# Patient Record
Sex: MALE | Race: WHITE | Employment: OTHER | URBAN - METROPOLITAN AREA
[De-identification: names, ages, dates, MRNs, and addresses within clinical notes are randomized per-mention and may not be internally consistent; named-entity substitution may affect disease eponyms.]

---

## 2017-01-02 ENCOUNTER — ALLIED HEALTH/NURSE VISIT (OUTPATIENT)
Dept: PEDIATRICS | Facility: CLINIC | Age: 82
End: 2017-01-02
Payer: COMMERCIAL

## 2017-01-02 VITALS — DIASTOLIC BLOOD PRESSURE: 74 MMHG | SYSTOLIC BLOOD PRESSURE: 145 MMHG | HEART RATE: 81 BPM

## 2017-01-02 DIAGNOSIS — I10 HTN (HYPERTENSION): Primary | ICD-10-CM

## 2017-01-02 PROCEDURE — 99207 ZZC NO CHARGE NURSE ONLY: CPT

## 2017-01-02 NOTE — PROGRESS NOTES
In for blood pressure check.    Today's reading: /74 mmHg  Pulse 81     History   Smoking status     Never Smoker    Smokeless tobacco     Never Used       Current Outpatient Prescriptions   Medication Sig     ACCU-CHEK SMARTVIEW test strip TEST BLOOD SUGAR TWICE DAILY  OR AS DIRECTED     blood glucose monitoring (NO BRAND SPECIFIED) test strip 1 strip by In Vitro route 2 times daily     chlorthalidone (HYGROTON) 25 MG tablet Take 1 tablet (25 mg) by mouth daily     metFORMIN (GLUCOPHAGE-XR) 500 MG 24 hr tablet Take 1 tablet (500 mg) by mouth 2 times daily (with meals)     chlorthalidone (HYGROTON) 25 MG tablet Take 1 tablet (25 mg) by mouth daily     tamsulosin (FLOMAX) 0.4 MG capsule Take 1 capsule (0.4 mg) by mouth daily     levothyroxine (SYNTHROID/LEVOTHROID) 50 MCG tablet TAKE 1 TABLET EVERY DAY     order for DME Equipment being ordered: HAPAD Metatarsal pad, QTY:2     capsaicin (ZOSTRIX) 0.025 % CREA To feet 2-3 times daily as needed.     ciclopirox (LOPROX) 0.77 % cream Apply topically 2 times daily To toenails.     blood glucose monitoring (ACCU-CHEK FASTCLIX) lancets TEST BLOOD SUGAR TWICE DAILY  OR AS DIRECTED     losartan (COZAAR) 100 MG tablet Take 1 tablet (100 mg) by mouth daily     blood glucose calibration (ACCU-CHEK SMARTVIEW CONTROL) solution 1 Bottle as needed Use to calibrate blood glucose monitor as needed as directed.     blood glucose monitoring (ACCU-CHEK MILADYS SMARTVIEW) meter device kit Use to test blood sugar 2 times daily or as directed.     STATIN NOT PRESCRIBED, INTENTIONAL, 1 each continuous Statin not prescribed intentionally due to does not meet [place] criteria and Other: LDL at goal without the statin.     aspirin 81 MG tablet Take 1 tablet (81 mg) by mouth daily     Omega-3 Fatty Acids (FISH OIL PO) Take  by mouth daily.     No current facility-administered medications for this visit.        He is having his blood pressure monitored because it has been mildly elevated  and has not been on medication.    Milo has had the following symptoms: none    Patient was informed that his two readings from day will be sent to Dr. Ibarra for review and he will receive a call with Dr. Ibarra's instructions. Patient states that he is leaving for [place] on Wednesday.      Estefanía Ocampo CMA

## 2017-03-01 ENCOUNTER — TELEPHONE (OUTPATIENT)
Dept: PEDIATRICS | Facility: CLINIC | Age: 82
End: 2017-03-01

## 2017-03-01 DIAGNOSIS — I10 HYPERTENSION GOAL BP (BLOOD PRESSURE) < 140/90: ICD-10-CM

## 2017-03-01 DIAGNOSIS — G47.33 OSA (OBSTRUCTIVE SLEEP APNEA): ICD-10-CM

## 2017-03-01 DIAGNOSIS — E03.4 HYPOTHYROIDISM DUE TO ACQUIRED ATROPHY OF THYROID: ICD-10-CM

## 2017-03-01 DIAGNOSIS — N40.0 BPH (BENIGN PROSTATIC HYPERPLASIA): ICD-10-CM

## 2017-03-01 NOTE — TELEPHONE ENCOUNTER
[facility] Call Center    Phone Message    Name of Caller: Milo     Phone Number:     [phone]-     Best time to return call: Any    May a detailed message be left on voicemail: yes    Relation to patient: Self    Reason for Call: Medication Refill Request    Has the patient contacted the pharmacy for the refill? Yes   Name of medication being requested: hydrochlorothiazide (HYDRODIURIL) 25 MG tablet [46883] (Order [id number]); tamsulosin (FLOMAX) 0.4 MG capsule [690399] (Order [id number]) levothyroxine (SYNTHROID/LEVOTHROID) 50 MCG tablet [85072] (Order [id number]) losartan (COZAAR) 100 MG tablet [08156] (Order [id number])     Provider who prescribed the medication: Dr. Ibarra  Ph[facility]acy: [facility]   Date medication is needed: asap       Question or concern regarding medication   Prescription Clarification:   Name of Medication: hydrochlorothiazide (HYDRODIURIL) 25 MG tablet [99468] (Order [id number])  Prescribing Provider: Dr. Ibarra   Pharmacy: [facility]    What on the order needs clarification?     Is patient symptomatic? No. Describe question or concern: Patient has questions about hydrochlorothiazide (HYDRODIURIL) 25 MG tablet [36302] (Order [id number])- He is currently taking the Rx and wondering if he should continue, if so will need refills. Please advise.       Action Taken: Message routed to:  Primary Care p [phone]

## 2017-03-03 RX ORDER — LOSARTAN POTASSIUM 100 MG/1
100 TABLET ORAL DAILY
Qty: 90 TABLET | Refills: 0 | Status: SHIPPED | OUTPATIENT
Start: 2017-03-03 | End: 2017-05-02

## 2017-03-03 RX ORDER — LEVOTHYROXINE SODIUM 50 UG/1
TABLET ORAL
Qty: 90 TABLET | Refills: 1 | Status: SHIPPED | OUTPATIENT
Start: 2017-03-03 | End: 2017-07-10

## 2017-03-03 RX ORDER — TAMSULOSIN HYDROCHLORIDE 0.4 MG/1
0.4 CAPSULE ORAL DAILY
Qty: 90 CAPSULE | Refills: 1 | Status: SHIPPED | OUTPATIENT
Start: 2017-03-03 | End: 2017-07-12

## 2017-03-03 NOTE — TELEPHONE ENCOUNTER
Spoke to patient- He reports that he has been taking both HCTZ and Chlorthalidone for about 1 week. He will discontinue the HCTZ. He will check his BP and call us with a reading next week as he was due for a re check in 2 weeks from last visit. He is currently in [place] until May.     Message will be postponed to follow up next week for a home BP reading.     Viki Chowdhury RN, AE-C

## 2017-03-03 NOTE — TELEPHONE ENCOUNTER
Due for a BP check. He should not be taking HCTZ but using chlorthalidone (HYGROTON) 25 MG tablet instead.     Called patient and he was unavailable. I will try back shortly.     Potassium   Date Value Ref Range Status   07/05/2016 4.0 3.4 - 5.3 mmol/L Final     Creatinine   Date Value Ref Range Status   07/05/2016 1.20 0.66 - 1.25 mg/dL Final     BP Readings from Last 3 Encounters:   01/02/17 145/74   12/19/16 144/72   11/07/16 181/74

## 2017-03-06 PROBLEM — G47.33 OSA (OBSTRUCTIVE SLEEP APNEA): Status: ACTIVE | Noted: 2017-03-06

## 2017-03-06 NOTE — TELEPHONE ENCOUNTER
Milo called this am stating he is in [place] and his CPAP machine .  He is wondering if we can send over a copy of his last sleep study?  I could not find a copy of a sleep study so I will let him know when I call him back.   He also needs a written order from Dr. Ibarra for a new CPAP machine.      Phone # [phone]    Routed to Dr. Fred Mendes CMA

## 2017-03-06 NOTE — TELEPHONE ENCOUNTER
Patient returned call to clinic and advised of provider note. Patient states he can't recall where he was seen for sleep study. It was probably done sometime around 1999. No records in chart and patient states he hasn't been seen for any further evaluation since he was given machine. Patient will try to find info of where he was seen.  Ernst Calvillo, CMA

## 2017-03-06 NOTE — TELEPHONE ENCOUNTER
We do not have DEMOND as a diagnosis for him. Where and when he was diagnosed? Typically the sleep center providers writes the CPAP because they have the sleep study result and the CPAP setting and writes for the machine. Can he contact the sleep center where he was diagnosed and treated?

## 2017-03-08 NOTE — TELEPHONE ENCOUNTER
Tried calling patient to see if he was able to get help with ordering a new CPAP machine.  Phone line busy.  Will try call again later.    Viviana Mendes CMA

## 2017-03-08 NOTE — TELEPHONE ENCOUNTER
I called patient to see if he was able to get a CPAP machine.  He states he called [employer] today and they gave him the name of a provider to which he has an appointment with on 03/14/17.  He states he was told by [employer] he has to see a provider face to face.  Patient will call back if further concerns.    Viviana Mendes CMA

## 2017-05-02 DIAGNOSIS — I10 HYPERTENSION GOAL BP (BLOOD PRESSURE) < 140/90: ICD-10-CM

## 2017-05-03 RX ORDER — LOSARTAN POTASSIUM 100 MG/1
TABLET ORAL
Qty: 60 TABLET | Refills: 0 | Status: SHIPPED | OUTPATIENT
Start: 2017-05-03 | End: 2017-07-12

## 2017-05-03 RX ORDER — CHLORTHALIDONE 25 MG/1
TABLET ORAL
Qty: 60 TABLET | Refills: 0 | Status: SHIPPED | OUTPATIENT
Start: 2017-05-03 | End: 2017-12-22

## 2017-05-03 NOTE — TELEPHONE ENCOUNTER
losartan (COZAAR) 100 MG tablet  Last Written Prescription Date: 3/3/2017  Last Fill Quantity: 90, # refills: 0  Last Office Visit with [facility], [facility] or [facility] prescribing provider: 12/19/2016     Potassium   Date Value Ref Range Status   07/05/2016 4.0 3.4 - 5.3 mmol/L Final     Creatinine   Date Value Ref Range Status   07/05/2016 1.20 0.66 - 1.25 mg/dL Final     BP Readings from Last 3 Encounters:   01/02/17 145/74   12/19/16 144/72   11/07/16 181/74       chlorthalidone (HYGROTON) 25 MG tablet  Last Written Prescription Date: 12/19/2016  Last Fill Quantity: 90, # refills: 1  Last Office Visit with [facility], [facility] or [facility] prescribing provider: 12/19/2016     Potassium   Date Value Ref Range Status   07/05/2016 4.0 3.4 - 5.3 mmol/L Final     Creatinine   Date Value Ref Range Status   07/05/2016 1.20 0.66 - 1.25 mg/dL Final     BP Readings from Last 3 Encounters:   01/02/17 145/74   12/19/16 144/72   11/07/16 181/74     Refilled per [facility] protocol.    Azucena Castle RN

## 2017-06-26 DIAGNOSIS — E11.9 CONTROLLED TYPE 2 DIABETES MELLITUS WITHOUT COMPLICATION, WITHOUT LONG-TERM CURRENT USE OF INSULIN (H): ICD-10-CM

## 2017-06-26 NOTE — LETTER
June 27, 2017      Milo Matthewabiliomarlyn  [address] [address]              Dear Milo,    Thank you for your refill request. We recently received a call from your pharmacy requesting a refill of your medication. We have provided a 30 day refill of your medication, metFORMIN (GLUCOPHAGE-XR) 500 MG 24 hr tablet, as requested.      However, our records show it is time for laboratory monitoring and diabetes follow up visit with Dr. Ibarra to ensure your treatment is safe and effective for you.  Labs: A1C    Regular follow up, including visits with your health care provider and laboratory monitoring are necessary to make sure your medication is working properly.    You can reach us at [phone] or schedule online via [employer].    Thank you for taking an active role in your healthcare.    Sincerely,  Azucena Castle RN,   [facility]            Sincerely,  Laurence Ibarra MD

## 2017-06-27 RX ORDER — METFORMIN HCL 500 MG
TABLET, EXTENDED RELEASE 24 HR ORAL
Qty: 60 TABLET | Refills: 0 | Status: SHIPPED | OUTPATIENT
Start: 2017-06-27 | End: 2017-07-12

## 2017-06-27 NOTE — TELEPHONE ENCOUNTER
metFORMIN (GLUCOPHAGE-XR) 500 MG 24 hr tablet     Last Written Prescription Date: 12/19/2016  Last Fill Quantity: 180, # refills: 1  Last Office Visit with [facility], [facility] or [facility] prescribing provider:  12/19/2016        BP Readings from Last 3 Encounters:   01/02/17 145/74   12/19/16 144/72   11/07/16 181/74     Lab Results   Component Value Date    MICROL 32 07/05/2016     Lab Results   Component Value Date    UMALCR 20.79 07/05/2016     Creatinine   Date Value Ref Range Status   07/05/2016 1.20 0.66 - 1.25 mg/dL Final   ]  GFR Estimate   Date Value Ref Range Status   07/05/2016 58 (L) >60 mL/min/1.7m2 Final     Comment:     Non  GFR Calc   06/22/2015 68 >60 mL/min/1.7m2 Final     Comment:     Non  GFR Calc   06/23/2014 66 >60 mL/min/1.7m2 Final     GFR Estimate If Black   Date Value Ref Range Status   07/05/2016 71 >60 mL/min/1.7m2 Final     Comment:      GFR Calc   06/22/2015 83 >60 mL/min/1.7m2 Final     Comment:      GFR Calc   06/23/2014 79 >60 mL/min/1.7m2 Final     Lab Results   Component Value Date    CHOL 137 12/19/2016     Lab Results   Component Value Date    HDL 35 12/19/2016     Lab Results   Component Value Date    LDL 52 12/19/2016     Lab Results   Component Value Date    TRIG 251 12/19/2016     Lab Results   Component Value Date    CHOLHDLRATIO 3.8 06/22/2015     Lab Results   Component Value Date    AST 47 06/23/2014     Lab Results   Component Value Date    ALT 49 07/05/2016     Lab Results   Component Value Date    A1C 5.8 10/17/2016    A1C 6.7 07/05/2016    A1C 6.3 09/29/2015    A1C 5.8 06/22/2015    A1C 5.7 12/16/2014     Potassium   Date Value Ref Range Status   07/05/2016 4.0 3.4 - 5.3 mmol/L Final     Refilled 1 month per [facility] protocol. Letter sent to patient indicating this as well as the need to schedule office visit with Dr. Ibarra and laboratory monitoring for A1C    Azucena Castle RN

## 2017-07-03 DIAGNOSIS — E11.9 DIABETES TYPE 2, CONTROLLED (H): ICD-10-CM

## 2017-07-05 RX ORDER — LANCETS
EACH MISCELLANEOUS
Qty: 204 EACH | Refills: 0 | Status: SHIPPED | OUTPATIENT
Start: 2017-07-05 | End: 2017-12-27

## 2017-07-05 NOTE — TELEPHONE ENCOUNTER
blood glucose monitoring (ACCU-CHEK FASTCLIX) lancets      Last Written Prescription Date: 5/3/2016  Last Fill Quantity: 3 Box,  # refills: 3   Last Office Visit with [facility], [facility] or [facility] prescribing provider: 12/19/2016                                         Next 5 appointments (look out 90 days)     Jul 12, 2017  1:40 PM CDT   Return Visit with Laurence Ibarra MD   [facility]    [address] [place] [address] [phone]                Refilled per [facility] protocol.    Azucena Castle RN

## 2017-07-10 DIAGNOSIS — N40.0 BENIGN PROSTATIC HYPERPLASIA WITHOUT LOWER URINARY TRACT SYMPTOMS: ICD-10-CM

## 2017-07-10 DIAGNOSIS — E03.4 HYPOTHYROIDISM DUE TO ACQUIRED ATROPHY OF THYROID: ICD-10-CM

## 2017-07-12 ENCOUNTER — OFFICE VISIT (OUTPATIENT)
Dept: PEDIATRICS | Facility: CLINIC | Age: 82
End: 2017-07-12
Payer: COMMERCIAL

## 2017-07-12 VITALS
HEIGHT: 68 IN | WEIGHT: 182 LBS | HEART RATE: 81 BPM | TEMPERATURE: 97.6 F | DIASTOLIC BLOOD PRESSURE: 66 MMHG | SYSTOLIC BLOOD PRESSURE: 128 MMHG | BODY MASS INDEX: 27.58 KG/M2 | OXYGEN SATURATION: 96 %

## 2017-07-12 DIAGNOSIS — I10 HYPERTENSION GOAL BP (BLOOD PRESSURE) < 140/90: ICD-10-CM

## 2017-07-12 DIAGNOSIS — N40.0 BENIGN PROSTATIC HYPERPLASIA WITHOUT LOWER URINARY TRACT SYMPTOMS: ICD-10-CM

## 2017-07-12 DIAGNOSIS — D64.9 ANEMIA, UNSPECIFIED TYPE: ICD-10-CM

## 2017-07-12 DIAGNOSIS — E11.9 CONTROLLED TYPE 2 DIABETES MELLITUS WITHOUT COMPLICATION, WITHOUT LONG-TERM CURRENT USE OF INSULIN (H): ICD-10-CM

## 2017-07-12 DIAGNOSIS — N18.30 CONTROLLED TYPE 2 DIABETES MELLITUS WITH STAGE 3 CHRONIC KIDNEY DISEASE, WITHOUT LONG-TERM CURRENT USE OF INSULIN (H): Primary | ICD-10-CM

## 2017-07-12 DIAGNOSIS — C61 PROSTATE CANCER (H): ICD-10-CM

## 2017-07-12 DIAGNOSIS — E11.22 CONTROLLED TYPE 2 DIABETES MELLITUS WITH STAGE 3 CHRONIC KIDNEY DISEASE, WITHOUT LONG-TERM CURRENT USE OF INSULIN (H): Primary | ICD-10-CM

## 2017-07-12 DIAGNOSIS — E11.42 DIABETIC POLYNEUROPATHY ASSOCIATED WITH TYPE 2 DIABETES MELLITUS (H): ICD-10-CM

## 2017-07-12 DIAGNOSIS — E03.4 HYPOTHYROIDISM DUE TO ACQUIRED ATROPHY OF THYROID: ICD-10-CM

## 2017-07-12 LAB
ALBUMIN UR-MCNC: 10 MG/DL
ANION GAP SERPL CALCULATED.3IONS-SCNC: 8 MMOL/L (ref 3–14)
APPEARANCE UR: CLEAR
BILIRUB UR QL STRIP: NEGATIVE
BUN SERPL-MCNC: 29 MG/DL (ref 7–30)
CALCIUM SERPL-MCNC: 9.2 MG/DL (ref 8.5–10.1)
CHLORIDE SERPL-SCNC: 103 MMOL/L (ref 94–109)
CO2 SERPL-SCNC: 28 MMOL/L (ref 20–32)
COLOR UR AUTO: YELLOW
CREAT SERPL-MCNC: 1.32 MG/DL (ref 0.66–1.25)
CREAT UR-MCNC: 176 MG/DL
ERYTHROCYTE [DISTWIDTH] IN BLOOD BY AUTOMATED COUNT: 14.7 % (ref 10–15)
FERRITIN SERPL-MCNC: 412 NG/ML (ref 26–388)
GFR SERPL CREATININE-BSD FRML MDRD: 52 ML/MIN/1.7M2
GLUCOSE SERPL-MCNC: 177 MG/DL (ref 70–99)
GLUCOSE UR STRIP-MCNC: 70 MG/DL
HBA1C MFR BLD: 5.7 % (ref 4.3–6)
HCT VFR BLD AUTO: 34.3 % (ref 40–53)
HGB BLD-MCNC: 11.8 G/DL (ref 13.3–17.7)
HGB BLD-MCNC: 12.2 G/DL (ref 13.3–17.7)
HGB UR QL STRIP: NEGATIVE
IRON SATN MFR SERPL: 33 % (ref 15–46)
IRON SERPL-MCNC: 88 UG/DL (ref 35–180)
KETONES UR STRIP-MCNC: NEGATIVE MG/DL
LEUKOCYTE ESTERASE UR QL STRIP: NEGATIVE
MCH RBC QN AUTO: 33.1 PG (ref 26.5–33)
MCHC RBC AUTO-ENTMCNC: 35.6 G/DL (ref 31.5–36.5)
MCV RBC AUTO: 93 FL (ref 78–100)
MICROALBUMIN UR-MCNC: 25 MG/L
MICROALBUMIN/CREAT UR: 13.98 MG/G CR (ref 0–17)
NITRATE UR QL: NEGATIVE
NON-SQ EPI CELLS #/AREA URNS LPF: ABNORMAL /LPF
PH UR STRIP: 7 PH (ref 5–7)
PLATELET # BLD AUTO: 161 10E9/L (ref 150–450)
POTASSIUM SERPL-SCNC: 3.8 MMOL/L (ref 3.4–5.3)
PSA SERPL-MCNC: NORMAL UG/L (ref 0–4)
RBC # BLD AUTO: 3.69 10E12/L (ref 4.4–5.9)
RBC #/AREA URNS AUTO: ABNORMAL /HPF (ref 0–2)
RETICS # AUTO: 156.5 10E9/L (ref 25–95)
RETICS/RBC NFR AUTO: 4.2 % (ref 0.5–2)
SODIUM SERPL-SCNC: 139 MMOL/L (ref 133–144)
SP GR UR STRIP: 1.02 (ref 1–1.03)
TIBC SERPL-MCNC: 264 UG/DL (ref 240–430)
TSH SERPL DL<=0.005 MIU/L-ACNC: 2.44 MU/L (ref 0.4–4)
URN SPEC COLLECT METH UR: ABNORMAL
UROBILINOGEN UR STRIP-MCNC: NORMAL MG/DL (ref 0–2)
VIT B12 SERPL-MCNC: 494 PG/ML (ref 193–986)
WBC # BLD AUTO: 6 10E9/L (ref 4–11)
WBC #/AREA URNS AUTO: ABNORMAL /HPF (ref 0–2)

## 2017-07-12 PROCEDURE — 83036 HEMOGLOBIN GLYCOSYLATED A1C: CPT | Performed by: INTERNAL MEDICINE

## 2017-07-12 PROCEDURE — 81001 URINALYSIS AUTO W/SCOPE: CPT | Performed by: INTERNAL MEDICINE

## 2017-07-12 PROCEDURE — 99214 OFFICE O/P EST MOD 30 MIN: CPT | Performed by: INTERNAL MEDICINE

## 2017-07-12 PROCEDURE — 83550 IRON BINDING TEST: CPT | Performed by: INTERNAL MEDICINE

## 2017-07-12 PROCEDURE — 80048 BASIC METABOLIC PNL TOTAL CA: CPT | Performed by: INTERNAL MEDICINE

## 2017-07-12 PROCEDURE — 85018 HEMOGLOBIN: CPT | Performed by: INTERNAL MEDICINE

## 2017-07-12 PROCEDURE — 84153 ASSAY OF PSA TOTAL: CPT | Performed by: INTERNAL MEDICINE

## 2017-07-12 PROCEDURE — 36415 COLL VENOUS BLD VENIPUNCTURE: CPT | Performed by: INTERNAL MEDICINE

## 2017-07-12 PROCEDURE — 82043 UR ALBUMIN QUANTITATIVE: CPT | Performed by: INTERNAL MEDICINE

## 2017-07-12 PROCEDURE — 82728 ASSAY OF FERRITIN: CPT | Performed by: INTERNAL MEDICINE

## 2017-07-12 PROCEDURE — 84443 ASSAY THYROID STIM HORMONE: CPT | Performed by: INTERNAL MEDICINE

## 2017-07-12 PROCEDURE — 82607 VITAMIN B-12: CPT | Performed by: INTERNAL MEDICINE

## 2017-07-12 PROCEDURE — 83540 ASSAY OF IRON: CPT | Performed by: INTERNAL MEDICINE

## 2017-07-12 PROCEDURE — 85045 AUTOMATED RETICULOCYTE COUNT: CPT | Performed by: INTERNAL MEDICINE

## 2017-07-12 PROCEDURE — 85027 COMPLETE CBC AUTOMATED: CPT | Performed by: INTERNAL MEDICINE

## 2017-07-12 RX ORDER — METFORMIN HCL 500 MG
TABLET, EXTENDED RELEASE 24 HR ORAL
Qty: 180 TABLET | Refills: 1 | Status: SHIPPED | OUTPATIENT
Start: 2017-07-12 | End: 2017-11-25

## 2017-07-12 RX ORDER — CHLORTHALIDONE 25 MG/1
25 TABLET ORAL DAILY
Qty: 90 TABLET | Refills: 3 | Status: SHIPPED | OUTPATIENT
Start: 2017-07-12 | End: 2018-10-21

## 2017-07-12 RX ORDER — LOSARTAN POTASSIUM 100 MG/1
TABLET ORAL
Qty: 90 TABLET | Refills: 3 | Status: SHIPPED | OUTPATIENT
Start: 2017-07-12 | End: 2018-06-01

## 2017-07-12 RX ORDER — LEVOTHYROXINE SODIUM 50 UG/1
TABLET ORAL
Qty: 90 TABLET | Refills: 1 | Status: CANCELLED | OUTPATIENT
Start: 2017-07-12

## 2017-07-12 RX ORDER — TAMSULOSIN HYDROCHLORIDE 0.4 MG/1
0.4 CAPSULE ORAL DAILY
Qty: 90 CAPSULE | Refills: 3 | Status: SHIPPED | OUTPATIENT
Start: 2017-07-12 | End: 2018-09-26

## 2017-07-12 NOTE — MR AVS SNAPSHOT
After Visit Summary   7/12/2017    Milo Lozano    MRN: [id number]           Patient Information     Date Of Birth          1935        Visit Information        Provider Department      7/12/2017 1:40 PM Laurence Ibarra MD [facility]        Today's Diagnoses     Controlled type 2 diabetes mellitus without complication, without long-term current use of insulin (H)    -  1    Hypertension goal BP (blood pressure) < 140/90        Prostate cancer (H)        Hypothyroidism due to acquired atrophy of thyroid        Benign prostatic hyperplasia without lower urinary tract symptoms        Anemia, unspecified type          Care Instructions    Make appointment(s) for:   -- get labs done today  -- Stop by lab to get testing kit for blood loss   -- diabetes follow up in 6 months.         Medication(s) prescribed today:    Orders Placed This Encounter   Medications     metFORMIN (GLUCOPHAGE-XR) 500 MG 24 hr tablet     Sig: TAKE 1 TABLET TWICE DAILY WITH MEALS     Dispense:  180 tablet     Refill:  1     losartan (COZAAR) 100 MG tablet     Sig: TAKE 1 TABLET EVERY DAY     Dispense:  90 tablet     Refill:  3     tamsulosin (FLOMAX) 0.4 MG capsule     Sig: Take 1 capsule (0.4 mg) by mouth daily     Dispense:  90 capsule     Refill:  3     chlorthalidone (HYGROTON) 25 MG tablet     Sig: Take 1 tablet (25 mg) by mouth daily     Dispense:  90 tablet     Refill:  3                     Follow-ups after your visit        Future tests that were ordered for you today     Open Future Orders        Priority Expected Expires Ordered    Occult blood fecal HGB immuno Routine  8/11/2017 7/12/2017            Who to contact     If you have questions or need follow up information about today's clinic visit or your schedule please contact [facility] directly at [phone].  Normal or non-critical lab and imaging results will be communicated to you by MyChart, letter or phone within 4 business  "days after the clinic has received the results. If you do not hear from us within 7 days, please contact the clinic through [employer] or phone. If you have a critical or abnormal lab result, we will notify you by phone as soon as possible.  Submit refill requests through [employer] or call your pharmacy and they will forward the refill request to us. Please allow 3 business days for your refill to be completed.          Additional Information About Your Visit        [employer] Information     [employer] is an electronic gateway that provides easy, online access to your medical records. With [employer], you can request a clinic appointment, read your test results, renew a prescription or communicate with your care team.     To sign up for [employer] visit the website at [email or web address]   You will be asked to enter the access code listed below, as well as some personal information. Please follow the directions to create your username and password.     Your access code is: [email or web address]  Expires: 10/10/2017  1:53 PM     Your access code will  in 90 days. If you need help or a new code, please contact your [facility] or call [phone] for assistance.        Care EveryWhere ID     This is your Care EveryWhere ID. This could be used by other organizations to access your [facility] medical records  [id number]        Your Vitals Were     Pulse Temperature Height Pulse Oximetry BMI (Body Mass Index)       81 97.6  F (36.4  C) (Temporal) 5' 8\" (1.727 m) 96% 27.67 kg/m2        Blood Pressure from Last 3 Encounters:   17 128/66   17 145/74   16 144/72    Weight from Last 3 Encounters:   17 182 lb (82.6 kg)   16 187 lb (84.8 kg)   16 188 lb (85.3 kg)              We Performed the Following     Albumin Random Urine Quantitative     Basic metabolic panel     CBC with platelets     Hemoglobin A1c     Iron and iron binding capacity     PSA, tumor marker     " Reticulocyte count     TSH with free T4 reflex     UA with Microscopic reflex to Culture (Emely Trammell; [facility])     Vitamin B12          Today's Medication Changes          These changes are accurate as of: 7/12/17  1:53 PM.  If you have any questions, ask your nurse or doctor.               These medicines have changed or have updated prescriptions.        Dose/Directions    losartan 100 MG tablet   Commonly known as:  COZAAR   This may have changed:  See the new instructions.   Used for:  Hypertension goal BP (blood pressure) < 140/90   Changed by:  Laurence Ibarra MD        TAKE 1 TABLET EVERY DAY   Quantity:  90 tablet   Refills:  3       metFORMIN 500 MG 24 hr tablet   Commonly known as:  GLUCOPHAGE-XR   This may have changed:  See the new instructions.   Used for:  Controlled type 2 diabetes mellitus without complication, without long-term current use of insulin (H)   Changed by:  Laurence Ibarra MD        TAKE 1 TABLET TWICE DAILY WITH MEALS   Quantity:  180 tablet   Refills:  1            Where to get your medicines      These medications were sent to [facility] - [facility] [address]  [address], [facility] [address]     Phone:  [phone]     chlorthalidone 25 MG tablet    losartan 100 MG tablet    metFORMIN 500 MG 24 hr tablet    tamsulosin 0.4 MG capsule                Primary Care Provider Office Phone # Fax #    Laurence Ibarra [phone]       [facility] [address]  [facility] [address]        Equal Access to Services     TONNY HANSON AH: Hadii nikole wang hadasho Soomaali, waaxda luqadaha, qaybta kaalmada adekath, tip claudio. So [facility] [phone].    ATENCIÓN: Si habla español, tiene a raygoza disposición servicios gratuitos de asistencia lingüística. Calista al [phone].    We comply with applicable federal civil rights laws and [place] laws. We do not discriminate on the basis of race, color, national  origin, age, disability sex, sexual orientation or gender identity.            Thank you!     Thank you for choosing [facility] [[email or web address]]  for your care. Our goal is always to provide you with excellent care. Hearing back from our patients is one way we can continue to improve our services. Please take a few minutes to complete the written survey that you may receive in the mail after your visit with us. Thank you!             Your Updated Medication List - Protect others around you: Learn how to safely use, store and throw away your medicines at www.disposemymeds.org.          This list is accurate as of: 7/12/17  1:53 PM.  Always use your most recent med list.                   Brand Name Dispense Instructions for use Diagnosis    * ACCU-CHEK SMARTVIEW test strip   Generic drug:  blood glucose monitoring     200 strip    TEST BLOOD SUGAR TWICE DAILY  OR AS DIRECTED    Type 2 diabetes mellitus without complication, without long-term current use of insulin (H)       * blood glucose monitoring test strip    no brand specified    200 strip    1 strip by In Vitro route 2 times daily    Type 2 diabetes mellitus without complication, without long-term current use of insulin (H)       aspirin 81 MG tablet     90 tablet    Take 1 tablet (81 mg) by mouth daily    Type 2 diabetes, HbA1c goal < 7% (H), Hypertension goal BP (blood pressure) < 140/90, CARDIOVASCULAR SCREENING; LDL GOAL LESS THAN 100       blood glucose calibration solution     1 each    1 Bottle as needed Use to calibrate blood glucose monitor as needed as directed.    Type 2 diabetes, HbA1c goal < 7% (H)       blood glucose monitoring lancets     204 each    TEST BLOOD SUGAR TWICE DAILY  OR AS DIRECTED    Diabetes type 2, controlled (H)       blood glucose monitoring meter device kit     1 kit    Use to test blood sugar 2 times daily or as directed.    Type 2 diabetes, HbA1C goal < 8% (H)       capsaicin 0.025 % Crea cream    ZOSTRIX    60 g    To feet  2-3 times daily as needed.    Diabetic neuropathy with neurologic complication (H)       * chlorthalidone 25 MG tablet    HYGROTON    90 tablet    Take 1 tablet (25 mg) by mouth daily    Hypertension goal BP (blood pressure) < 140/90       * chlorthalidone 25 MG tablet    HYGROTON    60 tablet    TAKE 1 TABLET EVERY DAY    Hypertension goal BP (blood pressure) < 140/90       * chlorthalidone 25 MG tablet    HYGROTON    90 tablet    Take 1 tablet (25 mg) by mouth daily    Hypertension goal BP (blood pressure) < 140/90       ciclopirox 0.77 % cream    LOPROX    90 g    Apply topically 2 times daily To toenails.    Dermatophytosis of nail       FISH OIL PO      Take  by mouth daily.        levothyroxine 50 MCG tablet    SYNTHROID/LEVOTHROID    90 tablet    TAKE 1 TABLET EVERY DAY    Hypothyroidism due to acquired atrophy of thyroid       losartan 100 MG tablet    COZAAR    90 tablet    TAKE 1 TABLET EVERY DAY    Hypertension goal BP (blood pressure) < 140/90       metFORMIN 500 MG 24 hr tablet    GLUCOPHAGE-XR    180 tablet    TAKE 1 TABLET TWICE DAILY WITH MEALS    Controlled type 2 diabetes mellitus without complication, without long-term current use of insulin (H)       order for DME     2 Device    Equipment being ordered: HAPAD Metatarsal pad, QTY:2    Diabetic neuropathy with neurologic complication (H)       STATIN NOT PRESCRIBED (INTENTIONAL)     0 each    1 each continuous Statin not prescribed intentionally due to does not meet [place] criteria and Other: LDL at goal without the statin.    CARDIOVASCULAR SCREENING; LDL GOAL LESS THAN 100       tamsulosin 0.4 MG capsule    FLOMAX    90 capsule    Take 1 capsule (0.4 mg) by mouth daily    Benign prostatic hyperplasia without lower urinary tract symptoms       * Notice:  This list has 5 medication(s) that are the same as other medications prescribed for you. Read the directions carefully, and ask your doctor or other care provider to review them with you.

## 2017-07-12 NOTE — PATIENT INSTRUCTIONS
Make appointment(s) for:   -- get labs done today  -- Stop by lab to get testing kit for blood loss   -- diabetes follow up in 6 months.         Medication(s) prescribed today:    Orders Placed This Encounter   Medications     metFORMIN (GLUCOPHAGE-XR) 500 MG 24 hr tablet     Sig: TAKE 1 TABLET TWICE DAILY WITH MEALS     Dispense:  180 tablet     Refill:  1     losartan (COZAAR) 100 MG tablet     Sig: TAKE 1 TABLET EVERY DAY     Dispense:  90 tablet     Refill:  3     tamsulosin (FLOMAX) 0.4 MG capsule     Sig: Take 1 capsule (0.4 mg) by mouth daily     Dispense:  90 capsule     Refill:  3     chlorthalidone (HYGROTON) 25 MG tablet     Sig: Take 1 tablet (25 mg) by mouth daily     Dispense:  90 tablet     Refill:  3

## 2017-07-12 NOTE — PROGRESS NOTES
SUBJECTIVE:                                                    Milo Lozano is a 82 year old male who presents to clinic today for the following health issues:      Diabetes Follow-up      Patient is checking blood sugars: down loaded    Diabetic concerns: blood sugar frequently over 200     Symptoms of hypoglycemia (low blood sugar): none     Paresthesias (numbness or burning in feet) or sores: Yes      Date of last diabetic eye exam: DUE      Amount of exercise or physical activity: 6-7 days/week for an average of less than 15 minutes    Problems taking medications regularly: No    Medication side effects: none    Diet: regular (no restrictions)      Average glucose 173 (151-213).    Getting Relief laser treatment for neuropathy in both feet. Half way through treatment. Not sure if it helps yet.       Current Outpatient Prescriptions on File Prior to Visit:  aspirin 81 MG tablet Take 1 tablet (81 mg) by mouth daily   Omega-3 Fatty Acids (FISH OIL PO) Take  by mouth daily.   levothyroxine (SYNTHROID/LEVOTHROID) 50 MCG tablet TAKE 1 TABLET EVERY DAY   blood glucose monitoring (ACCU-CHEK FASTCLIX) lancets TEST BLOOD SUGAR TWICE DAILY  OR AS DIRECTED   chlorthalidone (HYGROTON) 25 MG tablet TAKE 1 TABLET EVERY DAY   ACCU-CHEK SMARTVIEW test strip TEST BLOOD SUGAR TWICE DAILY  OR AS DIRECTED   blood glucose monitoring (NO BRAND SPECIFIED) test strip 1 strip by In Vitro route 2 times daily   chlorthalidone (HYGROTON) 25 MG tablet Take 1 tablet (25 mg) by mouth daily   order for DME Equipment being ordered: HAPAD Metatarsal pad, QTY:2   capsaicin (ZOSTRIX) 0.025 % CREA To feet 2-3 times daily as needed.   ciclopirox (LOPROX) 0.77 % cream Apply topically 2 times daily To toenails.   blood glucose calibration (ACCU-CHEK SMARTVIEW CONTROL) solution 1 Bottle as needed Use to calibrate blood glucose monitor as needed as directed.   blood glucose monitoring (ACCU-CHEK MILADYS SMARTVIEW) meter device kit Use to test blood  "sugar 2 times daily or as directed.   STATIN NOT PRESCRIBED, INTENTIONAL, 1 each continuous Statin not prescribed intentionally due to does not meet [place] criteria and Other: LDL at goal without the statin.     No current facility-administered medications on file prior to visit.       Problem list, Medication list, Allergies, and Medical/Social/Surgical histories reviewed in [facility] and updated as appropriate.    OBJECTIVE:                                                    /66 (Cuff Size: Adult Regular)  Pulse 81  Temp 97.6  F (36.4  C) (Temporal)  Ht 5' 8\" (1.727 m)  Wt 182 lb (82.6 kg)  SpO2 96%  BMI 27.67 kg/m2    GEN: healthy, alert and no distress  HEENT: unremarkable.  Neck:     supple, no thyromegaly, no cervical lymphadenopathy.   JACKSON:  CTA B/L, no wheezing or crackles.  CV:  RRR no murmur.  Intact distal pulses, good cap refill.  Abd: soft, normal active bowel sounds, non tender, non distended. No mass or organomegaly.   Ext:  no cyanosis, clubbing or edema.         Diagnostic test results:  Component      Latest Ref Rng & Units 7/12/2017   Sodium      133 - 144 mmol/L 139   Potassium      3.4 - 5.3 mmol/L 3.8   Chloride      94 - 109 mmol/L 103   Carbon Dioxide      20 - 32 mmol/L 28   Anion Gap      3 - 14 mmol/L 8   Glucose      70 - 99 mg/dL 177 (H)   Urea Nitrogen      7 - 30 mg/dL 29   Creatinine      0.66 - 1.25 mg/dL 1.32 (H)   GFR Estimate      >60 mL/min/1.7m2 52 (L)   GFR Estimate If Black      >60 mL/min/1.7m2 63   Calcium      8.5 - 10.1 mg/dL 9.2   Creatinine Urine      mg/dL 176   Albumin Urine mg/L      mg/L 25   Albumin Urine mg/g Cr      0 - 17 mg/g Cr 13.98   Hemoglobin      13.3 - 17.7 g/dL 11.8 (L)   Hemoglobin A1C      4.3 - 6.0 % 5.7   PSA      0 - 4 ug/L <0.01 . . .          ASSESSMENT/PLAN:                                                      82 year old male with the following diagnoses and treatment plan:      ICD-10-CM    1. Controlled type 2 diabetes mellitus with " stage 3 chronic kidney disease, without long-term current use of insulin (H) E11.22 Hemoglobin A1c    N18.3 Basic metabolic panel     Albumin Random Urine Quantitative     metFORMIN (GLUCOPHAGE-XR) 500 MG 24 hr tablet   2. Hypertension goal BP (blood pressure) < 140/90 I10 Basic metabolic panel     Albumin Random Urine Quantitative     losartan (COZAAR) 100 MG tablet     chlorthalidone (HYGROTON) 25 MG tablet   3. Prostate cancer (H) C61 PSA, tumor marker   4. Hypothyroidism due to acquired atrophy of thyroid E03.4 TSH with free T4 reflex   5. Benign prostatic hyperplasia without lower urinary tract symptoms N40.0 tamsulosin (FLOMAX) 0.4 MG capsule   6. Anemia, unspecified type D64.9 CBC with platelets     Iron and iron binding capacity     Vitamin B12     Reticulocyte count     Occult blood fecal HGB immuno     UA with Microscopic reflex to Culture ([place]; [facility])     Ferritin       -- stable chronic health issues. Medications refilled.   -- diabetic neuropathy: pt declined prescription medication. Will contact me if the laser treatment is unsuccessful.   -- developing anemia: which is new. No symptoms of GI/ bleed. Will obtain additional labs to evaluate.   -- further recommendation pending on lab results.   -- diabetes follow up in 6 months.     Will call or return to clinic if worsening or symptoms not improving as discussed.  See Patient Instructions.        Laurence Ibarra MD-PhD  [facility]    (Note: Chart documentation was done in part with Dragon Voice Recognition software. Although reviewed after completion, some word and grammatical errors may remain.)

## 2017-07-12 NOTE — TELEPHONE ENCOUNTER
Routing refill request to provider for review/approval because:  Patient has appointment with Dr. Ibarra today at 1:40pm.      levothyroxine     Last Written Prescription Date: 3/3/17  Last Quantity: 90, # refills: 1  Last Office Visit with FM, UMP or  Health prescribing provider: 12/19/16   Next 5 appointments (look out 90 days)     Jul 12, 2017  1:40 PM CDT   [facility] Visit with Laurence [facility] MD   [facility]    [address] [place] [id number] [phone]                   TSH   Date Value Ref Range Status   10/17/2016 2.63 0.40 - 4.00 mU/L Final         Bella Braxton RN, [facility]     ______________________________________________________________________    tamsulosin         Last Written Prescription Date: 3/3/17  Last Fill Quantity: 90, # refills: 1    Last Office Visit with [facility], [facility] or  Health prescribing provider:  12/19/16   Future Office Visit:    Next 5 appointments (look out 90 days)     Jul 12, 2017  1:40 PM CDT   Return Visit with Laurence Ibarra MD   [facility])    [address] [place] [id number] [phone]                  BP Readings from Last 3 Encounters:   01/02/17 145/74   12/19/16 144/72   11/07/16 181/74         Bella Braxton RN, [facility]

## 2017-07-12 NOTE — NURSING NOTE
"Chief Complaint   Patient presents with     Diabetes       Initial /66 (Cuff Size: Adult Regular)  Pulse 81  Temp 97.6  F (36.4  C) (Temporal)  Ht 5' 8\" (1.727 m)  Wt 182 lb (82.6 kg)  SpO2 96%  BMI 27.67 kg/m2 Estimated body mass index is 27.67 kg/(m^2) as calculated from the following:    Height as of this encounter: 5' 8\" (1.727 m).    Weight as of this encounter: 182 lb (82.6 kg).  Medication Reconciliation: complete    "

## 2017-07-13 PROCEDURE — 82274 ASSAY TEST FOR BLOOD FECAL: CPT | Performed by: INTERNAL MEDICINE

## 2017-07-13 RX ORDER — TAMSULOSIN HYDROCHLORIDE 0.4 MG/1
CAPSULE ORAL
Qty: 90 CAPSULE | Refills: 1 | COMMUNITY
Start: 2017-07-13 | End: 2017-07-13

## 2017-07-13 RX ORDER — LEVOTHYROXINE SODIUM 50 UG/1
TABLET ORAL
Qty: 90 TABLET | Refills: 3 | Status: SHIPPED | OUTPATIENT
Start: 2017-07-13 | End: 2018-05-01

## 2017-07-14 DIAGNOSIS — D64.9 ANEMIA, UNSPECIFIED TYPE: ICD-10-CM

## 2017-07-14 LAB — HEMOCCULT STL QL IA: NEGATIVE

## 2017-07-15 PROBLEM — E11.42 DIABETIC POLYNEUROPATHY ASSOCIATED WITH TYPE 2 DIABETES MELLITUS (H): Status: ACTIVE | Noted: 2017-07-15

## 2017-07-16 NOTE — PROGRESS NOTES
Please send normal result letter.         Dear Milo,    The results of your recent tests were normal. Enclosed is a copy of your test results.      If you have additional question, please call or make a follow-up appointment.    Laurence Ibarra MD

## 2017-07-21 ENCOUNTER — TELEPHONE (OUTPATIENT)
Dept: PEDIATRICS | Facility: CLINIC | Age: 82
End: 2017-07-21

## 2017-07-21 NOTE — TELEPHONE ENCOUNTER
Called patient and gave message per Dr. Ibarra.  Patient verbalized understanding and agreement to plan.  Scheduled lab appt for 8/10/17.     Miesha Bazan RN, [facility]          Notes Recorded by Laurence Ibarra MD on 7/21/2017 at 8:11 AM  Let pt know that I didn't find a specific cause for why the hemoglobin is low. No blood in the stool, iron, B12, TSH were all normal. Other labs were also normal or at baseline. I recommend rechecking his blood count in 3 weeks or so to see which way the hemoglobin is going. I have already ordered this as a future order in the computer..

## 2017-08-15 DIAGNOSIS — D64.9 ANEMIA, UNSPECIFIED TYPE: ICD-10-CM

## 2017-08-15 DIAGNOSIS — R78.89: Primary | ICD-10-CM

## 2017-08-15 LAB
BASOPHILS # BLD AUTO: 0 10E9/L (ref 0–0.2)
BASOPHILS NFR BLD AUTO: 1 %
DIFFERENTIAL METHOD BLD: ABNORMAL
EOSINOPHIL # BLD AUTO: 0 10E9/L (ref 0–0.7)
EOSINOPHIL NFR BLD AUTO: 1 %
ERYTHROCYTE [DISTWIDTH] IN BLOOD BY AUTOMATED COUNT: 14.7 % (ref 10–15)
HCT VFR BLD AUTO: 34.7 % (ref 40–53)
HGB BLD-MCNC: 12.1 G/DL (ref 13.3–17.7)
LYMPHOCYTES # BLD AUTO: 1.3 10E9/L (ref 0.8–5.3)
LYMPHOCYTES NFR BLD AUTO: 34 %
MCH RBC QN AUTO: 32.4 PG (ref 26.5–33)
MCHC RBC AUTO-ENTMCNC: 34.9 G/DL (ref 31.5–36.5)
MCV RBC AUTO: 93 FL (ref 78–100)
MONOCYTES # BLD AUTO: 0.4 10E9/L (ref 0–1.3)
MONOCYTES NFR BLD AUTO: 11 %
NEUTROPHILS # BLD AUTO: 2 10E9/L (ref 1.6–8.3)
NEUTROPHILS NFR BLD AUTO: 53 %
PLATELET # BLD AUTO: 145 10E9/L (ref 150–450)
PLATELET # BLD EST: NORMAL 10*3/UL
RBC # BLD AUTO: 3.74 10E12/L (ref 4.4–5.9)
RBC MORPH BLD: NORMAL
RETICS # AUTO: 165.7 10E9/L (ref 25–95)
RETICS/RBC NFR AUTO: 4.4 % (ref 0.5–2)
WBC # BLD AUTO: 3.7 10E9/L (ref 4–11)

## 2017-08-15 PROCEDURE — 36415 COLL VENOUS BLD VENIPUNCTURE: CPT | Performed by: INTERNAL MEDICINE

## 2017-08-15 PROCEDURE — 85045 AUTOMATED RETICULOCYTE COUNT: CPT | Performed by: INTERNAL MEDICINE

## 2017-08-15 PROCEDURE — 40000611 ZZHCL STATISTIC MORPHOLOGY W/INTERP HEMEPATH TC 85060: Performed by: INTERNAL MEDICINE

## 2017-08-15 PROCEDURE — 85025 COMPLETE CBC W/AUTO DIFF WBC: CPT | Performed by: INTERNAL MEDICINE

## 2017-08-16 LAB — COPATH REPORT: NORMAL

## 2017-08-18 ENCOUNTER — TELEPHONE (OUTPATIENT)
Dept: PEDIATRICS | Facility: CLINIC | Age: 82
End: 2017-08-18

## 2017-08-18 NOTE — TELEPHONE ENCOUNTER
Patient given results below.  Verbalized understanding.  He will call to schedule an appointment.    Bella Braxton RN,   [facility]

## 2017-08-18 NOTE — TELEPHONE ENCOUNTER
Called patient, left message with female in home with phone number to call back.   Miesha Bazan RN, [facility]    Bl morphology pathology review   Status:  Final result   Visible to patient:  No (Inaccessible in MyChart) Dx:  Anemia, unspecified type Order: [id number] - Reflex for Order [id number]       Notes Recorded by Laurence Ibarra MD on 8/18/2017 at 11:43 AM  Please call patient: he continues to have low grade anemia. The blood under the microscope showed a remanent of red blood cells (Weldon jolly bodies) that can sometime indicate low function of the spleen. I'm not certain the significance of this. I recommend he sees a blood specialist (hematologist). Referral made to heme/onc. Call [phone] to schedule.

## 2017-08-24 ENCOUNTER — TELEPHONE (OUTPATIENT)
Dept: PEDIATRICS | Facility: CLINIC | Age: 82
End: 2017-08-24

## 2017-08-24 NOTE — LETTER
August 24, 2017      Milo Lozano  [address] [facility] [address]        Dear Milo,       Enclosed are your recent results.  He continues to have low grade anemia. The blood under the microscope showed a remanent of red blood cells (Weldon jolly bodies) that can sometime indicate low function of the spleen. I'm not certain the significance of this. I recommend he sees a blood specialist (hematologist). Referral made to heme/onc. Call [phone] to schedule.    Sincerely,        Laurence Ibarra MD, MD

## 2017-08-24 NOTE — TELEPHONE ENCOUNTER
Patient has 1 bite on right leg, very faint.  2 bites on left leg.  Patient scratching the lower thigh one.  It is pink, not red, from the scratching.  It itches. Wonders what he can do.  Informed patient it does not look infected.  Gave him signs and symptoms of infection that would need to be treated and evaluated by a provider.  Informed patient he can use ice pack for 10 minutes, hydrocortisone cream, benadryl cream for the itching. At nighttime can use benadryl to help with itching, informed patient it can make him sleepy.  If does not clear up in a week or looks infected he will need to make an appt with provider.    Bella Braxton RN,   [facility]

## 2017-09-05 ENCOUNTER — ONCOLOGY VISIT (OUTPATIENT)
Dept: ONCOLOGY | Facility: CLINIC | Age: 82
End: 2017-09-05
Payer: COMMERCIAL

## 2017-09-05 VITALS
BODY MASS INDEX: 27.66 KG/M2 | HEART RATE: 75 BPM | TEMPERATURE: 97.1 F | SYSTOLIC BLOOD PRESSURE: 144 MMHG | WEIGHT: 182.5 LBS | RESPIRATION RATE: 16 BRPM | DIASTOLIC BLOOD PRESSURE: 75 MMHG | HEIGHT: 68 IN

## 2017-09-05 DIAGNOSIS — D61.818 PANCYTOPENIA (H): ICD-10-CM

## 2017-09-05 DIAGNOSIS — D64.9 NORMOCYTIC ANEMIA: Primary | ICD-10-CM

## 2017-09-05 DIAGNOSIS — R79.9 ABNORMAL BLOOD SMEAR: ICD-10-CM

## 2017-09-05 LAB
ALBUMIN SERPL-MCNC: 4.4 G/DL (ref 3.4–5)
ALP SERPL-CCNC: 55 U/L (ref 40–150)
ALT SERPL W P-5'-P-CCNC: 41 U/L (ref 0–70)
AST SERPL W P-5'-P-CCNC: 23 U/L (ref 0–45)
BASOPHILS # BLD AUTO: 0 10E9/L (ref 0–0.2)
BASOPHILS NFR BLD AUTO: 1 %
BILIRUB DIRECT SERPL-MCNC: 0.2 MG/DL (ref 0–0.2)
BILIRUB SERPL-MCNC: 1 MG/DL (ref 0.2–1.3)
CREAT SERPL-MCNC: 1.37 MG/DL (ref 0.66–1.25)
DIFFERENTIAL METHOD BLD: ABNORMAL
EOSINOPHIL # BLD AUTO: 0 10E9/L (ref 0–0.7)
EOSINOPHIL NFR BLD AUTO: 1 %
ERYTHROCYTE [DISTWIDTH] IN BLOOD BY AUTOMATED COUNT: 14.9 % (ref 10–15)
FERRITIN SERPL-MCNC: 463 NG/ML (ref 26–388)
GFR SERPL CREATININE-BSD FRML MDRD: 50 ML/MIN/1.7M2
HCT VFR BLD AUTO: 34.7 % (ref 40–53)
HGB BLD-MCNC: 12.4 G/DL (ref 13.3–17.7)
IRON SATN MFR SERPL: 26 % (ref 15–46)
IRON SERPL-MCNC: 72 UG/DL (ref 35–180)
LDH SERPL L TO P-CCNC: 160 U/L (ref 85–227)
LYMPHOCYTES # BLD AUTO: 1.2 10E9/L (ref 0.8–5.3)
LYMPHOCYTES NFR BLD AUTO: 29 %
MCH RBC QN AUTO: 33.2 PG (ref 26.5–33)
MCHC RBC AUTO-ENTMCNC: 35.7 G/DL (ref 31.5–36.5)
MCV RBC AUTO: 93 FL (ref 78–100)
MONOCYTES # BLD AUTO: 0.7 10E9/L (ref 0–1.3)
MONOCYTES NFR BLD AUTO: 16 %
NEUTROPHILS # BLD AUTO: 2.3 10E9/L (ref 1.6–8.3)
NEUTROPHILS NFR BLD AUTO: 53 %
PLATELET # BLD AUTO: 157 10E9/L (ref 150–450)
PROT SERPL-MCNC: 8.5 G/DL (ref 6.8–8.8)
RBC # BLD AUTO: 3.74 10E12/L (ref 4.4–5.9)
RETICS # AUTO: 145.9 10E9/L (ref 25–95)
RETICS/RBC NFR AUTO: 3.9 % (ref 0.5–2)
TIBC SERPL-MCNC: 277 UG/DL (ref 240–430)
TSH SERPL DL<=0.005 MIU/L-ACNC: 2.68 MU/L (ref 0.4–4)
WBC # BLD AUTO: 4.2 10E9/L (ref 4–11)

## 2017-09-05 PROCEDURE — 86038 ANTINUCLEAR ANTIBODIES: CPT | Performed by: INTERNAL MEDICINE

## 2017-09-05 PROCEDURE — 85045 AUTOMATED RETICULOCYTE COUNT: CPT | Performed by: INTERNAL MEDICINE

## 2017-09-05 PROCEDURE — 82728 ASSAY OF FERRITIN: CPT | Performed by: INTERNAL MEDICINE

## 2017-09-05 PROCEDURE — 83550 IRON BINDING TEST: CPT | Performed by: INTERNAL MEDICINE

## 2017-09-05 PROCEDURE — 99204 OFFICE O/P NEW MOD 45 MIN: CPT | Performed by: INTERNAL MEDICINE

## 2017-09-05 PROCEDURE — 40000611 ZZHCL STATISTIC MORPHOLOGY W/INTERP HEMEPATH TC 85060: Performed by: INTERNAL MEDICINE

## 2017-09-05 PROCEDURE — 80076 HEPATIC FUNCTION PANEL: CPT | Performed by: INTERNAL MEDICINE

## 2017-09-05 PROCEDURE — 84443 ASSAY THYROID STIM HORMONE: CPT | Performed by: INTERNAL MEDICINE

## 2017-09-05 PROCEDURE — 83615 LACTATE (LD) (LDH) ENZYME: CPT | Performed by: INTERNAL MEDICINE

## 2017-09-05 PROCEDURE — 82784 ASSAY IGA/IGD/IGG/IGM EACH: CPT | Performed by: INTERNAL MEDICINE

## 2017-09-05 PROCEDURE — 86334 IMMUNOFIX E-PHORESIS SERUM: CPT | Performed by: INTERNAL MEDICINE

## 2017-09-05 PROCEDURE — 85025 COMPLETE CBC W/AUTO DIFF WBC: CPT | Performed by: INTERNAL MEDICINE

## 2017-09-05 PROCEDURE — 83010 ASSAY OF HAPTOGLOBIN QUANT: CPT | Performed by: INTERNAL MEDICINE

## 2017-09-05 PROCEDURE — 36415 COLL VENOUS BLD VENIPUNCTURE: CPT | Performed by: INTERNAL MEDICINE

## 2017-09-05 PROCEDURE — 99000 SPECIMEN HANDLING OFFICE-LAB: CPT | Performed by: INTERNAL MEDICINE

## 2017-09-05 PROCEDURE — 83540 ASSAY OF IRON: CPT | Performed by: INTERNAL MEDICINE

## 2017-09-05 PROCEDURE — 82747 ASSAY OF FOLIC ACID RBC: CPT | Mod: 90 | Performed by: INTERNAL MEDICINE

## 2017-09-05 PROCEDURE — 82565 ASSAY OF CREATININE: CPT | Performed by: INTERNAL MEDICINE

## 2017-09-05 PROCEDURE — 86880 COOMBS TEST DIRECT: CPT | Performed by: INTERNAL MEDICINE

## 2017-09-05 NOTE — MR AVS SNAPSHOT
After Visit Summary   9/5/2017    Milo Lozano    MRN: [id number]           Patient Information     Date Of Birth          1935        Visit Information        Provider Department      9/5/2017 1:00 PM Karen Hernandez MD [facility]        Today's Diagnoses     Normocytic anemia    -  1    Pancytopenia (H)        Abnormal blood smear        Creatinine elevation           Follow-ups after your visit        Your next 10 appointments already scheduled     Dec 27, 2017  9:30 AM CST   Return Visit with Laurence Ibarra MD PhD   [facility])    [id number] [place] [id number] [phone]            Dec 28, 2017  7:45 AM CST   LAB with LAB ONC [facility])    [id number] [place] [id number] [phone]           Patient must bring picture ID. Patient should be prepared to give a urine specimen  Please do not eat 10-12 hours before your appointment if you are coming in fasting for labs on lipids, cholesterol, or glucose (sugar). Pregnant women should follow their Care Team instructions. Water with medications is okay. Do not drink coffee or other fluids. If you have concerns about taking  your medications, please ask at office or if scheduling via [employer], send a message by clicking on Secure Messaging, Message Your Care Team.            Dec 28, 2017  8:15 AM CST   Return Visit with STEFFEN Smith CNP   [facility])    [address] [place] [id number] [phone]              Who to contact     If you have questions or need follow up information about today's clinic visit or your schedule please contact [facility] directly at [phone].  Normal or non-critical lab and imaging results will be communicated to you by MyChart, letter or phone within 4 business days  "after the clinic has received the results. If you do not hear from us within 7 days, please contact the clinic through [employer] or phone. If you have a critical or abnormal lab result, we will notify you by phone as soon as possible.  Submit refill requests through [employer] or call your pharmacy and they will forward the refill request to us. Please allow 3 business days for your refill to be completed.          Additional Information About Your Visit        [employer] Information     [employer] is an electronic gateway that provides easy, online access to your medical records. With [employer], you can request a clinic appointment, read your test results, renew a prescription or communicate with your care team.     To sign up for [employer] visit the website at [email or web address]   You will be asked to enter the access code listed below, as well as some personal information. Please follow the directions to create your username and password.     Your access code is: [email or web address]  Expires: 2018  4:38 PM     Your access code will  in 90 days. If you need help or a new code, please contact your [facility] or call [phone] for assistance.        Care EveryWhere ID     This is your Care EveryWhere ID. This could be used by other organizations to access your [place] medical records  [id number]        Your Vitals Were     Pulse Temperature Respirations Height BMI (Body Mass Index)       75 97.1  F (36.2  C) (Oral) 16 1.727 m (5' 8\") 27.75 kg/m2        Blood Pressure from Last 3 Encounters:   17 144/75   17 128/66   17 145/74    Weight from Last 3 Encounters:   17 82.8 kg (182 lb 8 oz)   17 82.6 kg (182 lb)   16 84.8 kg (187 lb)              We Performed the Following     Anti Nuclear Sydnie IgG by IFA with Reflex     Bld morphology pathology review     CBC with platelets differential     Creatinine     Direct antiglobulin test     ELECTROPHORESIS " PROTEIN SERUM     Ferritin     Folate RBC     Haptoglobin     Hepatic panel     Iron and iron binding capacity     Lactate Dehydrogenase     Protein Immunofixation Serum     Reticulocyte count     TSH with free T4 reflex        Primary Care Provider Office Phone # Fax #    Laurence Ibarra MD PhD [phone] [address]  [facility] [address]        Equal Access to Services     TONNY HANSON : Hadii aad ku hadasho Soomaali, waaxda luqadaha, qaybta kaalmada adeegyada, waxay idiin hayaan adeeg kharash la'aan . So [facility] [phone].    ATENCIÓN: Si habla español, tiene a raygoza disposición servicios gratuitos de asistencia lingüística. Llame al [phone].    We comply with applicable federal civil rights laws and [place] laws. We do not discriminate on the basis of race, color, national origin, age, disability, sex, sexual orientation, or gender identity.            Thank you!     Thank you for choosing [facility]  for your care. Our goal is always to provide you with excellent care. Hearing back from our patients is one way we can continue to improve our services. Please take a few minutes to complete the written survey that you may receive in the mail after your visit with us. Thank you!             Your Updated Medication List - Protect others around you: Learn how to safely use, store and throw away your medicines at www.disposemymeds.org.          This list is accurate as of: 9/5/17 11:59 PM.  Always use your most recent med list.                   Brand Name Dispense Instructions for use Diagnosis    * ACCU-CHEK SMARTVIEW test strip   Generic drug:  blood glucose monitoring     200 strip    TEST BLOOD SUGAR TWICE DAILY  OR AS DIRECTED    Type 2 diabetes mellitus without complication, without long-term current use of insulin (H)       * blood glucose monitoring test strip    no brand specified    200 strip    1 strip by In Vitro route 2 times daily    Type 2 diabetes mellitus  without complication, without long-term current use of insulin (H)       aspirin 81 MG tablet     90 tablet    Take 1 tablet (81 mg) by mouth daily    Type 2 diabetes, HbA1c goal < 7% (H), Hypertension goal BP (blood pressure) < 140/90, CARDIOVASCULAR SCREENING; LDL GOAL LESS THAN 100       blood glucose calibration solution     1 each    1 Bottle as needed Use to calibrate blood glucose monitor as needed as directed.    Type 2 diabetes, HbA1c goal < 7% (H)       blood glucose monitoring lancets     204 each    TEST BLOOD SUGAR TWICE DAILY  OR AS DIRECTED    Diabetes type 2, controlled (H)       blood glucose monitoring meter device kit     1 kit    Use to test blood sugar 2 times daily or as directed.    Type 2 diabetes, HbA1C goal < 8% (H)       capsaicin 0.025 % Crea cream    ZOSTRIX    60 g    To feet 2-3 times daily as needed.    Diabetic neuropathy with neurologic complication (H)       * chlorthalidone 25 MG tablet    HYGROTON    90 tablet    Take 1 tablet (25 mg) by mouth daily    Hypertension goal BP (blood pressure) < 140/90       * chlorthalidone 25 MG tablet    HYGROTON    60 tablet    TAKE 1 TABLET EVERY DAY    Hypertension goal BP (blood pressure) < 140/90       * chlorthalidone 25 MG tablet    HYGROTON    90 tablet    Take 1 tablet (25 mg) by mouth daily    Hypertension goal BP (blood pressure) < 140/90       ciclopirox 0.77 % cream    LOPROX    90 g    Apply topically 2 times daily To toenails.    Dermatophytosis of nail       FISH OIL PO      Take  by mouth daily.        levothyroxine 50 MCG tablet    SYNTHROID/LEVOTHROID    90 tablet    TAKE 1 TABLET EVERY DAY    Hypothyroidism due to acquired atrophy of thyroid       losartan 100 MG tablet    COZAAR    90 tablet    TAKE 1 TABLET EVERY DAY    Hypertension goal BP (blood pressure) < 140/90       metFORMIN 500 MG 24 hr tablet    GLUCOPHAGE-XR    180 tablet    TAKE 1 TABLET TWICE DAILY WITH MEALS    Controlled type 2 diabetes mellitus with stage 3  chronic kidney disease, without long-term current use of insulin (H)       order for DME     2 Device    Equipment being ordered: HAPAD Metatarsal pad, QTY:2    Diabetic neuropathy with neurologic complication (H)       STATIN NOT PRESCRIBED (INTENTIONAL)     0 each    1 each continuous Statin not prescribed intentionally due to does not meet [place] criteria and Other: LDL at goal without the statin.    CARDIOVASCULAR SCREENING; LDL GOAL LESS THAN 100       tamsulosin 0.4 MG capsule    FLOMAX    90 capsule    Take 1 capsule (0.4 mg) by mouth daily    Benign prostatic hyperplasia without lower urinary tract symptoms       * Notice:  This list has 5 medication(s) that are the same as other medications prescribed for you. Read the directions carefully, and ask your doctor or other care provider to review them with you.

## 2017-09-05 NOTE — PROGRESS NOTES
Hematology Consultation:  Date on this visit: 9/5/2017    Christiana Hsieh  is referred by Dr.Libin Ibarra for a hematology consultation. He requires evaluation for new diagnosis of pancytopenia.    Primary Physician: Laurence Ibarra     History Of Present Illness:  Mr. Hsieh is a 82 year old male who presents with new diagnosis of pancytopenia. He was noted to be newly anemic on his cbc on 7/12/2017. When his CBC was repeated on 8/15/2017, he was mildly pancytopenic as below.   Results for [facility]STIANA [facility] (MRN [id number]) as of 9/5/2017 13:09   Ref. Range 7/12/2017 14:05 8/15/2017 09:56   WBC Latest Ref Range: 4.0 - 11.0 10e9/L 6.0 3.7 (L)     Results for [facility]IANA [facility] (MRN [id number]) as of 9/5/2017 13:09   Ref. Range 10/8/2012 08:46 10/22/2013 11:37 7/12/2017 13:01 7/12/2017 14:05 8/15/2017 09:56   Hemoglobin Latest Ref Range: 13.3 - 17.7 g/dL 14.9 14.7 11.8 (L) 12.2 (L) 12.1 (L)   MCV is 93.   Results for [facility]IANA [facility] (MRN [id number]) as of 9/5/2017 13:09   Ref. Range 7/12/2017 14:05 8/15/2017 09:56   Platelet Count Latest Ref Range: 150 - 450 10e9/L 161 145 (L)     Absolute differential counts were normal on 8/15/2017. peripheral blood smear    showed Slight normochromic normocytic anemia; minimal poikilocytosis       Slight thrombocytopenia with overall normal platelet morphology       Slight leukopenia with normal differential. The red cells appear normochromic.  Poikilocytosis is minimal.  Polychromasia is present. Rouleaux formation is not increased. A single  Wel[place] body is seen. The morphology of the platelets is normal.  Absolute reticulocyte count was elevated at 165.7. B12 was normal in 07/2017 at 494. Ferritin was elevated at 412. Creatinine is mildly elevated at 1.32, which was a new elevation as below:  Results for [facility]IANA [facility] (MRN [id number]) as of 9/5/2017 13:09   Ref. Range 5/20/2014 08:53 6/23/2014 11:06 6/22/2015 09:00 7/5/2016 08:01 7/12/2017 14:04   Creatinine Latest  Ref Range: 0.66 - 1.25 mg/dL 1.03 1.09 1.05 1.20 1.32 (H)    PSA was undetectable in 07/2017. TSH was normal. Stool hemoccult was negative in 07/2017.  UA showed no hematuria but did glucosuria and proteinuria. He has type 2 DM. HbA1C was 5.7% on 7/12/2017. He has been on Metformin for years, per patient.  He has never had a colonoscopy. He has had peripheral neuropathy and has had laser treatments for it for 6 months. He is here with his wife [place]. Except for mild fatigue, he has been feeling well. He denies CP. He is SOB on exertion, unchanged. He is pain free.   In addition, a complete 12 point  review of systems is negative.      Past Medical/Surgical History:  Past Medical History:   Diagnosis Date     BPH (benign prostatic hypertrophy)      CARDIOVASCULAR SCREENING; LDL GOAL LESS THAN 100 12/29/2011     CARDIOVASCULAR SCREENING; LDL GOAL LESS THAN 130 12/29/2011     Hypertension goal BP (blood pressure) < 140/90 12/29/2011     Hypothyroidism due to acquired atrophy of thyroid 12/5/2016     Prostate cancer (H) 1/2007    Had Radiation     Type 2 diabetes, HbA1c goal < 7% (H)      Past Surgical History:   Procedure Laterality Date     COLONOSCOPY       Allergies:  Allergies as of 09/05/2017 - Nam as Reviewed 07/12/2017   Allergen Reaction Noted     Lisinopril Cough 06/25/2014     Current Medications:  Current Outpatient Prescriptions   Medication Sig Dispense Refill     levothyroxine (SYNTHROID/LEVOTHROID) 50 MCG tablet TAKE 1 TABLET EVERY DAY 90 tablet 3     metFORMIN (GLUCOPHAGE-XR) 500 MG 24 hr tablet TAKE 1 TABLET TWICE DAILY WITH MEALS 180 tablet 1     losartan (COZAAR) 100 MG tablet TAKE 1 TABLET EVERY DAY 90 tablet 3     tamsulosin (FLOMAX) 0.4 MG capsule Take 1 capsule (0.4 mg) by mouth daily 90 capsule 3     chlorthalidone (HYGROTON) 25 MG tablet Take 1 tablet (25 mg) by mouth daily 90 tablet 3     blood glucose monitoring (ACCU-CHEK FASTCLIX) lancets TEST BLOOD SUGAR TWICE DAILY  OR AS DIRECTED 204  "each 0     chlorthalidone (HYGROTON) 25 MG tablet TAKE 1 TABLET EVERY DAY 60 tablet 0     ACCU-CHEK SMARTVIEW test strip TEST BLOOD SUGAR TWICE DAILY  OR AS DIRECTED 200 strip 3     blood glucose monitoring (NO BRAND SPECIFIED) test strip 1 strip by In Vitro route 2 times daily 200 strip 3     chlorthalidone (HYGROTON) 25 MG tablet Take 1 tablet (25 mg) by mouth daily 90 tablet 1     order for DME Equipment being ordered: HAPAD Metatarsal pad, QTY:2 2 Device 2     capsaicin (ZOSTRIX) 0.025 % CREA To feet 2-3 times daily as needed. 60 g 6     ciclopirox (LOPROX) 0.77 % cream Apply topically 2 times daily To toenails. 90 g 5     blood glucose calibration (ACCU-CHEK SMARTVIEW CONTROL) solution 1 Bottle as needed Use to calibrate blood glucose monitor as needed as directed. 1 each 1     blood glucose monitoring (ACCU-CHEK MILADYS SMARTVIEW) meter device kit Use to test blood sugar 2 times daily or as directed. 1 kit 0     STATIN NOT PRESCRIBED, INTENTIONAL, 1 each continuous Statin not prescribed intentionally due to does not meet Scribner criteria and Other: LDL at goal without the statin. 0 each 0     aspirin 81 MG tablet Take 1 tablet (81 mg) by mouth daily 90 tablet 3     Omega-3 Fatty Acids (FISH OIL PO) Take  by mouth daily.        Family History:  Family History   Problem Relation Age of Onset     Hypertension Mother      Alzheimer Disease Mother      Prostate Cancer Paternal Grandfather      Social History:  Social History     Social History     Marital status:      Spouse name: [place] Scanlon     Number of children: 4     Years of education: N/A     Occupational History     Heating and Air Conditioning Retired     Social History Main Topics     Smoking status: Never Smoker     Smokeless tobacco: Never Used     Alcohol use No       Physical Exam:  /75 (BP Location: Left arm, Patient Position: Chair, Cuff Size: Adult Regular)  Pulse 75  Temp 97.1  F (36.2  C) (Oral)  Resp 16  Ht 1.727 m (5' 8\")  " Wt 82.8 kg (182 lb 8 oz)  BMI 27.75 kg/m2      GENERAL APPEARANCE: healthy, alert and no distress     HENT: Mouth without ulcers or lesions     NECK: no adenopathy, no asymmetry or masses     LYMPHATICS: No cervical, supraclavicular, axillary or inguinal lymphadenopathy     RESP: lungs clear to auscultation - no rales, rhonchi or wheezes     CARDIOVASCULAR: regular rates and rhythm, normal S1 S2, no S3 or S4 and no murmur.     ABDOMEN:  soft, nontender, no HSM or masses and bowel sounds normal     MUSCULOSKELETAL: extremities normal- no gross deformities noted, no evidence of inflammation in joints, FROM in all extremities. No edema b/l LE.     SKIN: no suspicious lesions or rashes     PSYCHIATRIC: mentation appears normal and affect normal    Laboratory/Imaging Studies  Labs reviewed and documented in the EMR.    ASSESSMENT/PLAN:    Mr. Lozano is a 82 year old man, with type 2 DM, controlled on Metformin, now with normocytic  Anemia and borderline leucopenia and thrombocytopenia as well as reticulocytosis.    1. Normocytic anemia, reticulocytosis- repeat retic count, peripheral blood smear (solis-Jolly body noted on peripheral blood smear in 08/2017 but patient has no h/o splenectomy), iron studies, serum protein electrophoresis/IEP, LAURA, LDH, haptoglobin, RBC folate, TSH, for further evaluation and inform the patient of the result. Anemia parallels worsening in renal function and could be related to anemia of kidney disease but we'll rule out other causes.    2. CKD- creat worsening in 4153-4880, and Hb is worse in 2017 (no value available for 2016). Metformin still ok to use since creat cl is about 50, but may need to be d/cd. Avoid nephrotoxic drugs. Repeat creat today.    3. Leucopenia, borderline thrombocytopenia- isolated, f/up on repeat CBCd peripheral blood smear, TSH, RBC folate, check FERMIN, LFTs.    It was my pleasure to meet Arnold and his wife.  We'll inform him of the results of his testing and  f/up based on the results.  At the end of our visit patient verbalized understanding and concurred with the plan.    Addendum:  Component      Latest Ref Rng & Units 9/5/2017   WBC      4.0 - 11.0 10e9/L 4.2   RBC Count      4.4 - 5.9 10e12/L 3.74 (L)   Hemoglobin      13.3 - 17.7 g/dL 12.4 (L)   Hematocrit      40.0 - 53.0 % 34.7 (L)   MCV      78 - 100 fl 93   MCH      26.5 - 33.0 pg 33.2 (H)   MCHC      31.5 - 36.5 g/dL 35.7   RDW      10.0 - 15.0 % 14.9   Platelet Count      150 - 450 10e9/L 157   % Neutrophils      % 53.0   % Lymphocytes      % 29.0   % Monocytes      % 16.0   % Eosinophils      % 1.0   % Basophils      % 1.0   Absolute Neutrophil      1.6 - 8.3 10e9/L 2.3   Absolute Lymphocytes      0.8 - 5.3 10e9/L 1.2   Absolute Monocytes      0.0 - 1.3 10e9/L 0.7   Absolute Eosinophils      0.0 - 0.7 10e9/L 0.0   Absolute Basophils      0.0 - 0.2 10e9/L 0.0   Diff Method       Automated Method   Bilirubin Direct      0.0 - 0.2 mg/dL 0.2   Bilirubin Total      0.2 - 1.3 mg/dL 1.0   Albumin      3.4 - 5.0 g/dL 4.4   Protein Total      6.8 - 8.8 g/dL 8.5   Alkaline Phosphatase      40 - 150 U/L 55   ALT      0 - 70 U/L 41   AST      0 - 45 U/L 23   Creatinine      0.66 - 1.25 mg/dL 1.37 (H)   GFR Estimate      >60 mL/min/1.7m2 50 (L)   GFR Estimate If Black      >60 mL/min/1.7m2 60 (L)   Iron      35 - 180 ug/dL 72   Iron Binding Cap      240 - 430 ug/dL 277   Iron Saturation Index      15 - 46 % 26   HCT Within Past 24h      % 34.7   Folate RBC      >=366 ng/mL SEE NOTE   % Retic      0.5 - 2.0 % 3.9 (H)   Absolute Retic      25 - 95 10e9/L 145.9 (H)   TSH      0.40 - 4.00 mU/L 2.68   LAURA  Broad Spectrum       Neg   Ferritin      26 - 388 ng/mL 463 (H)   Lactate Dehydrogenase      85 - 227 U/L 160   Haptoglobin      35 - 175 mg/dL 123   FERMIN interpretation      NEG:Negative Negative   peripheral blood smear 9/5/17:  Peripheral Blood Smear:   - Normocytic slight anemia with minimal, nonspecific red cell    morphologic changes   - Slight morphologic evidence of increase in red cell regeneration with   no morphologic evidence of hemolysis   - Reactive lymphocyte morphology   - Normal platelet numbers and morphology   - See Comment     COMMENT:   Reactive and atypical lymphocytes are very nonspecific and can be seen   with acute chronic viral syndromes, acute and chronic inflammation, and   with a variety of medications. A followup blood smear may be helpful in   order to reassess this finding.   The etiology of this patient's anemia is not apparent from this   peripheral blood morphology.  There is no definitive morphologic   evidence of microangiopathic hemolytic anemia.   Please correlate also with results of all other ancillary testing and   with clinical data.    NO M protein on serum immunofixation.    We'll have patient see our NP and have a repeat CBCd, creat  and peripheral blood smear to f/up on reactive and atypical lymphocytes, on wed, 12/27/2017, when he will be seeing Dr. Ibarra in f/up. We'll ask Dr. Ibarra if patient is ok to remain on Metformin.

## 2017-09-05 NOTE — NURSING NOTE
"Oncology Rooming Note    September 5, 2017 1:13 PM   Milo Lozano is a 82 year old male who presents for:    Chief Complaint   Patient presents with     Oncology Clinic Visit     Initial Vitals: /75 (BP Location: Left arm, Patient Position: Chair, Cuff Size: Adult Regular)  Pulse 75  Temp 97.1  F (36.2  C) (Oral)  Resp 16  Ht 1.727 m (5' 8\")  Wt 82.8 kg (182 lb 8 oz)  BMI 27.75 kg/m2 Estimated body mass index is 27.75 kg/(m^2) as calculated from the following:    Height as of this encounter: 1.727 m (5' 8\").    Weight as of this encounter: 82.8 kg (182 lb 8 oz). Body surface area is 1.99 meters squared.  Data Unavailable Comment: Data Unavailable   No LMP for male patient.  Allergies reviewed: Yes  Medications reviewed: Yes    Medications: Medication refills not needed today.  Pharmacy name entered into EPIC:    [facility] FA[facility] ONLY - NEW RX ONLY  [facility] - [place], [place] - [address]  [id number] [place], [place] - [address]    Clinical concerns: States he feels well; PCP concerned about recent drop in Hemoglobin    10 minutes for nursing intake (face to face time)     Cari Lennon RN            "

## 2017-09-06 LAB
COPATH REPORT: NORMAL
DAT POLY-SP REAG RBC QL: NORMAL
HAPTOGLOB SERPL-MCNC: 123 MG/DL (ref 35–175)
IGA SERPL-MCNC: 217 MG/DL (ref 70–380)
IGG SERPL-MCNC: 1350 MG/DL (ref 695–1620)
IGM SERPL-MCNC: 78 MG/DL (ref 60–265)
PROT PATTERN SERPL IFE-IMP: NORMAL

## 2017-09-07 LAB
ANA SER QL IF: NEGATIVE
FOLATE RBC-MCNC: NORMAL NG/ML
HCT VFR BLD CALC: 34.7 %

## 2017-09-14 ENCOUNTER — CARE COORDINATION (OUTPATIENT)
Dept: ONCOLOGY | Facility: CLINIC | Age: 82
End: 2017-09-14

## 2017-09-14 NOTE — PROGRESS NOTES
Spoke with patient and informed him of the following per Dr. Hernandez:  let the patient know that his Hb has improved slightly to 12.4 g/dl. His labwork show no other cause for anemia except his suboptimal kidney function which is stable but still not normal. I will ask Dr. Schmidt if he should continue on Metformin. On his blood smear there were some white cells that were not quite typical and may have been a reaction to some inflammation or smth else going on in his body. The doctor (pathologist) who looked at his blood under the microscope recommended that we repeat his blood smear. I would like him to see Nuvia on 12/27- the same day he is scheduled to f/up with Dr. SCHMIDT and have labs cbcd, creat, peripheral blood smear.  Patient is understanding of this plan and had no further questions.  Will send message to schedulers to make appointments.

## 2017-09-27 ENCOUNTER — TRANSFERRED RECORDS (OUTPATIENT)
Dept: HEALTH INFORMATION MANAGEMENT | Facility: CLINIC | Age: 82
End: 2017-09-27

## 2017-10-20 ENCOUNTER — TELEPHONE (OUTPATIENT)
Dept: PEDIATRICS | Facility: CLINIC | Age: 82
End: 2017-10-20

## 2017-10-20 DIAGNOSIS — E11.9 TYPE 2 DIABETES, HBA1C GOAL < 8% (H): ICD-10-CM

## 2017-10-20 NOTE — TELEPHONE ENCOUNTER
Pt came in today requesting another script for his Accu check Smartview machine, states his broke and needs replacement. I gave pt a machine from [facility] ed to replace.   Gabi SILVAA

## 2017-11-25 DIAGNOSIS — E11.22 CONTROLLED TYPE 2 DIABETES MELLITUS WITH STAGE 3 CHRONIC KIDNEY DISEASE, WITHOUT LONG-TERM CURRENT USE OF INSULIN (H): ICD-10-CM

## 2017-11-25 DIAGNOSIS — N18.30 CONTROLLED TYPE 2 DIABETES MELLITUS WITH STAGE 3 CHRONIC KIDNEY DISEASE, WITHOUT LONG-TERM CURRENT USE OF INSULIN (H): ICD-10-CM

## 2017-11-28 RX ORDER — METFORMIN HCL 500 MG
TABLET, EXTENDED RELEASE 24 HR ORAL
Qty: 180 TABLET | Refills: 0 | Status: SHIPPED | OUTPATIENT
Start: 2017-11-28 | End: 2018-05-01

## 2017-11-28 NOTE — TELEPHONE ENCOUNTER
metFORMIN (GLUCOPHAGE-XR) 500 MG 24 hr tablet        Last Written Prescription Date: 7/12/2017  Last Fill Quantity: 180, # refills: 1  Last Office Visit with [facility], MARIA TERESA or [facility] prescribing provider:  7/12/017   Next 5 appointments (look out 90 days)     Dec 27, 2017  9:30 AM CST   Return Visit with Laurence Ibarra MD PhD   [facility]    [address] [facility] [address]   [phone]            Dec 28, 2017  8:15 AM CST   Return Visit with STEFFEN Smith CNP   [facility] ([facility]    [address] [facility] [address] [phone]                   BP Readings from Last 3 Encounters:   09/05/17 144/75   07/12/17 128/66   01/02/17 145/74     Lab Results   Component Value Date    MICROL 25 07/12/2017     Lab Results   Component Value Date    UMALCR 13.98 07/12/2017     Creatinine   Date Value Ref Range Status   09/05/2017 1.37 (H) 0.66 - 1.25 mg/dL Final   ]  GFR Estimate   Date Value Ref Range Status   09/05/2017 50 (L) >60 mL/min/1.7m2 Final     Comment:     Non  GFR Calc   07/12/2017 52 (L) >60 mL/min/1.7m2 Final     Comment:     Non  GFR Calc   07/05/2016 58 (L) >60 mL/min/1.7m2 Final     Comment:     Non  GFR Calc     GFR Estimate If Black   Date Value Ref Range Status   09/05/2017 60 (L) >60 mL/min/1.7m2 Final     Comment:      GFR Calc   07/12/2017 63 >60 mL/min/1.7m2 Final     Comment:      GFR Calc   07/05/2016 71 >60 mL/min/1.7m2 Final     Comment:      GFR Calc     Lab Results   Component Value Date    CHOL 137 12/19/2016     Lab Results   Component Value Date    HDL 35 12/19/2016     Lab Results   Component Value Date    LDL 52 12/19/2016     Lab Results   Component Value Date    TRIG 251 12/19/2016     Lab Results   Component Value Date    CHOLHDLRATIO 3.8 06/22/2015     Lab Results    Component Value Date    AST 23 09/05/2017     Lab Results   Component Value Date    ALT 41 09/05/2017     Lab Results   Component Value Date    A1C 5.7 07/12/2017    A1C 5.8 10/17/2016    A1C 6.7 07/05/2016    A1C 6.3 09/29/2015    A1C 5.8 06/22/2015     Potassium   Date Value Ref Range Status   07/12/2017 3.8 3.4 - 5.3 mmol/L Final     Tia FLORENCIA Castle

## 2017-12-22 ENCOUNTER — ONCOLOGY VISIT (OUTPATIENT)
Dept: ONCOLOGY | Facility: CLINIC | Age: 82
End: 2017-12-22
Payer: COMMERCIAL

## 2017-12-22 VITALS
HEART RATE: 87 BPM | TEMPERATURE: 98.4 F | SYSTOLIC BLOOD PRESSURE: 147 MMHG | OXYGEN SATURATION: 98 % | RESPIRATION RATE: 18 BRPM | WEIGHT: 183.1 LBS | BODY MASS INDEX: 27.84 KG/M2 | DIASTOLIC BLOOD PRESSURE: 68 MMHG

## 2017-12-22 DIAGNOSIS — C61 PROSTATE CANCER (H): ICD-10-CM

## 2017-12-22 DIAGNOSIS — D64.9 NORMOCHROMIC ANEMIA: Primary | ICD-10-CM

## 2017-12-22 DIAGNOSIS — D64.9 NORMOCYTIC ANEMIA: ICD-10-CM

## 2017-12-22 DIAGNOSIS — R79.9 ABNORMAL BLOOD SMEAR: ICD-10-CM

## 2017-12-22 DIAGNOSIS — N18.30 CKD (CHRONIC KIDNEY DISEASE) STAGE 3, GFR 30-59 ML/MIN (H): ICD-10-CM

## 2017-12-22 DIAGNOSIS — D61.818 PANCYTOPENIA (H): ICD-10-CM

## 2017-12-22 LAB
BASOPHILS # BLD AUTO: 0 10E9/L (ref 0–0.2)
BASOPHILS NFR BLD AUTO: 0.4 %
CREAT SERPL-MCNC: 1.4 MG/DL (ref 0.66–1.25)
DIFFERENTIAL METHOD BLD: ABNORMAL
EOSINOPHIL # BLD AUTO: 0 10E9/L (ref 0–0.7)
EOSINOPHIL NFR BLD AUTO: 0.6 %
ERYTHROCYTE [DISTWIDTH] IN BLOOD BY AUTOMATED COUNT: 14.6 % (ref 10–15)
GFR SERPL CREATININE-BSD FRML MDRD: 49 ML/MIN/1.7M2
HCT VFR BLD AUTO: 33.8 % (ref 40–53)
HGB BLD-MCNC: 11.5 G/DL (ref 13.3–17.7)
IMM GRANULOCYTES # BLD: 0 10E9/L (ref 0–0.4)
IMM GRANULOCYTES NFR BLD: 0.8 %
LYMPHOCYTES # BLD AUTO: 1.1 10E9/L (ref 0.8–5.3)
LYMPHOCYTES NFR BLD AUTO: 21.6 %
MCH RBC QN AUTO: 32.1 PG (ref 26.5–33)
MCHC RBC AUTO-ENTMCNC: 34 G/DL (ref 31.5–36.5)
MCV RBC AUTO: 94 FL (ref 78–100)
MONOCYTES # BLD AUTO: 0.6 10E9/L (ref 0–1.3)
MONOCYTES NFR BLD AUTO: 12.5 %
NEUTROPHILS # BLD AUTO: 3.2 10E9/L (ref 1.6–8.3)
NEUTROPHILS NFR BLD AUTO: 64.1 %
PLATELET # BLD AUTO: 153 10E9/L (ref 150–450)
RBC # BLD AUTO: 3.58 10E12/L (ref 4.4–5.9)
RETICS # AUTO: 132.1 10E9/L (ref 25–95)
RETICS/RBC NFR AUTO: 3.7 % (ref 0.5–2)
WBC # BLD AUTO: 5 10E9/L (ref 4–11)

## 2017-12-22 PROCEDURE — 99214 OFFICE O/P EST MOD 30 MIN: CPT | Performed by: NURSE PRACTITIONER

## 2017-12-22 PROCEDURE — 36415 COLL VENOUS BLD VENIPUNCTURE: CPT | Performed by: INTERNAL MEDICINE

## 2017-12-22 PROCEDURE — 40000611 ZZHCL STATISTIC MORPHOLOGY W/INTERP HEMEPATH TC 85060: Performed by: INTERNAL MEDICINE

## 2017-12-22 PROCEDURE — 85045 AUTOMATED RETICULOCYTE COUNT: CPT | Performed by: INTERNAL MEDICINE

## 2017-12-22 PROCEDURE — 82565 ASSAY OF CREATININE: CPT | Performed by: INTERNAL MEDICINE

## 2017-12-22 PROCEDURE — 85025 COMPLETE CBC W/AUTO DIFF WBC: CPT | Performed by: INTERNAL MEDICINE

## 2017-12-22 ASSESSMENT — PAIN SCALES - GENERAL: PAINLEVEL: MILD PAIN (2)

## 2017-12-22 NOTE — LETTER
"    12/22/2017         RE: Milo Lozano  [address] [facility] [id number]        Dear Colleague,    Thank you for referring your patient, Milo Lozano, to the [facility], [place]. Please see a copy of my visit note below.    Oncology Follow Up Visit: December 22, 2017    Oncologist: Dr Karen Hernandez  PCP: Laurence Ibarra    Diagnosis: Pancytopenia  Milo Lozano is an 82 year old male with new diagnosis of pancytopenia noted to be present in 7/2017. Full workup in 9/2017 is seen in 9/5/2017 visit with Dr Hernandez.     Interval History: Mr. \"Alex\" Carlos comes to clinic for review of blood counts and concerns initially noted 6 months previous.Pt today states he is feeling well with continuation of diabetes and diabetic neuropathy to feet as his biggest complaint- has tried lazer treatment to feet as only trial of improvement. He also noted some SOB with exertion but admits he is not exercising even as they go to winter in [place]- they will be returning to [place] next week and back in May. Denies Night sweats, unintensional weight loss, increased fatigue, pain, fevers or illness. Diabetes under good control according to pt with metformin. Admits he never had colonoscopy.   Rest of comprehensive and complete ROS is reviewed and is negative.   Past Medical History:   Diagnosis Date     BPH (benign prostatic hypertrophy)      CARDIOVASCULAR SCREENING; LDL GOAL LESS THAN 100 12/29/2011     CARDIOVASCULAR SCREENING; LDL GOAL LESS THAN 130 12/29/2011     Hypertension goal BP (blood pressure) < 140/90 12/29/2011     Hypothyroidism due to acquired atrophy of thyroid 12/5/2016     Prostate cancer (H) 1/2007    Had Radiation     Type 2 diabetes, HbA1c goal < 7% (H)      Current Outpatient Prescriptions   Medication     metFORMIN (GLUCOPHAGE-XR) 500 MG 24 hr tablet     levothyroxine (SYNTHROID/LEVOTHROID) 50 MCG tablet     losartan (COZAAR) 100 MG tablet     tamsulosin (FLOMAX) 0.4 MG capsule     " chlorthalidone (HYGROTON) 25 MG tablet     blood glucose monitoring (ACCU-CHEK FASTCLIX) lancets     chlorthalidone (HYGROTON) 25 MG tablet     ACCU-CHEK SMARTVIEW test strip     blood glucose monitoring (NO BRAND SPECIFIED) test strip     chlorthalidone (HYGROTON) 25 MG tablet     order for DME     capsaicin (ZOSTRIX) 0.025 % CREA     ciclopirox (LOPROX) 0.77 % cream     blood glucose calibration (ACCU-CHEK SMARTVIEW CONTROL) solution     blood glucose monitoring (ACCU-CHEK MILADYS SMARTVIEW) meter device kit     STATIN NOT PRESCRIBED, INTENTIONAL,     aspirin 81 MG tablet     Omega-3 Fatty Acids (FISH OIL PO)     No current facility-administered medications for this visit.      Allergies   Allergen Reactions     Lisinopril Cough     Developed an ACE cough on the Lisinopril       Physical Exam:/68  Pulse 87  Temp 98.4  F (36.9  C) (Oral)  Resp 18  Wt 83.1 kg (183 lb 1.6 oz)  SpO2 98%  BMI 27.84 kg/m2   Constitutional: Alert, healthy, and in no distress.   ENT: Eyes bright, No mouth sores  Neck: Supple, No adenopathy.Thyroid symmetric, normal size  Cardiac: Heart rate and rhythm is regular and strong without murmur  Respiratory: Breathing easy. Lung sounds clear to auscultation- no cough  GI: Abdomen is soft, non-tender, BS normal. No masses or organomegaly  MS: Muscle tone normal, extremities normal with no edema.   Skin: No suspicious lesions or rashes  Neuro: Sensory grossly WNL, gait normal.   Lymph: Normal ant/post cervical, axillary, supraclavicular nodes  Psych: Mentation appears normal and affect normal/bright and smiling    Laboratory Results:    Ref. Range 9/5/2017 13:38 9/5/2017 13:39 9/5/2017 13:40 9/27/2017 00:00 12/22/2017 14:25   Creatinine Latest Ref Range: 0.66 - 1.25 mg/dL 1.37 (H)    1.40 (H)   GFR Estimate Latest Ref Range: >60 mL/min/1.7m2 50 (L)    49 (L)   GFR Estimate If Black Latest Ref Range: >60 mL/min/1.7m2 60 (L)    59 (L)   Albumin Latest Ref Range: 3.4 - 5.0 g/dL 4.4        Protein Total Latest Ref Range: 6.8 - 8.8 g/dL 8.5       Bilirubin Total Latest Ref Range: 0.2 - 1.3 mg/dL 1.0       Alkaline Phosphatase Latest Ref Range: 40 - 150 U/L 55       ALT Latest Ref Range: 0 - 70 U/L 41       AST Latest Ref Range: 0 - 45 U/L 23       Bilirubin Direct Latest Ref Range: 0.0 - 0.2 mg/dL 0.2       Ferritin Latest Ref Range: 26 - 388 ng/mL 463 (H)       FOLATE RBC Unknown Rpt       Iron Latest Ref Range: 35 - 180 ug/dL 72       Iron Binding Cap Latest Ref Range: 240 - 430 ug/dL 277       Iron Saturation Index Latest Ref Range: 15 - 46 % 26       Lactate Dehydrogenase Latest Ref Range: 85 - 227 U/L 160       TSH Latest Ref Range: 0.40 - 4.00 mU/L 2.68       WBC Latest Ref Range: 4.0 - 11.0 10e9/L 4.2    5.0   Hemoglobin Latest Ref Range: 13.3 - 17.7 g/dL 12.4 (L)    11.5 (L)   Hematocrit Latest Ref Range: 40.0 - 53.0 % 34.7 (L)    33.8 (L)   Platelet Count Latest Ref Range: 150 - 450 10e9/L 157    153   RBC Count Latest Ref Range: 4.4 - 5.9 10e12/L 3.74 (L)    3.58 (L)   MCV Latest Ref Range: 78 - 100 fl 93    94   MCH Latest Ref Range: 26.5 - 33.0 pg 33.2 (H)    32.1   MCHC Latest Ref Range: 31.5 - 36.5 g/dL 35.7    34.0   RDW Latest Ref Range: 10.0 - 15.0 % 14.9    14.6   Diff Method Unknown Automated Method    Automated Method   % Neutrophils Latest Units: % 53.0    64.1   % Lymphocytes Latest Units: % 29.0    21.6   % Monocytes Latest Units: % 16.0    12.5   % Eosinophils Latest Units: % 1.0    0.6   % Basophils Latest Units: % 1.0    0.4   % Immature Granulocytes Latest Units: %     0.8   Absolute Neutrophil Latest Ref Range: 1.6 - 8.3 10e9/L 2.3    3.2   Absolute Lymphocytes Latest Ref Range: 0.8 - 5.3 10e9/L 1.2    1.1   Absolute Monocytes Latest Ref Range: 0.0 - 1.3 10e9/L 0.7    0.6   Absolute Eosinophils Latest Ref Range: 0.0 - 0.7 10e9/L 0.0    0.0   Absolute Basophils Latest Ref Range: 0.0 - 0.2 10e9/L 0.0    0.0   Abs Immature Granulocytes Latest Ref Range: 0 - 0.4 10e9/L     0.0  "  % Retic Latest Ref Range: 0.5 - 2.0 %  3.9 (H)   3.7 (H)   Absolute Retic Latest Ref Range: 25 - 95 10e9/L  145.9 (H)   132.1 (H)   LAURA  Broad Spectrum Unknown   Neg     ANTI NUCLEAR CHASE IGG BY IFA WITH REFLEX Unknown Rpt       Haptoglobin Latest Ref Range: 35 - 175 mg/dL 123       IGA Latest Ref Range: 70 - 380 mg/dL 217       IGG Latest Ref Range: 695 - 1620 mg/dL 1350       IGM Latest Ref Range: 60 - 265 mg/dL 78       Immunofixation ELP Unknown No monoclonal pro...         Assessment and Plan:   Normochromic anemia-Discussed with pt that he is here to follow up with blood counts and review for symptoms that may point to diagnosis of blood disorder.  Today, review of symptoms and exam show no sign of concern. Repeat of blood testing currently showing mild worsening of the hgb but WBCs and platelets remain normal. Awaiting blood smear for review and will follow with pt  Will plan on follow up with blood testing after he returns from [place] in May. WIll need CBC, Creatinine, retic and peripheral smear.   CKD stage 3- continued affect perhaps from diabetes. Diabetes under good control with last Hgb A1c=5.7%- using metformin.   History of Prostate cancer- Dx in [place] and had radiation in 2007. PSA undetectable with testing on 7/2017.   This was a 25 min visit with > 50% in counseling and coordinating care including education and management of concerns.    Nuvia Dasilva,TI      Oncology Rooming Note    December 22, 2017 2:41 PM   Milo Lozano is a 82 year old male who presents for:    Chief Complaint   Patient presents with     Oncology Clinic Visit     follow up     Initial Vitals: /68  Pulse 87  Temp 98.4  F (36.9  C) (Oral)  Resp 18  Wt 83.1 kg (183 lb 1.6 oz)  SpO2 98%  BMI 27.84 kg/m2 Estimated body mass index is 27.84 kg/(m^2) as calculated from the following:    Height as of 9/5/17: 1.727 m (5' 8\").    Weight as of this encounter: 83.1 kg (183 lb 1.6 oz). Body surface area is 2 meters " squared.  Mild Pain (2) Comment: both feet   No LMP for male patient.  Allergies reviewed: Yes  Medications reviewed: Yes   Patient monitors blood pressure periodically at home.    Medications: Medication refills not needed today.  Pharmacy name entered into EPIC:    [facility] FA[facility] ONLY - NEW RX ONLY  [facility] - [facility] [address]  [facility] - [facility] [address]  [facility] [address] [place] [facility] [place] [address] [address]    Clinical concerns: Curious about plan of care.  Longstanding neuropathy from diabetes.  SOB with activity Nuvia Dasilva NP was notified.    10 minutes for nursing intake (face to face time)     Ade Campuzano, RN                Again, thank you for allowing me to participate in the care of your patient.        Sincerely,        Nuvia Dasilva NP, APRN CNP

## 2017-12-22 NOTE — MR AVS SNAPSHOT
After Visit Summary   12/22/2017    Milo Lozano    MRN: [id number]           Patient Information     Date Of Birth          1935        Visit Information        Provider Department      12/22/2017 2:45 PM Nuvia Dasilva APRN CNP [facility]         Follow-ups after your visit        Your next 10 appointments already scheduled     Dec 27, 2017  9:30 AM CST   Return Visit with Laurence Ibarra MD PhD   [facility])    [address] [place] [id number] [phone]            May 08, 2018 10:00 AM CDT   LAB with LAB ONC [facility] ([facility])    [address] [place] [address] [id number] [phone]           Please do not eat 10-12 hours before your appointment if you are coming in fasting for labs on lipids, cholesterol, or glucose (sugar). This does not apply to pregnant women. Water, hot tea and black coffee (with nothing added) are okay. Do not drink other fluids, diet soda or chew gum.            May 11, 2018  3:30 PM CDT   Return Visit with Karen Hernandez MD   [facility])    [address] [place] [address] [phone]              Who to contact     If you have questions or need follow up information about today's clinic visit or your schedule please contact [facility] directly at [phone].  Normal or non-critical lab and imaging results will be communicated to you by MyChart, letter or phone within 4 business days after the clinic has received the results. If you do not hear from us within 7 days, please contact the clinic through MyChart or phone. If you have a critical or abnormal lab result, we will notify you by phone as soon as possible.  Submit refill requests through Gate[employer] or call your pharmacy and they will forward the refill request to us. Please allow 3  business days for your refill to be completed.          Additional Information About Your Visit        [employer] Information     PumpUp is an electronic gateway that provides easy, online access to your medical records. With PumpUp, you can request a clinic appointment, read your test results, renew a prescription or communicate with your care team.     To sign up for PumpUp visit the website at [email or web address] ([email or web address])   You will be asked to enter the access code listed below, as well as some personal information. Please follow the directions to create your username and password.     Your access code is: [email or web address]  Expires: 2018  3:38 PM     Your access code will  in 90 days. If you need help or a new code, please contact your [facility] or call [phone] for assistance.        Care EveryWhere ID     This is your Care EveryWhere ID. This could be used by other organizations to access your [place] medical records  [id number]        Your Vitals Were     Pulse Temperature Respirations Pulse Oximetry BMI (Body Mass Index)       87 98.4  F (36.9  C) (Oral) 18 98% 27.84 kg/m2        Blood Pressure from Last 3 Encounters:   17 147/68   17 144/75   17 128/66    Weight from Last 3 Encounters:   17 83.1 kg (183 lb 1.6 oz)   17 82.8 kg (182 lb 8 oz)   17 82.6 kg (182 lb)              Today, you had the following     No orders found for display         Today's Medication Changes          These changes are accurate as of: 17  3:17 PM.  If you have any questions, ask your nurse or doctor.               These medicines have changed or have updated prescriptions.        Dose/Directions    chlorthalidone 25 MG tablet   Commonly known as:  HYGROTON   This may have changed:  Another medication with the same name was removed. Continue taking this medication, and follow the directions you see here.   Used for:  Hypertension goal BP (blood  pressure) < 140/90   Changed by:  Laurence Ibarra MD PhD        Dose:  25 mg   Take 1 tablet (25 mg) by mouth daily   Quantity:  90 tablet   Refills:  3                Primary Care Provider Office Phone # Fax #    Laurence Ibarra MD PhD [phone] [address] [facility] [address]        Equal Access to Services     [facility] AH: Hadii aad ku hadasho Soomaali, waaxda luqadaha, qaybta kaalmada adeegyada, waxay idiin hayaan adeeg kharash la'aan . So [facility] [phone].    ATENCIÓN: Si habla español, tiene a raygoza disposición servicios gratuitos de asistencia lingüística. Llame al [phone].    We comply with applicable federal civil rights laws and [place] laws. We do not discriminate on the basis of race, color, national origin, age, disability, sex, sexual orientation, or gender identity.            Thank you!     Thank you for choosing [facility]  for your care. Our goal is always to provide you with excellent care. Hearing back from our patients is one way we can continue to improve our services. Please take a few minutes to complete the written survey that you may receive in the mail after your visit with us. Thank you!             Your Updated Medication List - Protect others around you: Learn how to safely use, store and throw away your medicines at www.disposemymeds.org.          This list is accurate as of: 12/22/17  3:17 PM.  Always use your most recent med list.                   Brand Name Dispense Instructions for use Diagnosis    * ACCU-CHEK SMARTVIEW test strip   Generic drug:  blood glucose monitoring     200 strip    TEST BLOOD SUGAR TWICE DAILY  OR AS DIRECTED    Type 2 diabetes mellitus without complication, without long-term current use of insulin (H)       * blood glucose monitoring test strip    no brand specified    200 strip    1 strip by In Vitro route 2 times daily    Type 2 diabetes mellitus without complication, without long-term current use of insulin  (H)       aspirin 81 MG tablet     90 tablet    Take 1 tablet (81 mg) by mouth daily    Type 2 diabetes, HbA1c goal < 7% (H), Hypertension goal BP (blood pressure) < 140/90, CARDIOVASCULAR SCREENING; LDL GOAL LESS THAN 100       blood glucose calibration solution     1 each    1 Bottle as needed Use to calibrate blood glucose monitor as needed as directed.    Type 2 diabetes, HbA1c goal < 7% (H)       blood glucose monitoring lancets     204 each    TEST BLOOD SUGAR TWICE DAILY  OR AS DIRECTED    Diabetes type 2, controlled (H)       blood glucose monitoring meter device kit     1 kit    Use to test blood sugar 2 times daily or as directed.    Type 2 diabetes, HbA1C goal < 8% (H)       capsaicin 0.025 % Crea cream    ZOSTRIX    60 g    To feet 2-3 times daily as needed.    Diabetic neuropathy with neurologic complication (H)       chlorthalidone 25 MG tablet    HYGROTON    90 tablet    Take 1 tablet (25 mg) by mouth daily    Hypertension goal BP (blood pressure) < 140/90       ciclopirox 0.77 % cream    LOPROX    90 g    Apply topically 2 times daily To toenails.    Dermatophytosis of nail       FISH OIL PO      Take  by mouth daily.        levothyroxine 50 MCG tablet    SYNTHROID/LEVOTHROID    90 tablet    TAKE 1 TABLET EVERY DAY    Hypothyroidism due to acquired atrophy of thyroid       losartan 100 MG tablet    COZAAR    90 tablet    TAKE 1 TABLET EVERY DAY    Hypertension goal BP (blood pressure) < 140/90       metFORMIN 500 MG 24 hr tablet    GLUCOPHAGE-XR    180 tablet    TAKE 1 TABLET TWICE DAILY WITH MEALS (PATIENT DUE FOR OFFICE VISIT AND LABORATORY MONITORING)    Controlled type 2 diabetes mellitus with stage 3 chronic kidney disease, without long-term current use of insulin (H)       order for DME     2 Device    Equipment being ordered: HAPAD Metatarsal pad, QTY:2    Diabetic neuropathy with neurologic complication (H)       STATIN NOT PRESCRIBED (INTENTIONAL)     0 each    1 each continuous Statin not  prescribed intentionally due to does not meet Rickman criteria and Other: LDL at goal without the statin.    CARDIOVASCULAR SCREENING; LDL GOAL LESS THAN 100       tamsulosin 0.4 MG capsule    FLOMAX    90 capsule    Take 1 capsule (0.4 mg) by mouth daily    Benign prostatic hyperplasia without lower urinary tract symptoms       * Notice:  This list has 2 medication(s) that are the same as other medications prescribed for you. Read the directions carefully, and ask your doctor or other care provider to review them with you.

## 2017-12-22 NOTE — PROGRESS NOTES
"Oncology Follow Up Visit: December 22, 2017    Oncologist: Dr Karen Hernandez  PCP: Laurence Ibarra    Diagnosis: Pancytopenia  Milo Lozano is an 82 year old male with new diagnosis of pancytopenia noted to be present in 7/2017. Full workup in 9/2017 is seen in 9/5/2017 visit with Dr Hernandez.     Interval History: Mr. \"Alex\" Carlos comes to clinic for review of blood counts and concerns initially noted 6 months previous.Pt today states he is feeling well with continuation of diabetes and diabetic neuropathy to feet as his biggest complaint- has tried lazer treatment to feet as only trial of improvement. He also noted some SOB with exertion but admits he is not exercising even as they go to winter in [place]- they will be returning to [place] next week and back in May. Denies Night sweats, unintensional weight loss, increased fatigue, pain, fevers or illness. Diabetes under good control according to pt with metformin. Admits he never had colonoscopy.   Rest of comprehensive and complete ROS is reviewed and is negative.   Past Medical History:   Diagnosis Date     BPH (benign prostatic hypertrophy)      CARDIOVASCULAR SCREENING; LDL GOAL LESS THAN 100 12/29/2011     CARDIOVASCULAR SCREENING; LDL GOAL LESS THAN 130 12/29/2011     Hypertension goal BP (blood pressure) < 140/90 12/29/2011     Hypothyroidism due to acquired atrophy of thyroid 12/5/2016     Prostate cancer (H) 1/2007    Had Radiation     Type 2 diabetes, HbA1c goal < 7% (H)      Current Outpatient Prescriptions   Medication     metFORMIN (GLUCOPHAGE-XR) 500 MG 24 hr tablet     levothyroxine (SYNTHROID/LEVOTHROID) 50 MCG tablet     losartan (COZAAR) 100 MG tablet     tamsulosin (FLOMAX) 0.4 MG capsule     chlorthalidone (HYGROTON) 25 MG tablet     blood glucose monitoring (ACCU-CHEK FASTCLIX) lancets     chlorthalidone (HYGROTON) 25 MG tablet     ACCU-CHEK SMARTVIEW test strip     blood glucose monitoring (NO BRAND SPECIFIED) test strip     " chlorthalidone (HYGROTON) 25 MG tablet     order for DME     capsaicin (ZOSTRIX) 0.025 % CREA     ciclopirox (LOPROX) 0.77 % cream     blood glucose calibration (ACCU-CHEK SMARTVIEW CONTROL) solution     blood glucose monitoring (ACCU-CHEK MILADYS SMARTVIEW) meter device kit     STATIN NOT PRESCRIBED, INTENTIONAL,     aspirin 81 MG tablet     Omega-3 Fatty Acids (FISH OIL PO)     No current facility-administered medications for this visit.      Allergies   Allergen Reactions     Lisinopril Cough     Developed an ACE cough on the Lisinopril       Physical Exam:/68  Pulse 87  Temp 98.4  F (36.9  C) (Oral)  Resp 18  Wt 83.1 kg (183 lb 1.6 oz)  SpO2 98%  BMI 27.84 kg/m2   Constitutional: Alert, healthy, and in no distress.   ENT: Eyes bright, No mouth sores  Neck: Supple, No adenopathy.Thyroid symmetric, normal size  Cardiac: Heart rate and rhythm is regular and strong without murmur  Respiratory: Breathing easy. Lung sounds clear to auscultation- no cough  GI: Abdomen is soft, non-tender, BS normal. No masses or organomegaly  MS: Muscle tone normal, extremities normal with no edema.   Skin: No suspicious lesions or rashes  Neuro: Sensory grossly WNL, gait normal.   Lymph: Normal ant/post cervical, axillary, supraclavicular nodes  Psych: Mentation appears normal and affect normal/bright and smiling    Laboratory Results:    Ref. Range 9/5/2017 13:38 9/5/2017 13:39 9/5/2017 13:40 9/27/2017 00:00 12/22/2017 14:25   Creatinine Latest Ref Range: 0.66 - 1.25 mg/dL 1.37 (H)    1.40 (H)   GFR Estimate Latest Ref Range: >60 mL/min/1.7m2 50 (L)    49 (L)   GFR Estimate If Black Latest Ref Range: >60 mL/min/1.7m2 60 (L)    59 (L)   Albumin Latest Ref Range: 3.4 - 5.0 g/dL 4.4       Protein Total Latest Ref Range: 6.8 - 8.8 g/dL 8.5       Bilirubin Total Latest Ref Range: 0.2 - 1.3 mg/dL 1.0       Alkaline Phosphatase Latest Ref Range: 40 - 150 U/L 55       ALT Latest Ref Range: 0 - 70 U/L 41       AST Latest Ref Range: 0  - 45 U/L 23       Bilirubin Direct Latest Ref Range: 0.0 - 0.2 mg/dL 0.2       Ferritin Latest Ref Range: 26 - 388 ng/mL 463 (H)       FOLATE RBC Unknown Rpt       Iron Latest Ref Range: 35 - 180 ug/dL 72       Iron Binding Cap Latest Ref Range: 240 - 430 ug/dL 277       Iron Saturation Index Latest Ref Range: 15 - 46 % 26       Lactate Dehydrogenase Latest Ref Range: 85 - 227 U/L 160       TSH Latest Ref Range: 0.40 - 4.00 mU/L 2.68       WBC Latest Ref Range: 4.0 - 11.0 10e9/L 4.2    5.0   Hemoglobin Latest Ref Range: 13.3 - 17.7 g/dL 12.4 (L)    11.5 (L)   Hematocrit Latest Ref Range: 40.0 - 53.0 % 34.7 (L)    33.8 (L)   Platelet Count Latest Ref Range: 150 - 450 10e9/L 157    153   RBC Count Latest Ref Range: 4.4 - 5.9 10e12/L 3.74 (L)    3.58 (L)   MCV Latest Ref Range: 78 - 100 fl 93    94   MCH Latest Ref Range: 26.5 - 33.0 pg 33.2 (H)    32.1   MCHC Latest Ref Range: 31.5 - 36.5 g/dL 35.7    34.0   RDW Latest Ref Range: 10.0 - 15.0 % 14.9    14.6   Diff Method Unknown Automated Method    Automated Method   % Neutrophils Latest Units: % 53.0    64.1   % Lymphocytes Latest Units: % 29.0    21.6   % Monocytes Latest Units: % 16.0    12.5   % Eosinophils Latest Units: % 1.0    0.6   % Basophils Latest Units: % 1.0    0.4   % Immature Granulocytes Latest Units: %     0.8   Absolute Neutrophil Latest Ref Range: 1.6 - 8.3 10e9/L 2.3    3.2   Absolute Lymphocytes Latest Ref Range: 0.8 - 5.3 10e9/L 1.2    1.1   Absolute Monocytes Latest Ref Range: 0.0 - 1.3 10e9/L 0.7    0.6   Absolute Eosinophils Latest Ref Range: 0.0 - 0.7 10e9/L 0.0    0.0   Absolute Basophils Latest Ref Range: 0.0 - 0.2 10e9/L 0.0    0.0   Abs Immature Granulocytes Latest Ref Range: 0 - 0.4 10e9/L     0.0   % Retic Latest Ref Range: 0.5 - 2.0 %  3.9 (H)   3.7 (H)   Absolute Retic Latest Ref Range: 25 - 95 10e9/L  145.9 (H)   132.1 (H)   LAURA  Broad Spectrum Unknown   Neg     ANTI NUCLEAR CHASE IGG BY IFA WITH REFLEX Unknown Rpt       Haptoglobin  Latest Ref Range: 35 - 175 mg/dL 123       IGA Latest Ref Range: 70 - 380 mg/dL 217       IGG Latest Ref Range: 695 - 1620 mg/dL 1350       IGM Latest Ref Range: 60 - 265 mg/dL 78       Immunofixation ELP Unknown No monoclonal pro...         Assessment and Plan:   Normochromic anemia-Discussed with pt that he is here to follow up with blood counts and review for symptoms that may point to diagnosis of blood disorder.  Today, review of symptoms and exam show no sign of concern. Repeat of blood testing currently showing mild worsening of the hgb but WBCs and platelets remain normal. Awaiting blood smear for review and will follow with pt  Will plan on follow up with blood testing after he returns from [place] in May. WIll need CBC, Creatinine, retic and peripheral smear.   CKD stage 3- continued affect perhaps from diabetes. Diabetes under good control with last Hgb A1c=5.7%- using metformin.   History of Prostate cancer- Dx in [place] and had radiation in 2007. PSA undetectable with testing on 7/2017.   This was a 25 min visit with > 50% in counseling and coordinating care including education and management of concerns.    Nuvia Dasilva,CNP

## 2017-12-22 NOTE — PROGRESS NOTES
"Oncology Rooming Note    December 22, 2017 2:41 PM   Milo Lozano is a 82 year old male who presents for:    Chief Complaint   Patient presents with     Oncology Clinic Visit     follow up     Initial Vitals: /68  Pulse 87  Temp 98.4  F (36.9  C) (Oral)  Resp 18  Wt 83.1 kg (183 lb 1.6 oz)  SpO2 98%  BMI 27.84 kg/m2 Estimated body mass index is 27.84 kg/(m^2) as calculated from the following:    Height as of 9/5/17: 1.727 m (5' 8\").    Weight as of this encounter: 83.1 kg (183 lb 1.6 oz). Body surface area is 2 meters squared.  Mild Pain (2) Comment: both feet   No LMP for male patient.  Allergies reviewed: Yes  Medications reviewed: Yes   Patient monitors blood pressure periodically at home.    Medications: Medication refills not needed today.  Pharmacy name entered into EPIC:    [facility] FA[facility] ONLY - NEW RX ONLY  [facility] - [place], [place] - [address] [id number] - [place], [place] - [address] [address]  [facility] [address] [address] [place], [place] - [facility]    Clinical concerns: Curious about plan of care.  Longstanding neuropathy from diabetes.  SOB with activity Nuvia Dasilva NP was notified.    10 minutes for nursing intake (face to face time)     Ade Campuzano RN              "

## 2017-12-26 LAB — COPATH REPORT: NORMAL

## 2017-12-27 ENCOUNTER — OFFICE VISIT (OUTPATIENT)
Dept: PEDIATRICS | Facility: CLINIC | Age: 82
End: 2017-12-27
Payer: COMMERCIAL

## 2017-12-27 VITALS
WEIGHT: 182.4 LBS | OXYGEN SATURATION: 97 % | HEART RATE: 71 BPM | TEMPERATURE: 96.8 F | DIASTOLIC BLOOD PRESSURE: 60 MMHG | BODY MASS INDEX: 27.73 KG/M2 | SYSTOLIC BLOOD PRESSURE: 142 MMHG

## 2017-12-27 DIAGNOSIS — Z13.6 CARDIOVASCULAR SCREENING; LDL GOAL LESS THAN 100: ICD-10-CM

## 2017-12-27 DIAGNOSIS — E11.42 DIABETIC PERIPHERAL NEUROPATHY (H): ICD-10-CM

## 2017-12-27 DIAGNOSIS — E11.9 TYPE 2 DIABETES MELLITUS WITHOUT COMPLICATION, WITHOUT LONG-TERM CURRENT USE OF INSULIN (H): Primary | ICD-10-CM

## 2017-12-27 DIAGNOSIS — E03.4 HYPOTHYROIDISM DUE TO ACQUIRED ATROPHY OF THYROID: ICD-10-CM

## 2017-12-27 DIAGNOSIS — E11.42 CONTROLLED TYPE 2 DIABETES MELLITUS WITH DIABETIC POLYNEUROPATHY, WITHOUT LONG-TERM CURRENT USE OF INSULIN (H): ICD-10-CM

## 2017-12-27 DIAGNOSIS — I10 HYPERTENSION GOAL BP (BLOOD PRESSURE) < 140/90: ICD-10-CM

## 2017-12-27 PROCEDURE — 99214 OFFICE O/P EST MOD 30 MIN: CPT | Performed by: INTERNAL MEDICINE

## 2017-12-27 RX ORDER — LANCETS
1 EACH MISCELLANEOUS 2 TIMES DAILY
Qty: 200 EACH | Refills: 3 | Status: SHIPPED | OUTPATIENT
Start: 2017-12-27 | End: 2019-02-05

## 2017-12-27 ASSESSMENT — PAIN SCALES - GENERAL: PAINLEVEL: NO PAIN (0)

## 2017-12-27 NOTE — MR AVS SNAPSHOT
After Visit Summary   2017    Milo Lozano    MRN: [id number]           Patient Information     Date Of Birth          1935        Visit Information        Provider Department      2017 9:30 AM Laurence Ibarra MD PhD [facility]        Today's Diagnoses     Hypertension goal BP (blood pressure) < 140/90    -  1    Type 2 diabetes mellitus without complication, without long-term current use of insulin (H)        CARDIOVASCULAR SCREENING; LDL GOAL LESS THAN 100        Diabetic peripheral neuropathy (H)        Hypothyroidism due to acquired atrophy of thyroid        Controlled type 2 diabetes mellitus with diabetic polyneuropathy, without long-term current use of insulin (H)          Care Instructions    Make appointment(s) for:   -- fasting lab tomorrow morning  -- diabetes follow up in 6 months      Medication(s) prescribed today:    Orders Placed This Encounter   Medications     blood glucose monitoring (ACCU-CHEK SMARTVIEW) test strip     Sig: TEST BLOOD SUGAR TWICE DAILY  OR AS DIRECTED     Dispense:  200 strip     Refill:  3     blood glucose monitoring (ACCU-CHEK FASTCLIX) lancets     Si each by In Vitro route 2 times daily     Dispense:  200 each     Refill:  3                         Follow-ups after your visit        Follow-up notes from your care team     Return in about 6 months (around 2018) for Diabetic check.      Your next 10 appointments already scheduled     May 08, 2018 10:00 AM CDT   LAB with [facility] ([facility])    [address] [place] [id number] [phone]           Please do not eat 10-12 hours before your appointment if you are coming in fasting for labs on lipids, cholesterol, or glucose (sugar). This does not apply to pregnant women. Water, hot tea and black coffee (with nothing added) are okay. Do not drink other fluids, diet soda or chew gum.            May 11,    3:30 PM CDT   [facility]    [address] [place] [id number] [phone]              Future tests that were ordered for you today     Open Future Orders        Priority Expected Expires Ordered    TSH with free T4 reflex Routine  2018    Basic metabolic panel Routine  2017    Hemoglobin A1c Routine  2017    Lipid panel reflex to direct LDL Fasting Routine  2017            Who to contact     If you have questions or need follow up information about today's clinic visit or your schedule please contact [facility] directly at [phone].  Normal or non-critical lab and imaging results will be communicated to you by MyChart, letter or phone within 4 business days after the clinic has received the results. If you do not hear from us within 7 days, please contact the clinic through MyChart or phone. If you have a critical or abnormal lab result, we will notify you by phone as soon as possible.  Submit refill requests through [employer] or call your pharmacy and they will forward the refill request to us. Please allow 3 business days for your refill to be completed.          Additional Information About Your Visit        [employer] Information     [employer] is an electronic gateway that provides easy, online access to your medical records. With [employer], you can request a clinic appointment, read your test results, renew a prescription or communicate with your care team.     To sign up for [employer] visit the website at [email or web address]   You will be asked to enter the access code listed below, as well as some personal information. Please follow the directions to create your username and password.     Your access code is: [email or web address]  Expires: 2018  3:38 PM     Your access code will  in 90 days. If you need help or a  new code, please contact your [facility] or call [phone] for assistance.        Care EveryWhere ID     This is your Care EveryWhere ID. This could be used by other organizations to access your [place] medical records  [id number]        Your Vitals Were     Pulse Temperature Pulse Oximetry BMI (Body Mass Index)          71 96.8  F (36  C) (Oral) 97% 27.73 kg/m2         Blood Pressure from Last 3 Encounters:   12/27/17 142/60   12/22/17 147/68   09/05/17 144/75    Weight from Last 3 Encounters:   12/27/17 182 lb 6.4 oz (82.7 kg)   12/22/17 183 lb 1.6 oz (83.1 kg)   09/05/17 182 lb 8 oz (82.8 kg)                 Today's Medication Changes          These changes are accurate as of: 12/27/17 10:17 AM.  If you have any questions, ask your nurse or doctor.               These medicines have changed or have updated prescriptions.        Dose/Directions    blood glucose monitoring lancets   This may have changed:  See the new instructions.   Used for:  Controlled type 2 diabetes mellitus with diabetic polyneuropathy, without long-term current use of insulin (H)   Changed by:  Laurence Ibarra MD PhD        Dose:  1 each   1 each by In Vitro route 2 times daily   Quantity:  200 each   Refills:  3       blood glucose monitoring test strip   Commonly known as:  ACCU-CHEK SMARTVIEW   This may have changed:  See the new instructions.   Used for:  Type 2 diabetes mellitus without complication, without long-term current use of insulin (H)   Changed by:  Laurence Ibarra MD PhD        TEST BLOOD SUGAR TWICE DAILY  OR AS DIRECTED   Quantity:  200 strip   Refills:  3            Where to get your medicines      Some of these will need a paper prescription and others can be bought over the counter.  Ask your nurse if you have questions.     Bring a paper prescription for each of these medications     blood glucose monitoring lancets    blood glucose monitoring test strip                Primary Care Provider  Office Phone # Fax #    Laurence Ibarra MD PhD [phone]       [address]  [facility] [address]        Equal Access to Services     TONNY HANSON : Hadii aad ku hadraheemnito Matildenataly, wanorada emiliajohnha, qatellota kanapoleonda cosme, tip whelanheladio milleramos lee laRadhamirna claudio. So [facility] [phone].    ATENCIÓN: Si habla español, tiene a raygoza disposición servicios gratuitos de asistencia lingüística. Llame al [phone].    We comply with applicable federal civil rights laws and [place] laws. We do not discriminate on the basis of race, color, national origin, age, disability, sex, sexual orientation, or gender identity.            Thank you!     Thank you for choosing [facility]  for your care. Our goal is always to provide you with excellent care. Hearing back from our patients is one way we can continue to improve our services. Please take a few minutes to complete the written survey that you may receive in the mail after your visit with us. Thank you!             Your Updated Medication List - Protect others around you: Learn how to safely use, store and throw away your medicines at www.disposemymeds.org.          This list is accurate as of: 12/27/17 10:17 AM.  Always use your most recent med list.                   Brand Name Dispense Instructions for use Diagnosis    aspirin 81 MG tablet     90 tablet    Take 1 tablet (81 mg) by mouth daily    Type 2 diabetes, HbA1c goal < 7% (H), Hypertension goal BP (blood pressure) < 140/90, CARDIOVASCULAR SCREENING; LDL GOAL LESS THAN 100       blood glucose calibration solution     1 each    1 Bottle as needed Use to calibrate blood glucose monitor as needed as directed.    Type 2 diabetes, HbA1c goal < 7% (H)       blood glucose monitoring lancets     200 each    1 each by In Vitro route 2 times daily    Controlled type 2 diabetes mellitus with diabetic polyneuropathy, without long-term current use of insulin (H)       blood glucose monitoring  meter device kit     1 kit    Use to test blood sugar 2 times daily or as directed.    Type 2 diabetes, HbA1C goal < 8% (H)       blood glucose monitoring test strip    ACCU-CHEK SMARTVIEW    200 strip    TEST BLOOD SUGAR TWICE DAILY  OR AS DIRECTED    Type 2 diabetes mellitus without complication, without long-term current use of insulin (H)       capsaicin 0.025 % Crea cream    ZOSTRIX    60 g    To feet 2-3 times daily as needed.    Diabetic neuropathy with neurologic complication (H)       chlorthalidone 25 MG tablet    HYGROTON    90 tablet    Take 1 tablet (25 mg) by mouth daily    Hypertension goal BP (blood pressure) < 140/90       ciclopirox 0.77 % cream    LOPROX    90 g    Apply topically 2 times daily To toenails.    Dermatophytosis of nail       FISH OIL PO      Take  by mouth daily.        levothyroxine 50 MCG tablet    SYNTHROID/LEVOTHROID    90 tablet    TAKE 1 TABLET EVERY DAY    Hypothyroidism due to acquired atrophy of thyroid       losartan 100 MG tablet    COZAAR    90 tablet    TAKE 1 TABLET EVERY DAY    Hypertension goal BP (blood pressure) < 140/90       metFORMIN 500 MG 24 hr tablet    GLUCOPHAGE-XR    180 tablet    TAKE 1 TABLET TWICE DAILY WITH MEALS (PATIENT DUE FOR OFFICE VISIT AND LABORATORY MONITORING)    Controlled type 2 diabetes mellitus with stage 3 chronic kidney disease, without long-term current use of insulin (H)       order for DME     2 Device    Equipment being ordered: HAPAD Metatarsal pad, QTY:2    Diabetic neuropathy with neurologic complication (H)       STATIN NOT PRESCRIBED (INTENTIONAL)     0 each    1 each continuous Statin not prescribed intentionally due to does not meet [place]ood criteria and Other: LDL at goal without the statin.    CARDIOVASCULAR SCREENING; LDL GOAL LESS THAN 100       tamsulosin 0.4 MG capsule    FLOMAX    90 capsule    Take 1 capsule (0.4 mg) by mouth daily    Benign prostatic hyperplasia without lower urinary tract symptoms

## 2017-12-27 NOTE — PROGRESS NOTES
SUBJECTIVE:   Milo Lozano is a 82 year old male who presents to clinic today for the following health issues:      Diabetes Follow-up    Patient is checking blood sugars: twice daily.    Blood sugar testing frequency justification:  Worsening diabetes control.  Results are as follows:       am - 170's       bedtime - 170-200's    Diabetic concerns: None     Symptoms of hypoglycemia (low blood sugar): none     Paresthesias (numbness or burning in feet) or sores: No     Date of last diabetic eye exam: 9/27/17  BP Readings from Last 2 Encounters:   12/27/17 142/60   12/22/17 147/68     Hemoglobin A1C (%)   Date Value   07/12/2017 5.7   10/17/2016 5.8     LDL Cholesterol Calculated (mg/dL)   Date Value   12/19/2016 52   07/05/2016 29         Amount of exercise or physical activity: None    Problems taking medications regularly: No    Medication side effects: none    Diet: low sugar    Patient reports recent glucose reading has been higher.  Has been no change in diet.  He is pretty sedentary but that has not changed.  Reports neuropathy is getting a little worse.  Has good days and bad days.  He does not want to take another pill.  Feels like he is a walking pillbox.    He is seeing hematologist for normocytic anemia.  Had a visit last week, had labs done, waiting to hear back regarding the results.    He is planning to go down to [place] leaving tomorrow, spend the winter there, return in May.    ROS:  Constitutional, HEENT, cardiovascular, pulmonary, gi and gu systems are negative, except as otherwise noted.      Current Outpatient Prescriptions on File Prior to Visit:  metFORMIN (GLUCOPHAGE-XR) 500 MG 24 hr tablet TAKE 1 TABLET TWICE DAILY WITH MEALS (PATIENT DUE FOR OFFICE VISIT AND LABORATORY MONITORING)   levothyroxine (SYNTHROID/LEVOTHROID) 50 MCG tablet TAKE 1 TABLET EVERY DAY   losartan (COZAAR) 100 MG tablet TAKE 1 TABLET EVERY DAY   tamsulosin (FLOMAX) 0.4 MG capsule Take 1 capsule (0.4 mg) by mouth  daily   chlorthalidone (HYGROTON) 25 MG tablet Take 1 tablet (25 mg) by mouth daily   order for DME Equipment being ordered: HAPAD Metatarsal pad, QTY:2   capsaicin (ZOSTRIX) 0.025 % CREA To feet 2-3 times daily as needed.   ciclopirox (LOPROX) 0.77 % cream Apply topically 2 times daily To toenails.   blood glucose calibration (ACCU-CHEK SMARTVIEW CONTROL) solution 1 Bottle as needed Use to calibrate blood glucose monitor as needed as directed.   blood glucose monitoring (ACCU-CHEK MILADYS SMARTVIEW) meter device kit Use to test blood sugar 2 times daily or as directed.   STATIN NOT PRESCRIBED, INTENTIONAL, 1 each continuous Statin not prescribed intentionally due to does not meet [place] criteria and Other: LDL at goal without the statin.   aspirin 81 MG tablet Take 1 tablet (81 mg) by mouth daily   Omega-3 Fatty Acids (FISH OIL PO) Take  by mouth daily.     No current facility-administered medications on file prior to visit.       Problem list, Medication list, Allergies, and Medical/Social/Surgical histories reviewed in [facility] and updated as appropriate.    OBJECTIVE:                                                    /60 (BP Location: Right arm, Patient Position: Sitting, Cuff Size: Adult Large)  Pulse 71  Temp 96.8  F (36  C) (Oral)  Wt 182 lb 6.4 oz (82.7 kg)  SpO2 97%  BMI 27.73 kg/m2    GEN: healthy, alert and no distress  HEENT: unremarkable.  Neck:     supple, no thyromegaly, no cervical lymphadenopathy.   JACKSON:  CTA B/L, no wheezing or crackles.  CV:  RRR no murmur.  Intact distal pulses, good cap refill.  Abd: soft, normal active bowel sounds, non tender, non distended. No mass or organomegaly.   Ext:  no cyanosis, clubbing or edema.         Diagnostic test results:  No results found for this or any previous visit (from the past 24 hour(s)).       ASSESSMENT/PLAN:                                                      82 year old male with the following diagnoses and treatment plan:      ICD-10-CM     1. Type 2 diabetes mellitus without complication, without long-term current use of insulin (H) E11.9 Hemoglobin A1c     blood glucose monitoring (ACCU-CHEK SMARTVIEW) test strip   2. Hypertension goal BP (blood pressure) < 140/90 I10 Basic metabolic panel   3. CARDIOVASCULAR SCREENING; LDL GOAL LESS THAN 100 Z13.6 Lipid panel reflex to direct LDL Fasting   4. Diabetic peripheral neuropathy (H) E11.42    5. Hypothyroidism due to acquired atrophy of thyroid E03.4 TSH with free T4 reflex   6. Controlled type 2 diabetes mellitus with diabetic polyneuropathy, without long-term current use of insulin (H) E11.42 blood glucose monitoring (ACCU-CHEK FASTCLIX) lancets       --Previously A1c has been very well controlled.  If his A1c is less than 7, we will leave metformin at the current dose, thousand milligrams per day.  If his A1c goes above 7, we will increase metformin to 4 tablets a day.  --CKD stage III: If his creatinine went above 1.5, metformin would not be an option.  We will consider sulfonylurea.  --Hypertension: Blood pressure is inadequately controlled.  Ideally I want to increase the chlorthalidone to 50 mg daily, but will want to follow-up on his BMP.  Patient is not planning to establish a clinic in [place] in the next few months.  We opted to not make any changes of his blood pressure medication for now.  --Patient will return tomorrow morning to get his full panel fasting labs done, will plan for scheduled diabetes follow-up in 6 months from now after he returns from [place].  We will communicate with him by phone regarding recommendations based on his lab results.    Will call or return to clinic if worsening or symptoms not improving as discussed.  See Patient Instructions.        Laurence Ibarra MD-PhD  [facility]    (Note: Chart documentation was done in part with Dragon Voice Recognition software. Although reviewed after completion, some word and grammatical errors may remain.)

## 2017-12-27 NOTE — NURSING NOTE
"Chief Complaint   Patient presents with     Recheck Medication       Initial /60 (BP Location: Right arm, Patient Position: Sitting, Cuff Size: Adult Large)  Pulse 71  Temp 96.8  F (36  C) (Oral)  Wt 182 lb 6.4 oz (82.7 kg)  SpO2 97%  BMI 27.73 kg/m2 Estimated body mass index is 27.73 kg/(m^2) as calculated from the following:    Height as of 9/5/17: 5' 8\" (1.727 m).    Weight as of this encounter: 182 lb 6.4 oz (82.7 kg).  BP completed using cuff size: mey Calvillo, CMA    "

## 2017-12-27 NOTE — PATIENT INSTRUCTIONS
Make appointment(s) for:   -- fasting lab tomorrow morning  -- diabetes follow up in 6 months      Medication(s) prescribed today:    Orders Placed This Encounter   Medications     blood glucose monitoring (ACCU-CHEK SMARTVIEW) test strip     Sig: TEST BLOOD SUGAR TWICE DAILY  OR AS DIRECTED     Dispense:  200 strip     Refill:  3     blood glucose monitoring (ACCU-CHEK FASTCLIX) lancets     Si each by In Vitro route 2 times daily     Dispense:  200 each     Refill:  3

## 2017-12-28 DIAGNOSIS — I10 HYPERTENSION GOAL BP (BLOOD PRESSURE) < 140/90: ICD-10-CM

## 2017-12-28 DIAGNOSIS — E11.9 TYPE 2 DIABETES MELLITUS WITHOUT COMPLICATION, WITHOUT LONG-TERM CURRENT USE OF INSULIN (H): ICD-10-CM

## 2017-12-28 DIAGNOSIS — R79.89 ELEVATED TSH: Primary | ICD-10-CM

## 2017-12-28 DIAGNOSIS — E03.4 HYPOTHYROIDISM DUE TO ACQUIRED ATROPHY OF THYROID: ICD-10-CM

## 2017-12-28 DIAGNOSIS — Z13.6 CARDIOVASCULAR SCREENING; LDL GOAL LESS THAN 100: ICD-10-CM

## 2017-12-28 LAB
ANION GAP SERPL CALCULATED.3IONS-SCNC: 8 MMOL/L (ref 3–14)
BUN SERPL-MCNC: 26 MG/DL (ref 7–30)
CALCIUM SERPL-MCNC: 9.3 MG/DL (ref 8.5–10.1)
CHLORIDE SERPL-SCNC: 100 MMOL/L (ref 94–109)
CHOLEST SERPL-MCNC: 148 MG/DL
CO2 SERPL-SCNC: 29 MMOL/L (ref 20–32)
CREAT SERPL-MCNC: 1.53 MG/DL (ref 0.66–1.25)
GFR SERPL CREATININE-BSD FRML MDRD: 44 ML/MIN/1.7M2
GLUCOSE SERPL-MCNC: 177 MG/DL (ref 70–99)
HBA1C MFR BLD: 6 % (ref 4.3–6)
HDLC SERPL-MCNC: 36 MG/DL
LDLC SERPL CALC-MCNC: 41 MG/DL
NONHDLC SERPL-MCNC: 112 MG/DL
POTASSIUM SERPL-SCNC: 3.7 MMOL/L (ref 3.4–5.3)
SODIUM SERPL-SCNC: 137 MMOL/L (ref 133–144)
T4 FREE SERPL-MCNC: 1.7 NG/DL (ref 0.76–1.46)
TRIGL SERPL-MCNC: 353 MG/DL
TSH SERPL DL<=0.005 MIU/L-ACNC: 5.38 MU/L (ref 0.4–4)

## 2017-12-28 PROCEDURE — 36415 COLL VENOUS BLD VENIPUNCTURE: CPT | Performed by: INTERNAL MEDICINE

## 2017-12-28 PROCEDURE — 83036 HEMOGLOBIN GLYCOSYLATED A1C: CPT | Performed by: INTERNAL MEDICINE

## 2017-12-28 PROCEDURE — 80048 BASIC METABOLIC PNL TOTAL CA: CPT | Performed by: INTERNAL MEDICINE

## 2017-12-28 PROCEDURE — 84439 ASSAY OF FREE THYROXINE: CPT | Performed by: INTERNAL MEDICINE

## 2017-12-28 PROCEDURE — 84443 ASSAY THYROID STIM HORMONE: CPT | Performed by: INTERNAL MEDICINE

## 2017-12-28 PROCEDURE — 80061 LIPID PANEL: CPT | Performed by: INTERNAL MEDICINE

## 2018-01-02 ENCOUNTER — TELEPHONE (OUTPATIENT)
Dept: PEDIATRICS | Facility: CLINIC | Age: 83
End: 2018-01-02

## 2018-01-02 DIAGNOSIS — E11.9 TYPE 2 DIABETES MELLITUS WITHOUT COMPLICATION, WITHOUT LONG-TERM CURRENT USE OF INSULIN (H): Primary | ICD-10-CM

## 2018-01-02 NOTE — LETTER
January 4, 2018      Milo Matthewbarbie  [address] [id number]        Dear Milo,     Enclosed are your recent lab results.    1. A1c is excellent. His Creatinine is getting higher. So best not to continue with metformin. I sent him glimepiride 2 mg once a day to replace Metformin.     2. His thyroid test indicated that he may be developing low thyroid state. I'd like to recheck this in 2-4 weeks with additional blood test to confirm low thyroid state. We may consider treating this if his TSH keeps going higher.     3. Creatinine is slightly higher than before but still within the range of stage 3 kidney disease. We'll monitor this every 6 months, instead of once a year.      Sincerely,        Laurence Ibarra MD PhD                                  Component      Latest Ref Rng & Units 12/28/2017   Sodium      133 - 144 mmol/L 137   Potassium      3.4 - 5.3 mmol/L 3.7   Chloride      94 - 109 mmol/L 100   Carbon Dioxide      20 - 32 mmol/L 29   Anion Gap      3 - 14 mmol/L 8   Glucose      70 - 99 mg/dL 177 (H)   Urea Nitrogen      7 - 30 mg/dL 26   Creatinine      0.66 - 1.25 mg/dL 1.53 (H)   GFR Estimate      >60 mL/min/1.7m2 44 (L)   GFR Estimate If Black      >60 mL/min/1.7m2 53 (L)   Calcium      8.5 - 10.1 mg/dL 9.3   Cholesterol      <200 mg/dL 148   Triglycerides      <150 mg/dL 353 (H)   HDL Cholesterol      >39 mg/dL 36 (L)   LDL Cholesterol Calculated      <100 mg/dL 41   Non HDL Cholesterol      <130 mg/dL 112   Hemoglobin A1C      4.3 - 6.0 % 6.0   TSH      0.40 - 4.00 mU/L 5.38 (H)   T4 Free      0.76 - 1.46 ng/dL 1.70 (H)

## 2018-01-04 RX ORDER — GLIMEPIRIDE 2 MG/1
2 TABLET ORAL
Qty: 30 TABLET | Refills: 1 | Status: CANCELLED | OUTPATIENT
Start: 2018-01-04

## 2018-05-01 ENCOUNTER — OFFICE VISIT (OUTPATIENT)
Dept: PEDIATRICS | Facility: CLINIC | Age: 83
End: 2018-05-01
Payer: COMMERCIAL

## 2018-05-01 VITALS
DIASTOLIC BLOOD PRESSURE: 66 MMHG | BODY MASS INDEX: 27.6 KG/M2 | HEART RATE: 83 BPM | SYSTOLIC BLOOD PRESSURE: 124 MMHG | OXYGEN SATURATION: 94 % | WEIGHT: 181.5 LBS | TEMPERATURE: 97.8 F

## 2018-05-01 DIAGNOSIS — E11.40 TYPE 2 DIABETES MELLITUS WITH DIABETIC NEUROPATHY, WITHOUT LONG-TERM CURRENT USE OF INSULIN (H): Primary | ICD-10-CM

## 2018-05-01 DIAGNOSIS — C61 PROSTATE CANCER (H): Primary | ICD-10-CM

## 2018-05-01 DIAGNOSIS — E03.4 HYPOTHYROIDISM DUE TO ACQUIRED ATROPHY OF THYROID: ICD-10-CM

## 2018-05-01 DIAGNOSIS — R79.89 ELEVATED TSH: ICD-10-CM

## 2018-05-01 LAB
ALBUMIN SERPL-MCNC: 4.3 G/DL (ref 3.4–5)
ALBUMIN UR QL STRIP: NEGATIVE MG/DL
ALP SERPL-CCNC: 66 U/L (ref 40–150)
ALT SERPL W P-5'-P-CCNC: 35 U/L (ref 0–70)
ANION GAP SERPL CALCULATED.3IONS-SCNC: 9 MMOL/L (ref 3–14)
AST SERPL W P-5'-P-CCNC: 24 U/L (ref 0–45)
BASOPHILS # BLD AUTO: 0 10E9/L (ref 0–0.2)
BASOPHILS NFR BLD AUTO: 0.8 %
BILIRUB SERPL-MCNC: 1.2 MG/DL (ref 0.2–1.3)
BUN SERPL-MCNC: 29 MG/DL (ref 7–30)
CALCIUM SERPL-MCNC: 9.3 MG/DL (ref 8.5–10.1)
CHLORIDE SERPL-SCNC: 99 MMOL/L (ref 94–109)
CO2 SERPL-SCNC: 27 MMOL/L (ref 20–32)
CREAT SERPL-MCNC: 1.3 MG/DL (ref 0.66–1.25)
DIFFERENTIAL METHOD BLD: ABNORMAL
EOSINOPHIL # BLD AUTO: 0.1 10E9/L (ref 0–0.7)
EOSINOPHIL NFR BLD AUTO: 1.5 %
ERYTHROCYTE [DISTWIDTH] IN BLOOD BY AUTOMATED COUNT: 15.4 % (ref 10–15)
GFR SERPL CREATININE-BSD FRML MDRD: 53 ML/MIN/1.7M2
GLUCOSE SERPL-MCNC: 346 MG/DL (ref 70–99)
HBA1C MFR BLD: 8 % (ref 0–5.6)
HCT VFR BLD AUTO: 34.5 % (ref 40–53)
HGB BLD-MCNC: 11.7 G/DL (ref 13.3–17.7)
IMM GRANULOCYTES # BLD: 0.1 10E9/L (ref 0–0.4)
IMM GRANULOCYTES NFR BLD: 1.8 %
LYMPHOCYTES # BLD AUTO: 1 10E9/L (ref 0.8–5.3)
LYMPHOCYTES NFR BLD AUTO: 25.4 %
MCH RBC QN AUTO: 31.9 PG (ref 26.5–33)
MCHC RBC AUTO-ENTMCNC: 33.9 G/DL (ref 31.5–36.5)
MCV RBC AUTO: 94 FL (ref 78–100)
MONOCYTES # BLD AUTO: 0.7 10E9/L (ref 0–1.3)
MONOCYTES NFR BLD AUTO: 17.6 %
NEUTROPHILS # BLD AUTO: 2.1 10E9/L (ref 1.6–8.3)
NEUTROPHILS NFR BLD AUTO: 52.9 %
PLATELET # BLD AUTO: 155 10E9/L (ref 150–450)
POTASSIUM SERPL-SCNC: 3.5 MMOL/L (ref 3.4–5.3)
PROT SERPL-MCNC: 8.6 G/DL (ref 6.8–8.8)
RBC # BLD AUTO: 3.67 10E12/L (ref 4.4–5.9)
RETICS # AUTO: 143.5 10E9/L (ref 25–95)
RETICS/RBC NFR AUTO: 3.9 % (ref 0.5–2)
SODIUM SERPL-SCNC: 135 MMOL/L (ref 133–144)
T4 FREE SERPL-MCNC: 1.51 NG/DL (ref 0.76–1.46)
TSH SERPL DL<=0.005 MIU/L-ACNC: 5.33 MU/L (ref 0.4–4)
WBC # BLD AUTO: 4 10E9/L (ref 4–11)

## 2018-05-01 PROCEDURE — 85025 COMPLETE CBC W/AUTO DIFF WBC: CPT | Performed by: INTERNAL MEDICINE

## 2018-05-01 PROCEDURE — 84443 ASSAY THYROID STIM HORMONE: CPT | Performed by: FAMILY MEDICINE

## 2018-05-01 PROCEDURE — 40000611 ZZHCL STATISTIC MORPHOLOGY W/INTERP HEMEPATH TC 85060: Performed by: INTERNAL MEDICINE

## 2018-05-01 PROCEDURE — 84439 ASSAY OF FREE THYROXINE: CPT | Performed by: FAMILY MEDICINE

## 2018-05-01 PROCEDURE — 85045 AUTOMATED RETICULOCYTE COUNT: CPT | Performed by: INTERNAL MEDICINE

## 2018-05-01 PROCEDURE — 00000219 ZZHCL STATISTIC OBSA - URINALYSIS: Performed by: FAMILY MEDICINE

## 2018-05-01 PROCEDURE — 80053 COMPREHEN METABOLIC PANEL: CPT | Performed by: FAMILY MEDICINE

## 2018-05-01 PROCEDURE — 83036 HEMOGLOBIN GLYCOSYLATED A1C: CPT | Performed by: FAMILY MEDICINE

## 2018-05-01 PROCEDURE — 99214 OFFICE O/P EST MOD 30 MIN: CPT | Performed by: FAMILY MEDICINE

## 2018-05-01 PROCEDURE — 36415 COLL VENOUS BLD VENIPUNCTURE: CPT | Performed by: FAMILY MEDICINE

## 2018-05-01 PROCEDURE — 86376 MICROSOMAL ANTIBODY EACH: CPT | Performed by: FAMILY MEDICINE

## 2018-05-01 RX ORDER — LEVOTHYROXINE SODIUM 50 UG/1
50 TABLET ORAL DAILY
Qty: 30 TABLET | Refills: 0 | Status: SHIPPED | OUTPATIENT
Start: 2018-05-01 | End: 2018-05-08

## 2018-05-01 RX ORDER — GLIMEPIRIDE 2 MG/1
2 TABLET ORAL
Qty: 30 TABLET | Refills: 1 | Status: SHIPPED | OUTPATIENT
Start: 2018-05-01 | End: 2018-05-08

## 2018-05-01 RX ORDER — GLIMEPIRIDE 2 MG/1
2 TABLET ORAL
Qty: 30 TABLET | Refills: 1 | Status: SHIPPED | OUTPATIENT
Start: 2018-05-01 | End: 2018-05-01

## 2018-05-01 ASSESSMENT — PAIN SCALES - GENERAL: PAINLEVEL: NO PAIN (0)

## 2018-05-01 NOTE — PROGRESS NOTES
SUBJECTIVE:   Milo Lozano is a 83 year old male who presents to clinic today for the following health issues:      Diabetes Follow-up- He states he was in [place] until recently.     The patient did not get the letter noted, but he did stop the Metformin.   He has not taking the glimiperide.      He complains that he is dizzy most of the time, this is ongoing, his feet have increased neuropathy pain.  His blood sugars have been in the upper 200s and low 300s.     Patient is checking blood sugars: twice daily.    Blood sugar testing frequency justification: Adjustment of medication(s) Pt states to not be using insulin but that his MetFORMIN medication is not helping the symptoms as well  Results are as follows:         am - 250         bedtime - 300    Diabetic concerns: None and other - Pt states is concerns about his reading glucose device as well maybe his medication MetFORMIN HCI needs to be change     Symptoms of hypoglycemia (low blood sugar): shaky, dizzy, weak, lethargy, blurred vision, Symptoms occur if sugars < Pt states that can not conclude what it makes him feel all the symptoms above.     Paresthesias (numbness or burning in feet) or sores: Yes, daily     Date of last diabetic eye exam: Pt states that probably was a year ago    BP Readings from Last 2 Encounters:   12/27/17 142/60   12/22/17 147/68     Hemoglobin A1C (%)   Date Value   12/28/2017 6.0   07/12/2017 5.7     LDL Cholesterol Calculated (mg/dL)   Date Value   12/28/2017 41   12/19/2016 52       Amount of exercise or physical activity: None    Problems taking medications regularly: No    Medication side effects: none    Diet: Regular diet with precautions on sugars consume             Problem list and histories reviewed & adjusted, as indicated.  Additional history: as documented    Patient Active Problem List   Diagnosis     Hypertension goal BP (blood pressure) < 140/90     CARDIOVASCULAR SCREENING; LDL GOAL LESS THAN 100     Prostate  cancer (H)     Type 2 diabetes, HbA1C goal < 8% (H)     Hyperlipidemia with target LDL less than 100     Diabetes type 2, controlled (H)     Advanced directives, counseling/discussion     Diabetic peripheral neuropathy (H)     White coat hypertension     Hypothyroidism due to acquired atrophy of thyroid     DEMOND (obstructive sleep apnea)     Diabetic polyneuropathy associated with type 2 diabetes mellitus (H)     Past Surgical History:   Procedure Laterality Date     COLONOSCOPY         Social History   Substance Use Topics     Smoking status: Never Smoker     Smokeless tobacco: Never Used     Alcohol use No     Family History   Problem Relation Age of Onset     Hypertension Mother      Alzheimer Disease Mother      Prostate Cancer Paternal Grandfather      CANCER No family hx of      DIABETES No family hx of      CEREBROVASCULAR DISEASE No family hx of      Thyroid Disease No family hx of      Glaucoma No family hx of      Macular Degeneration No family hx of          Current Outpatient Prescriptions   Medication Sig Dispense Refill     aspirin 81 MG tablet Take 1 tablet (81 mg) by mouth daily 90 tablet 3     blood glucose calibration (ACCU-CHEK SMARTVIEW CONTROL) solution 1 Bottle as needed Use to calibrate blood glucose monitor as needed as directed. 1 each 1     blood glucose monitoring (ACCU-CHEK FASTCLIX) lancets 1 each by In Vitro route 2 times daily 200 each 3     blood glucose monitoring (ACCU-CHEK MILADYS SMARTVIEW) meter device kit Use to test blood sugar 2 times daily or as directed. 1 kit 0     blood glucose monitoring (ACCU-CHEK SMARTVIEW) test strip TEST BLOOD SUGAR TWICE DAILY  OR AS DIRECTED 200 strip 3     capsaicin (ZOSTRIX) 0.025 % CREA To feet 2-3 times daily as needed. 60 g 6     chlorthalidone (HYGROTON) 25 MG tablet Take 1 tablet (25 mg) by mouth daily 90 tablet 3     ciclopirox (LOPROX) 0.77 % cream Apply topically 2 times daily To toenails. 90 g 5     glimepiride (AMARYL) 2 MG tablet Take 1  tablet (2 mg) by mouth every morning (before breakfast) 30 tablet 1     levothyroxine (SYNTHROID/LEVOTHROID) 50 MCG tablet Take 1 tablet (50 mcg) by mouth daily 30 tablet 0     losartan (COZAAR) 100 MG tablet TAKE 1 TABLET EVERY DAY 90 tablet 3     Omega-3 Fatty Acids (FISH OIL PO) Take  by mouth daily.       order for DME Equipment being ordered: HAPAD Metatarsal pad, QTY:2 2 Device 2     STATIN NOT PRESCRIBED, INTENTIONAL, 1 each continuous Statin not prescribed intentionally due to does not meet [place] criteria and Other: LDL at goal without the statin. 0 each 0     tamsulosin (FLOMAX) 0.4 MG capsule Take 1 capsule (0.4 mg) by mouth daily 90 capsule 3     [DISCONTINUED] glimepiride (AMARYL) 2 MG tablet Take 1 tablet (2 mg) by mouth every morning (before breakfast) 30 tablet 1     [DISCONTINUED] levothyroxine (SYNTHROID/LEVOTHROID) 50 MCG tablet TAKE 1 TABLET EVERY DAY (Patient not taking: Reported on 5/1/2018) 90 tablet 3     Allergies   Allergen Reactions     Lisinopril Cough     Developed an ACE cough on the Lisinopril     Recent Labs   Lab Test  05/01/18   1230  12/28/17   0801  12/22/17   1425  09/05/17   1338   07/12/17   1404  12/19/16   0858   07/05/16   0801   06/23/14   1106   A1C  8.0*  6.0   --    --    --   5.7   --    < >  6.7*   < >   --    LDL   --   41   --    --    --    --   52   --   29   < >   --    HDL   --   36*   --    --    --    --   35*   --   32*   < >   --    TRIG   --   353*   --    --    --    --   251*   --   378*   < >   --    ALT   --    --    --   41   --    --    --    --   49   --   53   CR   --   1.53*  1.40*  1.37*   --   1.32*   --    --   1.20   < >  1.09   GFRESTIMATED   --   44*  49*  50*   --   52*   --    --   58*   < >  66   GFRESTBLACK   --   53*  59*  60*   --   63   --    --   71   < >  79   POTASSIUM   --   3.7   --    --    --   3.8   --    --   4.0   < >  4.1   TSH   --   5.38*   --   2.68   < >   --    --    < >  8.84*   --    --     < > = values in this  interval not displayed.      BP Readings from Last 3 Encounters:   05/01/18 124/66   12/27/17 142/60   12/22/17 147/68    Wt Readings from Last 3 Encounters:   05/01/18 181 lb 8 oz (82.3 kg)   12/27/17 182 lb 6.4 oz (82.7 kg)   12/22/17 183 lb 1.6 oz (83.1 kg)          Imaging done in [place] indicated abnormal heterogeneous thyroid -Rt and 3.3  X 2.7 cyst Lt thyroid and gall stones without liver or kidney findings.Pt denies symptoms relating to these results.          Reviewed and updated as needed this visit by clinical staff       Reviewed and updated as needed this visit by Provider         ROS:  Constitutional, HEENT, cardiovascular, pulmonary, gi and gu systems are negative, except as otherwise noted.    OBJECTIVE:     /66 (BP Location: Right arm, Patient Position: Sitting, Cuff Size: Adult Large)  Pulse 83  Temp 97.8  F (36.6  C) (Temporal)  Wt 181 lb 8 oz (82.3 kg)  SpO2 94%  BMI 27.6 kg/m2  Body mass index is 27.6 kg/(m^2).  GENERAL: healthy, alert and no distress  EYES: Eyes grossly normal to inspection, PERRL and conjunctivae and sclerae normal  HENT: ear canals and TM's normal, nose and mouth without ulcers or lesions  NECK: no adenopathy, no asymmetry, masses, or scars and no carotid bruits  RESP: lungs clear to auscultation - no rales, rhonchi or wheezes  CV: regular rate and rhythm, normal S1 S2, no S3 or S4, no murmur, click or rub, no peripheral edema and peripheral pulses strong  MS: no gross musculoskeletal defects noted, no edema  SKIN: no suspicious lesions or rashes  NEURO: Normal strength and tone, mentation intact and speech normal  PSYCH: mentation appears normal, affect normal/bright    Diagnostic Test Results:see above and as noted-  CMP- pending  Protein albumenr urine-pending  TSH/Free T4-pending  ASSESSMENT/PLAN:         ICD-10-CM    1. Type 2 diabetes mellitus with diabetic neuropathy, without long-term current use of insulin (H) E11.40 Hemoglobin A1c     Protein albumin urine      Comprehensive metabolic panel (BMP + Alb, Alk Phos, ALT, AST, Total. Bili, TP)     glimepiride (AMARYL) 2 MG tablet     DISCONTINUED: glimepiride (AMARYL) 2 MG tablet   2. Hypothyroidism due to acquired atrophy of thyroid E03.4 levothyroxine (SYNTHROID/LEVOTHROID) 50 MCG tablet   3. Elevated TSH R94.6 TSH with free T4 reflex     Thyroid peroxidase antibody       See Patient Instructions    Milo Hebert MD  [facility], [place]

## 2018-05-01 NOTE — MR AVS SNAPSHOT
"              After Visit Summary   5/1/2018    Milo Lozano    MRN: [id number]           Patient Information     Date Of Birth          1935        Visit Information        Provider Department      5/1/2018 11:30 AM Milo Hebert MD [facility]        Today's Diagnoses     Type 2 diabetes mellitus with diabetic neuropathy, without long-term current use of insulin (H)    -  1    Hypothyroidism due to acquired atrophy of thyroid          Care Instructions      Diabetes with High Blood Sugar  You have been treated for high blood sugar (hyperglycemia). This may be because of an infection or other illness, eating too many sweets or starches, or not taking enough insulin.  Home care  Monitor and write down your blood sugar level at least twice a day. Do this before breakfast and before dinner. If you take insulin, record your routine insulin dose as well. Also record any additional doses required based on your sliding scale. Do this for the next 3 to 5 days.  High blood sugar may cause symptoms that you can learn to recognize, such as:    Frequent urination    Thirst    Dizziness    Headache    Shortness of breath    Breath that smells fruity    Nausea or vomiting    Abdominal pain    Drowsiness or loss of consciousness  If you have high-blood-sugar symptoms, use a blood or urine test to find out what your blood sugar level is. If it is above your usual range, use the \"sliding scale\" regular insulin dose prescribed by your healthcare provider. If you were not given a range for your insulin dose, contact your healthcare provider for more advice.  Follow-up care  Follow up with your healthcare provider, or as advised. You may need to meet with your healthcare provider in the next week. You will likely review your blood sugar records together. You may also talk to your provider about adjusting your dose of insulin or other medicine for blood sugar.  When to seek medical advice  Call your " healthcare provider right away if these occur:    High blood sugar symptoms (Symptoms are described above.)    Blood sugar over 300 mg/dl If you can t reach your healthcare provider, go to a hospital emergency room or urgent care center  Date Last Reviewed: 6/1/2016 2000-2017 [employer]. [address] [place] [address]. All rights reserved. This information is not intended as a substitute for professional medical care. Always follow your healthcare professional's instructions.                Follow-ups after your visit        Your next 10 appointments already scheduled     May 08, 2018 10:00 AM CDT   LAB with LAB ONC [facility] ([facility])    [address] [place] [address] [phone]           Please do not eat 10-12 hours before your appointment if you are coming in fasting for labs on lipids, cholesterol, or glucose (sugar). This does not apply to pregnant women. Water, hot tea and black coffee (with nothing added) are okay. Do not drink other fluids, diet soda or chew gum.            May 11, 2018  3:30 PM CDT   Return Visit with Karen Hernandez MD   [facility] ([facility])    [address] [place] [address] [phone]            Jun 27, 2018  8:30 AM CDT   Return Visit with Laurence Ibarra MD PhD   [facility])    [address] [place] [address] [phone]              Who to contact     If you have questions or need follow up information about today's clinic visit or your schedule please contact [facility] directly at [phone].  Normal or non-critical lab and imaging results will be communicated to you by MyChart, letter or phone within 4 business days after the clinic has received the results. If you do not hear from us within 7 days, please contact the clinic through [employer] or  phone. If you have a critical or abnormal lab result, we will notify you by phone as soon as possible.  Submit refill requests through [employer] or call your pharmacy and they will forward the refill request to us. Please allow 3 business days for your refill to be completed.          Additional Information About Your Visit        [employer] Information     [employer] is an electronic gateway that provides easy, online access to your medical records. With [employer], you can request a clinic appointment, read your test results, renew a prescription or communicate with your care team.     To sign up for [employer] visit the website at [email or web address]   You will be asked to enter the access code listed below, as well as some personal information. Please follow the directions to create your username and password.     Your access code is: [email or web address]  Expires: 2018 10:54 AM     Your access code will  in 90 days. If you need help or a new code, please contact your [facility] or call [phone] for assistance.        Care EveryWhere ID     This is your Care EveryWhere ID. This could be used by other organizations to access your [place] medical records  [id number]        Your Vitals Were     Pulse Temperature Pulse Oximetry BMI (Body Mass Index)          83 97.8  F (36.6  C) (Temporal) 94% 27.6 kg/m2         Blood Pressure from Last 3 Encounters:   18 124/66   17 142/60   17 147/68    Weight from Last 3 Encounters:   18 82.3 kg (181 lb 8 oz)   17 82.7 kg (182 lb 6.4 oz)   17 83.1 kg (183 lb 1.6 oz)              We Performed the Following     Comprehensive metabolic panel (BMP + Alb, Alk Phos, ALT, AST, Total. Bili, TP)     Hemoglobin A1c     Protein albumin urine          Today's Medication Changes          These changes are accurate as of 18 12:25 PM.  If you have any questions, ask your nurse or doctor.               Start taking these  medicines.        Dose/Directions    glimepiride 2 MG tablet   Commonly known as:  AMARYL   Used for:  Type 2 diabetes mellitus with diabetic neuropathy, without long-term current use of insulin (H)   Started by:  Milo Hebert MD        Dose:  2 mg   Take 1 tablet (2 mg) by mouth every morning (before breakfast)   Quantity:  30 tablet   Refills:  1         These medicines have changed or have updated prescriptions.        Dose/Directions    levothyroxine 50 MCG tablet   Commonly known as:  SYNTHROID/LEVOTHROID   This may have changed:  See the new instructions.   Used for:  Hypothyroidism due to acquired atrophy of thyroid   Changed by:  Milo Hebert MD        Dose:  50 mcg   Take 1 tablet (50 mcg) by mouth daily   Quantity:  30 tablet   Refills:  0            Where to get your medicines      Some of these will need a paper prescription and others can be bought over the counter.  Ask your nurse if you have questions.     Bring a paper prescription for each of these medications     glimepiride 2 MG tablet    levothyroxine 50 MCG tablet                Primary Care Provider Office Phone # Fax #    Laurence Ibarra MD [facility] [phone]       [address] [facility] [address]        Equal Access to Services     [facility]: Hadii nikole padilla Sonataly, waaxda luqadaha, qaybta kaalmada adekath, tip leslie . So [facility] [phone].    ATENCIÓN: Si habla español, tiene a raygoza disposición servicios gratuitos de asistencia lingüística. Llame al [phone].    We comply with applicable federal civil rights laws and [place] laws. We do not discriminate on the basis of race, color, national origin, age, disability, sex, sexual orientation, or gender identity.            Thank you!     Thank you for choosing [facility]  for your care. Our goal is always to provide you with excellent care. Hearing back from our patients is one way we can continue to  improve our services. Please take a few minutes to complete the written survey that you may receive in the mail after your visit with us. Thank you!             Your Updated Medication List - Protect others around you: Learn how to safely use, store and throw away your medicines at www.disposemymeds.org.          This list is accurate as of 5/1/18 12:25 PM.  Always use your most recent med list.                   Brand Name Dispense Instructions for use Diagnosis    aspirin 81 MG tablet     90 tablet    Take 1 tablet (81 mg) by mouth daily    Type 2 diabetes, HbA1c goal < 7% (H), Hypertension goal BP (blood pressure) < 140/90, CARDIOVASCULAR SCREENING; LDL GOAL LESS THAN 100       blood glucose calibration solution     1 each    1 Bottle as needed Use to calibrate blood glucose monitor as needed as directed.    Type 2 diabetes, HbA1c goal < 7% (H)       blood glucose monitoring lancets     200 each    1 each by In Vitro route 2 times daily    Controlled type 2 diabetes mellitus with diabetic polyneuropathy, without long-term current use of insulin (H)       blood glucose monitoring meter device kit     1 kit    Use to test blood sugar 2 times daily or as directed.    Type 2 diabetes, HbA1C goal < 8% (H)       blood glucose monitoring test strip    ACCU-CHEK SMARTVIEW    200 strip    TEST BLOOD SUGAR TWICE DAILY  OR AS DIRECTED    Type 2 diabetes mellitus without complication, without long-term current use of insulin (H)       capsaicin 0.025 % Crea cream    ZOSTRIX    60 g    To feet 2-3 times daily as needed.    Diabetic neuropathy with neurologic complication (H)       chlorthalidone 25 MG tablet    HYGROTON    90 tablet    Take 1 tablet (25 mg) by mouth daily    Hypertension goal BP (blood pressure) < 140/90       ciclopirox 0.77 % cream    LOPROX    90 g    Apply topically 2 times daily To toenails.    Dermatophytosis of nail       FISH OIL PO      Take  by mouth daily.        glimepiride 2 MG tablet    AMARYL     30 tablet    Take 1 tablet (2 mg) by mouth every morning (before breakfast)    Type 2 diabetes mellitus with diabetic neuropathy, without long-term current use of insulin (H)       levothyroxine 50 MCG tablet    SYNTHROID/LEVOTHROID    30 tablet    Take 1 tablet (50 mcg) by mouth daily    Hypothyroidism due to acquired atrophy of thyroid       losartan 100 MG tablet    COZAAR    90 tablet    TAKE 1 TABLET EVERY DAY    Hypertension goal BP (blood pressure) < 140/90       order for DME     2 Device    Equipment being ordered: HAPAD Metatarsal pad, QTY:2    Diabetic neuropathy with neurologic complication (H)       STATIN NOT PRESCRIBED (INTENTIONAL)     0 each    1 each continuous Statin not prescribed intentionally due to does not meet [place] criteria and Other: LDL at goal without the statin.    CARDIOVASCULAR SCREENING; LDL GOAL LESS THAN 100       tamsulosin 0.4 MG capsule    FLOMAX    90 capsule    Take 1 capsule (0.4 mg) by mouth daily    Benign prostatic hyperplasia without lower urinary tract symptoms

## 2018-05-01 NOTE — NURSING NOTE
"Chief Complaint   Patient presents with     Diabetes     Pt is here to discuss his diabetes       Initial /66 (BP Location: Right arm, Patient Position: Sitting, Cuff Size: Adult Large)  Pulse 83  Temp 97.8  F (36.6  C) (Temporal)  Wt 181 lb 8 oz (82.3 kg)  SpO2 94%  BMI 27.6 kg/m2 Estimated body mass index is 27.6 kg/(m^2) as calculated from the following:    Height as of 9/5/17: 5' 8\" (1.727 m).    Weight as of this encounter: 181 lb 8 oz (82.3 kg).  Medication Reconciliation: complete     Jose Martin Salvador MA  "

## 2018-05-01 NOTE — PATIENT INSTRUCTIONS
"  Diabetes with High Blood Sugar  You have been treated for high blood sugar (hyperglycemia). This may be because of an infection or other illness, eating too many sweets or starches, or not taking enough insulin.  Home care  Monitor and write down your blood sugar level at least twice a day. Do this before breakfast and before dinner. If you take insulin, record your routine insulin dose as well. Also record any additional doses required based on your sliding scale. Do this for the next 3 to 5 days.  High blood sugar may cause symptoms that you can learn to recognize, such as:    Frequent urination    Thirst    Dizziness    Headache    Shortness of breath    Breath that smells fruity    Nausea or vomiting    Abdominal pain    Drowsiness or loss of consciousness  If you have high-blood-sugar symptoms, use a blood or urine test to find out what your blood sugar level is. If it is above your usual range, use the \"sliding scale\" regular insulin dose prescribed by your healthcare provider. If you were not given a range for your insulin dose, contact your healthcare provider for more advice.  Follow-up care  Follow up with your healthcare provider, or as advised. You may need to meet with your healthcare provider in the next week. You will likely review your blood sugar records together. You may also talk to your provider about adjusting your dose of insulin or other medicine for blood sugar.  When to seek medical advice  Call your healthcare provider right away if these occur:    High blood sugar symptoms (Symptoms are described above.)    Blood sugar over 300 mg/dl If you can t reach your healthcare provider, go to a hospital emergency room or urgent care center  Date Last Reviewed: 6/1/2016 2000-2017 [employer]. [address] [place] [address], [place] [address]. All rights reserved. This information is not intended as a substitute for professional medical care. Always follow your healthcare professional's " instructions.

## 2018-05-02 LAB
COPATH REPORT: NORMAL
THYROPEROXIDASE AB SERPL-ACNC: 22 IU/ML

## 2018-05-08 ENCOUNTER — TELEPHONE (OUTPATIENT)
Dept: PEDIATRICS | Facility: CLINIC | Age: 83
End: 2018-05-08

## 2018-05-08 DIAGNOSIS — E11.40 TYPE 2 DIABETES MELLITUS WITH DIABETIC NEUROPATHY, WITHOUT LONG-TERM CURRENT USE OF INSULIN (H): ICD-10-CM

## 2018-05-08 DIAGNOSIS — E03.4 HYPOTHYROIDISM DUE TO ACQUIRED ATROPHY OF THYROID: ICD-10-CM

## 2018-05-08 DIAGNOSIS — C61 PROSTATE CANCER (H): Primary | ICD-10-CM

## 2018-05-08 RX ORDER — LEVOTHYROXINE SODIUM 50 UG/1
50 TABLET ORAL DAILY
Qty: 90 TABLET | Refills: 1 | Status: SHIPPED | OUTPATIENT
Start: 2018-05-08 | End: 2018-06-27

## 2018-05-08 RX ORDER — GLIMEPIRIDE 2 MG/1
2 TABLET ORAL
Qty: 90 TABLET | Refills: 1 | Status: SHIPPED | OUTPATIENT
Start: 2018-05-08 | End: 2018-06-27

## 2018-05-08 NOTE — TELEPHONE ENCOUNTER
glimepiride (AMARYL) 2 MG tablet 30 tablet 1 5/1/2018  No   Sig: Take 1 tablet (2 mg) by mouth every morning (before breakfast)     levothyroxine (SYNTHROID/LEVOTHROID) 50 MCG tablet 30 tablet 0 5/1/2018  No   Sig: Take 1 tablet (50 mcg) by mouth daily     Printed, signed by Dr. Hebert., and faxed to patients [facility]. Azucena Castle RN

## 2018-05-11 ENCOUNTER — ONCOLOGY VISIT (OUTPATIENT)
Dept: ONCOLOGY | Facility: CLINIC | Age: 83
End: 2018-05-11
Payer: COMMERCIAL

## 2018-05-11 VITALS
HEART RATE: 79 BPM | RESPIRATION RATE: 15 BRPM | DIASTOLIC BLOOD PRESSURE: 64 MMHG | TEMPERATURE: 98 F | HEIGHT: 68 IN | OXYGEN SATURATION: 95 % | BODY MASS INDEX: 27.28 KG/M2 | WEIGHT: 180 LBS | SYSTOLIC BLOOD PRESSURE: 145 MMHG

## 2018-05-11 DIAGNOSIS — C61 PROSTATE CANCER (H): ICD-10-CM

## 2018-05-11 DIAGNOSIS — D64.9 NORMOCYTIC ANEMIA: Primary | ICD-10-CM

## 2018-05-11 DIAGNOSIS — R70.1 RETICULOCYTOSIS: ICD-10-CM

## 2018-05-11 LAB
CREAT SERPL-MCNC: 1.48 MG/DL (ref 0.66–1.25)
GFR SERPL CREATININE-BSD FRML MDRD: 46 ML/MIN/1.7M2

## 2018-05-11 PROCEDURE — 36415 COLL VENOUS BLD VENIPUNCTURE: CPT | Performed by: INTERNAL MEDICINE

## 2018-05-11 PROCEDURE — 82565 ASSAY OF CREATININE: CPT | Performed by: INTERNAL MEDICINE

## 2018-05-11 PROCEDURE — 99214 OFFICE O/P EST MOD 30 MIN: CPT | Performed by: INTERNAL MEDICINE

## 2018-05-11 ASSESSMENT — PAIN SCALES - GENERAL: PAINLEVEL: NO PAIN (0)

## 2018-05-11 NOTE — MR AVS SNAPSHOT
After Visit Summary   5/11/2018    Milo Lozano    MRN: [id number]           Patient Information     Date Of Birth          1935        Visit Information        Provider Department      5/11/2018 3:30 PM Karen Hernandez MD [facility]        Today's Diagnoses     Normocytic anemia    -  1    Reticulocytosis           Follow-ups after your visit        Your next 10 appointments already scheduled     Jun 27, 2018  1:10 PM CDT   Return Visit with Laurence Ibarra MD PhD   [facility])    [address] [place] [address] [phone]            May 03, 2019 10:00 AM CDT   LAB with LAB ONC [facility])    [address] [place] [address] [phone]           Please do not eat 10-12 hours before your appointment if you are coming in fasting for labs on lipids, cholesterol, or glucose (sugar). This does not apply to pregnant women. Water, hot tea and black coffee (with nothing added) are okay. Do not drink other fluids, diet soda or chew gum.            May 10, 2019  2:30 PM CDT   Return Visit with Karen Hernandez MD   [facility] ([facility])    [address] [place] [address] [phone]              Who to contact     If you have questions or need follow up information about today's clinic visit or your schedule please contact [facility] directly at [phone].  Normal or non-critical lab and imaging results will be communicated to you by MyChart, letter or phone within 4 business days after the clinic has received the results. If you do not hear from us within 7 days, please contact the clinic through MyChart or phone. If you have a critical or abnormal lab result, we will notify you by phone as soon as possible.  Submit refill requests through [employer] or call your pharmacy and  "they will forward the refill request to us. Please allow 3 business days for your refill to be completed.          Additional Information About Your Visit        [id number] Information     "[employer] gives you secure access to your electronic health record. If you see a primary care provider, you can also send messages to your care team and make appointments. If you have questions, please call your primary care clinic.  If you do not have a primary care provider, please call [phone] and they will assist you.      "[employer] is an electronic gateway that provides easy, online access to your medical records. With "[employer] you can request a clinic appointment, read your test results, renew a prescription or communicate with your care team.     To access your existing account, please contact your [facility] or call [phone] for assistance.        Care EveryWhere ID     This is your Care EveryWhere ID. This could be used by other organizations to access your [place] medical records  [id number]        Your Vitals Were     Pulse Temperature Respirations Height Pulse Oximetry BMI (Body Mass Index)    79 98  F (36.7  C) 15 1.727 m (5' 7.99\") 95% 27.38 kg/m2       Blood Pressure from Last 3 Encounters:   05/16/18 148/65   05/11/18 145/64   05/01/18 124/66    Weight from Last 3 Encounters:   05/11/18 81.6 kg (180 lb)   05/01/18 82.3 kg (181 lb 8 oz)   12/27/17 82.7 kg (182 lb 6.4 oz)               Primary Care Provider Office Phone # Fax #    Laurence Ibarra MD PhD [phone]       [address]  [place] [address]        Equal Access to Services     TONNY HANSON : Hadii nikole padilla Sonataly, waaxda luqadaha, qaybta kaalmatip ford. So [facility] [phone].    ATENCIÓN: Si habla español, tiene a raygoza disposición servicios gratuitos de asistencia lingüística. Calista al [phone].    We comply with applicable federal civil rights laws " and [place] laws. We do not discriminate on the basis of race, color, national origin, age, disability, sex, sexual orientation, or gender identity.            Thank you!     Thank you for choosing [facility]  for your care. Our goal is always to provide you with excellent care. Hearing back from our patients is one way we can continue to improve our services. Please take a few minutes to complete the written survey that you may receive in the mail after your visit with us. Thank you!             Your Updated Medication List - Protect others around you: Learn how to safely use, store and throw away your medicines at www.disposemymeds.org.          This list is accurate as of 5/11/18 11:59 PM.  Always use your most recent med list.                   Brand Name Dispense Instructions for use Diagnosis    aspirin 81 MG tablet     90 tablet    Take 1 tablet (81 mg) by mouth daily    Type 2 diabetes, HbA1c goal < 7% (H), Hypertension goal BP (blood pressure) < 140/90, CARDIOVASCULAR SCREENING; LDL GOAL LESS THAN 100       blood glucose calibration solution     1 each    1 Bottle as needed Use to calibrate blood glucose monitor as needed as directed.    Type 2 diabetes, HbA1c goal < 7% (H)       blood glucose monitoring lancets     200 each    1 each by In Vitro route 2 times daily    Controlled type 2 diabetes mellitus with diabetic polyneuropathy, without long-term current use of insulin (H)       blood glucose monitoring meter device kit     1 kit    Use to test blood sugar 2 times daily or as directed.    Type 2 diabetes, HbA1C goal < 8% (H)       blood glucose monitoring test strip    ACCU-CHEK SMARTVIEW    200 strip    TEST BLOOD SUGAR TWICE DAILY  OR AS DIRECTED    Type 2 diabetes mellitus without complication, without long-term current use of insulin (H)       capsaicin 0.025 % Crea cream    ZOSTRIX    60 g    To feet 2-3 times daily as needed.    Diabetic neuropathy with neurologic complication  (H)       chlorthalidone 25 MG tablet    HYGROTON    90 tablet    Take 1 tablet (25 mg) by mouth daily    Hypertension goal BP (blood pressure) < 140/90       ciclopirox 0.77 % cream    LOPROX    90 g    Apply topically 2 times daily To toenails.    Dermatophytosis of nail       FISH OIL PO      Take  by mouth daily.        glimepiride 2 MG tablet    AMARYL    90 tablet    Take 1 tablet (2 mg) by mouth every morning (before breakfast)    Type 2 diabetes mellitus with diabetic neuropathy, without long-term current use of insulin (H)       levothyroxine 50 MCG tablet    SYNTHROID/LEVOTHROID    90 tablet    Take 1 tablet (50 mcg) by mouth daily    Hypothyroidism due to acquired atrophy of thyroid       losartan 100 MG tablet    COZAAR    90 tablet    TAKE 1 TABLET EVERY DAY    Hypertension goal BP (blood pressure) < 140/90       order for DME     2 Device    Equipment being ordered: HAPAD Metatarsal pad, QTY:2    Diabetic neuropathy with neurologic complication (H)       STATIN NOT PRESCRIBED (INTENTIONAL)     0 each    1 each continuous Statin not prescribed intentionally due to does not meet [place] criteria and Other: LDL at goal without the statin.    CARDIOVASCULAR SCREENING; LDL GOAL LESS THAN 100       tamsulosin 0.4 MG capsule    FLOMAX    90 capsule    Take 1 capsule (0.4 mg) by mouth daily    Benign prostatic hyperplasia without lower urinary tract symptoms

## 2018-05-11 NOTE — NURSING NOTE
"Oncology Rooming Note    May 11, 2018 3:34 PM   Milo Lozano is a 83 year old male who presents for:    Chief Complaint   Patient presents with     Oncology Clinic Visit     follow up     Initial Vitals: /64  Pulse 79  Temp 98  F (36.7  C)  Resp 15  Ht 1.727 m (5' 7.99\")  Wt 81.6 kg (180 lb)  SpO2 95%  BMI 27.38 kg/m2 Estimated body mass index is 27.38 kg/(m^2) as calculated from the following:    Height as of this encounter: 1.727 m (5' 7.99\").    Weight as of this encounter: 81.6 kg (180 lb). Body surface area is 1.98 meters squared.  No Pain (0) Comment: Data Unavailable   No LMP for male patient.  Allergies reviewed: Yes  Medications reviewed: Yes    Medications: Medication refills not needed today.  Pharmacy name entered into EPIC:    [facility] FAX ONLY - NEW RX ONLY  [facility] - [place], [place] [facility] [address] [facility] [id number] [place], [place] - [address]  [facility] [address] [facility] - [place], [place] - [facility]        5 minutes for nursing intake (face to face time)     Shae Moise LPN              "

## 2018-05-11 NOTE — LETTER
5/11/2018         RE: Milo Lozano  [address] [facility] [address]        Dear Colleague,    Thank you for referring your patient, Milo Lozano, to the [facility]. Please see a copy of my visit note below.    Hematology Follow-up visit:  Date on this visit: May 11, 2018    Pancytopenia    Primary Care Physician: Laurence Fitzgerald    [facility] [facility] [place] in [place], [place] .Address: [address] [place] [address]   Phone: [phone]  History Of Present Illness:  Mr. Lozano is a 83 year old male who presents for f/up of pancytopenia. He was noted to be newly anemic on his cbc on 7/12/2017. When his CBC was repeated on 8/15/2017, he was mildly pancytopenic, with Hb of 12.1 g/dl, MCV of 93,  mild leucopenia and borderline thrombocytopenia.   Absolute differential counts were normal and his peripheral blood smear showed slight normochromic normocytic anemia; minimal poikilocytosis and slight thrombocytopenia with overall normal platelet morphology. There was polychromasia. Absolute reticulocyte count was elevated at 165.7. B12 was normal in 07/2017 at 494. Ferritin was elevated at 412. Creatinine is mildly elevated at 1.32. FERMIN screen was negative. TSH was normal.   PSA was undetectable in 07/2017. TSH was normal. Stool hemoccult was negative in 07/2017. He has never had a colonoscopy. UA showed no hematuria but did glucosuria and proteinuria. He still had persisted marked reticulocytosis in 09/2017 with absolute retic count of 143. He had a normal LDH, negative LAURA, normal haptoglobin. Peripheral blood smear on 9/5/17 showed normocytic slight anemia with slight morphologic evidence of increase in red cell regeneration with no morphologic evidence of hemolysis. There was reactive lymphocyte morphology and f/up blood smear was recommended.  No M protein was noted on serum immunofixation. He had f/up labs on 5/1/2018. Hb was 11.7  g/dl with normal MCV. WBC and platelet counts were low normal. Absolute retic count was still elevated at 143. Peripheral blood smear on 5/1/2018 showed Mild normochromic normocytic anemia.   Adequate neutrophils with mild shift to immaturity. The lymphocyte population shows a population   of both small mature lymphocytes, large granular lymphocytes and reactive-appearing lymphocytes.  The morphology of the platelets was overall normal including large platelets. The lymphocyte population shows a population of both small mature lymphocytes, large granular lymphocytes and reactive-appearing lymphocytes.  The  morphology of the platelets is overall normal including large platelets. Both TSH and free T4 were mildly elevated.   He has type 2 DM. Lately his FS have been in 200s and 300s and he was seen by Dr. Hebert recently and had his diabetic regimen adjusted.   He has been feeling well. He denies CP. He is SOB on exertion, unchanged. He is pain free.  He spent a winter in [place] and volunteered for CT scans there with [place] [facility] but does not have the results with him.  In addition, a complete 12 point  review of systems is negative.      Past Medical/Surgical History:  Past Medical History:   Diagnosis Date     BPH (benign prostatic hypertrophy)      CARDIOVASCULAR SCREENING; LDL GOAL LESS THAN 100 12/29/2011     CARDIOVASCULAR SCREENING; LDL GOAL LESS THAN 130 12/29/2011     Hypertension goal BP (blood pressure) < 140/90 12/29/2011     Hypothyroidism due to acquired atrophy of thyroid 12/5/2016     Prostate cancer (H) 1/2007    Had Radiation     Type 2 diabetes, HbA1c goal < 7% (H)    He has  peripheral neuropathy and has had laser treatments for it for 6 months.  FHx and SocHx reviewed.     Past Surgical History:   Procedure Laterality Date     COLONOSCOPY       Allergies:  Allergies as of 05/11/2018 - Nam as Reviewed 05/11/2018   Allergen Reaction Noted     Lisinopril Cough 06/25/2014     Current  "Medications:  Current Outpatient Prescriptions   Medication Sig Dispense Refill     aspirin 81 MG tablet Take 1 tablet (81 mg) by mouth daily 90 tablet 3     blood glucose calibration (ACCU-CHEK SMARTVIEW CONTROL) solution 1 Bottle as needed Use to calibrate blood glucose monitor as needed as directed. 1 each 1     blood glucose monitoring (ACCU-CHEK FASTCLIX) lancets 1 each by In Vitro route 2 times daily 200 each 3     blood glucose monitoring (ACCU-CHEK MILADYS SMARTVIEW) meter device kit Use to test blood sugar 2 times daily or as directed. 1 kit 0     blood glucose monitoring (ACCU-CHEK SMARTVIEW) test strip TEST BLOOD SUGAR TWICE DAILY  OR AS DIRECTED 200 strip 3     capsaicin (ZOSTRIX) 0.025 % CREA To feet 2-3 times daily as needed. 60 g 6     chlorthalidone (HYGROTON) 25 MG tablet Take 1 tablet (25 mg) by mouth daily 90 tablet 3     ciclopirox (LOPROX) 0.77 % cream Apply topically 2 times daily To toenails. 90 g 5     glimepiride (AMARYL) 2 MG tablet Take 1 tablet (2 mg) by mouth every morning (before breakfast) 90 tablet 1     levothyroxine (SYNTHROID/LEVOTHROID) 50 MCG tablet Take 1 tablet (50 mcg) by mouth daily 90 tablet 1     losartan (COZAAR) 100 MG tablet TAKE 1 TABLET EVERY DAY 90 tablet 3     Omega-3 Fatty Acids (FISH OIL PO) Take  by mouth daily.       order for DME Equipment being ordered: HAPAD Metatarsal pad, QTY:2 2 Device 2     STATIN NOT PRESCRIBED, INTENTIONAL, 1 each continuous Statin not prescribed intentionally due to does not meet Richardson criteria and Other: LDL at goal without the statin. 0 each 0     tamsulosin (FLOMAX) 0.4 MG capsule Take 1 capsule (0.4 mg) by mouth daily 90 capsule 3      Physical Exam:  /64  Pulse 79  Temp 98  F (36.7  C)  Resp 15  Ht 1.727 m (5' 7.99\")  Wt 81.6 kg (180 lb)  SpO2 95%  BMI 27.38 kg/m2      GENERAL APPEARANCE: healthy, alert and no distress     HENT: Mouth without ulcers or lesions     NECK: no adenopathy, no asymmetry or masses       " RESP: lungs clear to auscultation - no rales, rhonchi or wheezes     CARDIOVASCULAR: regular rates and rhythm, normal S1 S2, no S3 or S4 and no murmur.     ABDOMEN:  soft, nontender, no HSM or masses and bowel sounds normal     MUSCULOSKELETAL: extremities normal- no gross deformities noted, no evidence of inflammation in joints, FROM in all extremities. No edema b/l LE.     SKIN: no suspicious lesions or rashes     PSYCHIATRIC: mentation appears normal and affect normal    Laboratory/Imaging Studies  Labs reviewed and documented in the EMR.  Results for CHRISTIANA HSIEH (MRN [id number]) as of 5/12/2018 15:47   Ref. Range 9/5/2017 13:38 12/22/2017 14:25 12/28/2017 08:01 5/1/2018 12:30 5/11/2018 15:22   Creatinine Latest Ref Range: 0.66 - 1.25 mg/dL 1.37 (H) 1.40 (H) 1.53 (H) 1.30 (H) 1.48 (H)     ASSESSMENT/PLAN:    Mr. Hsieh is a 83 year old man, with type 2 DM,  with normocytic anemia and transient borderline leucopenia and thrombocytopenia but persistent reticulocytosis.    1. Normocytic anemia, reticulocytosis- He has mild anemia, and it is at least partially related to anemia of kidney disease. The cause of his reticulocytosis is not clear. He has no e/o hemolysis on peripheral blood smear.  LAURA is negative. We'll plan to review his CT scans from [place], [place] for any e/o hepatosplenomegaly.  Since his Hb and retic count is stable, I feel it is reasonable to follow him, and the patient himself is not interested in more aggressive work-up.  We'll plan to see him in one year with cbcd, cmp, retic, LDH, haptoglobin.    2. CKD- creat stable.    3. Leucopenia, borderline thrombocytopenia- negative workup. Could have been related to mild TSH/free T4 fluctuations. Currently WBC and platelet counts low normal.    4. Hypothyroidism, type 2 DM- seeing Dr. Ibarra next month  At the end of our visit patient verbalized understanding and concurred with the plan.          Again, thank you for allowing me to participate in  the care of your patient.        Sincerely,        Karen Hernandez MD, MD

## 2018-05-11 NOTE — PROGRESS NOTES
Hematology Follow-up visit:  Date on this visit: May 11, 2018    Pancytopenia    Primary Care Physician: Laurence Fitzgerald    [facility] [facility] [place] in [place], [place] .Address: [address] , [place], [place]   Phone: [phone]  History Of Present Illness:  Mr. Lozano is a 83 year old male who presents for f/up of pancytopenia. He was noted to be newly anemic on his cbc on 7/12/2017. When his CBC was repeated on 8/15/2017, he was mildly pancytopenic, with Hb of 12.1 g/dl, MCV of 93,  mild leucopenia and borderline thrombocytopenia.   Absolute differential counts were normal and his peripheral blood smear showed slight normochromic normocytic anemia; minimal poikilocytosis and slight thrombocytopenia with overall normal platelet morphology. There was polychromasia. Absolute reticulocyte count was elevated at 165.7. B12 was normal in 07/2017 at 494. Ferritin was elevated at 412. Creatinine is mildly elevated at 1.32. FERMIN screen was negative. TSH was normal.   PSA was undetectable in 07/2017. TSH was normal. Stool hemoccult was negative in 07/2017. He has never had a colonoscopy. UA showed no hematuria but did glucosuria and proteinuria. He still had persisted marked reticulocytosis in 09/2017 with absolute retic count of 143. He had a normal LDH, negative LAURA, normal haptoglobin. Peripheral blood smear on 9/5/17 showed normocytic slight anemia with slight morphologic evidence of increase in red cell regeneration with no morphologic evidence of hemolysis. There was reactive lymphocyte morphology and f/up blood smear was recommended.  No M protein was noted on serum immunofixation. He had f/up labs on 5/1/2018. Hb was 11.7 g/dl with normal MCV. WBC and platelet counts were low normal. Absolute retic count was still elevated at 143. Peripheral blood smear on 5/1/2018 showed Mild normochromic normocytic anemia.   Adequate neutrophils with mild shift to immaturity. The  lymphocyte population shows a population   of both small mature lymphocytes, large granular lymphocytes and reactive-appearing lymphocytes.  The morphology of the platelets was overall normal including large platelets. The lymphocyte population shows a population of both small mature lymphocytes, large granular lymphocytes and reactive-appearing lymphocytes.  The  morphology of the platelets is overall normal including large platelets. Both TSH and free T4 were mildly elevated.   He has type 2 DM. Lately his FS have been in 200s and 300s and he was seen by Dr. Hebert recently and had his diabetic regimen adjusted.   He has been feeling well. He denies CP. He is SOB on exertion, unchanged. He is pain free.  He spent a winter in [place] and volunteered for CT scans there with [place] [facility] but does not have the results with him.  In addition, a complete 12 point  review of systems is negative.      Past Medical/Surgical History:  Past Medical History:   Diagnosis Date     BPH (benign prostatic hypertrophy)      CARDIOVASCULAR SCREENING; LDL GOAL LESS THAN 100 12/29/2011     CARDIOVASCULAR SCREENING; LDL GOAL LESS THAN 130 12/29/2011     Hypertension goal BP (blood pressure) < 140/90 12/29/2011     Hypothyroidism due to acquired atrophy of thyroid 12/5/2016     Prostate cancer (H) 1/2007    Had Radiation     Type 2 diabetes, HbA1c goal < 7% (H)    He has  peripheral neuropathy and has had laser treatments for it for 6 months.  FHx and SocHx reviewed.     Past Surgical History:   Procedure Laterality Date     COLONOSCOPY       Allergies:  Allergies as of 05/11/2018 - Nam as Reviewed 05/11/2018   Allergen Reaction Noted     Lisinopril Cough 06/25/2014     Current Medications:  Current Outpatient Prescriptions   Medication Sig Dispense Refill     aspirin 81 MG tablet Take 1 tablet (81 mg) by mouth daily 90 tablet 3     blood glucose calibration (ACCU-CHEK SMARTVIEW CONTROL) solution 1 Bottle as needed Use to calibrate  "blood glucose monitor as needed as directed. 1 each 1     blood glucose monitoring (ACCU-CHEK FASTCLIX) lancets 1 each by In Vitro route 2 times daily 200 each 3     blood glucose monitoring (ACCU-CHEK MILADYS SMARTVIEW) meter device kit Use to test blood sugar 2 times daily or as directed. 1 kit 0     blood glucose monitoring (ACCU-CHEK SMARTVIEW) test strip TEST BLOOD SUGAR TWICE DAILY  OR AS DIRECTED 200 strip 3     capsaicin (ZOSTRIX) 0.025 % CREA To feet 2-3 times daily as needed. 60 g 6     chlorthalidone (HYGROTON) 25 MG tablet Take 1 tablet (25 mg) by mouth daily 90 tablet 3     ciclopirox (LOPROX) 0.77 % cream Apply topically 2 times daily To toenails. 90 g 5     glimepiride (AMARYL) 2 MG tablet Take 1 tablet (2 mg) by mouth every morning (before breakfast) 90 tablet 1     levothyroxine (SYNTHROID/LEVOTHROID) 50 MCG tablet Take 1 tablet (50 mcg) by mouth daily 90 tablet 1     losartan (COZAAR) 100 MG tablet TAKE 1 TABLET EVERY DAY 90 tablet 3     Omega-3 Fatty Acids (FISH OIL PO) Take  by mouth daily.       order for DME Equipment being ordered: HAPAD Metatarsal pad, QTY:2 2 Device 2     STATIN NOT PRESCRIBED, INTENTIONAL, 1 each continuous Statin not prescribed intentionally due to does not meet Raven criteria and Other: LDL at goal without the statin. 0 each 0     tamsulosin (FLOMAX) 0.4 MG capsule Take 1 capsule (0.4 mg) by mouth daily 90 capsule 3      Physical Exam:  /64  Pulse 79  Temp 98  F (36.7  C)  Resp 15  Ht 1.727 m (5' 7.99\")  Wt 81.6 kg (180 lb)  SpO2 95%  BMI 27.38 kg/m2      GENERAL APPEARANCE: healthy, alert and no distress     HENT: Mouth without ulcers or lesions     NECK: no adenopathy, no asymmetry or masses       RESP: lungs clear to auscultation - no rales, rhonchi or wheezes     CARDIOVASCULAR: regular rates and rhythm, normal S1 S2, no S3 or S4 and no murmur.     ABDOMEN:  soft, nontender, no HSM or masses and bowel sounds normal     MUSCULOSKELETAL: extremities " normal- no gross deformities noted, no evidence of inflammation in joints, FROM in all extremities. No edema b/l LE.     SKIN: no suspicious lesions or rashes     PSYCHIATRIC: mentation appears normal and affect normal    Laboratory/Imaging Studies  Labs reviewed and documented in the EMR.  Results for CHRISTIANA HSIEH (MRN [id number]) as of 5/12/2018 15:47   Ref. Range 9/5/2017 13:38 12/22/2017 14:25 12/28/2017 08:01 5/1/2018 12:30 5/11/2018 15:22   Creatinine Latest Ref Range: 0.66 - 1.25 mg/dL 1.37 (H) 1.40 (H) 1.53 (H) 1.30 (H) 1.48 (H)     ASSESSMENT/PLAN:    Mr. Hsieh is a 83 year old man, with type 2 DM,  with normocytic anemia and transient borderline leucopenia and thrombocytopenia but persistent reticulocytosis.    1. Normocytic anemia, reticulocytosis- He has mild anemia, and it is at least partially related to anemia of kidney disease. The cause of his reticulocytosis is not clear. He has no e/o hemolysis on peripheral blood smear.  LAURA is negative. We'll plan to review his CT scans from [place], [place] for any e/o hepatosplenomegaly.  Since his Hb and retic count is stable, I feel it is reasonable to follow him, and the patient himself is not interested in more aggressive work-up.  We'll plan to see him in one year with cbcd, cmp, retic, LDH, haptoglobin.    2. CKD- creat stable.    3. Leucopenia, borderline thrombocytopenia- negative workup. Could have been related to mild TSH/free T4 fluctuations. Currently WBC and platelet counts low normal.    4. Hypothyroidism, type 2 DM- seeing Dr. Ibarra next month  At the end of our visit patient verbalized understanding and concurred with the plan.      [place] [email or web address] imaging records reviewed. He had some screening US studies there including of his thyroid which apparently showed heterogenous R thyroid gland and a left thyroid cyst and also gallbladder stones. There was no aneurism of abdominal aorta. We'll plan to proceed with Abdominal US.    Addendum:  US  abdomen 5/29/2018:  1.  Hyperechoic liver, seen with intrinsic parenchymal disease, most  commonly fatty liver. Liver cysts.  2.  Hepatosplenomegaly.  Spleen: Enlarged, measuring 12.7 x 6.6 x 13.5 cm. Gallstones.  Anemia, thrombocytopenia, leucopenia could be partially related to splenomegaly. Consider osmotic fragility testing for evaluation of reticulocytosis in the future if reticulocytosis or anemia worsen.

## 2018-05-16 ENCOUNTER — OFFICE VISIT (OUTPATIENT)
Dept: PODIATRY | Facility: CLINIC | Age: 83
End: 2018-05-16
Payer: COMMERCIAL

## 2018-05-16 VITALS — SYSTOLIC BLOOD PRESSURE: 148 MMHG | DIASTOLIC BLOOD PRESSURE: 65 MMHG | HEART RATE: 87 BPM | OXYGEN SATURATION: 97 %

## 2018-05-16 DIAGNOSIS — M20.42 HAMMER TOES OF BOTH FEET: Primary | ICD-10-CM

## 2018-05-16 DIAGNOSIS — M20.41 HAMMER TOES OF BOTH FEET: Primary | ICD-10-CM

## 2018-05-16 DIAGNOSIS — E11.49 TYPE II OR UNSPECIFIED TYPE DIABETES MELLITUS WITH NEUROLOGICAL MANIFESTATIONS, NOT STATED AS UNCONTROLLED(250.60) (H): ICD-10-CM

## 2018-05-16 PROCEDURE — 99213 OFFICE O/P EST LOW 20 MIN: CPT | Performed by: PODIATRIST

## 2018-05-16 NOTE — PROGRESS NOTES
Past Medical History:   Diagnosis Date     BPH (benign prostatic hypertrophy)      CARDIOVASCULAR SCREENING; LDL GOAL LESS THAN 100 12/29/2011     CARDIOVASCULAR SCREENING; LDL GOAL LESS THAN 130 12/29/2011     Hypertension goal BP (blood pressure) < 140/90 12/29/2011     Hypothyroidism due to acquired atrophy of thyroid 12/5/2016     Prostate cancer (H) 1/2007    Had Radiation     Type 2 diabetes, HbA1c goal < 7% (H)      Patient Active Problem List   Diagnosis     Hypertension goal BP (blood pressure) < 140/90     CARDIOVASCULAR SCREENING; LDL GOAL LESS THAN 100     Prostate cancer (H)     Type 2 diabetes, HbA1C goal < 8% (H)     Hyperlipidemia with target LDL less than 100     Diabetes type 2, controlled (H)     Advanced directives, counseling/discussion     Diabetic peripheral neuropathy (H)     White coat hypertension     Hypothyroidism due to acquired atrophy of thyroid     DEMOND (obstructive sleep apnea)     Diabetic polyneuropathy associated with type 2 diabetes mellitus (H)     Past Surgical History:   Procedure Laterality Date     COLONOSCOPY       Social History     Social History     Marital status:      Spouse name: [place] Scanlon     Number of children: 4     Years of education: N/A     Occupational History     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 No     Social History Narrative     Family History   Problem Relation Age of Onset     Hypertension Mother      Alzheimer Disease Mother      Prostate Cancer Paternal Grandfather      CANCER No family hx of      DIABETES No family hx of      CEREBROVASCULAR DISEASE No family hx of      Thyroid Disease No family hx of      Glaucoma No family hx of      Macular Degeneration No family hx of      Lab Results    Component Value Date    WBC 4.0 05/01/2018     Lab Results   Component Value Date    RBC 3.67 05/01/2018     Lab Results   Component Value Date    HGB 11.7 05/01/2018     Lab Results   Component Value Date    HCT 34.5 05/01/2018     No components found for: MCT  Lab Results   Component Value Date    MCV 94 05/01/2018     Lab Results   Component Value Date    MCH 31.9 05/01/2018     Lab Results   Component Value Date    MCHC 33.9 05/01/2018     Lab Results   Component Value Date    RDW 15.4 05/01/2018     Lab Results   Component Value Date     05/01/2018     Last Basic Metabolic Panel:  Lab Results   Component Value Date     05/01/2018      Lab Results   Component Value Date    POTASSIUM 3.5 05/01/2018     Lab Results   Component Value Date    CHLORIDE 99 05/01/2018     Lab Results   Component Value Date    DUSTY 9.3 05/01/2018     Lab Results   Component Value Date    CO2 27 05/01/2018     Lab Results   Component Value Date    BUN 29 05/01/2018     Lab Results   Component Value Date    CR 1.48 05/11/2018     Lab Results   Component Value Date     05/01/2018     Lab Results   Component Value Date    A1C 8.0 05/01/2018      SUBJECTIVE:  An 83-year-old male who relates he does not know why he is here.  Somebody called him and told him he had an appointment, but he is diabetic.  He could use a diabetic foot check.  He relates he gets neuropathy in his feet.  He tried capsaicin, but it burned too much so he quit using it.  He did not use the Loprox cream.  Relates no ulcers or sores since we have seen him last.  He is wearing his diabetic shoes.      OBJECTIVE:  DP and PT are 2/4 bilaterally.  He has dorsally contracted digits 1-5 bilaterally with laterally deviated hallux bilaterally.  Sharp/dull is decreased with 5.07 Parmele-Sanju monofilament bilaterally.  No gross tendon voids bilaterally.  No erythema, no drainage, no odor, no calor bilaterally.  He has decreased ankle joint dorsiflexion  bilaterally.  Muscle strength is intact bilaterally.  He has onychomycosis changes.        ASSESSMENT AND PLAN:  Tina bilaterally.  He is diabetic with peripheral neuropathy.  Diagnosis and treatment options discussed with him.  He is advised on stretching.  A prescription for new diabetic shoes and insoles given.  He is given the phone number and address for [facility] to pick those up.  He will return to clinic to see me in about 1 year.

## 2018-05-16 NOTE — PATIENT INSTRUCTIONS
Thanks for coming today.  [facility]  [address] [place] [address]    To schedule future appointments in Ortho Clinic, you may call [phone].    To schedule ordered imaging by your provider:   Call [facility] [phone]    To schedule an injection ordered by your provider:  Call [facility] Injection scheduling line: [phone]  [email or web address] available online at:  [email or web address]    Please call if any further questions or concerns ([phone]).  Clinic hours 8 am to 5 pm.    Return to clinic (call) if symptoms worsen or fail to improve.

## 2018-05-16 NOTE — MR AVS SNAPSHOT
After Visit Summary   2018    Milo Lozano    MRN: [id number]           Patient Information     Date Of Birth          1935        Visit Information        Provider Department      2018 7:30 AM Julio Calabrese DPM [facility]        Today's Diagnoses     Hammer toes of both feet    -  1    Type II or unspecified type diabetes mellitus with neurological manifestations, not stated as uncontrolled(250.60) (H)          Care Instructions    Thanks for coming today.  [facility]  [address] [place] [address]    To schedule future appointments in Ortho Clinic, you may call [phone].    To schedule ordered imaging by your provider:   Call Central Imaging [phone]    To schedule an injection ordered by your provider:  Call Central Imaging Injection scheduling line: [phone]  MyChart available online at:  [email or web address]    Please call if any further questions or concerns ([phone]).  Clinic hours 8 am to 5 pm.    Return to clinic (call) if symptoms worsen or fail to improve.            Follow-ups after your visit        Additional Services     ORTHOTICS REFERRAL       *This referral order prints off in the [facility]  (Orthotics & Prosthetics) Central Scheduling Office**    The [facility] Central Scheduling Staff will contact the patient to schedule appointments.     Central Scheduling Contact Information: [phone] ([place])    Disbetic shoes.  Custom Diabetic insoles with mpj pad bilaterally, 3 pair.    Please be aware that coverage of these services is subject to the terms and limitations of your health insurance plan.  Call member services at your health plan with any benefit or coverage questions.      Please bring the following to your appointment:    >>   Any x-rays, CTs or MRIs which have been performed.  Contact the facility where they were done to arrange for   prior to your scheduled appointment.    >>   List of current medications   >>   This referral request   >>   Any documents/labs given to you for this referral                  Your next 10 appointments already scheduled     Jun 27, 2018  1:10 PM CDT   Return Visit with Laurence Ibarra MD PhD   [facility]    [address] [place] [id number] [phone]              Who to contact     If you have questions or need follow up information about today's clinic visit or your schedule please contact [facility] directly at [phone].  Normal or non-critical lab and imaging results will be communicated to you by [employer], letter or phone within 4 business days after the clinic has received the results. If you do not hear from us within 7 days, please contact the clinic through [email or web address] or phone. If you have a critical or abnormal lab result, we will notify you by phone as soon as possible.  Submit refill requests through [facility] or call your pharmacy and they will forward the refill request to us. Please allow 3 business days for your refill to be completed.          Additional Information About Your Visit        [facility] [employer] Information     [facility] gives you secure access to your electronic health record. If you see a primary care provider, you can also send messages to your care team and make appointments. If you have questions, please call your primary care clinic.  If you do not have a primary care provider, please call [phone] and they will assist you.      [facility] is an electronic gateway that provides easy, online access to your medical records. With [facility], you can request a clinic appointment, read your test results, renew a prescription or communicate with your care team.     To access your existing account, please contact your [facility] or call [phone] for assistance.        Care EveryWhere ID      This is your Care EveryWhere ID. This could be used by other organizations to access your [facility] medical records  [id number]        Your Vitals Were     Pulse Pulse Oximetry                87 97%           Blood Pressure from Last 3 Encounters:   05/16/18 148/65   05/11/18 145/64   05/01/18 124/66    Weight from Last 3 Encounters:   05/11/18 81.6 kg (180 lb)   05/01/18 82.3 kg (181 lb 8 oz)   12/27/17 82.7 kg (182 lb 6.4 oz)              We Performed the Following     ORTHOTICS REFERRAL        Primary Care Provider Office Phone # Fax #    Laurence Ibarra MD PhD [phone]       [address]  [facility] [address]        Equal Access to Services     TONNY HANSON : Hadii aad ku hadasho Soomaali, waaxda luqadaha, qaybta kaalmada adeegyada, tip leslie . So [facility] [phone].    ATENCIÓN: Si habla español, tiene a raygoza disposición servicios gratuitos de asistencia lingüística. Llame al [phone].    We comply with applicable federal civil rights laws and [place] laws. We do not discriminate on the basis of race, color, national origin, age, disability, sex, sexual orientation, or gender identity.            Thank you!     Thank you for choosing [facility]  for your care. Our goal is always to provide you with excellent care. Hearing back from our patients is one way we can continue to improve our services. Please take a few minutes to complete the written survey that you may receive in the mail after your visit with us. Thank you!             Your Updated Medication List - Protect others around you: Learn how to safely use, store and throw away your medicines at www.disposemymeds.org.          This list is accurate as of 5/16/18  1:03 PM.  Always use your most recent med list.                   Brand Name Dispense Instructions for use Diagnosis    aspirin 81 MG tablet     90 tablet    Take 1 tablet (81 mg) by mouth daily    Type 2 diabetes, HbA1c goal <  7% (H), Hypertension goal BP (blood pressure) < 140/90, CARDIOVASCULAR SCREENING; LDL GOAL LESS THAN 100       blood glucose calibration solution     1 each    1 Bottle as needed Use to calibrate blood glucose monitor as needed as directed.    Type 2 diabetes, HbA1c goal < 7% (H)       blood glucose monitoring lancets     200 each    1 each by In Vitro route 2 times daily    Controlled type 2 diabetes mellitus with diabetic polyneuropathy, without long-term current use of insulin (H)       blood glucose monitoring meter device kit     1 kit    Use to test blood sugar 2 times daily or as directed.    Type 2 diabetes, HbA1C goal < 8% (H)       blood glucose monitoring test strip    ACCU-CHEK SMARTVIEW    200 strip    TEST BLOOD SUGAR TWICE DAILY  OR AS DIRECTED    Type 2 diabetes mellitus without complication, without long-term current use of insulin (H)       capsaicin 0.025 % Crea cream    ZOSTRIX    60 g    To feet 2-3 times daily as needed.    Diabetic neuropathy with neurologic complication (H)       chlorthalidone 25 MG tablet    HYGROTON    90 tablet    Take 1 tablet (25 mg) by mouth daily    Hypertension goal BP (blood pressure) < 140/90       ciclopirox 0.77 % cream    LOPROX    90 g    Apply topically 2 times daily To toenails.    Dermatophytosis of nail       FISH OIL PO      Take  by mouth daily.        glimepiride 2 MG tablet    AMARYL    90 tablet    Take 1 tablet (2 mg) by mouth every morning (before breakfast)    Type 2 diabetes mellitus with diabetic neuropathy, without long-term current use of insulin (H)       levothyroxine 50 MCG tablet    SYNTHROID/LEVOTHROID    90 tablet    Take 1 tablet (50 mcg) by mouth daily    Hypothyroidism due to acquired atrophy of thyroid       losartan 100 MG tablet    COZAAR    90 tablet    TAKE 1 TABLET EVERY DAY    Hypertension goal BP (blood pressure) < 140/90       order for DME     2 Device    Equipment being ordered: HAPAD Metatarsal pad, QTY:2    Diabetic  neuropathy with neurologic complication (H)       STATIN NOT PRESCRIBED (INTENTIONAL)     0 each    1 each continuous Statin not prescribed intentionally due to does not meet Bryan criteria and Other: LDL at goal without the statin.    CARDIOVASCULAR SCREENING; LDL GOAL LESS THAN 100       tamsulosin 0.4 MG capsule    FLOMAX    90 capsule    Take 1 capsule (0.4 mg) by mouth daily    Benign prostatic hyperplasia without lower urinary tract symptoms

## 2018-05-16 NOTE — LETTER
5/16/2018         RE: Milo Lozano  [address]  [facility] [address]        Dear Colleague,    Thank you for referring your patient, Milo Lozano, to the [facility]. Please see a copy of my visit note below.    Past Medical History:   Diagnosis Date     BPH (benign prostatic hypertrophy)      CARDIOVASCULAR SCREENING; LDL GOAL LESS THAN 100 12/29/2011     CARDIOVASCULAR SCREENING; LDL GOAL LESS THAN 130 12/29/2011     Hypertension goal BP (blood pressure) < 140/90 12/29/2011     Hypothyroidism due to acquired atrophy of thyroid 12/5/2016     Prostate cancer (H) 1/2007    Had Radiation     Type 2 diabetes, HbA1c goal < 7% (H)      Patient Active Problem List   Diagnosis     Hypertension goal BP (blood pressure) < 140/90     CARDIOVASCULAR SCREENING; LDL GOAL LESS THAN 100     Prostate cancer (H)     Type 2 diabetes, HbA1C goal < 8% (H)     Hyperlipidemia with target LDL less than 100     Diabetes type 2, controlled (H)     Advanced directives, counseling/discussion     Diabetic peripheral neuropathy (H)     White coat hypertension     Hypothyroidism due to acquired atrophy of thyroid     DEMOND (obstructive sleep apnea)     Diabetic polyneuropathy associated with type 2 diabetes mellitus (H)     Past Surgical History:   Procedure Laterality Date     COLONOSCOPY       Social History     Social History     Marital status:      Spouse name: [place] Scanlon     Number of children: 4     Years of education: N/A     Occupational History     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 No     Social History Narrative     Family History   Problem Relation Age of Onset     Hypertension Mother      Alzheimer Disease Mother      Prostate Cancer  Paternal Grandfather      CANCER No family hx of      DIABETES No family hx of      CEREBROVASCULAR DISEASE No family hx of      Thyroid Disease No family hx of      Glaucoma No family hx of      Macular Degeneration No family hx of      Lab Results   Component Value Date    WBC 4.0 05/01/2018     Lab Results   Component Value Date    RBC 3.67 05/01/2018     Lab Results   Component Value Date    HGB 11.7 05/01/2018     Lab Results   Component Value Date    HCT 34.5 05/01/2018     No components found for: MCT  Lab Results   Component Value Date    MCV 94 05/01/2018     Lab Results   Component Value Date    MCH 31.9 05/01/2018     Lab Results   Component Value Date    MCHC 33.9 05/01/2018     Lab Results   Component Value Date    RDW 15.4 05/01/2018     Lab Results   Component Value Date     05/01/2018     Last Basic Metabolic Panel:  Lab Results   Component Value Date     05/01/2018      Lab Results   Component Value Date    POTASSIUM 3.5 05/01/2018     Lab Results   Component Value Date    CHLORIDE 99 05/01/2018     Lab Results   Component Value Date    DUSTY 9.3 05/01/2018     Lab Results   Component Value Date    CO2 27 05/01/2018     Lab Results   Component Value Date    BUN 29 05/01/2018     Lab Results   Component Value Date    CR 1.48 05/11/2018     Lab Results   Component Value Date     05/01/2018     Lab Results   Component Value Date    A1C 8.0 05/01/2018      SUBJECTIVE:  An 83-year-old male who relates he does not know why he is here.  Somebody called him and told him he had an appointment, but he is diabetic.  He could use a diabetic foot check.  He relates he gets neuropathy in his feet.  He tried capsaicin, but it burned too much so he quit using it.  He did not use the Loprox cream.  Relates no ulcers or sores since we have seen him last.  He is wearing his diabetic shoes.      OBJECTIVE:  DP and PT are 2/4 bilaterally.  He has dorsally contracted digits 1-5 bilaterally with  laterally deviated hallux bilaterally.  Sharp/dull is decreased with 5.07 [place] monofilament bilaterally.  No gross tendon voids bilaterally.  No erythema, no drainage, no odor, no calor bilaterally.  He has decreased ankle joint dorsiflexion bilaterally.  Muscle strength is intact bilaterally.  He has onychomycosis changes.        ASSESSMENT AND PLAN:  Hammertoes bilaterally.  He is diabetic with peripheral neuropathy.  Diagnosis and treatment options discussed with him.  He is advised on stretching.  A prescription for new diabetic shoes and insoles given.  He is given the phone number and address for [facility] to pick those up.  He will return to clinic to see me in about 1 year.           Again, thank you for allowing me to participate in the care of your patient.        Sincerely,        Julio Calabrese DPM

## 2018-05-16 NOTE — NURSING NOTE
Milo Lozano's chief complaint for this visit includes:  Chief Complaint   Patient presents with     RECHECK     yearly foot exam      PCP: Laurence Ibarra    Referring Provider:  No referring provider defined for this encounter.    /65  Pulse 87  SpO2 97% Data Unavailable

## 2018-05-18 ENCOUNTER — CARE COORDINATION (OUTPATIENT)
Dept: ONCOLOGY | Facility: CLINIC | Age: 83
End: 2018-05-18

## 2018-05-18 NOTE — PROGRESS NOTES
Message left for call back regarding recommendations on report Dr. Hernandez reviewed from [facility].

## 2018-05-21 NOTE — PROGRESS NOTES
Karen Hernandez MD Lutz, Basia, RN Basia, please let the patient know I have reivewed the records he brought from [facility]. He had some ultrasound studies done - they showed stones in gallbladder, cyst in his thyroid- he can f/up with PCP for those. However, there is no measurements there of liver or spleen and therefore I would recommend that he still proceed with abdominal US (ordered) to make sure enlarged liver or spleen are not the cause of his anemia. I will give you his records that he brought in so we can mail it back to him. Thank you, Dr. WYATT.       Patient notified of the above information. Milo verbalized no other concerns or issues at this time.  His call was transferred to [facility] schedulers so he could make ultrasound appointment.    Ade Campuzano RN

## 2018-05-22 NOTE — PROGRESS NOTES
Dear Alex,   Here are your recent results.   -- these were the labs done and Dr. Hebert reviewed with you.  -- It looks like Dr. Hebert added the glimepiride but didn't change the levothyroxine dose. Is that correct?     Please call or Mychart to our office if you have further questions.     Laurence Ibarra MD-PhD

## 2018-05-29 ENCOUNTER — RADIANT APPOINTMENT (OUTPATIENT)
Dept: ULTRASOUND IMAGING | Facility: CLINIC | Age: 83
End: 2018-05-29
Attending: INTERNAL MEDICINE
Payer: COMMERCIAL

## 2018-05-29 DIAGNOSIS — D64.9 NORMOCYTIC ANEMIA: ICD-10-CM

## 2018-05-29 DIAGNOSIS — R70.1 RETICULOCYTOSIS: ICD-10-CM

## 2018-05-29 PROCEDURE — 76700 US EXAM ABDOM COMPLETE: CPT | Performed by: RADIOLOGY

## 2018-05-31 ENCOUNTER — TELEPHONE (OUTPATIENT)
Dept: ONCOLOGY | Facility: CLINIC | Age: 83
End: 2018-05-31

## 2018-05-31 NOTE — TELEPHONE ENCOUNTER
"Contacted patient to provide message from Dr Hernandez regarding his US results from 5/29/18 \"his ultrasound of the abdomen did show enlarged liver and spleen. The liver has some benign cysts and also some fatty infiltration and the spleen is likely enlarged because of liver enlargement. The treatment is weight loss if possible. This likely explains some of his lower blood counts. Also, gallstones were noted on the ultrasound but since he has no abdominal pain, no intervention is needed for those, just good to be aware. I will see him in one year with future labs as planned\"  Patient expressed understanding and denies questions.  Yasmine Fischer  RN, BSN, OCN    "

## 2018-06-12 ENCOUNTER — DOCUMENTATION ONLY (OUTPATIENT)
Dept: ORTHOPEDICS | Facility: CLINIC | Age: 83
End: 2018-06-12

## 2018-06-12 NOTE — PROGRESS NOTES
S: Pt. arrived to our [facility] facility today for a fitting for custom, foot orthotics and shoes to help protect diabetic feet.     O/Goals: The objective/Goals of orthotics are to help protect feet.     A: I have fit pt. with 3 pairs of bilateral, custom, xpe, foot orthotics with plastizote/poron overlay with 9mm met pads and 1 pair of size 10W Dr. Kelly castro 5710 diabetic shoes from Dr. Mendoza. Pt. has been instructed with proper donning, doffing, cleaning, and the importance skin care and checking. An instructional sheet has been given to pt. and gone through thoroughly. Pt. has ambulated with the orthotics and is satisfied with overall fit. I have instructed patient to stop using the shoes/orthotics immediately, if there were any concerns on fit or any signs of irritation and to contact our facility to discuss the issue(s.)    P: Pt. has been instructed to contact our facility with any future questions and/or concerns.

## 2018-06-27 ENCOUNTER — OFFICE VISIT (OUTPATIENT)
Dept: PEDIATRICS | Facility: CLINIC | Age: 83
End: 2018-06-27
Payer: COMMERCIAL

## 2018-06-27 VITALS
OXYGEN SATURATION: 97 % | BODY MASS INDEX: 27.77 KG/M2 | HEART RATE: 69 BPM | SYSTOLIC BLOOD PRESSURE: 132 MMHG | TEMPERATURE: 98.3 F | WEIGHT: 182.6 LBS | DIASTOLIC BLOOD PRESSURE: 64 MMHG

## 2018-06-27 DIAGNOSIS — E03.4 HYPOTHYROIDISM DUE TO ACQUIRED ATROPHY OF THYROID: ICD-10-CM

## 2018-06-27 DIAGNOSIS — Z12.5 SCREENING FOR PROSTATE CANCER: ICD-10-CM

## 2018-06-27 DIAGNOSIS — E11.40 TYPE 2 DIABETES MELLITUS WITH DIABETIC NEUROPATHY, WITHOUT LONG-TERM CURRENT USE OF INSULIN (H): Primary | ICD-10-CM

## 2018-06-27 DIAGNOSIS — R26.89 POOR BALANCE: ICD-10-CM

## 2018-06-27 PROCEDURE — 99214 OFFICE O/P EST MOD 30 MIN: CPT | Performed by: INTERNAL MEDICINE

## 2018-06-27 PROCEDURE — 99207 C FOOT EXAM  NO CHARGE: CPT | Performed by: INTERNAL MEDICINE

## 2018-06-27 RX ORDER — LEVOTHYROXINE SODIUM 50 UG/1
75 TABLET ORAL DAILY
Qty: 134 TABLET | Refills: 1 | COMMUNITY
Start: 2018-06-27 | End: 2018-09-26

## 2018-06-27 RX ORDER — GLIMEPIRIDE 4 MG/1
4 TABLET ORAL
Qty: 90 TABLET | Refills: 1 | Status: SHIPPED | OUTPATIENT
Start: 2018-06-27 | End: 2018-09-26

## 2018-06-27 ASSESSMENT — PAIN SCALES - GENERAL: PAINLEVEL: NO PAIN (0)

## 2018-06-27 NOTE — MR AVS SNAPSHOT
After Visit Summary   6/27/2018    Milo Lozano    MRN: [id number]           Patient Information     Date Of Birth          1935        Visit Information        Provider Department      6/27/2018 1:10 PM Laurence Ibarra MD PhD [facility]        Today's Diagnoses     Type 2 diabetes mellitus with diabetic neuropathy, without long-term current use of insulin (H)    -  1    Screening for prostate cancer        Poor balance        Hypothyroidism due to acquired atrophy of thyroid          Care Instructions    Make appointment(s) for:   -- Wellness visit with non fasting lab in 3 months (A1c and thyroid).      Increase Glimepiride to 4 mg daily  Increase Levothyroxine to 1.5 tablets daily.         Medication(s) prescribed today:    Orders Placed This Encounter   Medications     glimepiride (AMARYL) 4 MG tablet     Sig: Take 1 tablet (4 mg) by mouth every morning (before breakfast)     Dispense:  90 tablet     Refill:  1     levothyroxine (SYNTHROID/LEVOTHROID) 50 MCG tablet     Sig: Take 1.5 tablets (75 mcg) by mouth daily     Dispense:  134 tablet     Refill:  1     Dose increased today.                   Follow-ups after your visit        Follow-up notes from your care team     Return in about 3 months (around 9/27/2018) for Non-Fasting lab work, Physical.      Your next 10 appointments already scheduled     May 03, 2019 10:00 AM CDT   LAB with LAB ONC [facility] ([facility])    [address] [place] [id number] [phone]           Please do not eat 10-12 hours before your appointment if you are coming in fasting for labs on lipids, cholesterol, or glucose (sugar). This does not apply to pregnant women. Water, hot tea and black coffee (with nothing added) are okay. Do not drink other fluids, diet soda or chew gum.            May 10, 2019  2:30 PM CDT   Return Visit with Karen Hernandez MD   [facility] (  [facility]    [address] [place] [id number] [phone]              Future tests that were ordered for you today     Open Future Orders        Priority Expected Expires Ordered    **TSH with free T4 reflex FUTURE 2mo Routine 8/26/2018 10/25/2018 6/27/2018    **A1C FUTURE 3mo Routine 9/27/2018 10/27/2018 6/27/2018    PSA, screen Routine 9/27/2018 10/27/2018 6/27/2018    Albumin Random Urine Quantitative with Creat Ratio Routine 9/27/2018 10/27/2018 6/27/2018            Who to contact     If you have questions or need follow up information about today's clinic visit or your schedule please contact [facility] directly at [phone].  Normal or non-critical lab and imaging results will be communicated to you by [employer], letter or phone within 4 business days after the clinic has received the results. If you do not hear from us within 7 days, please contact the clinic through [facility] or phone. If you have a critical or abnormal lab result, we will notify you by phone as soon as possible.  Submit refill requests through [employer] or call your pharmacy and they will forward the refill request to us. Please allow 3 business days for your refill to be completed.          Additional Information About Your Visit        [employer] Information     [employer] gives you secure access to your electronic health record. If you see a primary care provider, you can also send messages to your care team and make appointments. If you have questions, please call your primary care clinic.  If you do not have a primary care provider, please call [phone] and they will assist you.      [employer] is an electronic gateway that provides easy, online access to your medical records. With [employer], you can request a clinic appointment, read your test results, renew a prescription or communicate with your care team.     To access your existing account, please contact your [facility] or call [phone] for assistance.        Care EveryWhere ID     This is your Care EveryWhere ID. This could be used by other organizations to access your [facility] medical records  [id number]        Your Vitals Were     Pulse Temperature Pulse Oximetry BMI (Body Mass Index)          69 98.3  F (36.8  C) (Oral) 97% 27.77 kg/m2         Blood Pressure from Last 3 Encounters:   06/27/18 132/64   05/16/18 148/65   05/11/18 145/64    Weight from Last 3 Encounters:   06/27/18 182 lb 9.6 oz (82.8 kg)   05/11/18 180 lb (81.6 kg)   05/01/18 181 lb 8 oz (82.3 kg)              We Performed the Following     C FOOT EXAM  NO CHARGE          Today's Medication Changes          These changes are accurate as of 6/27/18  1:39 PM.  If you have any questions, ask your nurse or doctor.               These medicines have changed or have updated prescriptions.        Dose/Directions    glimepiride 4 MG tablet   Commonly known as:  AMARYL   This may have changed:    - medication strength  - how much to take   Used for:  Type 2 diabetes mellitus with diabetic neuropathy, without long-term current use of insulin (H)   Changed by:  Laurence Ibarra MD PhD        Dose:  4 mg   Take 1 tablet (4 mg) by mouth every morning (before breakfast)   Quantity:  90 tablet   Refills:  1       levothyroxine 50 MCG tablet   Commonly known as:  SYNTHROID/LEVOTHROID   This may have changed:  how much to take   Used for:  Hypothyroidism due to acquired atrophy of thyroid   Changed by:  Laurence Ibarra MD PhD        Dose:  75 mcg   Take 1.5 tablets (75 mcg) by mouth daily   Quantity:  134 tablet   Refills:  1            Where to get your medicines      Some of these will need a paper prescription and others can be bought over the counter.  Ask your nurse if you have questions.     Bring a paper prescription for each of these medications     glimepiride 4 MG tablet                Primary Care Provider Office Phone # Fax #    Laurence Ibarra MD PhD  [phone]       [address]  [facility] [address]        Equal Access to Services     TONNY HANSON : Hadii nikole wang randy Sonataly, wanorada luqadaha, qaybta kaalmada cosme, tip claudio. So [facility] [phone].    ATENCIÓN: Si habla español, tiene a raygoza disposición servicios gratuitos de asistencia lingüística. Llame al [phone].    We comply with applicable federal civil rights laws and [place] laws. We do not discriminate on the basis of race, color, national origin, age, disability, sex, sexual orientation, or gender identity.            Thank you!     Thank you for choosing [facility]  for your care. Our goal is always to provide you with excellent care. Hearing back from our patients is one way we can continue to improve our services. Please take a few minutes to complete the written survey that you may receive in the mail after your visit with us. Thank you!             Your Updated Medication List - Protect others around you: Learn how to safely use, store and throw away your medicines at www.disposemymeds.org.          This list is accurate as of 6/27/18  1:39 PM.  Always use your most recent med list.                   Brand Name Dispense Instructions for use Diagnosis    aspirin 81 MG tablet     90 tablet    Take 1 tablet (81 mg) by mouth daily    Type 2 diabetes, HbA1c goal < 7% (H), Hypertension goal BP (blood pressure) < 140/90, CARDIOVASCULAR SCREENING; LDL GOAL LESS THAN 100       blood glucose calibration solution     1 each    1 Bottle as needed Use to calibrate blood glucose monitor as needed as directed.    Type 2 diabetes, HbA1c goal < 7% (H)       blood glucose monitoring lancets     200 each    1 each by In Vitro route 2 times daily    Controlled type 2 diabetes mellitus with diabetic polyneuropathy, without long-term current use of insulin (H)       blood glucose monitoring meter device kit     1 kit    Use to test blood  sugar 2 times daily or as directed.    Type 2 diabetes, HbA1C goal < 8% (H)       blood glucose monitoring test strip    ACCU-CHEK SMARTVIEW    200 strip    TEST BLOOD SUGAR TWICE DAILY  OR AS DIRECTED    Type 2 diabetes mellitus without complication, without long-term current use of insulin (H)       capsaicin 0.025 % Crea cream    ZOSTRIX    60 g    To feet 2-3 times daily as needed.    Diabetic neuropathy with neurologic complication (H)       chlorthalidone 25 MG tablet    HYGROTON    90 tablet    Take 1 tablet (25 mg) by mouth daily    Hypertension goal BP (blood pressure) < 140/90       ciclopirox 0.77 % cream    LOPROX    90 g    Apply topically 2 times daily To toenails.    Dermatophytosis of nail       FISH OIL PO      Take  by mouth daily.        glimepiride 4 MG tablet    AMARYL    90 tablet    Take 1 tablet (4 mg) by mouth every morning (before breakfast)    Type 2 diabetes mellitus with diabetic neuropathy, without long-term current use of insulin (H)       levothyroxine 50 MCG tablet    SYNTHROID/LEVOTHROID    134 tablet    Take 1.5 tablets (75 mcg) by mouth daily    Hypothyroidism due to acquired atrophy of thyroid       losartan 100 MG tablet    COZAAR    90 tablet    TAKE 1 TABLET EVERY DAY    Hypertension goal BP (blood pressure) < 140/90       order for DME     2 Device    Equipment being ordered: HAPAD Metatarsal pad, QTY:2    Diabetic neuropathy with neurologic complication (H)       STATIN NOT PRESCRIBED (INTENTIONAL)     0 each    1 each continuous Statin not prescribed intentionally due to does not meet [place] criteria and Other: LDL at goal without the statin.    CARDIOVASCULAR SCREENING; LDL GOAL LESS THAN 100       tamsulosin 0.4 MG capsule    FLOMAX    90 capsule    Take 1 capsule (0.4 mg) by mouth daily    Benign prostatic hyperplasia without lower urinary tract symptoms       VITAMIN B-12 CR PO      Take 1 tablet by mouth 2 times daily

## 2018-06-27 NOTE — PROGRESS NOTES
SUBJECTIVE:   Milo Lozano is a 83 year old male who presents to clinic today for the following health issues:      Diabetes Follow-up    Patient is checking blood sugars: twice daily.    Blood sugar testing frequency justification: Uncontrolled diabetes  Results are as follows:         7 day average 178    Diabetic concerns: None     Symptoms of hypoglycemia (low blood sugar): none     Paresthesias (numbness or burning in feet) or sores: Yes improving since started taking Vitamin B12     Date of last diabetic eye exam: 9/27/17    BP Readings from Last 2 Encounters:   06/27/18 132/64   05/16/18 148/65     Hemoglobin A1C (%)   Date Value   05/01/2018 8.0 (H)   12/28/2017 6.0     LDL Cholesterol Calculated (mg/dL)   Date Value   12/28/2017 41   12/19/2016 52         Amount of exercise or physical activity: None    Problems taking medications regularly: No    Medication side effects: Dizziness all the time, episodes of feeling like head will explode    Diet: regular (no restrictions)      Patient was seen in early May for diabetes follow-up.  His A1c went up 2 points from 6.0-8.0.  At which point Amaryl was started 2 mg daily.  Patient reports his glucose are mostly in the high 100s.  Average glucose of 178.    His TSH was elevated at that visit.  Levothyroxine dose was not increased.    Patient reports some off balance feeling.  Sense of dizziness/imbalance.  He denies any lightheadedness or spinning.  Happens mostly when he is walking.  Has to be careful.  He has some numbness and tingling in his feet.  Denies pain sensation.  He has no trouble sleeping at night.  He has not fallen    ROS:  Constitutional, HEENT, cardiovascular, pulmonary, gi and gu systems are negative, except as otherwise noted.         Current Outpatient Prescriptions on File Prior to Visit:  aspirin 81 MG tablet Take 1 tablet (81 mg) by mouth daily   blood glucose calibration (ACCU-CHEK SMARTVIEW CONTROL) solution 1 Bottle as needed Use to  calibrate blood glucose monitor as needed as directed.   blood glucose monitoring (ACCU-CHEK FASTCLIX) lancets 1 each by In Vitro route 2 times daily   blood glucose monitoring (ACCU-CHEK MILADYS SMARTVIEW) meter device kit Use to test blood sugar 2 times daily or as directed.   blood glucose monitoring (ACCU-CHEK SMARTVIEW) test strip TEST BLOOD SUGAR TWICE DAILY  OR AS DIRECTED   chlorthalidone (HYGROTON) 25 MG tablet Take 1 tablet (25 mg) by mouth daily   losartan (COZAAR) 100 MG tablet TAKE 1 TABLET EVERY DAY   Omega-3 Fatty Acids (FISH OIL PO) Take  by mouth daily.   order for DME Equipment being ordered: HAPAD Metatarsal pad, QTY:2   STATIN NOT PRESCRIBED, INTENTIONAL, 1 each continuous Statin not prescribed intentionally due to does not meet [place] criteria and Other: LDL at goal without the statin.   tamsulosin (FLOMAX) 0.4 MG capsule Take 1 capsule (0.4 mg) by mouth daily   capsaicin (ZOSTRIX) 0.025 % CREA To feet 2-3 times daily as needed. (Patient not taking: Reported on 6/27/2018)   ciclopirox (LOPROX) 0.77 % cream Apply topically 2 times daily To toenails. (Patient not taking: Reported on 6/27/2018)   [DISCONTINUED] glimepiride (AMARYL) 2 MG tablet Take 1 tablet (2 mg) by mouth every morning (before breakfast)   [DISCONTINUED] levothyroxine (SYNTHROID/LEVOTHROID) 50 MCG tablet Take 1 tablet (50 mcg) by mouth daily     No current facility-administered medications on file prior to visit.        Patient Active Problem List   Diagnosis     Hypertension goal BP (blood pressure) < 140/90     CARDIOVASCULAR SCREENING; LDL GOAL LESS THAN 100     Prostate cancer (H)     Type 2 diabetes, HbA1C goal < 8% (H)     Hyperlipidemia with target LDL less than 100     Diabetes type 2, controlled (H)     Advanced directives, counseling/discussion     Diabetic peripheral neuropathy (H)     White coat hypertension     Hypothyroidism due to acquired atrophy of thyroid     DEMOND (obstructive sleep apnea)     Diabetic  polyneuropathy associated with type 2 diabetes mellitus (H)     Past Surgical History:   Procedure Laterality Date     COLONOSCOPY         Social History   Substance Use Topics     Smoking status: Never Smoker     Smokeless tobacco: Never Used     Alcohol use No     Family History   Problem Relation Age of Onset     Hypertension Mother      Alzheimer Disease Mother      Prostate Cancer Paternal Grandfather      Cancer No family hx of      Diabetes No family hx of      Cerebrovascular Disease No family hx of      Thyroid Disease No family hx of      Glaucoma No family hx of      Macular Degeneration No family hx of              Problem list, Medication list, Allergies, and Medical/Social/Surgical histories reviewed in [facility] and updated as appropriate.    OBJECTIVE:                                                    /64 (BP Location: Right arm, Patient Position: Sitting, Cuff Size: Adult Large)  Pulse 69  Temp 98.3  F (36.8  C) (Oral)  Wt 182 lb 9.6 oz (82.8 kg)  SpO2 97%  BMI 27.77 kg/m2    GENERAL: healthy, alert and no distress  HEENT: unremarkable  Neck: no adenopathy/mass/stiffness. Thyroid normal.  Lung: clear, no wheezing/rhonchi/crackles  Heart: RRR, normal S1/2, no murmur/gallop/rup  Abd: soft, normal BS, non tender, no organomegaly   Ext: no cyanosis/clubbing/edema  Foot exam: normal sensation, DP 2+, filament test abnormal. Lack of sensation in the toes to about mid foot.       Diagnostic test results:  Orders Only on 05/11/2018   Component Date Value Ref Range Status     Creatinine 05/11/2018 1.48* 0.66 - 1.25 mg/dL Final     GFR Estimate 05/11/2018 46* >60 mL/min/1.7m2 Final    Non  GFR Calc     GFR Estimate If Black 05/11/2018 55* >60 mL/min/1.7m2 Final    African American GFR Calc           ASSESSMENT/PLAN:                                                      83 year old male with the following diagnoses and treatment plan:      ICD-10-CM    1. Type 2 diabetes mellitus with  diabetic neuropathy, without long-term current use of insulin (H) E11.40 glimepiride (AMARYL) 4 MG tablet     **A1C FUTURE 3mo     Albumin Random Urine Quantitative with Creat Ratio     C FOOT EXAM  NO CHARGE   2. Screening for prostate cancer Z12.5 PSA, screen   3. Poor balance R26.89    4. Hypothyroidism due to acquired atrophy of thyroid E03.4 levothyroxine (SYNTHROID/LEVOTHROID) 50 MCG tablet     **TSH with free T4 reflex FUTURE 2mo       Diabetes: not controlled. Increase Amaryl to 4 mg daily.   Poor balance: likely due to diabetes neuropathy  Hypothyroidism: increase Levothyroxine to 75 mcg daily.  Wellness visit in 3 months for A1c/TSH.       Will call or return to clinic if worsening or symptoms not improving as discussed.  See Patient Instructions.      Laurence Ibarra MD-PhD  [facility]    (Note: Chart documentation was done in part with Dragon Voice Recognition software. Although reviewed after completion, some word and grammatical errors may remain.)

## 2018-06-27 NOTE — PATIENT INSTRUCTIONS
Make appointment(s) for:   -- Wellness visit with non fasting lab in 3 months (A1c and thyroid).      Increase Glimepiride to 4 mg daily  Increase Levothyroxine to 1.5 tablets daily.         Medication(s) prescribed today:    Orders Placed This Encounter   Medications     glimepiride (AMARYL) 4 MG tablet     Sig: Take 1 tablet (4 mg) by mouth every morning (before breakfast)     Dispense:  90 tablet     Refill:  1     levothyroxine (SYNTHROID/LEVOTHROID) 50 MCG tablet     Sig: Take 1.5 tablets (75 mcg) by mouth daily     Dispense:  134 tablet     Refill:  1     Dose increased today.

## 2018-09-26 ENCOUNTER — OFFICE VISIT (OUTPATIENT)
Dept: PEDIATRICS | Facility: CLINIC | Age: 83
End: 2018-09-26
Payer: COMMERCIAL

## 2018-09-26 VITALS
SYSTOLIC BLOOD PRESSURE: 119 MMHG | OXYGEN SATURATION: 100 % | DIASTOLIC BLOOD PRESSURE: 53 MMHG | HEART RATE: 79 BPM | WEIGHT: 185 LBS | TEMPERATURE: 97.8 F | BODY MASS INDEX: 28.14 KG/M2

## 2018-09-26 DIAGNOSIS — E03.4 HYPOTHYROIDISM DUE TO ACQUIRED ATROPHY OF THYROID: ICD-10-CM

## 2018-09-26 DIAGNOSIS — E11.40 TYPE 2 DIABETES MELLITUS WITH DIABETIC NEUROPATHY, WITHOUT LONG-TERM CURRENT USE OF INSULIN (H): Primary | ICD-10-CM

## 2018-09-26 DIAGNOSIS — N40.0 BENIGN PROSTATIC HYPERPLASIA WITHOUT LOWER URINARY TRACT SYMPTOMS: ICD-10-CM

## 2018-09-26 DIAGNOSIS — Z12.5 SCREENING FOR PROSTATE CANCER: ICD-10-CM

## 2018-09-26 DIAGNOSIS — E11.40 TYPE 2 DIABETES MELLITUS WITH DIABETIC NEUROPATHY, WITHOUT LONG-TERM CURRENT USE OF INSULIN (H): ICD-10-CM

## 2018-09-26 DIAGNOSIS — Z23 NEEDS FLU SHOT: ICD-10-CM

## 2018-09-26 LAB
CREAT UR-MCNC: 142 MG/DL
HBA1C MFR BLD: 5.2 % (ref 0–5.6)
MICROALBUMIN UR-MCNC: 12 MG/L
MICROALBUMIN/CREAT UR: 8.38 MG/G CR (ref 0–17)
PSA SERPL-ACNC: <0.01 UG/L (ref 0–4)
TSH SERPL DL<=0.005 MIU/L-ACNC: 1.28 MU/L (ref 0.4–4)

## 2018-09-26 PROCEDURE — G0103 PSA SCREENING: HCPCS | Performed by: INTERNAL MEDICINE

## 2018-09-26 PROCEDURE — 90662 IIV NO PRSV INCREASED AG IM: CPT | Performed by: INTERNAL MEDICINE

## 2018-09-26 PROCEDURE — 83036 HEMOGLOBIN GLYCOSYLATED A1C: CPT | Performed by: INTERNAL MEDICINE

## 2018-09-26 PROCEDURE — 82043 UR ALBUMIN QUANTITATIVE: CPT | Performed by: INTERNAL MEDICINE

## 2018-09-26 PROCEDURE — 90471 IMMUNIZATION ADMIN: CPT | Performed by: INTERNAL MEDICINE

## 2018-09-26 PROCEDURE — 36415 COLL VENOUS BLD VENIPUNCTURE: CPT | Performed by: INTERNAL MEDICINE

## 2018-09-26 PROCEDURE — 99214 OFFICE O/P EST MOD 30 MIN: CPT | Mod: 25 | Performed by: INTERNAL MEDICINE

## 2018-09-26 PROCEDURE — 84443 ASSAY THYROID STIM HORMONE: CPT | Performed by: INTERNAL MEDICINE

## 2018-09-26 RX ORDER — LEVOTHYROXINE SODIUM 75 UG/1
75 TABLET ORAL DAILY
Qty: 90 TABLET | Refills: 3 | Status: SHIPPED | OUTPATIENT
Start: 2018-09-26 | End: 2019-10-13

## 2018-09-26 RX ORDER — TAMSULOSIN HYDROCHLORIDE 0.4 MG/1
0.4 CAPSULE ORAL DAILY
Qty: 90 CAPSULE | Refills: 3 | Status: SHIPPED | OUTPATIENT
Start: 2018-09-26 | End: 2019-10-13

## 2018-09-26 RX ORDER — GLIMEPIRIDE 4 MG/1
4 TABLET ORAL
Qty: 90 TABLET | Refills: 2 | Status: SHIPPED | OUTPATIENT
Start: 2018-09-26 | End: 2019-03-25

## 2018-09-26 RX ORDER — GLIMEPIRIDE 4 MG/1
4 TABLET ORAL
Qty: 90 TABLET | Refills: 1 | Status: SHIPPED | OUTPATIENT
Start: 2018-09-26 | End: 2018-09-26

## 2018-09-26 NOTE — PROGRESS NOTES
SUBJECTIVE:   Milo Lozano is a 83 year old male who presents to clinic today for the following health issues:      Diabetes Follow-up      Patient is checking blood sugars: downloaded    Diabetic concerns: None     Symptoms of hypoglycemia (low blood sugar): shaky, dizzy     Paresthesias (numbness or burning in feet) or sores: Yes Neuroapathy     Date of last diabetic eye exam: DUE    BP Readings from Last 2 Encounters:   06/27/18 132/64   05/16/18 148/65     Hemoglobin A1C (%)   Date Value   09/26/2018 5.2   05/01/2018 8.0 (H)     LDL Cholesterol Calculated (mg/dL)   Date Value   12/28/2017 41   12/19/2016 52       Diabetes Management Resources    Amount of exercise or physical activity: 6-7 days/week for an average of less than 15 minutes    Problems taking medications regularly: No    Medication side effects: none    Diet: regular (no restrictions)       1 1/2 years of feeling off balance, dizzy all the time. Comes close to falling before but hasn't fallen. Can happen with walking, sitting or any time. Unable to take sweater off while standing.     Sometimes has bad headache feeling like his head is going to explode for a few seconds and then goes away.     ROS:  Constitutional, HEENT, cardiovascular, pulmonary, gi and gu systems are negative, except as otherwise noted.         Current Outpatient Prescriptions on File Prior to Visit:  aspirin 81 MG tablet Take 1 tablet (81 mg) by mouth daily   chlorthalidone (HYGROTON) 25 MG tablet Take 1 tablet (25 mg) by mouth daily   losartan (COZAAR) 100 MG tablet TAKE 1 TABLET EVERY DAY   blood glucose calibration (ACCU-CHEK SMARTVIEW CONTROL) solution 1 Bottle as needed Use to calibrate blood glucose monitor as needed as directed.   blood glucose monitoring (ACCU-CHEK FASTCLIX) lancets 1 each by In Vitro route 2 times daily   blood glucose monitoring (ACCU-CHEK MILADYS SMARTVIEW) meter device kit Use to test blood sugar 2 times daily or as directed.   blood glucose  monitoring (ACCU-CHEK SMARTVIEW) test strip TEST BLOOD SUGAR TWICE DAILY  OR AS DIRECTED   capsaicin (ZOSTRIX) 0.025 % CREA To feet 2-3 times daily as needed. (Patient not taking: Reported on 6/27/2018)   ciclopirox (LOPROX) 0.77 % cream Apply topically 2 times daily To toenails. (Patient not taking: Reported on 6/27/2018)   Cyanocobalamin (VITAMIN B-12 CR PO) Take 1 tablet by mouth 2 times daily   Omega-3 Fatty Acids (FISH OIL PO) Take  by mouth daily.   order for DME Equipment being ordered: HAPAD Metatarsal pad, QTY:2   STATIN NOT PRESCRIBED, INTENTIONAL, 1 each continuous Statin not prescribed intentionally due to does not meet [place] criteria and Other: LDL at goal without the statin.     No current facility-administered medications on file prior to visit.        Patient Active Problem List   Diagnosis     Hypertension goal BP (blood pressure) < 140/90     CARDIOVASCULAR SCREENING; LDL GOAL LESS THAN 100     Prostate cancer (H)     Type 2 diabetes, HbA1C goal < 8% (H)     Hyperlipidemia with target LDL less than 100     Diabetes type 2, controlled (H)     Advanced directives, counseling/discussion     Diabetic peripheral neuropathy (H)     White coat hypertension     Hypothyroidism due to acquired atrophy of thyroid     DEMOND (obstructive sleep apnea)     Diabetic polyneuropathy associated with type 2 diabetes mellitus (H)     Past Surgical History:   Procedure Laterality Date     COLONOSCOPY         Social History   Substance Use Topics     Smoking status: Never Smoker     Smokeless tobacco: Never Used     Alcohol use No     Family History   Problem Relation Age of Onset     Hypertension Mother      Alzheimer Disease Mother      Prostate Cancer Paternal Grandfather      Cancer No family hx of      Diabetes No family hx of      Cerebrovascular Disease No family hx of      Thyroid Disease No family hx of      Glaucoma No family hx of      Macular Degeneration No family hx of              Problem list,  Medication list, Allergies, and Medical/Social/Surgical histories reviewed in [facility] and updated as appropriate.    OBJECTIVE:                                                    /53  Pulse 79  Temp 97.8  F (36.6  C) (Temporal)  Wt 185 lb (83.9 kg)  SpO2 100%  BMI 28.14 kg/m2    GENERAL: healthy, alert and no distress  HEENT: unremarkable  Neck: no adenopathy/mass/stiffness. Thyroid normal.  Lung: clear, no wheezing/rhonchi/crackles  Heart: RRR, normal S1/2, no murmur/gallop/rup  Abd: soft, normal BS, non tender, no organomegaly   Ext: no cyanosis/clubbing/edema  Hallpike: negative.      Diagnostic test results:  Results for orders placed or performed in visit on 09/26/18   **A1C FUTURE 3mo   Result Value Ref Range    Hemoglobin A1C 5.2 0 - 5.6 %   PSA, screen   Result Value Ref Range    PSA <0.01 0 - 4 ug/L   Albumin Random Urine Quantitative with Creat Ratio   Result Value Ref Range    Creatinine Urine 142 mg/dL    Albumin Urine mg/L 12 mg/L    Albumin Urine mg/g Cr 8.38 0 - 17 mg/g Cr   **TSH with free T4 reflex FUTURE 2mo   Result Value Ref Range    TSH 1.28 0.40 - 4.00 mU/L         ASSESSMENT/PLAN:                                                      83 year old male with the following diagnoses and treatment plan:      ICD-10-CM    1. Type 2 diabetes mellitus with diabetic neuropathy, without long-term current use of insulin (H) E11.40 glimepiride (AMARYL) 4 MG tablet     DISCONTINUED: glimepiride (AMARYL) 4 MG tablet   2. Needs flu shot Z23 FLU VACCINE, INCREASED ANTIGEN, PRESV FREE     ADMIN 1st VACCINE   3. Hypothyroidism due to acquired atrophy of thyroid E03.4 levothyroxine (SYNTHROID/LEVOTHROID) 75 MCG tablet   4. Benign prostatic hyperplasia without lower urinary tract symptoms N40.0 tamsulosin (FLOMAX) 0.4 MG capsule       -- diabetes well controlled.  stable chronic health issues. Medications refilled.    -- chronic dizziness: refer to [facility].  -- [place] 6 [place] labs done down south. return in 9 months for wellness with non fasting labs.     Will call or return to clinic if worsening or symptoms not improving as discussed.  See Patient Instructions.      Laurence Ibarra MD-PhD  [facility] [place]    (Note: Chart documentation was done in part with Dragon Voice Recognition software. Although reviewed after completion, some word and grammatical errors may remain.)

## 2018-09-26 NOTE — NURSING NOTE
Injectable Influenza Immunization Documentation    1.  Is the person to be vaccinated sick today?  No    2. Does the person to be vaccinated have an allergy to eggs or to a component of the vaccine?  No    3. Has the person to be vaccinated today ever had a serious reaction to influenza vaccine in the past?  No    4. Has the person to be vaccinated ever had Guillain-Barnes syndrome?  No     Form completed by Gabi MONTEJO

## 2018-09-26 NOTE — MR AVS SNAPSHOT
After Visit Summary   9/26/2018    Milo Lozano    MRN: [id number]           Patient Information     Date Of Birth          1935        Visit Information        Provider Department      9/26/2018 1:50 PM Laurence bIarra MD PhD [facility]        Today's Diagnoses     Type 2 diabetes mellitus with diabetic neuropathy, without long-term current use of insulin (H)    -  1    Needs flu shot        Hypothyroidism due to acquired atrophy of thyroid        Benign prostatic hyperplasia without lower urinary tract symptoms          Care Instructions    Make appointment(s) for:   -- wellness visit in 9 months with non fasting lab.     We'll send referral to [facility].         Medication(s) prescribed today:    Orders Placed This Encounter   Medications     glimepiride (AMARYL) 4 MG tablet     Sig: Take 1 tablet (4 mg) by mouth every morning (before breakfast)     Dispense:  90 tablet     Refill:  1     levothyroxine (SYNTHROID/LEVOTHROID) 75 MCG tablet     Sig: Take 1 tablet (75 mcg) by mouth daily     Dispense:  90 tablet     Refill:  3     Please cancel the 50 mcg tablet Rx.     tamsulosin (FLOMAX) 0.4 MG capsule     Sig: Take 1 capsule (0.4 mg) by mouth daily     Dispense:  90 capsule     Refill:  3                   Follow-ups after your visit        Your next 10 appointments already scheduled     May 03, 2019 10:00 AM CDT   LAB with LAB ONC [facility] ([facility])    [address] [place] [id number] [phone]           Please do not eat 10-12 hours before your appointment if you are coming in fasting for labs on lipids, cholesterol, or glucose (sugar). This does not apply to pregnant women. Water, hot tea and black coffee (with nothing added) are okay. Do not drink other fluids, diet soda or chew gum.            May 10, 2019  2:30 PM CDT   Return Visit with Karen Hernandez MD   [facility]  [facility] ([facility])    [address] [address] [place] [address] [phone]              Who to contact     If you have questions or need follow up information about today's clinic visit or your schedule please contact [facility] directly at [phone].  Normal or non-critical lab and imaging results will be communicated to you by MyChart, letter or phone within 4 business days after the clinic has received the results. If you do not hear from us within 7 days, please contact the clinic through [employer] or phone. If you have a critical or abnormal lab result, we will notify you by phone as soon as possible.  Submit refill requests through [employer] or call your pharmacy and they will forward the refill request to us. Please allow 3 business days for your refill to be completed.          Additional Information About Your Visit        [employer] Information     [employer] gives you secure access to your electronic health record. If you see a primary care provider, you can also send messages to your care team and make appointments. If you have questions, please call your primary care clinic.  If you do not have a primary care provider, please call [phone] and they will assist you.      [employer] is an electronic gateway that provides easy, online access to your medical records. With [employer], you can request a clinic appointment, read your test results, renew a prescription or communicate with your care team.     To access your existing account, please contact your [facility] or call [phone] for assistance.        Care EveryWhere ID     This is your Care EveryWhere ID. This could be used by other organizations to access your [place] medical records  [id number]        Your Vitals Were     Pulse Temperature Pulse Oximetry BMI (Body Mass Index)          79 97.8  F (36.6  C) (Temporal) 100% 28.14 kg/m2         Blood Pressure from Last 3  Encounters:   09/26/18 119/53   06/27/18 132/64   05/16/18 148/65    Weight from Last 3 Encounters:   09/26/18 185 lb (83.9 kg)   06/27/18 182 lb 9.6 oz (82.8 kg)   05/11/18 180 lb (81.6 kg)              We Performed the Following     ADMIN 1st VACCINE     FLU VACCINE, INCREASED ANTIGEN, PRESV FREE          Today's Medication Changes          These changes are accurate as of 9/26/18  2:19 PM.  If you have any questions, ask your nurse or doctor.               Start taking these medicines.        Dose/Directions    glimepiride 4 MG tablet   Commonly known as:  AMARYL   Used for:  Type 2 diabetes mellitus with diabetic neuropathy, without long-term current use of insulin (H)   Started by:  Laurence Ibarra MD PhD        Dose:  4 mg   Take 1 tablet (4 mg) by mouth every morning (before breakfast)   Quantity:  90 tablet   Refills:  2         These medicines have changed or have updated prescriptions.        Dose/Directions    levothyroxine 75 MCG tablet   Commonly known as:  SYNTHROID/LEVOTHROID   This may have changed:  medication strength   Used for:  Hypothyroidism due to acquired atrophy of thyroid   Changed by:  Laurence Ibarra MD PhD        Dose:  75 mcg   Take 1 tablet (75 mcg) by mouth daily   Quantity:  90 tablet   Refills:  3            Where to get your medicines      Some of these will need a paper prescription and others can be bought over the counter.  Ask your nurse if you have questions.     Bring a paper prescription for each of these medications     glimepiride 4 MG tablet    levothyroxine 75 MCG tablet    tamsulosin 0.4 MG capsule                Primary Care Provider Office Phone # Fax #    Laurence Ibarra MD PhD [phone]       [address]  [facility] [address]        Equal Access to Services     [facility] : yumiko Monzon, tip rainey. So [facility] [phone].    ATENCIÓN: alberto Corea  disposición servicios gratuitos de asistencia lingüística. Calista pena [phone].    We comply with applicable federal civil rights laws and [place] laws. We do not discriminate on the basis of race, color, national origin, age, disability, sex, sexual orientation, or gender identity.            Thank you!     Thank you for choosing [facility]  for your care. Our goal is always to provide you with excellent care. Hearing back from our patients is one way we can continue to improve our services. Please take a few minutes to complete the written survey that you may receive in the mail after your visit with us. Thank you!             Your Updated Medication List - Protect others around you: Learn how to safely use, store and throw away your medicines at www.disposemymeds.org.          This list is accurate as of 9/26/18  2:19 PM.  Always use your most recent med list.                   Brand Name Dispense Instructions for use Diagnosis    aspirin 81 MG tablet     90 tablet    Take 1 tablet (81 mg) by mouth daily    Type 2 diabetes, HbA1c goal < 7% (H), Hypertension goal BP (blood pressure) < 140/90, CARDIOVASCULAR SCREENING; LDL GOAL LESS THAN 100       blood glucose calibration solution     1 each    1 Bottle as needed Use to calibrate blood glucose monitor as needed as directed.    Type 2 diabetes, HbA1c goal < 7% (H)       blood glucose monitoring lancets     200 each    1 each by In Vitro route 2 times daily    Controlled type 2 diabetes mellitus with diabetic polyneuropathy, without long-term current use of insulin (H)       blood glucose monitoring meter device kit     1 kit    Use to test blood sugar 2 times daily or as directed.    Type 2 diabetes, HbA1C goal < 8% (H)       blood glucose monitoring test strip    ACCU-CHEK SMARTVIEW    200 strip    TEST BLOOD SUGAR TWICE DAILY  OR AS DIRECTED    Type 2 diabetes mellitus without complication, without long-term current use of insulin (H)        capsaicin 0.025 % Crea cream    ZOSTRIX    60 g    To feet 2-3 times daily as needed.    Diabetic neuropathy with neurologic complication (H)       chlorthalidone 25 MG tablet    HYGROTON    90 tablet    Take 1 tablet (25 mg) by mouth daily    Hypertension goal BP (blood pressure) < 140/90       ciclopirox 0.77 % cream    LOPROX    90 g    Apply topically 2 times daily To toenails.    Dermatophytosis of nail       FISH OIL PO      Take  by mouth daily.        glimepiride 4 MG tablet    AMARYL    90 tablet    Take 1 tablet (4 mg) by mouth every morning (before breakfast)    Type 2 diabetes mellitus with diabetic neuropathy, without long-term current use of insulin (H)       levothyroxine 75 MCG tablet    SYNTHROID/LEVOTHROID    90 tablet    Take 1 tablet (75 mcg) by mouth daily    Hypothyroidism due to acquired atrophy of thyroid       losartan 100 MG tablet    COZAAR    90 tablet    TAKE 1 TABLET EVERY DAY    Hypertension goal BP (blood pressure) < 140/90       order for DME     2 Device    Equipment being ordered: HAPAD Metatarsal pad, QTY:2    Diabetic neuropathy with neurologic complication (H)       STATIN NOT PRESCRIBED (INTENTIONAL)     0 each    1 each continuous Statin not prescribed intentionally due to does not meet [place] criteria and Other: LDL at goal without the statin.    CARDIOVASCULAR SCREENING; LDL GOAL LESS THAN 100       tamsulosin 0.4 MG capsule    FLOMAX    90 capsule    Take 1 capsule (0.4 mg) by mouth daily    Benign prostatic hyperplasia without lower urinary tract symptoms       VITAMIN B-12 CR PO      Take 1 tablet by mouth 2 times daily

## 2018-09-26 NOTE — PATIENT INSTRUCTIONS
Make appointment(s) for:   -- wellness visit in 9 months with non fasting lab.     We'll send referral to [facility].         Medication(s) prescribed today:    Orders Placed This Encounter   Medications     glimepiride (AMARYL) 4 MG tablet     Sig: Take 1 tablet (4 mg) by mouth every morning (before breakfast)     Dispense:  90 tablet     Refill:  1     levothyroxine (SYNTHROID/LEVOTHROID) 75 MCG tablet     Sig: Take 1 tablet (75 mcg) by mouth daily     Dispense:  90 tablet     Refill:  3     Please cancel the 50 mcg tablet Rx.     tamsulosin (FLOMAX) 0.4 MG capsule     Sig: Take 1 capsule (0.4 mg) by mouth daily     Dispense:  90 capsule     Refill:  3

## 2018-09-27 NOTE — PROGRESS NOTES
Please mail result letter with the following comment:    Dear Alex,   Here is a copy of your test results that we reviewed at your recent clinic visit. Please continue the plan of care during the visit and follow up as planned.     Please call or make an appointment if you have further questions.     Laurence Ibarra MD-PhD

## 2018-10-19 ENCOUNTER — TRANSFERRED RECORDS (OUTPATIENT)
Dept: HEALTH INFORMATION MANAGEMENT | Facility: CLINIC | Age: 83
End: 2018-10-19

## 2018-10-21 DIAGNOSIS — I10 HYPERTENSION GOAL BP (BLOOD PRESSURE) < 140/90: ICD-10-CM

## 2018-10-22 ENCOUNTER — TRANSFERRED RECORDS (OUTPATIENT)
Dept: HEALTH INFORMATION MANAGEMENT | Facility: CLINIC | Age: 83
End: 2018-10-22

## 2018-10-22 NOTE — TELEPHONE ENCOUNTER
Routing refill request to provider for review/approval because:  Labs out of range:  Creatinine     chlorthalidone (HYGROTON) 25 MG tablet 90 tablet 3 7/12/2017  No   Sig - Route: Take 1 tablet (25 mg) by mouth daily - Oral     Last OV with Dr. Ibarra: 9/26/18    No future apts.     BP Readings from Last 3 Encounters:   09/26/18 119/53   06/27/18 132/64   05/16/18 148/65     Creatinine   Date Value Ref Range Status   05/11/2018 1.48 (H) 0.66 - 1.25 mg/dL Final     Potassium   Date Value Ref Range Status   05/01/2018 3.5 3.4 - 5.3 mmol/L Final     Diuretics (Including Combos) Protocol Dsntui04/21 9:51 PM   Normal serum creatinine on file in past 12 months    Blood pressure under 140/90 in past 12 months    Recent (12 mo) or future (30 days) visit within the authorizing provider's specialty    Patient is age 18 or older    Normal serum potassium on file in past 12 months    Normal serum sodium on file in past 12 months     Azucena Castle RN

## 2018-10-24 RX ORDER — CHLORTHALIDONE 25 MG/1
TABLET ORAL
Qty: 90 TABLET | Refills: 3 | Status: SHIPPED | OUTPATIENT
Start: 2018-10-24 | End: 2019-09-05 | Stop reason: ALTCHOICE

## 2018-11-09 ENCOUNTER — TRANSFERRED RECORDS (OUTPATIENT)
Dept: HEALTH INFORMATION MANAGEMENT | Facility: CLINIC | Age: 83
End: 2018-11-09

## 2019-01-10 ENCOUNTER — TELEPHONE (OUTPATIENT)
Dept: PEDIATRICS | Facility: CLINIC | Age: 84
End: 2019-01-10

## 2019-01-10 NOTE — TELEPHONE ENCOUNTER
"Patient is calling today stating that he is in [place] for the winter. [facility] and they recommended that he continues physical therapy while in [place] at [facility]. Per patient he is being charged a larger co-pay for these visits. He called Veronica to clarify co-pay and was advised that [facility] is not on \"Dr. Ibarra's list\" therefore will be charged $40 copay instead of a $20 copay.     Patient called asking if we can add [facility] to Dr. Ibarra's list so that he is not charged $40 per visit.    [facility] called [facility] for clarification, we are out of network for the patient with his current policy. He will be responsible for 20% co-insurance.    Called the patient back and notify him of this, understanding verbalized. Nothing further is needed at this time.    Mily Rosario RN   [facility]    "

## 2019-02-01 ENCOUNTER — TELEPHONE (OUTPATIENT)
Dept: ONCOLOGY | Facility: CLINIC | Age: 84
End: 2019-02-01

## 2019-02-01 NOTE — TELEPHONE ENCOUNTER
I spoke to the patient and mailed a letter appt date/time. The appoinment was rescheduled from 05/17/2019 to 05/16/2019 Provider not in clinic .-cap-02/01/2019

## 2019-02-04 DIAGNOSIS — E11.9 TYPE 2 DIABETES MELLITUS WITHOUT COMPLICATION, WITHOUT LONG-TERM CURRENT USE OF INSULIN (H): ICD-10-CM

## 2019-02-04 DIAGNOSIS — E11.9 TYPE 2 DIABETES, HBA1C GOAL < 7% (H): ICD-10-CM

## 2019-02-04 DIAGNOSIS — E11.42 CONTROLLED TYPE 2 DIABETES MELLITUS WITH DIABETIC POLYNEUROPATHY, WITHOUT LONG-TERM CURRENT USE OF INSULIN (H): ICD-10-CM

## 2019-02-04 DIAGNOSIS — E11.9 TYPE 2 DIABETES, HBA1C GOAL < 8% (H): ICD-10-CM

## 2019-02-04 NOTE — TELEPHONE ENCOUNTER
Accu check guide monitor system  Last Written Prescription Date:  2-4-15  Last Fill Quantity: 1,  # refills: 0   Last office visit: 9/26/2018 with prescribing provider:  clare Funez Office Visit:      Accu check guide test strips  Last Written Prescription Date:  12-27-17  Last Fill Quantity: 200,  # refills: 3   Last office visit: 9/26/2018 with prescribing provider:  clare   Future Office Visit:      Accu check guide Lancets  Last Written Prescription Date:  12-27-17  Last Fill Quantity: 200,  # refills: 3   Last office visit: 9/26/2018 with prescribing provider:  clare   Future Office Visit:

## 2019-02-05 RX ORDER — LANCETS
1 EACH MISCELLANEOUS 2 TIMES DAILY
Qty: 200 EACH | Refills: 3 | Status: SHIPPED | OUTPATIENT
Start: 2019-02-05 | End: 2019-05-10

## 2019-02-05 NOTE — TELEPHONE ENCOUNTER
Refilled per protocol.    Mily Rosario RN   .[facility], [facility]

## 2019-02-14 ENCOUNTER — TELEPHONE (OUTPATIENT)
Dept: PEDIATRICS | Facility: CLINIC | Age: 84
End: 2019-02-14

## 2019-02-14 DIAGNOSIS — E11.9 TYPE 2 DIABETES, HBA1C GOAL < 8% (H): ICD-10-CM

## 2019-02-14 NOTE — TELEPHONE ENCOUNTER
[facility] Call Center    Phone Message    May a detailed message be left on voicemail: yes    Reason for Call: Other: [facility] pharmacy is calling because they received a refill verification for the Accuchek Sanam Meter and they are discontinuing that so they are wondering if they can get another RX for the Accucheck Guide replacement.  The lancets are still okay to use.  Please call if further details are needed.       Action Taken: Message routed to:  Primary Care p [phone]

## 2019-02-21 DIAGNOSIS — E11.9 TYPE 2 DIABETES MELLITUS WITHOUT COMPLICATION, WITHOUT LONG-TERM CURRENT USE OF INSULIN (H): ICD-10-CM

## 2019-02-21 RX ORDER — BLOOD-GLUCOSE CONTROL, NORMAL
EACH MISCELLANEOUS
Qty: 1 EACH | Refills: 0 | Status: SHIPPED | OUTPATIENT
Start: 2019-02-21 | End: 2020-06-09

## 2019-02-21 NOTE — TELEPHONE ENCOUNTER
[facility] Call Center    Phone Message.  Patient is calling for his test strips refill. blood glucose (ACCU-CHEK SMARTVIEW) test strip [[id number]] (Order [id number]) -      [facility] - [place], [place] - [facility] [address]      May a detailed message be left on voicemail: yes    Reason for Call: Other: Patient is calling for his test strips refill. blood glucose (ACCU-CHEK SMARTVIEW) test strip [[id number]] (Order [id number]) -      Action Taken: Message routed to:  Primary Care p [phone]

## 2019-02-21 NOTE — TELEPHONE ENCOUNTER
This was sent to [facility] and we received a receipt on 2/5. Informed patient to call [facility]- they send a new Accu-check Guide out yesterday but they didn't send lancets or test strips- resent as generic and encouraged patient to make sure they are on it's way. Patient verbalized understanding. He has 6 days left.  Sarah Mix RN

## 2019-03-25 DIAGNOSIS — E11.40 TYPE 2 DIABETES MELLITUS WITH DIABETIC NEUROPATHY, WITHOUT LONG-TERM CURRENT USE OF INSULIN (H): ICD-10-CM

## 2019-03-25 NOTE — LETTER
March 26, 2019      Milo Matthewbarbie  [address] [facility] [address]              Dear Milo,    We recently received a refill request from your pharmacy requesting a refill of : Amaryl.     A review of your chart indicates that your last office visit was on 9/26.  An appointment is required every 6 months with your provider. We have authorized one time 30 day refill of your medication to allow time for you to schedule.     Please call the clinic at [phone], or log in to your [employer] account at [email or web address] to schedule your appointment within that time frame so there is no delay in next month's refill.    Thank you for taking an active role in your healthcare.    Sincerely,      FLORENCIA Del Rosario MD    If you need assistance with your [employer] log in, please call [phone].

## 2019-03-26 RX ORDER — GLIMEPIRIDE 4 MG/1
TABLET ORAL
Qty: 30 TABLET | Refills: 0 | Status: SHIPPED | OUTPATIENT
Start: 2019-03-26 | End: 2019-05-10

## 2019-04-03 ENCOUNTER — TELEPHONE (OUTPATIENT)
Dept: PEDIATRICS | Facility: CLINIC | Age: 84
End: 2019-04-03

## 2019-04-03 DIAGNOSIS — N18.30 CONTROLLED TYPE 2 DIABETES MELLITUS WITH STAGE 3 CHRONIC KIDNEY DISEASE, WITHOUT LONG-TERM CURRENT USE OF INSULIN (H): ICD-10-CM

## 2019-04-03 DIAGNOSIS — E11.22 CONTROLLED TYPE 2 DIABETES MELLITUS WITH STAGE 3 CHRONIC KIDNEY DISEASE, WITHOUT LONG-TERM CURRENT USE OF INSULIN (H): ICD-10-CM

## 2019-04-03 DIAGNOSIS — I10 HYPERTENSION GOAL BP (BLOOD PRESSURE) < 140/90: Primary | ICD-10-CM

## 2019-04-03 DIAGNOSIS — E78.5 HYPERLIPIDEMIA WITH TARGET LDL LESS THAN 100: ICD-10-CM

## 2019-04-03 NOTE — TELEPHONE ENCOUNTER
[facility] Call Center    Phone Message    May a detailed message be left on voicemail: no    Reason for Call: Other: Pt rec'd a letter from Cat.  Dr Ibarra patient.  He was to call back and ask for Cat.       Action Taken: Message routed to:  Primary Care p [phone]

## 2019-04-03 NOTE — TELEPHONE ENCOUNTER
Patient is in [place] now, so he scheduled a diabetes visit for 5/10. He will do fasting labs before, pended.   Sarah Mix RN

## 2019-05-07 ENCOUNTER — TRANSFERRED RECORDS (OUTPATIENT)
Dept: HEALTH INFORMATION MANAGEMENT | Facility: CLINIC | Age: 84
End: 2019-05-07

## 2019-05-10 ENCOUNTER — OFFICE VISIT (OUTPATIENT)
Dept: PEDIATRICS | Facility: CLINIC | Age: 84
End: 2019-05-10
Payer: COMMERCIAL

## 2019-05-10 VITALS
BODY MASS INDEX: 29.66 KG/M2 | SYSTOLIC BLOOD PRESSURE: 150 MMHG | TEMPERATURE: 97.9 F | DIASTOLIC BLOOD PRESSURE: 68 MMHG | HEART RATE: 69 BPM | OXYGEN SATURATION: 97 % | WEIGHT: 189 LBS | HEIGHT: 67 IN

## 2019-05-10 DIAGNOSIS — E78.5 HYPERLIPIDEMIA WITH TARGET LDL LESS THAN 100: ICD-10-CM

## 2019-05-10 DIAGNOSIS — N18.30 CONTROLLED TYPE 2 DIABETES MELLITUS WITH STAGE 3 CHRONIC KIDNEY DISEASE, WITHOUT LONG-TERM CURRENT USE OF INSULIN (H): Primary | ICD-10-CM

## 2019-05-10 DIAGNOSIS — I10 HYPERTENSION GOAL BP (BLOOD PRESSURE) < 140/90: ICD-10-CM

## 2019-05-10 DIAGNOSIS — R42 DIZZINESS: ICD-10-CM

## 2019-05-10 DIAGNOSIS — N18.30 CONTROLLED TYPE 2 DIABETES MELLITUS WITH STAGE 3 CHRONIC KIDNEY DISEASE, WITHOUT LONG-TERM CURRENT USE OF INSULIN (H): ICD-10-CM

## 2019-05-10 DIAGNOSIS — E03.4 HYPOTHYROIDISM DUE TO ACQUIRED ATROPHY OF THYROID: ICD-10-CM

## 2019-05-10 DIAGNOSIS — I10 WHITE COAT SYNDROME WITH HYPERTENSION: ICD-10-CM

## 2019-05-10 DIAGNOSIS — D64.9 NORMOCYTIC ANEMIA: ICD-10-CM

## 2019-05-10 DIAGNOSIS — E11.40 TYPE 2 DIABETES MELLITUS WITH DIABETIC NEUROPATHY, WITHOUT LONG-TERM CURRENT USE OF INSULIN (H): ICD-10-CM

## 2019-05-10 DIAGNOSIS — E11.22 CONTROLLED TYPE 2 DIABETES MELLITUS WITH STAGE 3 CHRONIC KIDNEY DISEASE, WITHOUT LONG-TERM CURRENT USE OF INSULIN (H): Primary | ICD-10-CM

## 2019-05-10 DIAGNOSIS — R70.1 RETICULOCYTOSIS: ICD-10-CM

## 2019-05-10 DIAGNOSIS — C61 PROSTATE CANCER (H): ICD-10-CM

## 2019-05-10 DIAGNOSIS — E11.22 CONTROLLED TYPE 2 DIABETES MELLITUS WITH STAGE 3 CHRONIC KIDNEY DISEASE, WITHOUT LONG-TERM CURRENT USE OF INSULIN (H): ICD-10-CM

## 2019-05-10 LAB
ALBUMIN SERPL-MCNC: 4.4 G/DL (ref 3.4–5)
ALP SERPL-CCNC: 57 U/L (ref 40–150)
ALT SERPL W P-5'-P-CCNC: 45 U/L (ref 0–70)
ANION GAP SERPL CALCULATED.3IONS-SCNC: 5 MMOL/L (ref 3–14)
ANISOCYTOSIS BLD QL SMEAR: SLIGHT
AST SERPL W P-5'-P-CCNC: 25 U/L (ref 0–45)
BASOPHILS # BLD AUTO: 0 10E9/L (ref 0–0.2)
BASOPHILS NFR BLD AUTO: 1 %
BILIRUB DIRECT SERPL-MCNC: 0.3 MG/DL (ref 0–0.2)
BILIRUB SERPL-MCNC: 1.3 MG/DL (ref 0.2–1.3)
BUN SERPL-MCNC: 33 MG/DL (ref 7–30)
CALCIUM SERPL-MCNC: 9.2 MG/DL (ref 8.5–10.1)
CHLORIDE SERPL-SCNC: 104 MMOL/L (ref 94–109)
CHOLEST SERPL-MCNC: 122 MG/DL
CO2 SERPL-SCNC: 31 MMOL/L (ref 20–32)
COPATH REPORT: NORMAL
CREAT SERPL-MCNC: 1.48 MG/DL (ref 0.66–1.25)
DIFFERENTIAL METHOD BLD: ABNORMAL
EOSINOPHIL # BLD AUTO: 0 10E9/L (ref 0–0.7)
EOSINOPHIL NFR BLD AUTO: 1 %
ERYTHROCYTE [DISTWIDTH] IN BLOOD BY AUTOMATED COUNT: 16.1 % (ref 10–15)
GFR SERPL CREATININE-BSD FRML MDRD: 43 ML/MIN/{1.73_M2}
GLUCOSE SERPL-MCNC: 178 MG/DL (ref 70–99)
HAPTOGLOB SERPL-MCNC: 94 MG/DL (ref 35–175)
HBA1C MFR BLD: 6 % (ref 0–5.6)
HCT VFR BLD AUTO: 33.2 % (ref 40–53)
HDLC SERPL-MCNC: 33 MG/DL
HGB BLD-MCNC: 11.3 G/DL (ref 13.3–17.7)
LDH SERPL L TO P-CCNC: 170 U/L (ref 85–227)
LDLC SERPL CALC-MCNC: 39 MG/DL
LYMPHOCYTES # BLD AUTO: 1.1 10E9/L (ref 0.8–5.3)
LYMPHOCYTES NFR BLD AUTO: 28 %
MCH RBC QN AUTO: 32.9 PG (ref 26.5–33)
MCHC RBC AUTO-ENTMCNC: 34 G/DL (ref 31.5–36.5)
MCV RBC AUTO: 97 FL (ref 78–100)
MONOCYTES # BLD AUTO: 0.7 10E9/L (ref 0–1.3)
MONOCYTES NFR BLD AUTO: 18 %
NEUTROPHILS # BLD AUTO: 2.1 10E9/L (ref 1.6–8.3)
NEUTROPHILS NFR BLD AUTO: 52 %
NONHDLC SERPL-MCNC: 89 MG/DL
NRBC # BLD AUTO: 0 10*3/UL
NRBC BLD AUTO-RTO: 1 /100
PLATELET # BLD AUTO: 103 10E9/L (ref 150–450)
PLATELET # BLD EST: ABNORMAL 10*3/UL
POTASSIUM SERPL-SCNC: 3.7 MMOL/L (ref 3.4–5.3)
PROT SERPL-MCNC: 8.2 G/DL (ref 6.8–8.8)
RBC # BLD AUTO: 3.43 10E12/L (ref 4.4–5.9)
RETICS # AUTO: 192.4 10E9/L (ref 25–95)
RETICS/RBC NFR AUTO: 5.6 % (ref 0.5–2)
SODIUM SERPL-SCNC: 140 MMOL/L (ref 133–144)
TRIGL SERPL-MCNC: 250 MG/DL
WBC # BLD AUTO: 4 10E9/L (ref 4–11)

## 2019-05-10 PROCEDURE — 83036 HEMOGLOBIN GLYCOSYLATED A1C: CPT | Performed by: INTERNAL MEDICINE

## 2019-05-10 PROCEDURE — 83615 LACTATE (LD) (LDH) ENZYME: CPT | Performed by: INTERNAL MEDICINE

## 2019-05-10 PROCEDURE — 80076 HEPATIC FUNCTION PANEL: CPT | Performed by: INTERNAL MEDICINE

## 2019-05-10 PROCEDURE — 83010 ASSAY OF HAPTOGLOBIN QUANT: CPT | Performed by: INTERNAL MEDICINE

## 2019-05-10 PROCEDURE — 85025 COMPLETE CBC W/AUTO DIFF WBC: CPT | Performed by: INTERNAL MEDICINE

## 2019-05-10 PROCEDURE — 40000611 ZZHCL STATISTIC MORPHOLOGY W/INTERP HEMEPATH TC 85060: Performed by: INTERNAL MEDICINE

## 2019-05-10 PROCEDURE — 80061 LIPID PANEL: CPT | Performed by: INTERNAL MEDICINE

## 2019-05-10 PROCEDURE — 36415 COLL VENOUS BLD VENIPUNCTURE: CPT | Performed by: INTERNAL MEDICINE

## 2019-05-10 PROCEDURE — 80048 BASIC METABOLIC PNL TOTAL CA: CPT | Performed by: INTERNAL MEDICINE

## 2019-05-10 PROCEDURE — 99215 OFFICE O/P EST HI 40 MIN: CPT | Performed by: INTERNAL MEDICINE

## 2019-05-10 PROCEDURE — 85045 AUTOMATED RETICULOCYTE COUNT: CPT | Performed by: INTERNAL MEDICINE

## 2019-05-10 RX ORDER — LOSARTAN POTASSIUM 100 MG/1
TABLET ORAL
Qty: 91 TABLET | Refills: 3 | Status: SHIPPED | OUTPATIENT
Start: 2019-05-10 | End: 2020-07-03

## 2019-05-10 RX ORDER — GLIMEPIRIDE 4 MG/1
4 TABLET ORAL
Qty: 90 TABLET | Refills: 1 | Status: SHIPPED | OUTPATIENT
Start: 2019-05-10 | End: 2019-08-21

## 2019-05-10 ASSESSMENT — MIFFLIN-ST. JEOR: SCORE: 1515.93

## 2019-05-10 NOTE — Clinical Note
Please abstract the following data from this visit with this patient into the appropriate field in Epic:Please abstract the following data from this visit with this patient into the appropriate field in Epic:Eye exam with ophthalmology on this date: 5-7-19 [facility] pt reported

## 2019-05-10 NOTE — PATIENT INSTRUCTIONS
Make appointment(s) for:   -- wellness visit with non fasting lab (A1c/TSH/PSA) in 6 months.   -- brain MRI  -- ENT at the [facility].         Additional Instructions:   Check with your insurance regarding coverage for Shingrix (RZV) - the new shingles vaccine.         Medication(s) prescribed today:    Orders Placed This Encounter   Medications     glimepiride (AMARYL) 4 MG tablet     Sig: Take 1 tablet (4 mg) by mouth every morning (before breakfast)     Dispense:  90 tablet     Refill:  1     losartan (COZAAR) 100 MG tablet     Sig: TAKE 1 TABLET EVERY DAY     Dispense:  91 tablet     Refill:  3

## 2019-05-10 NOTE — PROGRESS NOTES
SUBJECTIVE:   Milo Lozano is a 84 year old male who presents to clinic today for the following   health issues:      Diabetes Follow-up      Patient is checking blood sugars: average 200    Diabetic concerns: blood sugar frequently over 200     Symptoms of hypoglycemia (low blood sugar): shaky, dizzy, weak. He didn't check glucose when he feels this way. His glucose has never been below 130 of all the times he checks.     Paresthesias (numbness or burning in feet) or sores: Yes      Date of last diabetic eye exam: 5-7-19 at [facility], sent to abst.     BP Readings from Last 2 Encounters:   05/16/19 131/61   05/10/19 150/68     Hemoglobin A1C (%)   Date Value   05/10/2019 6.0 (H)   09/26/2018 5.2     LDL Cholesterol Calculated (mg/dL)   Date Value   05/10/2019 39   12/28/2017 41       Diabetes Management Resources    Amount of exercise or physical activity: 2-3 days/week for an average of 15-30 minutes    Problems taking medications regularly: No    Medication side effects: none    Diet: carbohydrate counting        Home /53    Today he didn't take morning meds.     Chronic dizziness: went to [facility], dx with inner ear problem. Had therapy, without benefit. He is now living with this.       10 point ROS of systems including Constitutional, Eyes, Respiratory, Cardiovascular, Gastroenterology, Genitourinary, Integumentary, Muscularskeletal, Psychiatric were all negative except for pertinent positives noted in my HPI.          Current Outpatient Medications on File Prior to Visit:  aspirin 81 MG tablet Take 1 tablet (81 mg) by mouth daily   blood glucose (ACCU-CHEK SMARTVIEW) test strip TEST BLOOD SUGAR TWICE DAILY  OR AS DIRECTED   blood glucose calibration (NO BRAND SPECIFIED) solution Use to calibrate blood glucose monitor as needed as directed.   chlorthalidone (HYGROTON) 25 MG tablet TAKE 1 TABLET EVERY DAY   levothyroxine (SYNTHROID/LEVOTHROID) 75 MCG tablet Take 1  tablet (75 mcg) by mouth daily   Omega-3 Fatty Acids (FISH OIL PO) Take  by mouth daily.   order for DME Equipment being ordered: HAPAD Metatarsal pad, QTY:2   STATIN NOT PRESCRIBED, INTENTIONAL, 1 each continuous Statin not prescribed intentionally due to does not meet [place] criteria and Other: LDL at goal without the statin.   tamsulosin (FLOMAX) 0.4 MG capsule Take 1 capsule (0.4 mg) by mouth daily   blood glucose (NO BRAND SPECIFIED) lancets standard Use to test blood sugar 2 times daily or as directed.   blood glucose (NO BRAND SPECIFIED) test strip Use to test blood sugar 2 times daily or as directed.   blood glucose monitoring (ACCU-CHEK MILADYS SMARTVIEW) meter device kit Use to test blood sugar 2 times daily or as directed.   blood glucose monitoring (NO BRAND SPECIFIED) meter device kit Use to test blood sugar 2 times daily. Brand per insurance.   capsaicin (ZOSTRIX) 0.025 % CREA To feet 2-3 times daily as needed. (Patient not taking: Reported on 5/10/2019)   ciclopirox (LOPROX) 0.77 % cream Apply topically 2 times daily To toenails. (Patient not taking: Reported on 5/10/2019)   Cyanocobalamin (VITAMIN B-12 CR PO) Take 1 tablet by mouth 2 times daily     No current facility-administered medications on file prior to visit.        Patient Active Problem List   Diagnosis     Hypertension goal BP (blood pressure) < 140/90     CARDIOVASCULAR SCREENING; LDL GOAL LESS THAN 100     Prostate cancer (H)     Hyperlipidemia with target LDL less than 100     Diabetes type 2, controlled (H)     Advanced directives, counseling/discussion     Diabetic peripheral neuropathy (H)     White coat hypertension     Hypothyroidism due to acquired atrophy of thyroid     DEMOND (obstructive sleep apnea)     Diabetic polyneuropathy associated with type 2 diabetes mellitus (H)     Past Surgical History:   Procedure Laterality Date     COLONOSCOPY         Social History     Tobacco Use     Smoking status: Never Smoker     Smokeless  "tobacco: Never Used   Substance Use Topics     Alcohol use: No     Family History   Problem Relation Age of Onset     Hypertension Mother      Alzheimer Disease Mother      Prostate Cancer Paternal Grandfather      Cancer No family hx of      Diabetes No family hx of      Cerebrovascular Disease No family hx of      Thyroid Disease No family hx of      Glaucoma No family hx of      Macular Degeneration No family hx of              Problem list, Medication list, Allergies, and Medical/Social/Surgical histories reviewed in [facility] and updated as appropriate.    OBJECTIVE:                                                    /68   Pulse 69   Temp 97.9  F (36.6  C) (Temporal)   Ht 1.702 m (5' 7\")   Wt 85.7 kg (189 lb)   SpO2 97%   BMI 29.60 kg/m      GENERAL: healthy, alert and no distress  Skin: no rash  HEENT: unremarkable  Neck: no adenopathy/mass/stiffness. Thyroid normal.  Lung: clear, no wheezing/rhonchi/crackles  Heart: RRR, normal S1/2, no murmur/gallop/rup  Abd: soft, normal BS, non tender, no organomegaly   Ext: no cyanosis/clubbing/edema  Neuro: non focal.      Diagnostic test results:  Results for orders placed or performed in visit on 05/10/19   CBC with platelets and differential   Result Value Ref Range    WBC 4.0 4.0 - 11.0 10e9/L    RBC Count 3.43 (L) 4.4 - 5.9 10e12/L    Hemoglobin 11.3 (L) 13.3 - 17.7 g/dL    Hematocrit 33.2 (L) 40.0 - 53.0 %    MCV 97 78 - 100 fl    MCH 32.9 26.5 - 33.0 pg    MCHC 34.0 31.5 - 36.5 g/dL    RDW 16.1 (H) 10.0 - 15.0 %    Platelet Count 103 (L) 150 - 450 10e9/L    % Neutrophils 52.0 %    % Lymphocytes 28.0 %    % Monocytes 18.0 %    % Eosinophils 1.0 %    % Basophils 1.0 %    Nucleated RBCs 1 (H) 0 /100    Absolute Neutrophil 2.1 1.6 - 8.3 10e9/L    Absolute Lymphocytes 1.1 0.8 - 5.3 10e9/L    Absolute Monocytes 0.7 0.0 - 1.3 10e9/L    Absolute Eosinophils 0.0 0.0 - 0.7 10e9/L    Absolute Basophils 0.0 0.0 - 0.2 10e9/L    Absolute Nucleated RBC 0.0     " Anisocytosis Slight     Platelet Estimate       Automated count confirmed.  Platelet morphology is normal.    Diff Method Manual Differential    Basic metabolic panel   Result Value Ref Range    Sodium 140 133 - 144 mmol/L    Potassium 3.7 3.4 - 5.3 mmol/L    Chloride 104 94 - 109 mmol/L    Carbon Dioxide 31 20 - 32 mmol/L    Anion Gap 5 3 - 14 mmol/L    Glucose 178 (H) 70 - 99 mg/dL    Urea Nitrogen 33 (H) 7 - 30 mg/dL    Creatinine 1.48 (H) 0.66 - 1.25 mg/dL    GFR Estimate 43 (L) >60 mL/min/[1.73_m2]    GFR Estimate If Black 50 (L) >60 mL/min/[1.73_m2]    Calcium 9.2 8.5 - 10.1 mg/dL   Hemoglobin A1c   Result Value Ref Range    Hemoglobin A1C 6.0 (H) 0 - 5.6 %   Lipid panel reflex to direct LDL Fasting   Result Value Ref Range    Cholesterol 122 <200 mg/dL    Triglycerides 250 (H) <150 mg/dL    HDL Cholesterol 33 (L) >39 mg/dL    LDL Cholesterol Calculated 39 <100 mg/dL    Non HDL Cholesterol 89 <130 mg/dL   Bld morphology pathology review   Result Value Ref Range    Copath Report       Patient Name: CHRISTIANA HSIEH  MR#: [id number]  Specimen #: [id number]  Collected: 5/10/2019  Received: 5/10/2019  Reported: 5/10/2019 17:41  Ordering Phy(s): ADDISON VANEGAS    For improved result formatting, select 'View Enhanced Report Format' under   Linked Documents section.    TEST(S):  Blood Smear Morphology    FINAL DIAGNOSIS:  Peripheral blood smear:       Slight normochromic normocytic anemia with increased red cell   regeneration       Slight thrombocytopenia with a normal platelet morphology    COMMENT:  The etiology of increased red cell turnover is not apparent from this   peripheral blood smear review. Please  rule-out blood loss, splenomegaly, lymphadenopathy.    I have personally reviewed all specimens and/or slides, including the   listed special stains, and used them  with my medical judgment to determine the final diagnosis.    Electronically signed out by:    Keenan Donnelly  Thang YATESsitirso    Technical testing/processing performed at [place], [place]    CLINICAL HISTORY:  84-year-old male with diabetes, hyperlipidemia, review requested due to   reticulocytosis    CLINICAL LAB RESULTS:  Battery Order No. Lab Test Code Clinical Result Ref. Range Units Result   Date  Hemogram/Diff/PLT [id number] MG WBC Count 4.0 4.0-11.0 10e9/L 5/10/2019 09:32       RBC Count L 3.43 4.4-5.9 10e12/L 5/10/2019 09:32       Hemoglobin L 11.3 13.3-17.7 g/dL 5/10/2019 09:32       Hematocrit L 33.2 40.0-53.0 % 5/10/2019 09:32       MCV 97  fl 5/10/2019 09:32       MCH 32.9 26.5-33.0 pg 5/10/2019 09:32       MCHC 34.0 31.5-36.5 g/dL 5/10/2019 09:32       RDW H 16.1 10.0-15.0 % 5/10/2019 09:32       Platelet Count L 103 150-450 10e9/L 5/10/2019 09:32       % Neutrophils 52.0  % 5/10/2019 10:15       % Lymphocytes 28.0  % 5/10/2019 10:15       % Monocytes 18.0  % 5/10/2019 10:15       % Eosinophils 1.0  % 5/10/2019 10:15       % Basophils 1.0  % 5/10/2019 10:15       Nucleated RBCs H 1 0 /100 5/10/2019 10: 15       abs Neutrophils 2.1 1.6-8.3 10e9/L 5/10/2019 10:15       abs Lymphocytes 1.1 0.8-5.3 10e9/L 5/10/2019 10:15       abs Monocytes 0.7 0.0-1.3 10e9/L 5/10/2019 10:15       abs Eosinophils 0.0 0.0-0.7 10e9/L 5/10/2019 10:15       abs Basophils 0.0 0.0-0.2 10e9/L 5/10/2019 10:15       abs NRBC 0.0   5/10/2019 10:15    Retic   Retic % H 5.6 0.5-2.0 % 5/10/2019 09:32       Retic abs H 192.4 25-95 10e9/L 5/10/2019 09:32    MICROSCOPIC DESCRIPTION:  Peripheral Blood  The red blood cells appear normochromic.  Poikilocytosis is minimal.    Polychromasia is  increased.  Rouleaux  formation is not  increased.  The morphology of the platelets is normal.   Granulocytes are mature and well  granulated, not dysplastic. No circulating blasts are seen.    CPT Codes:  A: 25764-OTHGS    TESTING LAB LOCATION:  [facility]  [place], [facility] [address] [place]   [address] [phone]    COLLECTION SITE:  Client:  [facility], [place]  Location:  [facility] (B)     Haptoglobin   Result Value Ref Range    Haptoglobin 94 35 - 175 mg/dL   Lactate Dehydrogenase   Result Value Ref Range    Lactate Dehydrogenase 170 85 - 227 U/L   Hepatic panel   Result Value Ref Range    Bilirubin Direct 0.3 (H) 0.0 - 0.2 mg/dL    Bilirubin Total 1.3 0.2 - 1.3 mg/dL    Albumin 4.4 3.4 - 5.0 g/dL    Protein Total 8.2 6.8 - 8.8 g/dL    Alkaline Phosphatase 57 40 - 150 U/L    ALT 45 0 - 70 U/L    AST 25 0 - 45 U/L   Reticulocyte count   Result Value Ref Range    % Retic 5.6 (H) 0.5 - 2.0 %    Absolute Retic 192.4 (H) 25 - 95 10e9/L         ASSESSMENT/PLAN:                                                      84 year old male with the following diagnoses and treatment plan:      ICD-10-CM    1. Controlled type 2 diabetes mellitus with stage 3 chronic kidney disease, without long-term current use of insulin (H) E11.22 Hemoglobin A1c    N18.3    2. Hypertension goal BP (blood pressure) < 140/90 I10 losartan (COZAAR) 100 MG tablet   3. Prostate cancer (H) C61 PSA, tumor marker   4. Hyperlipidemia with target LDL less than 100 E78.5    5. Hypothyroidism due to acquired atrophy of thyroid E03.4 TSH   6. Type 2 diabetes mellitus with diabetic neuropathy, without long-term current use of insulin (H) E11.40 glimepiride (AMARYL) 4 MG tablet   7. Dizziness R42 OTOLARYNGOLOGY REFERRAL     MR Brain w/o & w Contrast   8. White coat syndrome with hypertension I10        --Diabetes  hyperlipidemia and hypothyroidism are stable.  No change in his medications.  -- white coat HTN; home BP normal. No change in Losartan dose.  --Chronic dizziness: Has not benefited from [facility] and [facility].  We will obtain brain MRI, refer to ENT for further evaluation.  -- Shingrix (RZV) advised. Pt will check  insurance coverage.    -- wellness visit with labs in 6 months.     Will call or return to clinic if worsening or symptoms not improving as discussed.  See Patient Instructions.      Laurence Ibarra MD-PhD  [facility] [place]    (Note: Chart documentation was done in part with Dragon Voice Recognition software. Although reviewed after completion, some word and grammatical errors may remain.)

## 2019-05-16 ENCOUNTER — ONCOLOGY VISIT (OUTPATIENT)
Dept: ONCOLOGY | Facility: CLINIC | Age: 84
End: 2019-05-16
Payer: COMMERCIAL

## 2019-05-16 VITALS
BODY MASS INDEX: 29.9 KG/M2 | HEIGHT: 67 IN | WEIGHT: 190.5 LBS | TEMPERATURE: 98.2 F | OXYGEN SATURATION: 97 % | HEART RATE: 77 BPM | SYSTOLIC BLOOD PRESSURE: 131 MMHG | DIASTOLIC BLOOD PRESSURE: 61 MMHG | RESPIRATION RATE: 16 BRPM

## 2019-05-16 DIAGNOSIS — D58.0 ANEMIA DUE TO HEREDITARY SPHEROCYTOSIS (H): Primary | ICD-10-CM

## 2019-05-16 DIAGNOSIS — D59.9 ACQUIRED HEMOLYTIC ANEMIA (H): ICD-10-CM

## 2019-05-16 DIAGNOSIS — R16.2 HEPATOSPLENOMEGALY: ICD-10-CM

## 2019-05-16 PROCEDURE — 99215 OFFICE O/P EST HI 40 MIN: CPT | Performed by: INTERNAL MEDICINE

## 2019-05-16 ASSESSMENT — MIFFLIN-ST. JEOR: SCORE: 1522.85

## 2019-05-16 ASSESSMENT — PAIN SCALES - GENERAL: PAINLEVEL: MODERATE PAIN (5)

## 2019-05-16 NOTE — NURSING NOTE
"Oncology Rooming Note    May 16, 2019 3:23 PM   Milo Lozano is a 84 year old male who presents for:    Chief Complaint   Patient presents with     Oncology Clinic Visit     1 year follow up     Initial Vitals: /61 (BP Location: Right arm)   Pulse 77   Temp 98.2  F (36.8  C) (Oral)   Resp 16   Ht 1.702 m (5' 7.01\")   Wt 86.4 kg (190 lb 8 oz)   SpO2 97%   BMI 29.83 kg/m   Estimated body mass index is 29.83 kg/m  as calculated from the following:    Height as of this encounter: 1.702 m (5' 7.01\").    Weight as of this encounter: 86.4 kg (190 lb 8 oz). Body surface area is 2.02 meters squared.  Moderate Pain (5) Comment: Data Unavailable   No LMP for male patient.  Allergies reviewed: Yes  Medications reviewed: Yes    Medications: Medication refills not needed today.  Pharmacy name entered into EPIC:    [facility] FA[facility] ONLY - NEW RX ONLY  [id number] - [place], [place] - [address]  [facility] [id number] [facility] - [place], [place] - [address]  [facility] - [place], [place] - [address]            "

## 2019-05-16 NOTE — LETTER
5/16/2019         RE: Milo Lozano  [address]  [place] [facility] [facility] [address]        Dear Colleague,    Thank you for referring your patient, Milo Lozano, to the [facility]. Please see a copy of my visit note below.    Hematology Follow-up visit:  Date on this visit: May 16, 2019  Primary Care Physician: Nikki Fitzgeraldin    Pancytopenia   He was noted to be newly anemic on his cbc on 7/12/2017. When his CBC was repeated on 8/15/2017, he was mildly pancytopenic, with Hb of 12.1 g/dl, MCV of 93,  mild leucopenia and borderline thrombocytopenia.   Absolute differential counts were normal and his peripheral blood smear showed slight normochromic normocytic anemia; minimal poikilocytosis and slight thrombocytopenia with overall normal platelet morphology. There was polychromasia. Absolute reticulocyte count was elevated at 165.7. B12 was normal in 07/2017 at 494. Ferritin was elevated at 412. Creatinine was mildly elevated at 1.32. FERMIN screen was negative. TSH was normal.   PSA was undetectable in 07/2017. TSH was normal. Stool hemoccult was negative in 07/2017. He has never had a colonoscopy. UA showed no hematuria but did glucosuria and proteinuria. He still had persisted marked reticulocytosis in 09/2017 with absolute retic count of 143. He had a normal LDH, negative LAURA, normal haptoglobin. Peripheral blood smear on 9/5/17 showed normocytic slight anemia with slight morphologic evidence of increase in red cell regeneration with no morphologic evidence of hemolysis. There was reactive lymphocyte morphology and f/up blood smear was recommended.  No M protein was noted on serum immunofixation. He had f/up labs on 5/1/2018. Hb was 11.7 g/dl with normal MCV. WBC and platelet counts were low normal. Absolute retic count was still elevated at 143. Peripheral blood smear on 5/1/2018 showed Mild normochromic normocytic anemia. Adequate neutrophils with mild shift to immaturity. The lymphocyte  population shows a population   of both small mature lymphocytes, large granular lymphocytes and reactive-appearing lymphocytes.  The morphology of the platelets was overall normal including large platelets.  Both TSH and free T4 were mildly elevated.  US abdomen on 5/29/2018 showed findings c/w hepatosplenomegaly- hyperechoic liver, suspicious for fatty liver and enlarged spleen-  measuring 12.7 x 6.6 x 13.5 cm as well as gallstones.         History Of Present Illness:  Mr. Lozano is a 84 year old male who presents for f/up of pancytopenia. He has been feeling well except c/o dizziness that are being evaluated by Dr. Ibarra. He is being referred for brain MRI and ENT. His platelet count has been declining, down to 103. Hb is mildly low over 11 g/dl, but stable. Absolute retic count is elevated at 192.4.  Serum haptoglobin was normal.  LDH was normal as well.  Peripheral blood smear on May 10, 2019 showed normochromic normocytic anemia with increased red cell regeneration and slight thrombocytopenia and with normal platelet morphology.  Granulocytes were mature and well granulated and not dysplastic.  No circulating blasts were seen.  He has some burning and tingling in his feet bilaterally, which he rates at 5/10. He has type 2 DM.  He is pain free.    In addition, a complete 12 point  review of systems is negative.      Past Medical/Surgical History:  Past Medical History:   Diagnosis Date     BPH (benign prostatic hypertrophy)      CARDIOVASCULAR SCREENING; LDL GOAL LESS THAN 100 12/29/2011     CARDIOVASCULAR SCREENING; LDL GOAL LESS THAN 130 12/29/2011     Hypertension goal BP (blood pressure) < 140/90 12/29/2011     Hypothyroidism due to acquired atrophy of thyroid 12/5/2016     Prostate cancer (H) 1/2007    Had Radiation     Type 2 diabetes, HbA1c goal < 7% (H)    He has  peripheral neuropathy and has had laser treatments for it for 6 months.  FHx and SocHx reviewed.     Past Surgical History:   Procedure  Laterality Date     COLONOSCOPY       Allergies:  Allergies as of 05/16/2019 - Reviewed 05/16/2019   Allergen Reaction Noted     Lisinopril Cough 06/25/2014     Current Medications:  Current Outpatient Medications   Medication Sig Dispense Refill     aspirin 81 MG tablet Take 1 tablet (81 mg) by mouth daily 90 tablet 3     blood glucose (ACCU-CHEK SMARTVIEW) test strip TEST BLOOD SUGAR TWICE DAILY  OR AS DIRECTED 200 strip 3     blood glucose (NO BRAND SPECIFIED) lancets standard Use to test blood sugar 2 times daily or as directed. 200 each 3     blood glucose (NO BRAND SPECIFIED) test strip Use to test blood sugar 2 times daily or as directed. 200 each 3     blood glucose calibration (NO BRAND SPECIFIED) solution Use to calibrate blood glucose monitor as needed as directed. 1 each 0     blood glucose monitoring (ACCU-CHEK MILADYS SMARTVIEW) meter device kit Use to test blood sugar 2 times daily or as directed. 1 kit 0     blood glucose monitoring (NO BRAND SPECIFIED) meter device kit Use to test blood sugar 2 times daily. Brand per insurance. 1 kit 0     chlorthalidone (HYGROTON) 25 MG tablet TAKE 1 TABLET EVERY DAY 90 tablet 3     Cyanocobalamin (VITAMIN B-12 CR PO) Take 1 tablet by mouth 2 times daily       glimepiride (AMARYL) 4 MG tablet Take 1 tablet (4 mg) by mouth every morning (before breakfast) 90 tablet 1     levothyroxine (SYNTHROID/LEVOTHROID) 75 MCG tablet Take 1 tablet (75 mcg) by mouth daily 90 tablet 3     losartan (COZAAR) 100 MG tablet TAKE 1 TABLET EVERY DAY 91 tablet 3     Omega-3 Fatty Acids (FISH OIL PO) Take  by mouth daily.       order for DME Equipment being ordered: HAPAD Metatarsal pad, QTY:2 2 Device 2     STATIN NOT PRESCRIBED, INTENTIONAL, 1 each continuous Statin not prescribed intentionally due to does not meet [place] criteria and Other: LDL at goal without the statin. 0 each 0     tamsulosin (FLOMAX) 0.4 MG capsule Take 1 capsule (0.4 mg) by mouth daily 90 capsule 3     capsaicin  "(ZOSTRIX) 0.025 % CREA To feet 2-3 times daily as needed. (Patient not taking: Reported on 5/10/2019) 60 g 6     ciclopirox (LOPROX) 0.77 % cream Apply topically 2 times daily To toenails. (Patient not taking: Reported on 5/10/2019) 90 g 5      Physical Exam:  /61 (BP Location: Right arm)   Pulse 77   Temp 98.2  F (36.8  C) (Oral)   Resp 16   Ht 1.702 m (5' 7.01\")   Wt 86.4 kg (190 lb 8 oz)   SpO2 97%   BMI 29.83 kg/m         GENERAL APPEARANCE: healthy, alert and no distress     HENT: Mouth without ulcers or lesions     NECK: no adenopathy, no asymmetry or masses       RESP: lungs clear to auscultation - no rales, rhonchi or wheezes     CARDIOVASCULAR: regular rates and rhythm, normal S1 S2, no S3 or S4 and no murmur.     ABDOMEN: Obese, soft, nontender, and bowel sounds normal. hepatosplenomegaly difficult to evaluate secondary to body habitus     MUSCULOSKELETAL: extremities normal- no gross deformities noted, no evidence of inflammation in joints, FROM in all extremities. No edema b/l LE.     SKIN: no suspicious lesions or rashes     PSYCHIATRIC: mentation appears normal and affect normal    Laboratory/Imaging Studies    Results for LANDON HSIEH (MRN [id number]) as of 5/16/2019 15:32   Ref. Range 8/15/2017 09:56 9/5/2017 13:38 12/22/2017 14:25 5/1/2018 12:30 5/10/2019 09:14   Hemoglobin Latest Ref Range: 13.3 - 17.7 g/dL 12.1 (L) 12.4 (L) 11.5 (L) 11.7 (L) 11.3 (L)     Results for LANDON HSIEH (MRN [id number]) as of 5/16/2019 15:32   Ref. Range 7/12/2017 14:05 8/15/2017 09:56 9/5/2017 13:39 12/22/2017 14:25 5/1/2018 12:30 5/10/2019 09:14   Absolute Retic Latest Ref Range: 25 - 95 10e9/L 156.5 (H) 165.7 (H) 145.9 (H) 132.1 (H) 143.5 (H) 192.4 (H)   Results for LANDON HSIEH (MRN [id number]) as of 5/16/2019 15:32   Ref. Range 8/15/2017 09:56 9/5/2017 13:38 12/22/2017 14:25 5/1/2018 12:30 5/10/2019 09:14   Platelet Count Latest Ref Range: 150 - 450 10e9/L 145 (L) 157 153 155 103 (L)     WBC " and absolute differential counts are within normal limits.  Total white count low normal at 4.0.   Results for LANDON HSIEH (MRN [id number]) as of 5/22/2019 21:46   Ref. Range 6/23/2014 11:06 6/22/2015 09:00 7/5/2016 08:01 7/12/2017 14:04 9/5/2017 13:38 12/22/2017 14:25 12/28/2017 08:01 5/1/2018 12:30 5/11/2018 15:22 5/10/2019 09:14   Creatinine Latest Ref Range: 0.66 - 1.25 mg/dL 1.09 1.05 1.20 1.32 (H) 1.37 (H) 1.40 (H) 1.53 (H) 1.30 (H) 1.48 (H) 1.48 (H)       ASSESSMENT/PLAN:    Mr. Hsieh is a 84 year old man, with type 2 DM,  with normocytic anemia and transient borderline leucopenia and mild thrombocytopenia but persistent reticulocytosis.    1. Normocytic anemia, reticulocytosis, thrombocytopenia- He has mild anemia, and his thrombocytopenia is worsening. He also has marked reticulocytosis, slightly worsening. He has compensated hemolysis going on, of unclear etiology.  Autoimmune hemolysis work-up was negative.  He does have hepatosplenomegaly which could be associated with pancytopenia.  Hepatomegaly could also be associated with hemolysis. At this time would proceed with osmotic fragility testing for evaluation of reticulocytosis.  Also given hepatosplenomegaly, would like to proceed with noncontrast CT chest, abdomen and pelvis for evaluation of the etiology of hepatosplenomegaly, such as lymphadenopathy and possible occult underlying lymphoproliferative disorder.  The rationale for this testing reviewed with the pain in detail.  He is agreeable to proceed.  Once the work-up has been completed, and if there is no additional etiology of his reticulocytosis, we will at the minimum institute folic acid 1 mg daily.  His anemia is also at least partially related to anemia of kidney disease.   We'll inform the patient of the results and recommendations and  f/up plan based on the results.     2. CKD- creat stable.    3.  Chronic dizziness -proceed with further evaluation as recommended by Dr. Ibarra, with  brain MRI and ENT consultation.  At the end of our visit patient verbalized understanding and concurred with the plan.      Again, thank you for allowing me to participate in the care of your patient.        Sincerely,        Karen Hernandez MD, MD

## 2019-05-21 ENCOUNTER — PATIENT OUTREACH (OUTPATIENT)
Dept: ONCOLOGY | Facility: CLINIC | Age: 84
End: 2019-05-21

## 2019-05-21 NOTE — PROGRESS NOTES
Contacted patient regarding request by Dr. Hernandez to have additional lab test drawn to see why he is breaking down red cells called an osmotic fragility test.  He will be able to have this drawn while he is here on Friday, 5/24, having a CT and MRI scan.  Appointment scheduled and patient aware of date and time.

## 2019-05-24 ENCOUNTER — ANCILLARY PROCEDURE (OUTPATIENT)
Dept: GENERAL RADIOLOGY | Facility: CLINIC | Age: 84
End: 2019-05-24
Attending: INTERNAL MEDICINE
Payer: COMMERCIAL

## 2019-05-24 ENCOUNTER — ANCILLARY PROCEDURE (OUTPATIENT)
Dept: MRI IMAGING | Facility: CLINIC | Age: 84
End: 2019-05-24
Attending: INTERNAL MEDICINE
Payer: COMMERCIAL

## 2019-05-24 ENCOUNTER — ANCILLARY PROCEDURE (OUTPATIENT)
Dept: CT IMAGING | Facility: CLINIC | Age: 84
End: 2019-05-24
Attending: INTERNAL MEDICINE
Payer: COMMERCIAL

## 2019-05-24 DIAGNOSIS — E03.4 HYPOTHYROIDISM DUE TO ACQUIRED ATROPHY OF THYROID: ICD-10-CM

## 2019-05-24 DIAGNOSIS — R16.2 HEPATOSPLENOMEGALY: ICD-10-CM

## 2019-05-24 DIAGNOSIS — R42 DIZZINESS: ICD-10-CM

## 2019-05-24 DIAGNOSIS — N18.30 CONTROLLED TYPE 2 DIABETES MELLITUS WITH STAGE 3 CHRONIC KIDNEY DISEASE, WITHOUT LONG-TERM CURRENT USE OF INSULIN (H): ICD-10-CM

## 2019-05-24 DIAGNOSIS — C61 PROSTATE CANCER (H): ICD-10-CM

## 2019-05-24 DIAGNOSIS — E11.22 CONTROLLED TYPE 2 DIABETES MELLITUS WITH STAGE 3 CHRONIC KIDNEY DISEASE, WITHOUT LONG-TERM CURRENT USE OF INSULIN (H): ICD-10-CM

## 2019-05-24 DIAGNOSIS — Z98.890 H/O METAL REMOVED FROM EYE: ICD-10-CM

## 2019-05-24 DIAGNOSIS — D59.9 ACQUIRED HEMOLYTIC ANEMIA (H): ICD-10-CM

## 2019-05-24 LAB
HBA1C MFR BLD: 5.9 % (ref 0–5.6)
PSA SERPL-MCNC: <0.01 UG/L (ref 0–4)
TSH SERPL DL<=0.005 MIU/L-ACNC: 2.48 MU/L (ref 0.4–4)

## 2019-05-24 PROCEDURE — 84153 ASSAY OF PSA TOTAL: CPT | Performed by: INTERNAL MEDICINE

## 2019-05-24 PROCEDURE — 74176 CT ABD & PELVIS W/O CONTRAST: CPT | Performed by: RADIOLOGY

## 2019-05-24 PROCEDURE — 70553 MRI BRAIN STEM W/O & W/DYE: CPT | Performed by: RADIOLOGY

## 2019-05-24 PROCEDURE — 70200 X-RAY EXAM OF EYE SOCKETS: CPT | Performed by: RADIOLOGY

## 2019-05-24 PROCEDURE — A9585 GADOBUTROL INJECTION: HCPCS | Mod: JW | Performed by: INTERNAL MEDICINE

## 2019-05-24 PROCEDURE — 71250 CT THORAX DX C-: CPT | Performed by: RADIOLOGY

## 2019-05-24 PROCEDURE — 85555 RBC OSMOTIC FRAGILITY: CPT | Mod: 90 | Performed by: INTERNAL MEDICINE

## 2019-05-24 PROCEDURE — 84443 ASSAY THYROID STIM HORMONE: CPT | Performed by: INTERNAL MEDICINE

## 2019-05-24 PROCEDURE — 36415 COLL VENOUS BLD VENIPUNCTURE: CPT | Performed by: INTERNAL MEDICINE

## 2019-05-24 PROCEDURE — 99000 SPECIMEN HANDLING OFFICE-LAB: CPT | Performed by: INTERNAL MEDICINE

## 2019-05-24 PROCEDURE — 83036 HEMOGLOBIN GLYCOSYLATED A1C: CPT | Performed by: INTERNAL MEDICINE

## 2019-05-24 RX ORDER — GADOBUTROL 604.72 MG/ML
10 INJECTION INTRAVENOUS ONCE
Status: COMPLETED | OUTPATIENT
Start: 2019-05-24 | End: 2019-05-24

## 2019-05-24 RX ADMIN — GADOBUTROL 9.5 ML: 604.72 INJECTION INTRAVENOUS at 10:22

## 2019-05-25 NOTE — RESULT ENCOUNTER NOTE
Dear Alex,   Your recent lab results showed the following:  -- labs were normal or at baseline. No change in your current medications.     Please call or Mychart to our office if you have further questions.     Laurence Ibarra MD-PhD

## 2019-05-25 NOTE — RESULT ENCOUNTER NOTE
Dear Alex,   Your recent lab results showed the following:  -- brain MRI does not show anything abnormal beyond aging related changes.   -- it is good that it is not from any abnormal changes in the brain but this leaves us now knowing precisely what causes the dizziness. At this juncture, we'll have to manage by making changes on how to do things such as slowing down in activities and movements.     Please call or Mychart to our office if you have further questions.     Laurence Ibarra MD-PhD

## 2019-05-28 ENCOUNTER — PATIENT OUTREACH (OUTPATIENT)
Dept: ONCOLOGY | Facility: CLINIC | Age: 84
End: 2019-05-28

## 2019-05-28 NOTE — PROGRESS NOTES
Communicated the following results regarding recent imaging results:    Spleen was not enlarged on the CT scan.  Liver was borderline enlarged with small cysts in it.  He does have gallstones.  There is a little bit of fluid around his heart.  There is no reason on his CT scan to point out why his body is breaking down his own red cells.  I will wait for the blood draw results that he had earlier and let him know.  Overall, this is good news, that there are no major  Worrisome findings on his CT scan.  Patient is understanding of plan.

## 2019-05-29 LAB — LAB SCANNED RESULT: NORMAL

## 2019-05-29 RX ORDER — FOLIC ACID 1 MG/1
1 TABLET ORAL DAILY
Qty: 90 TABLET | Refills: 3 | Status: SHIPPED | OUTPATIENT
Start: 2019-05-29 | End: 2020-01-28

## 2019-05-29 NOTE — ADDENDUM NOTE
Addended by: ADDISON VANEGAS on: 5/29/2019 05:19 PM     Modules accepted: Orders, Level of Service

## 2019-07-12 ENCOUNTER — TELEPHONE (OUTPATIENT)
Dept: PEDIATRICS | Facility: CLINIC | Age: 84
End: 2019-07-12

## 2019-07-12 DIAGNOSIS — R42 DIZZINESS: Primary | ICD-10-CM

## 2019-07-12 NOTE — TELEPHONE ENCOUNTER
[facility] Call Center    Phone Message    May a detailed message be left on voicemail: yes    Reason for Call: Other: Pt would like a call to discuss getting a referral to the [facility]. Please advise.      Action Taken: Message routed to:  Primary Care p [phone]

## 2019-07-12 NOTE — TELEPHONE ENCOUNTER
He needs a ENT referral for [facility].  He spoke to them 3 weeks ago but they haven't called him back. He is dizzy and he's been seen by two places without relief.    [facility]- [phone]. He wasn't given the fax number.  [facility] ID#- [id number]  Sarah Mix RN

## 2019-07-16 NOTE — TELEPHONE ENCOUNTER
Unable to fax records from outside our clinic.    Printed OV notes from 9/26/18 when original referral placed to [facility] along with OV notes from 5/10/19 when referral placed to [facility]. Faxed to [facility] ([phone]) along with MRI report from 5/24/19 and referral 7/12/19.    Received confirmation from [employer] that fax was sent successfully.    Called patient on home number to inform completed.  Ernst THORNTON, CMA

## 2019-07-16 NOTE — TELEPHONE ENCOUNTER
Diagnosis is dizziness.   Send record from [facility] (under Media).   Pt provided [place] ID.

## 2019-07-16 NOTE — TELEPHONE ENCOUNTER
Called the phone number provided.  This number is for Neurology.  The staff gave this RN the number for ENT clinic.   [phone]   Fax # [phone]    Spoke to Justyna in ENT.  They need:     *Referral   *[place] ID #  *Demographics  *Insurance  *Medical records regarding the reason for referral.    Miesha Bazan RN, [facility]

## 2019-08-05 ENCOUNTER — TELEPHONE (OUTPATIENT)
Dept: PEDIATRICS | Facility: CLINIC | Age: 84
End: 2019-08-05

## 2019-08-05 DIAGNOSIS — R42 DIZZINESS: Primary | ICD-10-CM

## 2019-08-05 NOTE — TELEPHONE ENCOUNTER
Called patient, he states that the [facility] reviewed his chart, it did not appear to warrant an appointment with the [facility].    He states that they advised that Dr. Ibarra directly speak to a Neurologist at [place] [facility] to discuss.  The patient understood that a neurologist would be able to speak to Dr. Ibarra when she calls.    He is ok with waiting until Dr. Ibarra returns to clinic.      Routing to Dr. Ibarra.  Please advise if able to do this and if you would like RN to call and get connected with Neurology and transfer call.    Miesha Bazan RN, [facility]

## 2019-08-05 NOTE — TELEPHONE ENCOUNTER
[facility] Call Center    Phone Message    May a detailed message be left on voicemail: yes    Reason for Call: Other: Pt states he called down to the [facility] to schedule an appointment. After they reviewed his records, they were unable to schedule pt. They recommended to have his PCP call a neurologist there directly.     Pt is requesting Dr. Ibarra call the [facility] for dizziness and what treatment has been done so far. Please call the [facility] at [phone] to discuss pt scheduling an appointment with a neurologist. [facility] ID number: [id number]     Pt is requesting a follow up call from Dr. Ibarra or care team.    Action Taken: Message routed to:  Primary Care p [phone]

## 2019-08-06 NOTE — TELEPHONE ENCOUNTER
Spoke with patient.  [facility].  He was diagnosed with vestibular and balance.  He participated in physical therapy through them locally as well as physical therapy in [place], without improvement.  He has not been evaluated by ENT or neurology.  Previously I have ordered a brain MRI as part of the work-up in preparation for his [facility] appointment.  At the time, I had the impression that patient already got an appointment just needs a referral as formality. The MRI did not reveal in worrisome intracranial pathology.    Discussed with patient that the best next approach is to have him see ENT locally.  It will not be beneficial for me to call [facility] neurology when no local ENT/neurology evaluation was in place.  Patient is comfortable with this plan.  Referral made.  Patient will call the clinic to schedule to see ENT.

## 2019-08-09 ENCOUNTER — OFFICE VISIT (OUTPATIENT)
Dept: OTOLARYNGOLOGY | Facility: CLINIC | Age: 84
End: 2019-08-09
Payer: COMMERCIAL

## 2019-08-09 VITALS — SYSTOLIC BLOOD PRESSURE: 150 MMHG | HEART RATE: 73 BPM | DIASTOLIC BLOOD PRESSURE: 69 MMHG

## 2019-08-09 DIAGNOSIS — R42 DIZZINESS: Primary | ICD-10-CM

## 2019-08-09 PROCEDURE — 99203 OFFICE O/P NEW LOW 30 MIN: CPT | Performed by: OTOLARYNGOLOGY

## 2019-08-09 ASSESSMENT — ENCOUNTER SYMPTOMS
HEARTBURN: 0
PHOTOPHOBIA: 0
DIZZINESS: 1
NAUSEA: 0
SORE THROAT: 0
STRIDOR: 0
BLURRED VISION: 0
HEADACHES: 0
CONSTITUTIONAL NEGATIVE: 1
BRUISES/BLEEDS EASILY: 0
TREMORS: 1
SINUS PAIN: 0
DOUBLE VISION: 0
VOMITING: 0
TINGLING: 1

## 2019-08-09 ASSESSMENT — PAIN SCALES - GENERAL: PAINLEVEL: NO PAIN (0)

## 2019-08-09 NOTE — NURSING NOTE
Milo Lozano's goals for this visit include:   Chief Complaint   Patient presents with     Consult     Dizziness, previously been evaluated at [facility], recent MRI     He requests these members of his care team be copied on today's visit information: Yes    PCP: Laurence Ibarra    Referring Provider:  No referring provider defined for this encounter.    BP (!) 150/69 (BP Location: Right arm, Patient Position: Sitting, Cuff Size: Adult Regular)   Pulse 73     Do you need any medication refills at today's visit? No    Libra Taylor LPN

## 2019-08-09 NOTE — LETTER
8/9/2019         RE: Milo Lozano  [address] [address]        Dear Colleague,    Thank you for referring your patient, Milo Lozano, to the [facility]. Please see a copy of my visit note below.    HPI  This is an 84 year old patient who has been having dizzy spells for years. Occurs when he stands up quickly or move quickly while walking. Lasts several seconds. Feels dizzy and off balanced and has a tendency to fall. He wears bilateral hearing aids. No vision changes. He has peripheral neuropathy. Denies any episodic vertigo, dizzy spells when he rolls over in the bed. He has a hx of DEMOND, diabetic neuropathy, hypothyroidism, and hypertension. He uses CPAP everyday.   He did have a hearing test and vestibular testing in another center. His MRI does not show any retrocochlear pathology. His blood pressure is high today and looks like fluctuating. His diabetes is stable.    Review of Systems   Constitutional: Negative.    HENT: Positive for hearing loss and tinnitus. Negative for congestion, ear discharge, ear pain, nosebleeds, sinus pain and sore throat.    Eyes: Negative for blurred vision, double vision and photophobia.   Respiratory: Negative for stridor.    Gastrointestinal: Negative for heartburn, nausea and vomiting.   Skin: Negative.    Neurological: Positive for dizziness, tingling and tremors. Negative for headaches.   Endo/Heme/Allergies: Negative for environmental allergies. Does not bruise/bleed easily.         Physical Exam   Constitutional: He appears well-developed and well-nourished.   HENT:   Head: Normocephalic and atraumatic.   Right Ear: Tympanic membrane, external ear and ear canal normal. No drainage, swelling or tenderness. No middle ear effusion. Decreased hearing is noted.   Left Ear: Tympanic membrane, external ear and ear canal normal. No drainage, swelling or tenderness.  No middle ear effusion. Decreased hearing is noted.   Nose: Nose  normal. No mucosal edema, rhinorrhea or septal deviation.   Mouth/Throat: Uvula is midline, oropharynx is clear and moist and mucous membranes are normal.   Eyes: Pupils are equal, round, and reactive to light. EOM are normal.   Neck: Normal range of motion. Neck supple.   No Spontaneous nystagmus.    A/P    This pleasant patient is having dizzy spells and off balances likely due to his blood pressure fluctuations. I will refer him for vestibular rehab for further treatment and request his previous hearing test. In the meantime, he will have another hearing test here to compare. His questions were answered.      Again, thank you for allowing me to participate in the care of your patient.        Sincerely,        Yvette Cali MD

## 2019-08-09 NOTE — PROGRESS NOTES
HPI  This is an 84 year old patient who has been having dizzy spells for years. Occurs when he stands up quickly or move quickly while walking. Lasts several seconds. Feels dizzy and off balanced and has a tendency to fall. He wears bilateral hearing aids. No vision changes. He has peripheral neuropathy. Denies any episodic vertigo, dizzy spells when he rolls over in the bed. He has a hx of DEMOND, diabetic neuropathy, hypothyroidism, and hypertension. He uses CPAP everyday.   He did have a hearing test and vestibular testing in another center. His MRI does not show any retrocochlear pathology. His blood pressure is high today and looks like fluctuating. His diabetes is stable.    Review of Systems   Constitutional: Negative.    HENT: Positive for hearing loss and tinnitus. Negative for congestion, ear discharge, ear pain, nosebleeds, sinus pain and sore throat.    Eyes: Negative for blurred vision, double vision and photophobia.   Respiratory: Negative for stridor.    Gastrointestinal: Negative for heartburn, nausea and vomiting.   Skin: Negative.    Neurological: Positive for dizziness, tingling and tremors. Negative for headaches.   Endo/Heme/Allergies: Negative for environmental allergies. Does not bruise/bleed easily.         Physical Exam   Constitutional: He appears well-developed and well-nourished.   HENT:   Head: Normocephalic and atraumatic.   Right Ear: Tympanic membrane, external ear and ear canal normal. No drainage, swelling or tenderness. No middle ear effusion. Decreased hearing is noted.   Left Ear: Tympanic membrane, external ear and ear canal normal. No drainage, swelling or tenderness.  No middle ear effusion. Decreased hearing is noted.   Nose: Nose normal. No mucosal edema, rhinorrhea or septal deviation.   Mouth/Throat: Uvula is midline, oropharynx is clear and moist and mucous membranes are normal.   Eyes: Pupils are equal, round, and reactive to light. EOM are normal.   Neck: Normal range of  motion. Neck supple.   No Spontaneous nystagmus.    A/P    This pleasant patient is having dizzy spells and off balances likely due to his blood pressure fluctuations. I will refer him for vestibular rehab for further treatment and request his previous hearing test. In the meantime, he will have another hearing test here to compare. His questions were answered.

## 2019-08-12 ENCOUNTER — OFFICE VISIT (OUTPATIENT)
Dept: AUDIOLOGY | Facility: CLINIC | Age: 84
End: 2019-08-12
Payer: COMMERCIAL

## 2019-08-12 DIAGNOSIS — H90.3 SNHL (SENSORY-NEURAL HEARING LOSS), ASYMMETRICAL: Primary | ICD-10-CM

## 2019-08-12 PROCEDURE — 92550 TYMPANOMETRY & REFLEX THRESH: CPT | Performed by: AUDIOLOGIST

## 2019-08-12 PROCEDURE — 92557 COMPREHENSIVE HEARING TEST: CPT | Performed by: AUDIOLOGIST

## 2019-08-12 NOTE — PROGRESS NOTES
AUDIOLOGY REPORT    SUMMARY: Audiology visit completed. See audiogram for results.    RECOMMENDATIONS: Follow-up with ENT.    Alphonse Copeland  Doctor of Audiology  [place] License # [id number]

## 2019-08-13 ENCOUNTER — HOSPITAL ENCOUNTER (OUTPATIENT)
Dept: PHYSICAL THERAPY | Facility: CLINIC | Age: 84
Setting detail: THERAPIES SERIES
End: 2019-08-13
Attending: OTOLARYNGOLOGY
Payer: COMMERCIAL

## 2019-08-13 DIAGNOSIS — R42 DIZZINESS: ICD-10-CM

## 2019-08-13 PROCEDURE — 97162 PT EVAL MOD COMPLEX 30 MIN: CPT | Mod: GP | Performed by: PHYSICAL THERAPIST

## 2019-08-13 PROCEDURE — 97112 NEUROMUSCULAR REEDUCATION: CPT | Mod: GP | Performed by: PHYSICAL THERAPIST

## 2019-08-16 NOTE — ADDENDUM NOTE
Encounter addended by: Estefanía Gusman, PT on: 8/16/2019 8:46 AM   Actions taken: Sign clinical note, Flowsheet accepted

## 2019-08-16 NOTE — PROGRESS NOTES
08/13/19 1400   Quick Adds   Quick Adds Vestibular Eval;Certification   Type of Visit Initial OP PT Evaluation   General Information   Start of Care Date 08/13/19   Referring Physician Dr. Yvette Cali    Orders Evaluate and Treat as Indicated   Order Date 08/09/19   Medical Diagnosis dizziness    Onset of illness/injury or Date of Surgery 08/09/19  (reflects order date as onset was vaugely 1.5  years ago )   Surgical/Medical history reviewed Yes   Pertinent history of current vestibular problem (include personal factors and/or comorbidities that impact the POC)  Hearing loss   Hearing Loss Comments hearing loss BB   Pertinent history of current problem (include personal factors and/or comorbidities that impact the POC) Started a few years ago-- went to [facility] [place] for PT and testing for several sessions. Then went to PT [place] for continued treatment. Did not feel like he made much improvements. Gets HAs that are intense, pressure builds and then it goes away, no always dizzy with this but in standing. Imbalanced when he is walking. No room spinning dizziness. mostly just imbalance. will fall from standing but catching himself on object. Testing from [facility] showed some possible peripheral invovlement and central invovlement for his dizziness/imbalance but recent MRI was clear for any other pathology. patient did close to 6 months of therapy between [place] and [place] and he just does not feel that he is much better- he has not continued any exercises that he learned in the past.    Pertinent Visual History  no vision change lately, wear glasses   Prior level of function comment lives in [place] in the winter (past two), health rider at home (9.5 minutes today) he just started doing this last week, no yard work because he lives in [place], grocery store a few times a week (uses cart)    Patient role/Employment history Retired   Living environment Apartment/condo  (no yard work )   Assistive Devices Comments  uses walking stick or cane   Patient/Family Goals Statement feel more steady on his feet, less balance checks in standing,    Fall Risk Screen   Fall screen completed by PT   Have you fallen 2 or more times in the past year? No   Have you fallen and had an injury in the past year? No   Timed Up and Go score (seconds) not tested    Is patient a fall risk? Yes   Fall screen comments feels like he might fall but he is usually able to catch himself on refrigerator/wall/chair    Functional Scales   Functional Scales and Outcomes DHI 32/100   Pain   Pain comments denies pain    Vitals Signs   Heart Rate 80   Blood Pressure 129/66   Cognitive Status Examination   Orientation orientation to person, place and time   Level of Consciousness alert   Follows Commands and Answers Questions 100% of the time   Personal Safety and Judgment intact   Memory intact   Posture   Posture Comments forward shoulder posture, guarded through shoulders, limited head movement during conversation or with gait    Range of Motion (ROM)   ROM Comment appears tight in upper traps as noticed through observation, decreased neck ROM to B side with rotation by 50%    Strength   Strength Comments sit<>stand 10 reps in 30 secs without UEs; overall deconditioned with decreased activity tolerance    Bed Mobility   Bed Mobility Comments did not assess today    Transfer Skills   Transfer Comments sit<>stand with preference for use of hands for stability    Gait   Gait Comments decreased arm swing B, very little, deviations R to L when walking    Gait Special Tests   Gait Special Tests 25 FOOT TIMED WALK;FUNCTIONAL GAIT ASSESSMENT   Gait Special Tests 25 Foot Timed Walk   Seconds 10.94   Steps 17 Steps   Comments 7.82, 14 steps when asked to swing arms    Gait Special Tests Functional Gait Assessment Score out of 30   Score out of 30 14   Balance   Balance Comments in standing he will have posterior balance shift and reach for wall or take step in order to  keep his balance, this happened several times when standing in the [place]    Balance Special Tests   Balance Special Tests Modified CTSIB Conditions;Sharpened Romberg;Sit to stand reps   Balance Special Tests Modified CTSIB Conditions   Condition 1, seconds 30 Seconds   Condition 2, seconds 6 Seconds   Condition 4, seconds 30 Seconds  (but with feet in slightly widen LEXI )   Condition 5, seconds 2 Seconds   Balance Special Tests Sharpened Romberg   Seconds 5 Seconds   Balance Special Tests Sit to Stand Reps in 30 Seconds   Reps in 30 seconds 10   Height 18   Sensory Examination   Sensory Perception Comments neuropathy in B feet; shoes have diabetic inserts, he feels like this is why he will loose his balance sometimes since they are soft and his feet move in them    Planned Therapy Interventions   Planned Therapy Interventions IADL retraining;balance training;gait training;neuromuscular re-education;ROM;strengthening;stretching;transfer training   Clinical Impression   Criteria for Skilled Therapeutic Interventions Met yes, treatment indicated   PT Diagnosis balance impairments, gait instability, deconditioning, and dizziness    Influenced by the following impairments balance impairments, dizzy sensation, poor balance reactions, peripheral neuropathy, gait instability, fear of falling    Functional limitations due to impairments at increased risk of falls    Clinical Presentation Evolving/Changing   Clinical Presentation Rationale dizziness/imbalance that has not improved for > 1 year, impaired balance, clinical judgement    Clinical Decision Making (Complexity) Moderate complexity   Therapy Frequency 1 time/week   Predicted Duration of Therapy Intervention (days/wks) up to 12 weeks    Risk & Benefits of therapy have been explained Yes   Patient, Family & other staff in agreement with plan of care Yes   Clinical Impression Comments Patient with complex hx of imbalance and dizziness-- today for evaluation focused on  imbalance portion of his issues and will plan to assess vestibular system more closely next session. He is showing increased risk of falls and poor WS in standing with delayed stepping reactions in standing when he starts to loose his balance. Patient has done many months of PT for his issues last year but is willing to try another burst of PT to help to see if this improves his balance and stability. Will benefit from 1 time per week.    GOALS   PT Eval Goals 1;2;4;3   Goal 1   Goal Identifier FGA   Goal Description Alex will improve score on FGA From 14 to 20 in order to reduce his risk of falls and improve his confidence on his feet with balance.    Target Date 10/11/19   Goal 2   Goal Identifier gait speed    Goal Description Alex will improve his gait speed to 8 secs or < over 25 feet with 15 or < steps in order to improve his confidence and ease of walking at home and in the community.    Target Date 10/11/19   Goal 3   Goal Identifier walking/aerobic goal    Goal Description Alex will perform aerobic activity via walking program or bike 4 times per week in order to improve his overall functional endurance and mobility.    Target Date 11/11/19   Goal 4   Goal Identifier CTSIB   Goal Description Alex will perform conditions 3 and 5 on CTSIB for 10 secs or > in order to show improved ease and confidence on uneven surfaces.    Target Date 11/11/19   Total Evaluation Time   PT Eval, Moderate Complexity Minutes (40489) 36   Therapy Certification   Certification date from 08/13/19   Certification date to 11/10/19   Medical Diagnosis dizziness    Certification I certify the need for these services furnished under this plan of treatment and while under my care.  (Physician co-signature of this document indicates review and certification of the therapy plan).

## 2019-08-19 ENCOUNTER — TELEPHONE (OUTPATIENT)
Dept: ONCOLOGY | Facility: CLINIC | Age: 84
End: 2019-08-19

## 2019-08-19 NOTE — TELEPHONE ENCOUNTER
I spoke to the patient about the appointment date/time. Provider is not in clinic I rescheduled the follow up with Dr Hernandez from 12/12/2019 to 12/17/2019. Cap-08/19/2019

## 2019-08-21 ENCOUNTER — OFFICE VISIT (OUTPATIENT)
Dept: PEDIATRICS | Facility: CLINIC | Age: 84
End: 2019-08-21
Payer: COMMERCIAL

## 2019-08-21 VITALS
WEIGHT: 189 LBS | HEART RATE: 73 BPM | TEMPERATURE: 98 F | DIASTOLIC BLOOD PRESSURE: 69 MMHG | BODY MASS INDEX: 29.59 KG/M2 | OXYGEN SATURATION: 97 % | SYSTOLIC BLOOD PRESSURE: 144 MMHG

## 2019-08-21 DIAGNOSIS — E11.40 TYPE 2 DIABETES MELLITUS WITH DIABETIC NEUROPATHY, WITHOUT LONG-TERM CURRENT USE OF INSULIN (H): ICD-10-CM

## 2019-08-21 DIAGNOSIS — R26.89 POOR BALANCE: Primary | ICD-10-CM

## 2019-08-21 DIAGNOSIS — E11.42 DIABETIC POLYNEUROPATHY ASSOCIATED WITH TYPE 2 DIABETES MELLITUS (H): ICD-10-CM

## 2019-08-21 DIAGNOSIS — I10 HYPERTENSION GOAL BP (BLOOD PRESSURE) < 140/90: ICD-10-CM

## 2019-08-21 PROCEDURE — 99214 OFFICE O/P EST MOD 30 MIN: CPT | Performed by: INTERNAL MEDICINE

## 2019-08-21 RX ORDER — GLIMEPIRIDE 4 MG/1
8 TABLET ORAL
Qty: 180 TABLET | Refills: 0 | Status: SHIPPED | OUTPATIENT
Start: 2019-08-21 | End: 2019-10-16

## 2019-08-21 NOTE — PROGRESS NOTES
Subjective     Milo Lozano is a 84 year old male who presents to clinic today for the following health issues:    HPI   Hypertension Follow-up      Do you check your blood pressure regularly outside of the clinic? Yes     Are you following a low salt diet? No    Are your blood pressures ever more than 140 on the top number (systolic) OR more   than 90 on the bottom number (diastolic), for example 140/90? Yes occas      How many servings of fruits and vegetables do you eat daily?  4 or more    On average, how many sweetened beverages do you drink each day (soda, juice, sweet tea, etc)?   0    How many days per week do you miss taking your medication? 0      Diabetes: last 3 weeks glucose are in the 200s. No change in his diet or medications.     Dizziness and balance problem.   Patient has dizziness and was evaluated by ENT.  It was not thought to be vestibular in nature, but patient did get a referral to physical therapy for vestibular rehab.  He had one visit, not sure it helped.  ENT also thought that a pole component of his dizziness may be related to blood pressure fluctuation.    His wife came with him today.  In retrospect, he has had dizziness feeling off and on ever since he was started on blood pressure medication in 2010.  It may had been worse in the last couple of years or so.    We will bothersome for him is a sense of poor balance.  He has diabetic neuropathy with numbness in both lower extremities.  He has not had any falls, but is quite bothered by the sense of imbalance.      [facility], but he appears that the therapy was directed more towards vestibular balance.    We looked up his blood pressure medication history.  He has been on valsartan since 2010, changed to losartan in 2014.  He was on hydrochlorothiazide  until December 2016 when he was switched to chlorthalidone due to inadequate control of his blood pressure.  In the past  he was on clonidine, that was discontinued a couple of years ago.      ROS:  Constitutional, HEENT, cardiovascular, pulmonary, gi and gu systems are negative, except as otherwise noted.         Current Outpatient Medications on File Prior to Visit:  aspirin 81 MG tablet Take 1 tablet (81 mg) by mouth daily   chlorthalidone (HYGROTON) 25 MG tablet TAKE 1 TABLET EVERY DAY   folic acid (FOLVITE) 1 MG tablet Take 1 tablet (1 mg) by mouth daily   levothyroxine (SYNTHROID/LEVOTHROID) 75 MCG tablet Take 1 tablet (75 mcg) by mouth daily   losartan (COZAAR) 100 MG tablet TAKE 1 TABLET EVERY DAY   Omega-3 Fatty Acids (FISH OIL PO) Take  by mouth daily.   tamsulosin (FLOMAX) 0.4 MG capsule Take 1 capsule (0.4 mg) by mouth daily   blood glucose (ACCU-CHEK SMARTVIEW) test strip TEST BLOOD SUGAR TWICE DAILY  OR AS DIRECTED   blood glucose (NO BRAND SPECIFIED) lancets standard Use to test blood sugar 2 times daily or as directed.   blood glucose (NO BRAND SPECIFIED) test strip Use to test blood sugar 2 times daily or as directed.   blood glucose calibration (NO BRAND SPECIFIED) solution Use to calibrate blood glucose monitor as needed as directed.   blood glucose monitoring (ACCU-CHEK MILADYS SMARTVIEW) meter device kit Use to test blood sugar 2 times daily or as directed.   blood glucose monitoring (NO BRAND SPECIFIED) meter device kit Use to test blood sugar 2 times daily. Brand per insurance.   capsaicin (ZOSTRIX) 0.025 % CREA To feet 2-3 times daily as needed. (Patient not taking: Reported on 5/10/2019)   ciclopirox (LOPROX) 0.77 % cream Apply topically 2 times daily To toenails. (Patient not taking: Reported on 5/10/2019)   Cyanocobalamin (VITAMIN B-12 CR PO) Take 1 tablet by mouth 2 times daily   order for DME Equipment being ordered: HAPAD Metatarsal pad, QTY:2   STATIN NOT PRESCRIBED, INTENTIONAL, 1 each continuous Statin not prescribed intentionally due to does not meet [facility] criteria and Other: LDL at goal without the  statin.     No current facility-administered medications on file prior to visit.        Patient Active Problem List   Diagnosis     Hypertension goal BP (blood pressure) < 140/90     CARDIOVASCULAR SCREENING; LDL GOAL LESS THAN 100     Prostate cancer (H)     Hyperlipidemia with target LDL less than 100     Diabetes type 2, controlled (H)     Advanced directives, counseling/discussion     Diabetic peripheral neuropathy (H)     White coat hypertension     Hypothyroidism due to acquired atrophy of thyroid     DEMOND (obstructive sleep apnea)     Diabetic polyneuropathy associated with type 2 diabetes mellitus (H)     Past Surgical History:   Procedure Laterality Date     COLONOSCOPY         Social History     Tobacco Use     Smoking status: Never Smoker     Smokeless tobacco: Never Used   Substance Use Topics     Alcohol use: No     Family History   Problem Relation Age of Onset     Hypertension Mother      Alzheimer Disease Mother      Prostate Cancer Paternal Grandfather      Cancer No family hx of      Diabetes No family hx of      Cerebrovascular Disease No family hx of      Thyroid Disease No family hx of      Glaucoma No family hx of      Macular Degeneration No family hx of              Problem list, Medication list, Allergies, and Medical/Social/Surgical histories reviewed in [facility] and updated as appropriate.    OBJECTIVE:                                                    BP (!) 144/69   Pulse 73   Temp 98  F (36.7  C) (Temporal)   Wt 85.7 kg (189 lb)   SpO2 97%   BMI 29.59 kg/m      GENERAL:84 year old male, alert and no acute distress but frustrated with dizziness and poor balance problems.             ASSESSMENT/PLAN:                                                      84 year old male with the following diagnoses and treatment plan:      ICD-10-CM    1. Poor balance R26.89 [facility] PT, HAND, AND CHIROPRACTIC REFERRAL   2. Diabetic polyneuropathy associated with type 2 diabetes mellitus (H) E11.42 [facility] PT,  HAND, AND CHIROPRACTIC REFERRAL   3. Hypertension goal BP (blood pressure) < 140/90 I10    4. Type 2 diabetes mellitus with diabetic neuropathy, without long-term current use of insulin (H) E11.40 glimepiride (AMARYL) 4 MG tablet       --Poor balance: This is likely related to diabetes neuropathy loss of proprioception.  We discussed physical therapy, not vesicular therapy may be more helpful for him for the balance issue.  Patient is noncommittal to do therapy.  I put a referral on file for him in case he decides to pursue this.  He wanted to complete the referral from ENT for vestibular rehab first.  -- Diabetes: Recent glucose readings are in the 200 range.--Etiology is unclear.  He is on glimepiride 4 mg.  Advised him to increase it to 8 mg  --Chronic dizziness: Since he has been worsen in the last year or so, chlorthalidone may be contributing to this.  We discussed options of changing chlorthalidone to amlodipine to see if this helps.  Patient does not want to make any changes at this time. BP inadequately controlled, this was noted and relayed to pt.   --We will have him follow-up in a couple of weeks to recheck his blood pressure and follow-up on progress with vestibular rehab.      Will call or return to clinic if worsening or symptoms not improving as discussed.  See Patient Instructions.    A total of 25 minutes was spent face to face with this patient. More than 50% of the time was spent on education for the above problems and management plans.     Laurence Ibarra MD-PhD  [facility]    (Note: Chart documentation was done in part with Dragon Voice Recognition software. Although reviewed after completion, some word and grammatical errors may remain.)

## 2019-08-21 NOTE — PATIENT INSTRUCTIONS
Make appointment(s) for:   -- clinic follow up in 2 weeks.       Increase Glimepiride to 2 tablets a day.     Schedule for PT

## 2019-08-23 ENCOUNTER — MEDICAL CORRESPONDENCE (OUTPATIENT)
Dept: HEALTH INFORMATION MANAGEMENT | Facility: CLINIC | Age: 84
End: 2019-08-23

## 2019-08-26 ENCOUNTER — HOSPITAL ENCOUNTER (OUTPATIENT)
Dept: PHYSICAL THERAPY | Facility: CLINIC | Age: 84
Setting detail: THERAPIES SERIES
End: 2019-08-26
Attending: OTOLARYNGOLOGY
Payer: COMMERCIAL

## 2019-08-26 PROCEDURE — 97110 THERAPEUTIC EXERCISES: CPT | Mod: GP | Performed by: PHYSICAL THERAPIST

## 2019-08-26 PROCEDURE — 97112 NEUROMUSCULAR REEDUCATION: CPT | Mod: GP | Performed by: PHYSICAL THERAPIST

## 2019-09-05 ENCOUNTER — DOCUMENTATION ONLY (OUTPATIENT)
Dept: ORTHOPEDICS | Facility: CLINIC | Age: 84
End: 2019-09-05

## 2019-09-05 ENCOUNTER — OFFICE VISIT (OUTPATIENT)
Dept: PEDIATRICS | Facility: CLINIC | Age: 84
End: 2019-09-05
Payer: COMMERCIAL

## 2019-09-05 ENCOUNTER — HOSPITAL ENCOUNTER (OUTPATIENT)
Dept: PHYSICAL THERAPY | Facility: CLINIC | Age: 84
Setting detail: THERAPIES SERIES
End: 2019-09-05
Attending: OTOLARYNGOLOGY
Payer: COMMERCIAL

## 2019-09-05 VITALS
DIASTOLIC BLOOD PRESSURE: 69 MMHG | WEIGHT: 190 LBS | TEMPERATURE: 97.7 F | SYSTOLIC BLOOD PRESSURE: 152 MMHG | HEART RATE: 72 BPM | OXYGEN SATURATION: 97 % | BODY MASS INDEX: 29.75 KG/M2

## 2019-09-05 DIAGNOSIS — R41.89 IMPAIRED COGNITIVE ABILITY: ICD-10-CM

## 2019-09-05 DIAGNOSIS — I10 HYPERTENSION GOAL BP (BLOOD PRESSURE) < 140/90: Primary | ICD-10-CM

## 2019-09-05 DIAGNOSIS — E11.42 DIABETIC POLYNEUROPATHY ASSOCIATED WITH TYPE 2 DIABETES MELLITUS (H): ICD-10-CM

## 2019-09-05 PROCEDURE — 97112 NEUROMUSCULAR REEDUCATION: CPT | Mod: GP | Performed by: PHYSICAL THERAPIST

## 2019-09-05 PROCEDURE — 97116 GAIT TRAINING THERAPY: CPT | Mod: GP | Performed by: PHYSICAL THERAPIST

## 2019-09-05 PROCEDURE — 99214 OFFICE O/P EST MOD 30 MIN: CPT | Performed by: INTERNAL MEDICINE

## 2019-09-05 RX ORDER — AMLODIPINE BESYLATE 10 MG/1
10 TABLET ORAL DAILY
Qty: 90 TABLET | Refills: 0 | Status: SHIPPED | OUTPATIENT
Start: 2019-09-05 | End: 2019-10-16

## 2019-09-05 NOTE — PROGRESS NOTES
Subjective     Milo Lozano is a 84 year old male who presents to clinic today for the following health issues:    HPI   Hypertension Follow-up      Do you check your blood pressure regularly outside of the clinic? Yes     Are you following a low salt diet? Yes    Are your blood pressures ever more than 140 on the top number (systolic) OR more   than 90 on the bottom number (diastolic), for example 140/90? No      How many servings of fruits and vegetables do you eat daily?  2-3    On average, how many sweetened beverages do you drink each day (soda, juice, sweet tea, etc)?   0    How many days per week do you miss taking your medication? 0        DM: at last visit, his glucose was over 200. So we increased glimepiride to 8 mg. Reported his numbers are slightly better in AM but evening still a lot of numbers above 200. He doesn't count carb but feels his wife has a good handle on the diet part and she prepares all the meals.     Patient has a chronic dizziness, currently getting vestibular rehab therapy.  He does not think this has helped.    In talking with the patient, it does not appear that he recall the conversation we had at his last visit regarding dizziness, balance issues, diabetic neuropathy.  He was confused about which medication dose was increased.    ROS:  Constitutional, HEENT, cardiovascular, pulmonary, gi and gu systems are negative, except as otherwise noted.         Current Outpatient Medications on File Prior to Visit:  aspirin 81 MG tablet Take 1 tablet (81 mg) by mouth daily   Cyanocobalamin (VITAMIN B-12 CR PO) Take 1 tablet by mouth 2 times daily   folic acid (FOLVITE) 1 MG tablet Take 1 tablet (1 mg) by mouth daily   glimepiride (AMARYL) 4 MG tablet Take 2 tablets (8 mg) by mouth every morning (before breakfast)   levothyroxine (SYNTHROID/LEVOTHROID) 75 MCG tablet Take 1 tablet (75 mcg) by mouth daily   losartan (COZAAR) 100 MG tablet TAKE 1 TABLET EVERY DAY   Omega-3 Fatty Acids (FISH  OIL PO) Take  by mouth daily.   tamsulosin (FLOMAX) 0.4 MG capsule Take 1 capsule (0.4 mg) by mouth daily   blood glucose (ACCU-CHEK SMARTVIEW) test strip TEST BLOOD SUGAR TWICE DAILY  OR AS DIRECTED   blood glucose (NO BRAND SPECIFIED) lancets standard Use to test blood sugar 2 times daily or as directed.   blood glucose (NO BRAND SPECIFIED) test strip Use to test blood sugar 2 times daily or as directed.   blood glucose calibration (NO BRAND SPECIFIED) solution Use to calibrate blood glucose monitor as needed as directed.   blood glucose monitoring (ACCU-CHEK MILADYS SMARTVIEW) meter device kit Use to test blood sugar 2 times daily or as directed.   blood glucose monitoring (NO BRAND SPECIFIED) meter device kit Use to test blood sugar 2 times daily. Brand per insurance.   order for DME Equipment being ordered: HAPAD Metatarsal pad, QTY:2   STATIN NOT PRESCRIBED, INTENTIONAL, 1 each continuous Statin not prescribed intentionally due to does not meet [place] criteria and Other: LDL at goal without the statin.     No current facility-administered medications on file prior to visit.        Patient Active Problem List   Diagnosis     Hypertension goal BP (blood pressure) < 140/90     CARDIOVASCULAR SCREENING; LDL GOAL LESS THAN 100     Prostate cancer (H)     Hyperlipidemia with target LDL less than 100     Diabetes type 2, controlled (H)     Advanced directives, counseling/discussion     Diabetic peripheral neuropathy (H)     White coat hypertension     Hypothyroidism due to acquired atrophy of thyroid     DEMOND (obstructive sleep apnea)     Diabetic polyneuropathy associated with type 2 diabetes mellitus (H)     Past Surgical History:   Procedure Laterality Date     COLONOSCOPY         Social History     Tobacco Use     Smoking status: Never Smoker     Smokeless tobacco: Never Used   Substance Use Topics     Alcohol use: No     Family History   Problem Relation Age of Onset     Hypertension Mother      Alzheimer  Disease Mother      Prostate Cancer Paternal Grandfather      Cancer No family hx of      Diabetes No family hx of      Cerebrovascular Disease No family hx of      Thyroid Disease No family hx of      Glaucoma No family hx of      Macular Degeneration No family hx of              Problem list, Medication list, Allergies, and Medical/Social/Surgical histories reviewed in [facility] and updated as appropriate.    OBJECTIVE:                                                    BP (!) 152/69   Pulse 72   Temp 97.7  F (36.5  C) (Temporal)   Wt 86.2 kg (190 lb)   SpO2 97%   BMI 29.75 kg/m      GENERAL: healthy, alert and no distress  Mini-Cog: Failed adequate recall, cannot recall any 1 of the 3 words.  Clock draw was correct.        ASSESSMENT/PLAN:                                                      84 year old male with the following diagnoses and treatment plan:      ICD-10-CM    1. Hypertension goal BP (blood pressure) < 140/90 I10 amLODIPine (NORVASC) 10 MG tablet   2. Diabetic polyneuropathy associated with type 2 diabetes mellitus (H) E11.42 Hemoglobin A1c   3. Impaired cognitive ability R41.89        --Hypertension: At his last visit, in looking at his medication history, there seems to be a correlation between increasing dizziness when we switched from hydrochlorothiazide to chlorthalidone.  We discussed this continuing chlorthalidone, changed to amlodipine.  Patient did not recall a conversation but is willing to switch at this time.  I sent a prescription of amlodipine to his refill pharmacy  --Diabetes: Glucose reading with slight improvement.  Will give it some time, plan to recheck A1c around November  --Patient with mild impaired cognitive abilities in terms of short-term memory.  Patient reported that he will discuss this with his wife.  --Follow-up in clinic in 4 weeks to recheck his blood pressure.    Will call or return to clinic if worsening or symptoms not improving as discussed.  See Patient  Instructions.      Laurence Ibarra MD-PhD  [facility]    (Note: Chart documentation was done in part with Dragon Voice Recognition software. Although reviewed after completion, some word and grammatical errors may remain.)

## 2019-09-05 NOTE — PATIENT INSTRUCTIONS
Make appointment(s) for:   -- wellness visit and BP check in 4 weeks.       Additional Instructions:   1. Stop Chlorthalidone.   2. Start Amlodipine.       Medication(s) prescribed today:    Orders Placed This Encounter   Medications     amLODIPine (NORVASC) 10 MG tablet     Sig: Take 1 tablet (10 mg) by mouth daily     Dispense:  90 tablet     Refill:  0

## 2019-09-05 NOTE — PROGRESS NOTES
S: Pt. arrived to our facility today for a fitting for custom, foot orthotics and shoes to help protect diabetic feet.     O/Goals: The objective/Goals of orthotics are to help protect feet.     A: I have fit pt. with 3 pairs of diabetic foot orthotics with met pads and 1 pair of size 10W Dr. Kelly Martinez Bark diabetic shoed. Pt. has been instructed with proper donning, doffing, cleaning, and the importance skin care and checking. An instructional sheet has been given to pt. and gone through thoroughly. Pt. has ambulated with the orthotics and is satisfied with overall fit.  I have instructed patient to stop using the shoes/orthotics immediately, if there were any concerns on fit or any signs of irritation and to contact our facility to discuss the issue(s.)    P: Pt. has been instructed to contact our facility with any future questions and/or concerns.     Tyler MARCELO [facility] Licensed Orthotist , [facility] Certified Orthotist

## 2019-09-12 ENCOUNTER — HOSPITAL ENCOUNTER (OUTPATIENT)
Dept: PHYSICAL THERAPY | Facility: CLINIC | Age: 84
Setting detail: THERAPIES SERIES
End: 2019-09-12
Attending: OTOLARYNGOLOGY
Payer: COMMERCIAL

## 2019-09-12 PROCEDURE — 97116 GAIT TRAINING THERAPY: CPT | Mod: GP | Performed by: PHYSICAL THERAPIST

## 2019-09-12 PROCEDURE — 97112 NEUROMUSCULAR REEDUCATION: CPT | Mod: GP | Performed by: PHYSICAL THERAPIST

## 2019-09-19 ENCOUNTER — TELEPHONE (OUTPATIENT)
Dept: PEDIATRICS | Facility: CLINIC | Age: 84
End: 2019-09-19

## 2019-09-19 ENCOUNTER — HOSPITAL ENCOUNTER (OUTPATIENT)
Dept: PHYSICAL THERAPY | Facility: CLINIC | Age: 84
Setting detail: THERAPIES SERIES
End: 2019-09-19
Attending: OTOLARYNGOLOGY
Payer: COMMERCIAL

## 2019-09-19 PROCEDURE — 97116 GAIT TRAINING THERAPY: CPT | Mod: GP | Performed by: PHYSICAL THERAPIST

## 2019-09-19 PROCEDURE — 97112 NEUROMUSCULAR REEDUCATION: CPT | Mod: GP | Performed by: PHYSICAL THERAPIST

## 2019-09-19 NOTE — TELEPHONE ENCOUNTER
Patient walks in wanting lab results from May, printed, reviewed and patient verbalized understanding.    Sarah Mix RN

## 2019-10-07 ENCOUNTER — HOSPITAL ENCOUNTER (OUTPATIENT)
Dept: PHYSICAL THERAPY | Facility: CLINIC | Age: 84
Setting detail: THERAPIES SERIES
End: 2019-10-07
Attending: OTOLARYNGOLOGY
Payer: COMMERCIAL

## 2019-10-07 PROCEDURE — 97112 NEUROMUSCULAR REEDUCATION: CPT | Mod: GP | Performed by: PHYSICAL THERAPIST

## 2019-10-07 PROCEDURE — 97116 GAIT TRAINING THERAPY: CPT | Mod: GP | Performed by: PHYSICAL THERAPIST

## 2019-10-07 PROCEDURE — 97750 PHYSICAL PERFORMANCE TEST: CPT | Mod: GP | Performed by: PHYSICAL THERAPIST

## 2019-10-07 NOTE — PROGRESS NOTES
Functional Gait Assessment (FGA): The FGA assesses postural stability during various walking tasks.   Gait assistive device used: none     Patient Score: 19/30  Scores of <22/30 have been correlated with predicting falls in community-dwelling older adults according to Jeannie & Jose Maria 2010.   Scores of <18/30 have been correlated with increased risk for falls in patients with Parkinsons Disease according to Cali Spencer Zhou et al 2014.  Minimal Detectable Change for patients with acute/chronic stroke = 4.2 according to Clair & Jesusitael 2009  Minimal Detectable Change for patients with vestibular disorder = 8 according to Jeannie & Jose Maria 2010    Assessment (rationale for performing, application to patient s function & care plan): Performed to assess balance-- improved score from 14 to 19/30 but still in fall risk category. Most trouble with NBOS walking, eyes closed and stepping over object. Will continue to work on these areas with PT intervention.     Minutes billed as physical performance test: 9       10/07/19 1000   Signing Clinician's Name / Credentials   Signing clinician's name / credentials Estefanía Gusman, PT, DPT   Functional Gait Assessment (BRIAN Dennis, Linnette GHood F., et al. (2004))   1. GAIT LEVEL SURFACE 2   2. CHANGE IN GAIT SPEED 2   3. GAIT WITH HORIZONTAL HEAD TURNS 3   4. GAIT WITH VERTICAL HEAD TURNS 3   5. GAIT AND PIVOT TURN 2   6. STEP OVER OBSTACLE 2   7. GAIT WITH NARROW BASE OF SUPPORT 1   8. GAIT WITH EYES CLOSED 0   9. AMBULATING BACKWARDS 2   10. STEPS 2   Total Functional Gait Assessment Score   TOTAL SCORE: (MAXIMUM SCORE 30) 19

## 2019-10-07 NOTE — PROGRESS NOTES
Outpatient Physical Therapy Progress Note     Patient: Milo Lozano  : 1935    Beginning/End Dates of Reporting Period:  19 to 10/7/2019    Referring Provider: Dr. Yvette Gipson    Therapy Diagnosis: balance impairments, gait instability, deconditioning and dizziness.     Client Self Report: Patient reports no major improvements over the last three weeks. He has been doing his exercises 1-2 times per day. He has been doing infared therapy for neuropathy-- no major improvements from this either. No dizziness, just imbalance.     Objective Measurements:  Objective Measure: FGA  Details:        Goals:  Goal Identifier FGA- , improving    Goal Description Alex will improve score on FGA From 14 to 20 in order to reduce his risk of falls and improve his confidence on his feet with balance.    Target Date 10/11/19   Date Met      Progress:     Goal Identifier gait speed    Goal Description Alex will improve his gait speed to 8 secs or < over 25 feet with 15 or < steps in order to improve his confidence and ease of walking at home and in the community.    Target Date 10/11/19   Date Met  19   Progress: MET     Goal Identifier walking/aerobic goal   Goal Description Alex will perform aerobic activity via walking program or bike 4 times per week in order to improve his overall functional endurance and mobility.    Target Date 19   Date Met  10/07/19   Progress: MEt     Goal Identifier CTSIB- discontinued   Goal Description Alex will perform conditions 3 and 5 on CTSIB for 10 secs or > in order to show improved ease and confidence on uneven surfaces.    Target Date 19   Date Met      Progress:     Progress Toward Goals:   Patient making slow, steady progress towards goals. Still with reported instability/imbalance feeling and many times in standing where he needs to reach for surface to keep his balance. He did improve with his FGA score and gait speed score but reports  decreased confidence with moving. Patient wanting to continue exercises at home and check in with PT in about 1 month to let her know how things are going. He also started some laser therapy for his neuropathy at a different clinic so he is hoping this helps.     Plan:  Other: will plan to see or have phone call with in ~1 month to see his progress.    Discharge:  No

## 2019-10-13 DIAGNOSIS — N40.0 BENIGN PROSTATIC HYPERPLASIA WITHOUT LOWER URINARY TRACT SYMPTOMS: ICD-10-CM

## 2019-10-13 DIAGNOSIS — E03.4 HYPOTHYROIDISM DUE TO ACQUIRED ATROPHY OF THYROID: ICD-10-CM

## 2019-10-13 ASSESSMENT — ACTIVITIES OF DAILY LIVING (ADL): CURRENT_FUNCTION: NO ASSISTANCE NEEDED

## 2019-10-14 RX ORDER — TAMSULOSIN HYDROCHLORIDE 0.4 MG/1
CAPSULE ORAL
Qty: 30 CAPSULE | Refills: 0 | Status: SHIPPED | OUTPATIENT
Start: 2019-10-14 | End: 2019-10-16

## 2019-10-14 RX ORDER — LEVOTHYROXINE SODIUM 75 UG/1
TABLET ORAL
Qty: 30 TABLET | Refills: 0 | Status: SHIPPED | OUTPATIENT
Start: 2019-10-14 | End: 2019-10-16

## 2019-10-16 ENCOUNTER — OFFICE VISIT (OUTPATIENT)
Dept: PEDIATRICS | Facility: CLINIC | Age: 84
End: 2019-10-16
Payer: COMMERCIAL

## 2019-10-16 VITALS
HEART RATE: 69 BPM | WEIGHT: 193 LBS | HEIGHT: 67 IN | DIASTOLIC BLOOD PRESSURE: 58 MMHG | BODY MASS INDEX: 30.29 KG/M2 | TEMPERATURE: 97.8 F | SYSTOLIC BLOOD PRESSURE: 136 MMHG | OXYGEN SATURATION: 97 %

## 2019-10-16 DIAGNOSIS — I10 HYPERTENSION GOAL BP (BLOOD PRESSURE) < 140/90: ICD-10-CM

## 2019-10-16 DIAGNOSIS — E11.9 TYPE 2 DIABETES MELLITUS WITHOUT COMPLICATION, WITHOUT LONG-TERM CURRENT USE OF INSULIN (H): ICD-10-CM

## 2019-10-16 DIAGNOSIS — E11.40 TYPE 2 DIABETES MELLITUS WITH DIABETIC NEUROPATHY, WITHOUT LONG-TERM CURRENT USE OF INSULIN (H): ICD-10-CM

## 2019-10-16 DIAGNOSIS — N40.0 BENIGN PROSTATIC HYPERPLASIA WITHOUT LOWER URINARY TRACT SYMPTOMS: ICD-10-CM

## 2019-10-16 DIAGNOSIS — Z00.00 ENCOUNTER FOR MEDICARE ANNUAL WELLNESS EXAM: Primary | ICD-10-CM

## 2019-10-16 DIAGNOSIS — E03.4 HYPOTHYROIDISM DUE TO ACQUIRED ATROPHY OF THYROID: ICD-10-CM

## 2019-10-16 PROCEDURE — G0439 PPPS, SUBSEQ VISIT: HCPCS | Performed by: INTERNAL MEDICINE

## 2019-10-16 PROCEDURE — 90662 IIV NO PRSV INCREASED AG IM: CPT | Performed by: INTERNAL MEDICINE

## 2019-10-16 PROCEDURE — G0008 ADMIN INFLUENZA VIRUS VAC: HCPCS | Performed by: INTERNAL MEDICINE

## 2019-10-16 RX ORDER — LEVOTHYROXINE SODIUM 75 UG/1
TABLET ORAL
Qty: 90 TABLET | Refills: 3 | Status: SHIPPED | OUTPATIENT
Start: 2019-10-16 | End: 2020-09-22

## 2019-10-16 RX ORDER — GLIMEPIRIDE 4 MG/1
8 TABLET ORAL
Qty: 180 TABLET | Refills: 1 | Status: SHIPPED | OUTPATIENT
Start: 2019-10-16 | End: 2019-12-05

## 2019-10-16 RX ORDER — TAMSULOSIN HYDROCHLORIDE 0.4 MG/1
CAPSULE ORAL
Qty: 90 CAPSULE | Refills: 3 | Status: SHIPPED | OUTPATIENT
Start: 2019-10-16 | End: 2020-09-22

## 2019-10-16 RX ORDER — AMLODIPINE BESYLATE 10 MG/1
10 TABLET ORAL DAILY
Qty: 90 TABLET | Refills: 3 | Status: SHIPPED | OUTPATIENT
Start: 2019-10-16 | End: 2020-09-22

## 2019-10-16 ASSESSMENT — MIFFLIN-ST. JEOR: SCORE: 1534.07

## 2019-10-16 NOTE — PROGRESS NOTES
"  SUBJECTIVE:   Milo Lozano is a 84 year old male who presents for Preventive Visit.    HPI:  Diabetes neuropathy: doing laser treatment in [place], done 3 of the 8 sessions. Has not noticed improvement yet. Had foot exam there. He couldn't feel at all at the bottom of his feet on both sides.   Diabetes: glucose 1554, last night 250.   Are you in the first 12 months of your Medicare Part B coverage?  No    Physical Health:    In general, how would you rate your overall physical health? poor    Outside of work, how many days during the week do you exercise? 2-3 days/week    Outside of work, approximately how many minutes a day do you exercise?less than 15 minutes    If you drink alcohol do you typically have >3 drinks per day or >7 drinks per week? Not Applicable    Do you usually eat at least 4 servings of fruit and vegetables a day, include whole grains & fiber and avoid regularly eating high fat or \"junk\" foods? Yes    Do you have any problems taking medications regularly?  No    Do you have any side effects from medications? none    Needs assistance for the following daily activities: no assistance needed    Which of the following safety concerns are present in your home?  none identified     Hearing impairment: Yes, Difficulty following a conversation in a noisy restaurant or crowded room.    Difficulty understanding soft or whispered speech.    Difficulty understanding speech on the telephone.    In the past 6 months, have you been bothered by leaking of urine? no    Mental Health:    In general, how would you rate your overall mental or emotional health? good  PHQ-2 Score: (P) 0    Do you feel safe in your environment? Yes    Do you have a Health Care Directive? Yes: Advance Directive has been received and scanned.    Additional concerns to address?  No    Fall risk:  Fallen 2 or more times in the past year?: No  Any fall with injury in the past year?: No    Cognitive Screenin) Repeat 3 items " (Leader, Season, Table)    2) Clock draw: NORMAL  3) 3 item recall: Recalls 2 objects   Results: NORMAL clock, 1-2 items recalled: COGNITIVE IMPAIRMENT LESS LIKELY    Mini-CogTM Copyright S Abbi. Licensed by the author for use in [facility]; reprinted with permission (kirti@[facility]). All rights reserved.      Do you have sleep apnea, excessive snoring or daytime drowsiness?: yes            Reviewed and updated as needed this visit by clinical staff  Tobacco  Allergies  Meds  Med Hx  Surg Hx  Fam Hx  Soc Hx        Reviewed and updated as needed this visit by Provider        Social History     Tobacco Use     Smoking status: Never Smoker     Smokeless tobacco: Never Used   Substance Use Topics     Alcohol use: No                           Current providers sharing in care for this patient include:   Patient Care Team:  Laurence Ibarra MD PhD as PCP - General (Internal Medicine)  Milo Hebert MD as Assigned PCP    The following health maintenance items are reviewed in Epic and correct as of today:  Health Maintenance   Topic Date Due     ZOSTER IMMUNIZATION (1 of 2) 04/09/1987     MEDICARE ANNUAL WELLNESS VISIT  10/22/2014     DIABETIC FOOT EXAM  06/27/2019     INFLUENZA VACCINE (1) 09/01/2019     MICROALBUMIN  09/26/2019     ADVANCE CARE PLANNING  10/17/2019     A1C  11/24/2019     EYE EXAM  05/07/2020     BMP  05/10/2020     LIPID  05/10/2020     FALL RISK ASSESSMENT  05/10/2020     CBC  05/10/2020     PSA  05/24/2020     TSH W/FREE T4 REFLEX  05/24/2020     DTAP/TDAP/TD IMMUNIZATION (3 - Td) 09/22/2024     COLONOSCOPY  07/01/2026     PHQ-2  Completed     PNEUMOCOCCAL IMMUNIZATION 65+ LOW/MEDIUM RISK  Completed     IPV IMMUNIZATION  Aged Out     MENINGITIS IMMUNIZATION  Aged Out     Labs reviewed in EPIC      ROS:  Constitutional, HEENT, cardiovascular, pulmonary, gi and gu systems are negative, except as otherwise noted.    OBJECTIVE:   There were no vitals taken for this visit. Estimated body  "mass index is 29.75 kg/m  as calculated from the following:    Height as of 5/16/19: 1.702 m (5' 7.01\").    Weight as of 9/5/19: 86.2 kg (190 lb).  EXAM:   GENERAL: healthy, alert and no distress  EYES: Eyes grossly normal to inspection, PERRL and conjunctivae and sclerae normal  HENT: ear canals and TM's normal, nose and mouth without ulcers or lesions  NECK: no adenopathy, no asymmetry, masses, or scars and thyroid normal to palpation  RESP: lungs clear to auscultation - no rales, rhonchi or wheezes  CV: regular rate and rhythm, normal S1 S2, no S3 or S4, no murmur, click or rub, no peripheral edema and peripheral pulses strong  ABDOMEN: soft, nontender, no hepatosplenomegaly, no masses and bowel sounds normal  MS: no gross musculoskeletal defects noted, no edema  DM foot exam: pt declined. Was done just recently at neuropathy clinic.    Diagnostic Test Results:  Component      Latest Ref Rng & Units 5/24/2019   PSA      0 - 4 ug/L <0.01   TSH      0.40 - 4.00 mU/L 2.48   Hemoglobin A1C      0 - 5.6 % 5.9 (H)       ASSESSMENT / PLAN:       ICD-10-CM    1. Encounter for Medicare annual wellness exam Z00.00    2. Hypertension goal BP (blood pressure) < 140/90 I10 amLODIPine (NORVASC) 10 MG tablet   3. Type 2 diabetes mellitus with diabetic neuropathy, without long-term current use of insulin (H) E11.40 glimepiride (AMARYL) 4 MG tablet     Hemoglobin A1c     Albumin Random Urine Quantitative with Creat Ratio   4. Hypothyroidism due to acquired atrophy of thyroid E03.4 levothyroxine (SYNTHROID/LEVOTHROID) 75 MCG tablet   5. Benign prostatic hyperplasia without lower urinary tract symptoms N40.0 tamsulosin (FLOMAX) 0.4 MG capsule   6. Type 2 diabetes mellitus without complication, without long-term current use of insulin (H) E11.9 blood glucose (ACCU-CHEK SMARTVIEW) test strip     blood glucose (NO BRAND SPECIFIED) lancets standard     -- stable chronic health issues. Medications refilled. Due for diabetes labs in 1 " "month. Pt plans to do them with oncology labs in December.   -- return in 6 months for diabetes follow up.     End of Life Planning:  Patient currently has an advanced directive: Yes.  Practitioner is supportive of decision.    COUNSELING:  Reviewed preventive health counseling, as reflected in patient instructions    Estimated body mass index is 29.75 kg/m  as calculated from the following:    Height as of 5/16/19: 1.702 m (5' 7.01\").    Weight as of 9/5/19: 86.2 kg (190 lb).         reports that he has never smoked. He has never used smokeless tobacco.      Appropriate preventive services were discussed with this patient, including applicable screening as appropriate for cardiovascular disease, diabetes, osteopenia/osteoporosis, and glaucoma.  As appropriate for age/gender, discussed screening for colorectal cancer, prostate cancer, breast cancer, and cervical cancer. Checklist reviewing preventive services available has been given to the patient.    Reviewed patients plan of care and provided an AVS. The Basic Care Plan (routine screening as documented in Health Maintenance) for Milo meets the Care Plan requirement. This Care Plan has been established and reviewed with the Patient.    Counseling Resources:  ATP IV Guidelines  Pooled Cohorts Equation Calculator  Breast Cancer Risk Calculator  FRAX Risk Assessment  ICSI Preventive Guidelines  Dietary Guidelines for Americans, 2010  USDA's MyPlate  ASA Prophylaxis  Lung CA Screening    Laurence Ibarra MD PhD  [facility]  "

## 2019-10-16 NOTE — PATIENT INSTRUCTIONS
Make appointment(s) for:   -- diabetes follow up in 6 months with fasting labs.   -- cancel 11/15/ lab appointment. Do all non fasting labs on 12/5/2019.       Check with your pharmacy/insurance regarding coverage for Shingrix (RZV) - the new shingles vaccine.     Medication(s) prescribed today:    Orders Placed This Encounter   Medications     amLODIPine (NORVASC) 10 MG tablet     Sig: Take 1 tablet (10 mg) by mouth daily     Dispense:  90 tablet     Refill:  3     glimepiride (AMARYL) 4 MG tablet     Sig: Take 2 tablets (8 mg) by mouth every morning (before breakfast)     Dispense:  180 tablet     Refill:  1     levothyroxine (SYNTHROID/LEVOTHROID) 75 MCG tablet     Sig: TAKE 1 TABLET EVERY DAY (PLEASE STOP THE 50 MCG TABLET)     Dispense:  90 tablet     Refill:  3     tamsulosin (FLOMAX) 0.4 MG capsule     Sig: TAKE 1 CAPSULE EVERY DAY     Dispense:  90 capsule     Refill:  3     blood glucose (ACCU-CHEK SMARTVIEW) test strip     Sig: TEST BLOOD SUGAR TWICE DAILY  OR AS DIRECTED     Dispense:  200 strip     Refill:  3     blood glucose (NO BRAND SPECIFIED) lancets standard     Sig: Use to test blood sugar 2 times daily or as directed.     Dispense:  200 each     Refill:  3           Patient Education   Personalized Prevention Plan  You are due for the preventive services outlined below.  Your care team is available to assist you in scheduling these services.  If you have already completed any of these items, please share that information with your care team to update in your medical record.  Health Maintenance Due   Topic Date Due     Zoster (Shingles) Vaccine (1 of 2) 04/09/1987     Annual Wellness Visit  10/22/2014     Diabetic Foot Exam  06/27/2019     Flu Vaccine (1) 09/01/2019     Kidney Microalbumin Urine Test  09/26/2019     Discuss Advance Care Planning  10/17/2019

## 2019-10-24 ENCOUNTER — TELEPHONE (OUTPATIENT)
Dept: PEDIATRICS | Facility: CLINIC | Age: 84
End: 2019-10-24

## 2019-10-24 NOTE — TELEPHONE ENCOUNTER
Called the patient for more information. States that he received two medications, levothyroxine and flomax at 30 day supply instead of 90 day supply. Per chart review, this has been updated to 90 day supply and resent to pharmacy. Previous 30 day refill was due to alessandro refill.     Patient verbalized understanding, no further questions.     Mily Rosario RN, BSN, PHN  .[facility]

## 2019-10-24 NOTE — TELEPHONE ENCOUNTER
[facility] Call Center    Phone Message    May a detailed message be left on voicemail: yes    Reason for Call: Patient is requesting a call to discuss the quantity of each medication he received from 10/16/19.  He said he receive out of pocket cost and usually does not if the prescriptions are for 90 days.  Please advise.  Thank you.     Action Taken: Message routed to:  Primary Care p [phone]

## 2019-12-05 DIAGNOSIS — D58.0 ANEMIA DUE TO HEREDITARY SPHEROCYTOSIS (H): ICD-10-CM

## 2019-12-05 DIAGNOSIS — D59.9 ACQUIRED HEMOLYTIC ANEMIA (H): ICD-10-CM

## 2019-12-05 DIAGNOSIS — R16.2 HEPATOSPLENOMEGALY: ICD-10-CM

## 2019-12-05 DIAGNOSIS — E11.40 TYPE 2 DIABETES MELLITUS WITH DIABETIC NEUROPATHY, WITHOUT LONG-TERM CURRENT USE OF INSULIN (H): ICD-10-CM

## 2019-12-05 LAB
ALBUMIN SERPL-MCNC: 4.1 G/DL (ref 3.4–5)
ALP SERPL-CCNC: 61 U/L (ref 40–150)
ALT SERPL W P-5'-P-CCNC: 45 U/L (ref 0–70)
ANISOCYTOSIS BLD QL SMEAR: SLIGHT
AST SERPL W P-5'-P-CCNC: 25 U/L (ref 0–45)
BASOPHILS # BLD AUTO: 0.1 10E9/L (ref 0–0.2)
BASOPHILS NFR BLD AUTO: 1 %
BILIRUB DIRECT SERPL-MCNC: 0.3 MG/DL (ref 0–0.2)
BILIRUB SERPL-MCNC: 1.2 MG/DL (ref 0.2–1.3)
CREAT SERPL-MCNC: 1.43 MG/DL (ref 0.66–1.25)
CREAT UR-MCNC: 172 MG/DL
DIFFERENTIAL METHOD BLD: ABNORMAL
EOSINOPHIL # BLD AUTO: 0.1 10E9/L (ref 0–0.7)
EOSINOPHIL NFR BLD AUTO: 1 %
ERYTHROCYTE [DISTWIDTH] IN BLOOD BY AUTOMATED COUNT: 15.6 % (ref 10–15)
GFR SERPL CREATININE-BSD FRML MDRD: 45 ML/MIN/{1.73_M2}
HAPTOGLOB SERPL-MCNC: 92 MG/DL (ref 35–175)
HBA1C MFR BLD: 6.2 % (ref 0–5.6)
HCT VFR BLD AUTO: 35.5 % (ref 40–53)
HGB BLD-MCNC: 11.9 G/DL (ref 13.3–17.7)
LDH SERPL L TO P-CCNC: 182 U/L (ref 85–227)
LYMPHOCYTES # BLD AUTO: 1.3 10E9/L (ref 0.8–5.3)
LYMPHOCYTES NFR BLD AUTO: 25 %
MCH RBC QN AUTO: 31.3 PG (ref 26.5–33)
MCHC RBC AUTO-ENTMCNC: 33.5 G/DL (ref 31.5–36.5)
MCV RBC AUTO: 93 FL (ref 78–100)
MICROALBUMIN UR-MCNC: 145 MG/L
MICROALBUMIN/CREAT UR: 84.3 MG/G CR (ref 0–17)
MONOCYTES # BLD AUTO: 0.6 10E9/L (ref 0–1.3)
MONOCYTES NFR BLD AUTO: 12 %
MYELOCYTES # BLD: 0.2 10E9/L
MYELOCYTES NFR BLD MANUAL: 4 %
NEUTROPHILS # BLD AUTO: 2.7 10E9/L (ref 1.6–8.3)
NEUTROPHILS NFR BLD AUTO: 57 %
PLATELET # BLD AUTO: 86 10E9/L (ref 150–450)
PLATELET # BLD EST: ABNORMAL 10*3/UL
PROT SERPL-MCNC: 7.6 G/DL (ref 6.8–8.8)
RBC # BLD AUTO: 3.8 10E12/L (ref 4.4–5.9)
RETICS # AUTO: 191.1 10E9/L (ref 25–95)
RETICS/RBC NFR AUTO: 5 % (ref 0.5–2)
WBC # BLD AUTO: 5 10E9/L (ref 4–11)

## 2019-12-05 PROCEDURE — 36415 COLL VENOUS BLD VENIPUNCTURE: CPT | Performed by: INTERNAL MEDICINE

## 2019-12-05 PROCEDURE — 85045 AUTOMATED RETICULOCYTE COUNT: CPT | Performed by: INTERNAL MEDICINE

## 2019-12-05 PROCEDURE — 83615 LACTATE (LD) (LDH) ENZYME: CPT | Performed by: INTERNAL MEDICINE

## 2019-12-05 PROCEDURE — 85025 COMPLETE CBC W/AUTO DIFF WBC: CPT | Performed by: INTERNAL MEDICINE

## 2019-12-05 PROCEDURE — 82565 ASSAY OF CREATININE: CPT | Performed by: INTERNAL MEDICINE

## 2019-12-05 PROCEDURE — 82043 UR ALBUMIN QUANTITATIVE: CPT | Performed by: INTERNAL MEDICINE

## 2019-12-05 PROCEDURE — 99000 SPECIMEN HANDLING OFFICE-LAB: CPT | Performed by: INTERNAL MEDICINE

## 2019-12-05 PROCEDURE — 82955 ASSAY OF G6PD ENZYME: CPT | Mod: 90 | Performed by: INTERNAL MEDICINE

## 2019-12-05 PROCEDURE — 83036 HEMOGLOBIN GLYCOSYLATED A1C: CPT | Performed by: INTERNAL MEDICINE

## 2019-12-05 PROCEDURE — 83010 ASSAY OF HAPTOGLOBIN QUANT: CPT | Performed by: INTERNAL MEDICINE

## 2019-12-05 PROCEDURE — 80076 HEPATIC FUNCTION PANEL: CPT | Performed by: INTERNAL MEDICINE

## 2019-12-05 RX ORDER — GLIMEPIRIDE 4 MG/1
4 TABLET ORAL
Qty: 90 TABLET | Refills: 1 | Status: SHIPPED | OUTPATIENT
Start: 2019-12-05 | End: 2020-12-03

## 2019-12-05 NOTE — RESULT ENCOUNTER NOTE
Dear Alex,   Your recent lab results showed the following:  -- A1c is excellent.   -- reduce Amaryl to 1 tablet daily.   -- diabetes follow up with fasting labs next May    Please call or Mychart to our office if you have further questions.     Laurence Ibarra MD-PhD

## 2019-12-05 NOTE — RESULT ENCOUNTER NOTE
Dear Alex,   Your recent lab results showed the following:  -- urine protein is present again. This is likely related to chronic kidney disease.   -- continue with Losartan, which helps.   -- we'll recheck next year.     Please call or Mychart to our office if you have further questions.     Laurence Ibarra MD-PhD

## 2019-12-07 LAB — G6PD RBC-CCNC: 14.3 U/G HB (ref 9.9–16.6)

## 2019-12-11 ENCOUNTER — TELEPHONE (OUTPATIENT)
Dept: PEDIATRICS | Facility: CLINIC | Age: 84
End: 2019-12-11

## 2019-12-11 NOTE — TELEPHONE ENCOUNTER
Clarified that Amaryl's other name was Glimepiride. Patient verbalized understanding.    Sarah Mix RN

## 2019-12-11 NOTE — TELEPHONE ENCOUNTER
[facility] Call Center    Phone Message    May a detailed message be left on voicemail: yes    Reason for Call: Other: Patient called regarding my chart message about results. pt would like a call back to discuss the results and medicications. please advise     Action Taken: Message routed to:  Primary Care p [phone]

## 2019-12-17 ENCOUNTER — ONCOLOGY VISIT (OUTPATIENT)
Dept: ONCOLOGY | Facility: CLINIC | Age: 84
End: 2019-12-17
Payer: COMMERCIAL

## 2019-12-17 VITALS
SYSTOLIC BLOOD PRESSURE: 166 MMHG | BODY MASS INDEX: 30.32 KG/M2 | HEIGHT: 67 IN | OXYGEN SATURATION: 97 % | RESPIRATION RATE: 20 BRPM | DIASTOLIC BLOOD PRESSURE: 71 MMHG | HEART RATE: 68 BPM | WEIGHT: 193.2 LBS

## 2019-12-17 DIAGNOSIS — R70.1 RETICULOCYTOSIS: ICD-10-CM

## 2019-12-17 DIAGNOSIS — D69.6 THROMBOCYTOPENIA (H): ICD-10-CM

## 2019-12-17 DIAGNOSIS — G62.9 NEUROPATHY: ICD-10-CM

## 2019-12-17 DIAGNOSIS — D64.9 NORMOCHROMIC ANEMIA: Primary | ICD-10-CM

## 2019-12-17 LAB
BASOPHILS # BLD AUTO: 0.1 10E9/L (ref 0–0.2)
BASOPHILS NFR BLD AUTO: 0.8 %
DIFFERENTIAL METHOD BLD: ABNORMAL
EOSINOPHIL # BLD AUTO: 0.1 10E9/L (ref 0–0.7)
EOSINOPHIL NFR BLD AUTO: 0.9 %
ERYTHROCYTE [DISTWIDTH] IN BLOOD BY AUTOMATED COUNT: 15.8 % (ref 10–15)
HCT VFR BLD AUTO: 35.4 % (ref 40–53)
HGB BLD-MCNC: 11.8 G/DL (ref 13.3–17.7)
IMM GRANULOCYTES # BLD: 0.6 10E9/L (ref 0–0.4)
IMM GRANULOCYTES NFR BLD: 9.4 %
LYMPHOCYTES # BLD AUTO: 1.3 10E9/L (ref 0.8–5.3)
LYMPHOCYTES NFR BLD AUTO: 19.1 %
MCH RBC QN AUTO: 31.1 PG (ref 26.5–33)
MCHC RBC AUTO-ENTMCNC: 33.3 G/DL (ref 31.5–36.5)
MCV RBC AUTO: 93 FL (ref 78–100)
MONOCYTES # BLD AUTO: 1.2 10E9/L (ref 0–1.3)
MONOCYTES NFR BLD AUTO: 18.5 %
NEUTROPHILS # BLD AUTO: 3.4 10E9/L (ref 1.6–8.3)
NEUTROPHILS NFR BLD AUTO: 51.3 %
PLATELET # BLD AUTO: 85 10E9/L (ref 150–450)
RBC # BLD AUTO: 3.8 10E12/L (ref 4.4–5.9)
RETICS # AUTO: 202.9 10E9/L (ref 25–95)
RETICS/RBC NFR AUTO: 5.3 % (ref 0.5–2)
VIT B12 SERPL-MCNC: 1261 PG/ML (ref 193–986)
WBC # BLD AUTO: 6.6 10E9/L (ref 4–11)

## 2019-12-17 PROCEDURE — 36415 COLL VENOUS BLD VENIPUNCTURE: CPT | Performed by: INTERNAL MEDICINE

## 2019-12-17 PROCEDURE — 82607 VITAMIN B-12: CPT | Performed by: INTERNAL MEDICINE

## 2019-12-17 PROCEDURE — 99214 OFFICE O/P EST MOD 30 MIN: CPT | Performed by: INTERNAL MEDICINE

## 2019-12-17 PROCEDURE — 85025 COMPLETE CBC W/AUTO DIFF WBC: CPT | Performed by: INTERNAL MEDICINE

## 2019-12-17 PROCEDURE — 85045 AUTOMATED RETICULOCYTE COUNT: CPT | Performed by: INTERNAL MEDICINE

## 2019-12-17 ASSESSMENT — MIFFLIN-ST. JEOR: SCORE: 1535.1

## 2019-12-17 ASSESSMENT — PAIN SCALES - GENERAL: PAINLEVEL: NO PAIN (0)

## 2019-12-17 NOTE — NURSING NOTE
"Oncology Rooming Note    December 17, 2019 2:24 PM   Milo Lozano is a 84 year old male who presents for:    Chief Complaint   Patient presents with     Oncology Clinic Visit     Follow up     Initial Vitals: BP (!) 166/71 (BP Location: Right arm)   Pulse 68   Resp 20   Ht 1.702 m (5' 7.01\")   Wt 87.6 kg (193 lb 3.2 oz)   SpO2 97%   BMI 30.25 kg/m   Estimated body mass index is 30.25 kg/m  as calculated from the following:    Height as of this encounter: 1.702 m (5' 7.01\").    Weight as of this encounter: 87.6 kg (193 lb 3.2 oz). Body surface area is 2.04 meters squared.  No Pain (0) Comment: Data Unavailable   No LMP for male patient.  Allergies reviewed: Yes  Medications reviewed: Yes    Medications: Medication refills not needed today.  Pharmacy name entered into EPIC:    [facility] FA[facility] ONLY - NEW RX ONLY  [id number] - [place], [place] - [address]  [facility] [id number] [address] [employer] - [place] [address] [address]  [facility] - [facility] [address]              "

## 2019-12-17 NOTE — LETTER
12/17/2019         RE: Milo Lozano  [address]  [place] [address]        Dear Colleague,    Thank you for referring your patient, Milo Lozano, to the [facility]. Please see a copy of my visit note below.    Hematology Follow-up visit:  Date on this visit: Dec 17, 2019  Primary Care Physician: Nikki Fitzgeraldin    Pancytopenia   He was noted to be newly anemic on his cbc on 7/12/2017. When his CBC was repeated on 8/15/2017, he was mildly pancytopenic, with Hb of 12.1 g/dl, MCV of 93,  mild leucopenia and borderline thrombocytopenia.   Absolute differential counts were normal and his peripheral blood smear showed slight normochromic normocytic anemia; minimal poikilocytosis and slight thrombocytopenia with overall normal platelet morphology. There was polychromasia. Absolute reticulocyte count was elevated at 165.7. B12 was normal in 07/2017 at 494. Ferritin was elevated at 412. Creatinine was mildly elevated at 1.32. FERMIN screen was negative. TSH was normal.   PSA was undetectable in 07/2017. TSH was normal. Stool hemoccult was negative in 07/2017. He has never had a colonoscopy. UA showed no hematuria but did glucosuria and proteinuria. He still had persisted marked reticulocytosis in 09/2017 with absolute retic count of 143. He had a normal LDH, negative LAURA, normal haptoglobin. Peripheral blood smear on 9/5/17 showed normocytic slight anemia with slight morphologic evidence of increase in red cell regeneration with no morphologic evidence of hemolysis. There was reactive lymphocyte morphology and f/up blood smear was recommended.  No M protein was noted on serum immunofixation. He had f/up labs on 5/1/2018. Hb was 11.7 g/dl with normal MCV. WBC and platelet counts were low normal. Absolute retic count was still elevated at 143. Peripheral blood smear on 5/1/2018 showed Mild normochromic normocytic anemia. Adequate neutrophils with mild shift to immaturity. The lymphocyte  population shows a population   of both small mature lymphocytes, large granular lymphocytes and reactive-appearing lymphocytes.  The morphology of the platelets was overall normal including large platelets.  Both TSH and free T4 were mildly elevated.  US abdomen on 5/29/2018 showed findings c/w hepatosplenomegaly- hyperechoic liver, suspicious for fatty liver and enlarged spleen-  measuring 12.7 x 6.6 x 13.5 cm as well as gallstones.  CT scan of the chest abdomen and pelvis without IV contrast showed no evidence of splenomegaly on the CT, with spleen size measuring 11 cm.  There was borderline hepatomegaly and gallstones noted as well.   Osmotic fragility testing demonstrated mildly increased osmotic fragility. Occasional spherocytes were noted on peripheral blood smear. 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 HS can present in 9th decade. He has mild anemia with Hb >11 g/dl and gallstones.   He has no family history of anemia.   G6PD deficiency screen was negative. He was started on daily folic acid supplementation, 1 mg PO Qday.           History Of Present Illness:  Mr. Lozano is a 84 year old male who presents for f/up of pancytopenia.  His Hb and platelet count was slightly lower on 12/5 and we repeated the labs today and they are stable compared to two weeks ago, as detailed in the lab section below. He has had no bleeding or cruising symptoms. He does have s/o peripheral neuropathy in his feet and has been receiving subq B12 injections into his feet from a local clinic.   Peripheral blood smear on May 10, 2019 showed normochromic normocytic anemia with increased red cell regeneration and slight thrombocytopenia and with normal platelet morphology.  Granulocytes were mature and well granulated and not dysplastic.  No circulating blasts were seen. He has type 2 DM.  HbA1C was 6.2%. He follows with Dr. Ibarra.   In addition, a  complete 12 point  review of systems is negative.      Past Medical/Surgical History:  Past Medical History:   Diagnosis Date     BPH (benign prostatic hypertrophy)      CARDIOVASCULAR SCREENING; LDL GOAL LESS THAN 100 12/29/2011     CARDIOVASCULAR SCREENING; LDL GOAL LESS THAN 130 12/29/2011     Hypertension goal BP (blood pressure) < 140/90 12/29/2011     Hypothyroidism due to acquired atrophy of thyroid 12/5/2016     Prostate cancer (H) 1/2007    Had Radiation     Type 2 diabetes, HbA1c goal < 7% (H)    He has  peripheral neuropathy and has had laser treatments for it for 6 months.  FHx and SocHx reviewed.     Past Surgical History:   Procedure Laterality Date     COLONOSCOPY       Allergies:  Allergies as of 12/17/2019 - Reviewed 12/17/2019   Allergen Reaction Noted     Lisinopril Cough 06/25/2014     Current Medications:  Current Outpatient Medications   Medication Sig Dispense Refill     amLODIPine (NORVASC) 10 MG tablet Take 1 tablet (10 mg) by mouth daily 90 tablet 3     aspirin 81 MG tablet Take 1 tablet (81 mg) by mouth daily 90 tablet 3     blood glucose (ACCU-CHEK SMARTVIEW) test strip TEST BLOOD SUGAR TWICE DAILY  OR AS DIRECTED 200 strip 3     blood glucose (NO BRAND SPECIFIED) lancets standard Use to test blood sugar 2 times daily or as directed. 200 each 3     blood glucose calibration (NO BRAND SPECIFIED) solution Use to calibrate blood glucose monitor as needed as directed. 1 each 0     blood glucose monitoring (ACCU-CHEK MILADYS SMARTVIEW) meter device kit Use to test blood sugar 2 times daily or as directed. 1 kit 0     folic acid (FOLVITE) 1 MG tablet Take 1 tablet (1 mg) by mouth daily 90 tablet 3     glimepiride (AMARYL) 4 MG tablet Take 1 tablet (4 mg) by mouth every morning (before breakfast) 90 tablet 1     levothyroxine (SYNTHROID/LEVOTHROID) 75 MCG tablet TAKE 1 TABLET EVERY DAY (PLEASE STOP THE 50 MCG TABLET) 90 tablet 3     losartan (COZAAR) 100 MG tablet TAKE 1 TABLET EVERY DAY 91 tablet  "3     Omega-3 Fatty Acids (FISH OIL PO) Take  by mouth daily.       tamsulosin (FLOMAX) 0.4 MG capsule TAKE 1 CAPSULE EVERY DAY 90 capsule 3     blood glucose (NO BRAND SPECIFIED) test strip Use to test blood sugar 2 times daily or as directed. 200 each 3     blood glucose monitoring (NO BRAND SPECIFIED) meter device kit Use to test blood sugar 2 times daily. Brand per insurance. 1 kit 0     Cyanocobalamin (VITAMIN B-12 CR PO) Take 1 tablet by mouth 2 times daily       order for DME Equipment being ordered: HAPAD Metatarsal pad, QTY:2 2 Device 2     STATIN NOT PRESCRIBED, INTENTIONAL, 1 each continuous Statin not prescribed intentionally due to does not meet Ruthven criteria and Other: LDL at goal without the statin. 0 each 0      Physical Exam:  BP (!) 166/71 (BP Location: Right arm)   Pulse 68   Resp 20   Ht 1.702 m (5' 7.01\")   Wt 87.6 kg (193 lb 3.2 oz)   SpO2 97%   BMI 30.25 kg/m         GENERAL APPEARANCE:  alert and no distress     HENT: Mouth without ulcers or lesions     NECK: no adenopathy, no asymmetry or masses       RESP: lungs clear to auscultation - no rales, rhonchi or wheezes     CARDIOVASCULAR: regular rates and rhythm, normal S1 S2, no S3 or S4 and no murmur.     ABDOMEN: Obese, soft, nontender, and bowel sounds normal. hepatosplenomegaly difficult to evaluate secondary to body habitus     MUSCULOSKELETAL: extremities normal- no gross deformities noted, no evidence of inflammation in joints, FROM in all extremities. No edema b/l LE.     SKIN: no suspicious lesions or rashes     PSYCHIATRIC: mentation appears normal and affect normal    Laboratory/Imaging Studies    Results for L[facility]ON [facility] (MRN [id number]) as of 12/25/2019 20:03   Ref. Range 5/10/2019 09:14 12/5/2019 09:05 12/17/2019 15:27   WBC Latest Ref Range: 4.0 - 11.0 10e9/L 4.0 5.0 6.6   Hemoglobin Latest Ref Range: 13.3 - 17.7 g/dL 11.3 (L) 11.9 (L) 11.8 (L)   Hematocrit Latest Ref Range: 40.0 - 53.0 % 33.2 (L) 35.5 (L) 35.4 " (L)   Platelet Count Latest Ref Range: 150 - 450 10e9/L 103 (L) 86 (L) 85 (L)   RBC Count Latest Ref Range: 4.4 - 5.9 10e12/L 3.43 (L) 3.80 (L) 3.80 (L)   MCV Latest Ref Range: 78 - 100 fl 97 93 93   MCH Latest Ref Range: 26.5 - 33.0 pg 32.9 31.3 31.1   MCHC Latest Ref Range: 31.5 - 36.5 g/dL 34.0 33.5 33.3   RDW Latest Ref Range: 10.0 - 15.0 % 16.1 (H) 15.6 (H) 15.8 (H)   Diff Method Unknown Manual Differential Manual Differential Automated Method   % Neutrophils Latest Units: % 52.0 57.0 51.3   % Lymphocytes Latest Units: % 28.0 25.0 19.1   % Monocytes Latest Units: % 18.0 12.0 18.5   % Eosinophils Latest Units: % 1.0 1.0 0.9   % Basophils Latest Units: % 1.0 1.0 0.8   % Myelocytes Latest Units: %  4.0    % Immature Granulocytes Latest Units: %   9.4   Nucleated RBCs Latest Ref Range: 0 /100 1 (H)     Absolute Neutrophil Latest Ref Range: 1.6 - 8.3 10e9/L 2.1 2.7 3.4   Absolute Lymphocytes Latest Ref Range: 0.8 - 5.3 10e9/L 1.1 1.3 1.3   Absolute Monocytes Latest Ref Range: 0.0 - 1.3 10e9/L 0.7 0.6 1.2   Absolute Eosinophils Latest Ref Range: 0.0 - 0.7 10e9/L 0.0 0.1 0.1   Absolute Basophils Latest Ref Range: 0.0 - 0.2 10e9/L 0.0 0.1 0.1   Absolute Myelocytes Latest Ref Range: 0 10e9/L  0.2 (H)    Abs Immature Granulocytes Latest Ref Range: 0 - 0.4 10e9/L   0.6 (H)   Absolute Nucleated RBC Unknown 0.0     Anisocytosis Unknown Slight Slight    Platelet Estimate Unknown Automated count confirmed.  Platelet morphology is normal. Automated count confirmed.  Giant platelets are present.    % Retic Latest Ref Range: 0.5 - 2.0 % 5.6 (H) 5.0 (H) 5.3 (H)   Absolute Retic Latest Ref Range: 25 - 95 10e9/L 192.4 (H) 191.1 (H) 202.9 (H)     Results for L[facility]ON [facility] (MRN [id number]) as of 12/25/2019 20:03   Ref. Range 12/28/2017 08:01 5/1/2018 12:30 5/11/2018 15:22 5/10/2019 09:14 12/5/2019 09:05   Creatinine Latest Ref Range: 0.66 - 1.25 mg/dL 1.53 (H) 1.30 (H) 1.48 (H) 1.48 (H) 1.43 (H)       ASSESSMENT/PLAN:      Blake is a 84 year old man, with type 2 DM,  with normocytic anemia and transient borderline leucopenia and mild thrombocytopenia but persistent reticulocytosis.    1. Normocytic anemia, reticulocytosis, thrombocytopenia- He has mild anemia, and his thrombocytopenia is worsening. He also has marked reticulocytosis, slightly worsening. He is on aspirin and his platelet count is in the 80s and I will communicate with Dr. Ibarra to see if she would like him to remain on Aspirin which I believe is being used for primary prevention in the setting of DM. We did discuss increased bleeding risk on aspirin and he was recommended to seek immediate medical care with any new bleeding or bruising symptoms. He will be going to [place] in the next couple of weeks and returning in May. We'll see him back at that time.   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  Autoimmune hemolysis work-up was negative.  He does have hepatosplenomegaly which could be associated with pancytopenia.  Continue folic acid 1 mg daily.  His anemia is also at least partially related to anemia of kidney disease.       2. CKD- creat stable.    At the end of our visit patient verbalized understanding and concurred with the plan.            Again, thank you for allowing me to participate in the care of your patient.        Sincerely,        Karen Hernandez MD, MD

## 2019-12-17 NOTE — PROGRESS NOTES
Hematology Follow-up visit:  Date on this visit: Dec 17, 2019  Primary Care Physician: Laurence Fitzgerald    Pancytopenia   He was noted to be newly anemic on his cbc on 7/12/2017. When his CBC was repeated on 8/15/2017, he was mildly pancytopenic, with Hb of 12.1 g/dl, MCV of 93,  mild leucopenia and borderline thrombocytopenia.   Absolute differential counts were normal and his peripheral blood smear showed slight normochromic normocytic anemia; minimal poikilocytosis and slight thrombocytopenia with overall normal platelet morphology. There was polychromasia. Absolute reticulocyte count was elevated at 165.7. B12 was normal in 07/2017 at 494. Ferritin was elevated at 412. Creatinine was mildly elevated at 1.32. FERMIN screen was negative. TSH was normal.   PSA was undetectable in 07/2017. TSH was normal. Stool hemoccult was negative in 07/2017. He has never had a colonoscopy. UA showed no hematuria but did glucosuria and proteinuria. He still had persisted marked reticulocytosis in 09/2017 with absolute retic count of 143. He had a normal LDH, negative LAURA, normal haptoglobin. Peripheral blood smear on 9/5/17 showed normocytic slight anemia with slight morphologic evidence of increase in red cell regeneration with no morphologic evidence of hemolysis. There was reactive lymphocyte morphology and f/up blood smear was recommended.  No M protein was noted on serum immunofixation. He had f/up labs on 5/1/2018. Hb was 11.7 g/dl with normal MCV. WBC and platelet counts were low normal. Absolute retic count was still elevated at 143. Peripheral blood smear on 5/1/2018 showed Mild normochromic normocytic anemia. Adequate neutrophils with mild shift to immaturity. The lymphocyte population shows a population   of both small mature lymphocytes, large granular lymphocytes and reactive-appearing lymphocytes.  The morphology of the platelets was overall normal including large platelets.  Both TSH and free T4 were mildly elevated.  US  abdomen on 5/29/2018 showed findings c/w hepatosplenomegaly- hyperechoic liver, suspicious for fatty liver and enlarged spleen-  measuring 12.7 x 6.6 x 13.5 cm as well as gallstones.  CT scan of the chest abdomen and pelvis without IV contrast showed no evidence of splenomegaly on the CT, with spleen size measuring 11 cm.  There was borderline hepatomegaly and gallstones noted as well.   Osmotic fragility testing demonstrated mildly increased osmotic fragility. Occasional spherocytes were noted on peripheral blood smear. 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 HS can present in 9th decade. He has mild anemia with Hb >11 g/dl and gallstones.   He has no family history of anemia.   G6PD deficiency screen was negative. He was started on daily folic acid supplementation, 1 mg PO Qday.           History Of Present Illness:  Mr. Lozano is a 84 year old male who presents for f/up of pancytopenia.  His Hb and platelet count was slightly lower on 12/5 and we repeated the labs today and they are stable compared to two weeks ago, as detailed in the lab section below. He has had no bleeding or cruising symptoms. He does have s/o peripheral neuropathy in his feet and has been receiving subq B12 injections into his feet from a local clinic.   Peripheral blood smear on May 10, 2019 showed normochromic normocytic anemia with increased red cell regeneration and slight thrombocytopenia and with normal platelet morphology.  Granulocytes were mature and well granulated and not dysplastic.  No circulating blasts were seen. He has type 2 DM.  HbA1C was 6.2%. He follows with Dr. Ibarra.   In addition, a complete 12 point  review of systems is negative.      Past Medical/Surgical History:  Past Medical History:   Diagnosis Date     BPH (benign prostatic hypertrophy)      CARDIOVASCULAR SCREENING; LDL GOAL LESS THAN 100 12/29/2011     CARDIOVASCULAR SCREENING; LDL  GOAL LESS THAN 130 12/29/2011     Hypertension goal BP (blood pressure) < 140/90 12/29/2011     Hypothyroidism due to acquired atrophy of thyroid 12/5/2016     Prostate cancer (H) 1/2007    Had Radiation     Type 2 diabetes, HbA1c goal < 7% (H)    He has  peripheral neuropathy and has had laser treatments for it for 6 months.  FHx and SocHx reviewed.     Past Surgical History:   Procedure Laterality Date     COLONOSCOPY       Allergies:  Allergies as of 12/17/2019 - Reviewed 12/17/2019   Allergen Reaction Noted     Lisinopril Cough 06/25/2014     Current Medications:  Current Outpatient Medications   Medication Sig Dispense Refill     amLODIPine (NORVASC) 10 MG tablet Take 1 tablet (10 mg) by mouth daily 90 tablet 3     aspirin 81 MG tablet Take 1 tablet (81 mg) by mouth daily 90 tablet 3     blood glucose (ACCU-CHEK SMARTVIEW) test strip TEST BLOOD SUGAR TWICE DAILY  OR AS DIRECTED 200 strip 3     blood glucose (NO BRAND SPECIFIED) lancets standard Use to test blood sugar 2 times daily or as directed. 200 each 3     blood glucose calibration (NO BRAND SPECIFIED) solution Use to calibrate blood glucose monitor as needed as directed. 1 each 0     blood glucose monitoring (ACCU-CHEK MILADYS SMARTVIEW) meter device kit Use to test blood sugar 2 times daily or as directed. 1 kit 0     folic acid (FOLVITE) 1 MG tablet Take 1 tablet (1 mg) by mouth daily 90 tablet 3     glimepiride (AMARYL) 4 MG tablet Take 1 tablet (4 mg) by mouth every morning (before breakfast) 90 tablet 1     levothyroxine (SYNTHROID/LEVOTHROID) 75 MCG tablet TAKE 1 TABLET EVERY DAY (PLEASE STOP THE 50 MCG TABLET) 90 tablet 3     losartan (COZAAR) 100 MG tablet TAKE 1 TABLET EVERY DAY 91 tablet 3     Omega-3 Fatty Acids (FISH OIL PO) Take  by mouth daily.       tamsulosin (FLOMAX) 0.4 MG capsule TAKE 1 CAPSULE EVERY DAY 90 capsule 3     blood glucose (NO BRAND SPECIFIED) test strip Use to test blood sugar 2 times daily or as directed. 200 each 3      "blood glucose monitoring (NO BRAND SPECIFIED) meter device kit Use to test blood sugar 2 times daily. Brand per insurance. 1 kit 0     Cyanocobalamin (VITAMIN B-12 CR PO) Take 1 tablet by mouth 2 times daily       order for DME Equipment being ordered: HAPAD Metatarsal pad, QTY:2 2 Device 2     STATIN NOT PRESCRIBED, INTENTIONAL, 1 each continuous Statin not prescribed intentionally due to does not meet [place] criteria and Other: LDL at goal without the statin. 0 each 0      Physical Exam:  BP (!) 166/71 (BP Location: Right arm)   Pulse 68   Resp 20   Ht 1.702 m (5' 7.01\")   Wt 87.6 kg (193 lb 3.2 oz)   SpO2 97%   BMI 30.25 kg/m        GENERAL APPEARANCE:  alert and no distress     HENT: Mouth without ulcers or lesions     NECK: no adenopathy, no asymmetry or masses       RESP: lungs clear to auscultation - no rales, rhonchi or wheezes     CARDIOVASCULAR: regular rates and rhythm, normal S1 S2, no S3 or S4 and no murmur.     ABDOMEN: Obese, soft, nontender, and bowel sounds normal. hepatosplenomegaly difficult to evaluate secondary to body habitus     MUSCULOSKELETAL: extremities normal- no gross deformities noted, no evidence of inflammation in joints, FROM in all extremities. No edema b/l LE.     SKIN: no suspicious lesions or rashes     PSYCHIATRIC: mentation appears normal and affect normal    Laboratory/Imaging Studies    Results for [facility] (MRN [id number]) as of 12/25/2019 20:03   Ref. Range 5/10/2019 09:14 12/5/2019 09:05 12/17/2019 15:27   WBC Latest Ref Range: 4.0 - 11.0 10e9/L 4.0 5.0 6.6   Hemoglobin Latest Ref Range: 13.3 - 17.7 g/dL 11.3 (L) 11.9 (L) 11.8 (L)   Hematocrit Latest Ref Range: 40.0 - 53.0 % 33.2 (L) 35.5 (L) 35.4 (L)   Platelet Count Latest Ref Range: 150 - 450 10e9/L 103 (L) 86 (L) 85 (L)   RBC Count Latest Ref Range: 4.4 - 5.9 10e12/L 3.43 (L) 3.80 (L) 3.80 (L)   MCV Latest Ref Range: 78 - 100 fl 97 93 93   MCH Latest Ref Range: 26.5 - 33.0 pg 32.9 31.3 31.1   MCHC " Latest Ref Range: 31.5 - 36.5 g/dL 34.0 33.5 33.3   RDW Latest Ref Range: 10.0 - 15.0 % 16.1 (H) 15.6 (H) 15.8 (H)   Diff Method Unknown Manual Differential Manual Differential Automated Method   % Neutrophils Latest Units: % 52.0 57.0 51.3   % Lymphocytes Latest Units: % 28.0 25.0 19.1   % Monocytes Latest Units: % 18.0 12.0 18.5   % Eosinophils Latest Units: % 1.0 1.0 0.9   % Basophils Latest Units: % 1.0 1.0 0.8   % Myelocytes Latest Units: %  4.0    % Immature Granulocytes Latest Units: %   9.4   Nucleated RBCs Latest Ref Range: 0 /100 1 (H)     Absolute Neutrophil Latest Ref Range: 1.6 - 8.3 10e9/L 2.1 2.7 3.4   Absolute Lymphocytes Latest Ref Range: 0.8 - 5.3 10e9/L 1.1 1.3 1.3   Absolute Monocytes Latest Ref Range: 0.0 - 1.3 10e9/L 0.7 0.6 1.2   Absolute Eosinophils Latest Ref Range: 0.0 - 0.7 10e9/L 0.0 0.1 0.1   Absolute Basophils Latest Ref Range: 0.0 - 0.2 10e9/L 0.0 0.1 0.1   Absolute Myelocytes Latest Ref Range: 0 10e9/L  0.2 (H)    Abs Immature Granulocytes Latest Ref Range: 0 - 0.4 10e9/L   0.6 (H)   Absolute Nucleated RBC Unknown 0.0     Anisocytosis Unknown Slight Slight    Platelet Estimate Unknown Automated count confirmed.  Platelet morphology is normal. Automated count confirmed.  Giant platelets are present.    % Retic Latest Ref Range: 0.5 - 2.0 % 5.6 (H) 5.0 (H) 5.3 (H)   Absolute Retic Latest Ref Range: 25 - 95 10e9/L 192.4 (H) 191.1 (H) 202.9 (H)     Results for [facility] (MRN [id number]) as of 12/25/2019 20:03   Ref. Range 12/28/2017 08:01 5/1/2018 12:30 5/11/2018 15:22 5/10/2019 09:14 12/5/2019 09:05   Creatinine Latest Ref Range: 0.66 - 1.25 mg/dL 1.53 (H) 1.30 (H) 1.48 (H) 1.48 (H) 1.43 (H)       ASSESSMENT/PLAN:    Mr. Hsieh is a 84 year old man, with type 2 DM,  with normocytic anemia and transient borderline leucopenia and mild thrombocytopenia but persistent reticulocytosis.    1. Normocytic anemia, reticulocytosis, thrombocytopenia- He has mild anemia, and his  thrombocytopenia is worsening. He also has marked reticulocytosis, slightly worsening. He is on aspirin and his platelet count is in the 80s and I will communicate with Dr. Ibarra to see if she would like him to remain on Aspirin which I believe is being used for primary prevention in the setting of DM. We did discuss increased bleeding risk on aspirin and he was recommended to seek immediate medical care with any new bleeding or bruising symptoms. He will be going to [place] in the next couple of weeks and returning in May. We'll see him back at that time.   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  Autoimmune hemolysis work-up was negative.  He does have hepatosplenomegaly which could be associated with pancytopenia.  Continue folic acid 1 mg daily.  His anemia is also at least partially related to anemia of kidney disease.       2. CKD- creat stable.    At the end of our visit patient verbalized understanding and concurred with the plan.    Addendum:  I have communicated with Dr. Ibarra and she will contact the patient to stop ASA.

## 2020-01-10 ENCOUNTER — TELEPHONE (OUTPATIENT)
Dept: PEDIATRICS | Facility: CLINIC | Age: 85
End: 2020-01-10

## 2020-01-22 ENCOUNTER — TELEPHONE (OUTPATIENT)
Dept: PEDIATRICS | Facility: CLINIC | Age: 85
End: 2020-01-22

## 2020-01-22 NOTE — TELEPHONE ENCOUNTER
[facility] Call Center    Phone Message    May a detailed message be left on voicemail: yes    Reason for Call: Other: patient is transferring pharmacies and would like to have all new prescriptions sent to [facility] delivery, home delivery [phone] fax number is [phone] . Please advise    Action Taken: Message routed to:  Primary Care p [phone]

## 2020-01-27 ENCOUNTER — TELEPHONE (OUTPATIENT)
Dept: PEDIATRICS | Facility: CLINIC | Age: 85
End: 2020-01-27

## 2020-01-27 DIAGNOSIS — E11.40 TYPE 2 DIABETES MELLITUS WITH DIABETIC NEUROPATHY, WITHOUT LONG-TERM CURRENT USE OF INSULIN (H): Primary | ICD-10-CM

## 2020-01-27 NOTE — TELEPHONE ENCOUNTER
"[facility] Call Center    Phone Message    May a detailed message be left on voicemail: yes    Reason for Call: Medication Question or concern regarding medication   Prescription Clarification  Name of Medication: blood glucose (ACCU-CHEK SMARTVIEW) test strip [16828] (Order [id number])    Prescribing Provider: clare   Pharmacy:      [facility]      What on the order needs clarification? New insurance does not cover this brand, patient states he needs the brand covered by [employer]. please advise. Maybe \"contour next\", patient states he couldn't hear the insurance gal.         Please advise.    Action Taken: Message routed to:  Primary Care p [phone]  "

## 2020-01-28 DIAGNOSIS — D58.0 ANEMIA DUE TO HEREDITARY SPHEROCYTOSIS (H): ICD-10-CM

## 2020-01-28 DIAGNOSIS — E11.9 TYPE 2 DIABETES, HBA1C GOAL < 7% (H): ICD-10-CM

## 2020-01-28 DIAGNOSIS — I10 HYPERTENSION GOAL BP (BLOOD PRESSURE) < 140/90: ICD-10-CM

## 2020-01-28 DIAGNOSIS — Z13.6 CARDIOVASCULAR SCREENING; LDL GOAL LESS THAN 100: ICD-10-CM

## 2020-01-28 RX ORDER — FOLIC ACID 1 MG/1
1 TABLET ORAL DAILY
Qty: 90 TABLET | Refills: 3 | Status: SHIPPED | OUTPATIENT
Start: 2020-01-28 | End: 2020-05-14

## 2020-01-28 NOTE — TELEPHONE ENCOUNTER
ASPIRIN 81MG EC LOW DOSE TABLETS  Last Written Prescription Date:  05/23/2014  Last Fill Quantity: 90 TABLETS,  # refills: 3   Last office visit: 10/16/2019 with prescribing provider:    Last refill per Pharmacy   Future Office Visit:      Jose Martin Salvador CMA

## 2020-01-28 NOTE — TELEPHONE ENCOUNTER
FOLIC ACID 1MG TABLETS  Last Written Prescription Date:  05/29/2019  Last Fill Quantity: 90 TABLETS,  # refills: 3   Last office visit: 10/16/2019 with prescribing provider:    Last refill per Pharmacy   Future Office Visit:      Jose Martin Salvador CMA

## 2020-02-21 ENCOUNTER — TELEPHONE (OUTPATIENT)
Dept: PEDIATRICS | Facility: CLINIC | Age: 85
End: 2020-02-21

## 2020-02-21 DIAGNOSIS — E11.9 TYPE 2 DIABETES MELLITUS WITHOUT COMPLICATION, WITHOUT LONG-TERM CURRENT USE OF INSULIN (H): ICD-10-CM

## 2020-02-21 NOTE — TELEPHONE ENCOUNTER
[facility] Call Center    Phone Message    May a detailed message be left on voicemail: yes     Reason for Call: Ciara from Pharmcacy called regarding prescription for lancents. Please call back at [phone] to discuss.       Action Taken: Message routed to:  Primary Care p [phone]    Travel Screening: Not Applicable

## 2020-02-21 NOTE — TELEPHONE ENCOUNTER
[facility] is requesting a script for lancets. LOV- 10/16 says 6 months. Sent remaining refill to pharmacy.  Sarah Mix RN

## 2020-03-22 ENCOUNTER — TRANSFERRED RECORDS (OUTPATIENT)
Dept: HEALTH INFORMATION MANAGEMENT | Facility: CLINIC | Age: 85
End: 2020-03-22

## 2020-05-07 NOTE — PROGRESS NOTES
"Milo Lozano is a 85 year old male who is being evaluated via a billable telephone visit.      The patient has been notified of following:     \"This telephone visit will be conducted via a call between you and your physician/provider. We have found that certain health care needs can be provided without the need for a physical exam.  This service lets us provide the care you need with a short phone conversation.  If a prescription is necessary we can send it directly to your pharmacy.  If lab work is needed we can place an order for that and you can then stop by our lab to have the test done at a later time.    Telephone visits are billed at different rates depending on your insurance coverage. During this emergency period, for some insurers they may be billed the same as an in-person visit.  Please reach out to your insurance provider with any questions.    If during the course of the call the physician/provider feels a telephone visit is not appropriate, you will not be charged for this service.\"    Patient has given verbal consent for Telephone visit?  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 When his CBC was repeated on 8/15/2017, he was mildly pancytopenic, with Hb of 12.1 g/dl, MCV of 93,  mild leucopenia and borderline thrombocytopenia.   Absolute differential counts were normal and his peripheral blood smear showed slight normochromic normocytic anemia; minimal poikilocytosis and slight thrombocytopenia with overall normal platelet morphology. There was polychromasia. Absolute reticulocyte count was elevated at 165.7. B12 was normal in 07/2017 at 494. Ferritin was elevated at 412. Creatinine was mildly elevated at 1.32. FERMIN screen was negative. TSH was normal.   PSA was " undetectable in 07/2017. TSH was normal. Stool hemoccult was negative in 07/2017. He has never had a colonoscopy. UA showed no hematuria but did glucosuria and proteinuria. He still had persisted marked reticulocytosis in 09/2017 with absolute retic count of 143. He had a normal LDH, negative LAURA, normal haptoglobin. Peripheral blood smear on 9/5/17 showed normocytic slight anemia with slight morphologic evidence of increase in red cell regeneration with no morphologic evidence of hemolysis. There was reactive lymphocyte morphology and f/up blood smear was recommended.  No M protein was noted on serum immunofixation. He had f/up labs on 5/1/2018. Hb was 11.7 g/dl with normal MCV. WBC and platelet counts were low normal. Absolute retic count was still elevated at 143. Peripheral blood smear on 5/1/2018 showed Mild normochromic normocytic anemia. Adequate neutrophils with mild shift to immaturity. The lymphocyte population shows a population   of both small mature lymphocytes, large granular lymphocytes and reactive-appearing lymphocytes.  The morphology of the platelets was overall normal including large platelets.  Both TSH and free T4 were mildly elevated.  US abdomen on 5/29/2018 showed findings c/w hepatosplenomegaly- hyperechoic liver, suspicious for fatty liver and enlarged spleen-  measuring 12.7 x 6.6 x 13.5 cm as well as gallstones.  CT scan of the chest abdomen and pelvis without IV contrast showed no evidence of splenomegaly on the CT, with spleen size measuring 11 cm.  There was borderline hepatomegaly and gallstones noted as well.   Osmotic fragility testing demonstrated mildly increased osmotic fragility. Occasional spherocytes were noted on peripheral blood smear. 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 HS can present in 9th decade. He has mild anemia with Hb >11 g/dl and gallstones.   He has no family history of anemia.    G6PD deficiency screen was negative. He was started on daily folic acid supplementation, 1 mg PO Qday.       2. Prostate cancer- treated with XRT in 2007. PSA remains undetectable, last <0.01 in May 2019.    History Of Present Illness:  Mr. Hsieh is a 84 year old male who presents for f/up of pancytopenia. Has mildly increased osmotic fragility, possibly late onset mild HS. WBC is normal. Today has metamyelocytes, myelocytes and granulocytes noted on diff.   Hb stable as below:  Results for [facility] (MRN [id number]) as of 5/14/2020 14:24   Ref. Range 5/10/2019 09:14 12/5/2019 09:05 12/17/2019 15:27 5/14/2020 09:28   Hemoglobin Latest Ref Range: 13.3 - 17.7 g/dL 11.3 (L) 11.9 (L) 11.8 (L) 12.1 (L)   MCV was normal.  Platelets stable in 80s int he last 6 months:  Results for [facility] (MRN [id number]) as of 5/14/2020 14:24   Ref. Range 5/1/2018 12:30 5/10/2019 09:14 12/5/2019 09:05 12/17/2019 15:27 5/14/2020 09:28   Platelet Count Latest Ref Range: 150 - 450 10e9/L 155 103 (L) 86 (L) 85 (L) 81 (L)   Creat stable. LDH is normal. LFTs normal except borderline direct marifer at 0.3.  Absolute retic count higher as below:  Results for [facility] (MRN [id number]) as of 5/14/2020 14:24   Ref. Range 5/1/2018 12:30 5/10/2019 09:14 12/5/2019 09:05 12/17/2019 15:27 5/14/2020 09:28   Absolute Retic Latest Ref Range: 25 - 95 10e9/L 143.5 (H) 192.4 (H) 191.1 (H) 202.9 (H) 241.1 (H)      Peripheral blood smear on May 10, 2019 showed normochromic normocytic anemia with increased red cell regeneration and slight thrombocytopenia and with normal platelet morphology.  Granulocytes were mature and well granulated and not dysplastic.  No circulating blasts were seen. Autoimmune hemolysis work-up was negative.  He does have borderline hepatosplenomegaly which could be associated with pancytopenia.    He does have s/o peripheral neuropathy in his feet.  He has type 2 DM.  HbA1C was 6.2% in 12/2019. He follows with   Fred.  He is back on aspirin 81 mg orally daily although previously per my discussion with Dr. Iabrra this could have been stopped in view of mild thrombocytopenia.  They spend winter in [place].  He has had no bruising or bleeding symptoms.  He has been feeling well.  In addition, a complete 12 point  review of systems is negative.      Past Medical/Surgical History:  Past Medical History:   Diagnosis Date     BPH (benign prostatic hypertrophy)      CARDIOVASCULAR SCREENING; LDL GOAL LESS THAN 100 12/29/2011     CARDIOVASCULAR SCREENING; LDL GOAL LESS THAN 130 12/29/2011     Hypertension goal BP (blood pressure) < 140/90 12/29/2011     Hypothyroidism due to acquired atrophy of thyroid 12/5/2016     Prostate cancer (H) 1/2007    Had Radiation     Type 2 diabetes, HbA1c goal < 7% (H)    He has  peripheral neuropathy and has had laser treatments for it for 6 months.  FHx and SocHx reviewed.     Past Surgical History:   Procedure Laterality Date     COLONOSCOPY       Allergies:  Allergies as of 05/14/2020 - Reviewed 12/17/2019   Allergen Reaction Noted     Lisinopril Cough 06/25/2014     Current Medications:  Current Outpatient Medications   Medication Sig Dispense Refill     amLODIPine (NORVASC) 10 MG tablet Take 1 tablet (10 mg) by mouth daily 90 tablet 3     aspirin (ASA) 81 MG tablet Take 1 tablet (81 mg) by mouth daily 90 tablet 3     blood glucose (ACCU-CHEK SMARTVIEW) test strip TEST BLOOD SUGAR TWICE DAILY  OR AS DIRECTED 200 strip 3     blood glucose (NO BRAND SPECIFIED) test strip Use to test blood sugar 2 times daily or as directed. To accompany: Blood Glucose Monitor Brands: per insurance. 200 strip 3     blood glucose monitoring (ACCU-CHEK MILADYS SMARTVIEW) meter device kit Use to test blood sugar 2 times daily or as directed. 1 kit 0     blood glucose monitoring (NO BRAND SPECIFIED) meter device kit Use to test blood sugar 2 times daily or as directed. Preferred blood glucose meter OR supplies to accompany: Blood  Glucose Monitor Brands: per insurance. 1 kit 0     blood glucose XX lancets standard Use to test blood sugar 2 times daily or as directed. 200 each 0     Cyanocobalamin (VITAMIN B-12 CR PO) Take 1 tablet by mouth 2 times daily       folic acid (FOLVITE) 1 MG tablet Take 1 tablet (1 mg) by mouth daily 90 tablet 3     glimepiride (AMARYL) 4 MG tablet Take 1 tablet (4 mg) by mouth every morning (before breakfast) 90 tablet 1     levothyroxine (SYNTHROID/LEVOTHROID) 75 MCG tablet TAKE 1 TABLET EVERY DAY (PLEASE STOP THE 50 MCG TABLET) 90 tablet 3     losartan (COZAAR) 100 MG tablet TAKE 1 TABLET EVERY DAY 91 tablet 3     Omega-3 Fatty Acids (FISH OIL PO) Take  by mouth daily.       order for DME Equipment being ordered: HAPAD Metatarsal pad, QTY:2 2 Device 2     STATIN NOT PRESCRIBED, INTENTIONAL, 1 each continuous Statin not prescribed intentionally due to does not meet [place] criteria and Other: LDL at goal without the statin. 0 each 0     tamsulosin (FLOMAX) 0.4 MG capsule TAKE 1 CAPSULE EVERY DAY 90 capsule 3      Physical Exam:  Constitutional: alert and in no apparent distress  PSYCH: Alert and oriented times 3; coherent speech, normal   rate and volume, able to articulate logical thoughts, able   to abstract reason, no tangential thoughts, no hallucinations   or delusions  His affect is normal  RESP: No cough, no audible wheezing, able to talk in full sentences  Remainder of exam unable to be completed due to telephone visits  The rest the comprehensive physical examination is deferred due to public health emergency       Laboratory/Imaging Studies  Component      Latest Ref Rng & Units 5/14/2020   WBC      4.0 - 11.0 10e9/L 6.5   RBC Count      4.4 - 5.9 10e12/L 3.92 (L)   Hemoglobin      13.3 - 17.7 g/dL 12.1 (L)   Hematocrit      40.0 - 53.0 % 36.4 (L)   MCV      78 - 100 fl 93   MCH      26.5 - 33.0 pg 30.9   MCHC      31.5 - 36.5 g/dL 33.2   RDW      10.0 - 15.0 % 16.2 (H)   Platelet Count      150 - 450  10e9/L 81 (L)   % Neutrophils      % 57.0   % Lymphocytes      % 28.0   % Monocytes      % 4.0   % Basophils      % 2.0   % Metamyelocytes      % 5.0   % Myelocytes      % 4.0   Absolute Neutrophil      1.6 - 8.3 10e9/L 3.7   Absolute Lymphocytes      0.8 - 5.3 10e9/L 1.8   Absolute Monocytes      0.0 - 1.3 10e9/L 0.3   Absolute Basophils      0.0 - 0.2 10e9/L 0.1   Absolute Metamyelocytes      0 10e9/L 0.3 (H)   Absolute Myelocytes      0 10e9/L 0.3 (H)   RBC Morphology       Normal   Platelet Estimate       Automated count confirmed.  Platelet morphology is normal.   Diff Method       Manual Differential   Bilirubin Direct      0.0 - 0.2 mg/dL 0.3 (H)   Bilirubin Total      0.2 - 1.3 mg/dL 1.3   Albumin      3.4 - 5.0 g/dL 4.2   Protein Total      6.8 - 8.8 g/dL 7.9   Alkaline Phosphatase      40 - 150 U/L 66   ALT      0 - 70 U/L 46   AST      0 - 45 U/L 27   Creatinine      0.66 - 1.25 mg/dL 1.30 (H)   GFR Estimate      >60 mL/min/1.73:m2 50 (L)   GFR Estimate If Black      >60 mL/min/1.73:m2 58 (L)   % Retic      0.5 - 2.0 % 6.2 (H)   Absolute Retic      25 - 95 10e9/L 241.1 (H)   Lactate Dehydrogenase      85 - 227 U/L 194     ASSESSMENT/PLAN:    Mr. Lozano is a 84 year old man, with type 2 DM,  with normocytic anemia and transient borderline leucopenia and mild thrombocytopenia but persistent reticulocytosis.    1. Normocytic anemia, reticulocytosis, thrombocytopenia- He has mild anemia stable, and his thrombocytopenia has worsened in the last year but stable in the last 6 months, with plt count in the 80s. Has immature cells on today's diff as above and we'll proceed with peripheral blood smear (this could not be added to this morning's labs since it is been over 4 hours since he had his labs drawn), and patient will return in 2 weeks to have repeat CBC deep and peripheral blood smear. He also has marked reticulocytosis,  Worsening.   May need to proceed with bone marrow biopsy for further evaluation  pending peripheral blood smear results.  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  Autoimmune hemolysis work-up was negative.  Continue folic acid 1 mg daily.  His anemia is also at least partially related to anemia of kidney disease.   We'll inform the patient of the results and recommendations and  f/up plan based on the results.       2. CKD- creat stable.    3.  Prostate cancer- treated with XRT in 2007. PSA remains undetectable, last <0.01 in May 2019.      At the end of our visit patient verbalized understanding and concurred with the plan.    Addendum:  Peripheral Blood Smear:   -Slight normochromic, normocytic anemia; increased erythrocyte   regeneration   -Slight neutrophilic left shift with rare cells suspicious for circulating    blasts   -Moderate thrombocytopenia  Given left shift and thrombocytopenia, we'll proceed with a bone marrow biopsy for further evaluation.  Component      Latest Ref Rng & Units 5/28/2020   WBC      4.0 - 11.0 10e9/L 8.0   RBC Count      4.4 - 5.9 10e12/L 4.00 (L)   Hemoglobin      13.3 - 17.7 g/dL 12.3 (L)   Hematocrit      40.0 - 53.0 % 37.2 (L)   MCV      78 - 100 fl 93   MCH      26.5 - 33.0 pg 30.8   MCHC      31.5 - 36.5 g/dL 33.1   RDW      10.0 - 15.0 % 16.3 (H)   Platelet Count      150 - 450 10e9/L 74 (L)   % Neutrophils      % 51.0   % Lymphocytes      % 32.0   % Monocytes      % 11.0   % Basophils      % 1.0   % Metamyelocytes      % 1.0   % Myelocytes      % 4.0   Absolute Neutrophil      1.6 - 8.3 10e9/L 4.0   Absolute Lymphocytes      0.8 - 5.3 10e9/L 2.6   Absolute Monocytes      0.0 - 1.3 10e9/L 0.9   Absolute Basophils      0.0 - 0.2 10e9/L 0.1   Absolute Metamyelocytes      0 10e9/L 0.1 (H)   Absolute Myelocytes      0 10e9/L 0.3 (H)   RBC Morphology       Normal   Platelet Estimate       Automated count confirmed.  Platelet morphology is normal.   Diff Method       Manual Differential   % Retic      0.5 - 2.0 % 6.0  (H)   Absolute Retic      25 - 95 10e9/L 240.4 (H)

## 2020-05-14 ENCOUNTER — VIRTUAL VISIT (OUTPATIENT)
Dept: ONCOLOGY | Facility: CLINIC | Age: 85
End: 2020-05-14
Attending: INTERNAL MEDICINE
Payer: COMMERCIAL

## 2020-05-14 DIAGNOSIS — R79.89 ABNORMAL COMPLETE BLOOD COUNT: Primary | ICD-10-CM

## 2020-05-14 DIAGNOSIS — D64.9 NORMOCHROMIC ANEMIA: ICD-10-CM

## 2020-05-14 DIAGNOSIS — D58.0 ANEMIA DUE TO HEREDITARY SPHEROCYTOSIS (H): ICD-10-CM

## 2020-05-14 DIAGNOSIS — R70.1 RETICULOCYTOSIS: ICD-10-CM

## 2020-05-14 DIAGNOSIS — D69.6 THROMBOCYTOPENIA (H): ICD-10-CM

## 2020-05-14 LAB
ALBUMIN SERPL-MCNC: 4.2 G/DL (ref 3.4–5)
ALP SERPL-CCNC: 66 U/L (ref 40–150)
ALT SERPL W P-5'-P-CCNC: 46 U/L (ref 0–70)
AST SERPL W P-5'-P-CCNC: 27 U/L (ref 0–45)
BASOPHILS # BLD AUTO: 0.1 10E9/L (ref 0–0.2)
BASOPHILS NFR BLD AUTO: 2 %
BILIRUB DIRECT SERPL-MCNC: 0.3 MG/DL (ref 0–0.2)
BILIRUB SERPL-MCNC: 1.3 MG/DL (ref 0.2–1.3)
CREAT SERPL-MCNC: 1.3 MG/DL (ref 0.66–1.25)
DIFFERENTIAL METHOD BLD: ABNORMAL
ERYTHROCYTE [DISTWIDTH] IN BLOOD BY AUTOMATED COUNT: 16.2 % (ref 10–15)
GFR SERPL CREATININE-BSD FRML MDRD: 50 ML/MIN/{1.73_M2}
HCT VFR BLD AUTO: 36.4 % (ref 40–53)
HGB BLD-MCNC: 12.1 G/DL (ref 13.3–17.7)
LDH SERPL L TO P-CCNC: 194 U/L (ref 85–227)
LYMPHOCYTES # BLD AUTO: 1.8 10E9/L (ref 0.8–5.3)
LYMPHOCYTES NFR BLD AUTO: 28 %
MCH RBC QN AUTO: 30.9 PG (ref 26.5–33)
MCHC RBC AUTO-ENTMCNC: 33.2 G/DL (ref 31.5–36.5)
MCV RBC AUTO: 93 FL (ref 78–100)
METAMYELOCYTES # BLD: 0.3 10E9/L
METAMYELOCYTES NFR BLD MANUAL: 5 %
MONOCYTES # BLD AUTO: 0.3 10E9/L (ref 0–1.3)
MONOCYTES NFR BLD AUTO: 4 %
MYELOCYTES # BLD: 0.3 10E9/L
MYELOCYTES NFR BLD MANUAL: 4 %
NEUTROPHILS # BLD AUTO: 3.7 10E9/L (ref 1.6–8.3)
NEUTROPHILS NFR BLD AUTO: 57 %
PLATELET # BLD AUTO: 81 10E9/L (ref 150–450)
PLATELET # BLD EST: ABNORMAL 10*3/UL
PROT SERPL-MCNC: 7.9 G/DL (ref 6.8–8.8)
RBC # BLD AUTO: 3.92 10E12/L (ref 4.4–5.9)
RBC MORPH BLD: NORMAL
RETICS # AUTO: 241.1 10E9/L (ref 25–95)
RETICS/RBC NFR AUTO: 6.2 % (ref 0.5–2)
WBC # BLD AUTO: 6.5 10E9/L (ref 4–11)

## 2020-05-14 PROCEDURE — 36415 COLL VENOUS BLD VENIPUNCTURE: CPT | Performed by: INTERNAL MEDICINE

## 2020-05-14 PROCEDURE — 82565 ASSAY OF CREATININE: CPT | Performed by: INTERNAL MEDICINE

## 2020-05-14 PROCEDURE — 85045 AUTOMATED RETICULOCYTE COUNT: CPT | Performed by: INTERNAL MEDICINE

## 2020-05-14 PROCEDURE — 99214 OFFICE O/P EST MOD 30 MIN: CPT | Mod: 95 | Performed by: INTERNAL MEDICINE

## 2020-05-14 PROCEDURE — 85025 COMPLETE CBC W/AUTO DIFF WBC: CPT | Performed by: INTERNAL MEDICINE

## 2020-05-14 PROCEDURE — 80076 HEPATIC FUNCTION PANEL: CPT | Performed by: INTERNAL MEDICINE

## 2020-05-14 PROCEDURE — 83615 LACTATE (LD) (LDH) ENZYME: CPT | Performed by: INTERNAL MEDICINE

## 2020-05-14 RX ORDER — FOLIC ACID 1 MG/1
1 TABLET ORAL DAILY
Qty: 90 TABLET | Refills: 3 | Status: SHIPPED | OUTPATIENT
Start: 2020-05-14 | End: 2020-10-06

## 2020-05-14 NOTE — NURSING NOTE
"Milo Lozano is a 85 year old male who is being evaluated via a billable telephone visit.      The patient has been notified of following:     \"This telephone visit will be conducted via a call between you and your physician/provider. We have found that certain health care needs can be provided without the need for a physical exam.  This service lets us provide the care you need with a short phone conversation.  If a prescription is necessary we can send it directly to your pharmacy.  If lab work is needed we can place an order for that and you can then stop by our lab to have the test done at a later time.    Telephone visits are billed at different rates depending on your insurance coverage. During this emergency period, for some insurers they may be billed the same as an in-person visit.  Please reach out to your insurance provider with any questions.    If during the course of the call the physician/provider feels a telephone visit is not appropriate, you will not be charged for this service.\"    Patient has given verbal consent for Telephone visit?  Yes    What phone number would you like to be contacted at? [phone]    How would you like to obtain your AVS? Jose Mlior    Patient has some pain in his feet due to his neuropathy.  He rates it at a 3.  The neuropathy in his hands and feet are making it a little more difficult to walk and button clothing.  He has intermittent swelling in his left calf.    NO Concerns    Jeni Irwin CMA      "

## 2020-05-14 NOTE — LETTER
"    5/14/2020         RE: Milo Lozano  [address] [facility] [id number]        Dear Colleague,    Thank you for referring your patient, Milo Lozano, to the [facility]. Please see a copy of my visit note below.    Milo Lozano is a 85 year old male who is being evaluated via a billable telephone visit.      The patient has been notified of following:     \"This telephone visit will be conducted via a call between you and your physician/provider. We have found that certain health care needs can be provided without the need for a physical exam.  This service lets us provide the care you need with a short phone conversation.  If a prescription is necessary we can send it directly to your pharmacy.  If lab work is needed we can place an order for that and you can then stop by our lab to have the test done at a later time.    Telephone visits are billed at different rates depending on your insurance coverage. During this emergency period, for some insurers they may be billed the same as an in-person visit.  Please reach out to your insurance provider with any questions.    If during the course of the call the physician/provider feels a telephone visit is not appropriate, you will not be charged for this service.\"    Patient has given verbal consent for Telephone visit?  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 When his CBC was repeated on 8/15/2017, he was mildly pancytopenic, with Hb of 12.1 g/dl, MCV of 93,  mild leucopenia and borderline thrombocytopenia.   Absolute differential counts were normal and his peripheral blood smear showed slight normochromic normocytic anemia; minimal poikilocytosis and slight thrombocytopenia with overall normal " platelet morphology. There was polychromasia. Absolute reticulocyte count was elevated at 165.7. B12 was normal in 07/2017 at 494. Ferritin was elevated at 412. Creatinine was mildly elevated at 1.32. FERMIN screen was negative. TSH was normal.   PSA was undetectable in 07/2017. TSH was normal. Stool hemoccult was negative in 07/2017. He has never had a colonoscopy. UA showed no hematuria but did glucosuria and proteinuria. He still had persisted marked reticulocytosis in 09/2017 with absolute retic count of 143. He had a normal LDH, negative LAURA, normal haptoglobin. Peripheral blood smear on 9/5/17 showed normocytic slight anemia with slight morphologic evidence of increase in red cell regeneration with no morphologic evidence of hemolysis. There was reactive lymphocyte morphology and f/up blood smear was recommended.  No M protein was noted on serum immunofixation. He had f/up labs on 5/1/2018. Hb was 11.7 g/dl with normal MCV. WBC and platelet counts were low normal. Absolute retic count was still elevated at 143. Peripheral blood smear on 5/1/2018 showed Mild normochromic normocytic anemia. Adequate neutrophils with mild shift to immaturity. The lymphocyte population shows a population   of both small mature lymphocytes, large granular lymphocytes and reactive-appearing lymphocytes.  The morphology of the platelets was overall normal including large platelets.  Both TSH and free T4 were mildly elevated.  US abdomen on 5/29/2018 showed findings c/w hepatosplenomegaly- hyperechoic liver, suspicious for fatty liver and enlarged spleen-  measuring 12.7 x 6.6 x 13.5 cm as well as gallstones.  CT scan of the chest abdomen and pelvis without IV contrast showed no evidence of splenomegaly on the CT, with spleen size measuring 11 cm.  There was borderline hepatomegaly and gallstones noted as well.   Osmotic fragility testing demonstrated mildly increased osmotic fragility. Occasional spherocytes were noted on peripheral  blood smear. 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 HS can present in 9th decade. He has mild anemia with Hb >11 g/dl and gallstones.   He has no family history of anemia.   G6PD deficiency screen was negative. He was started on daily folic acid supplementation, 1 mg PO Qday.       2. Prostate cancer- treated with XRT in 2007. PSA remains undetectable, last <0.01 in May 2019.    History Of Present Illness:  Mr. Hsieh is a 84 year old male who presents for f/up of pancytopenia. Has mildly increased osmotic fragility, possibly late onset mild HS. WBC is normal. Today has metamyelocytes, myelocytes and granulocytes noted on diff.   Hb stable as below:  Results for [facility] (MRN [id number]) as of 5/14/2020 14:24   Ref. Range 5/10/2019 09:14 12/5/2019 09:05 12/17/2019 15:27 5/14/2020 09:28   Hemoglobin Latest Ref Range: 13.3 - 17.7 g/dL 11.3 (L) 11.9 (L) 11.8 (L) 12.1 (L)   MCV was normal.  Platelets stable in 80s int he last 6 months:  Results for [facility] (MRN [id number]) as of 5/14/2020 14:24   Ref. Range 5/1/2018 12:30 5/10/2019 09:14 12/5/2019 09:05 12/17/2019 15:27 5/14/2020 09:28   Platelet Count Latest Ref Range: 150 - 450 10e9/L 155 103 (L) 86 (L) 85 (L) 81 (L)   Creat stable. LDH is normal. LFTs normal except borderline direct marifer at 0.3.  Absolute retic count higher as below:  Results for [facility] (MRN [id number]) as of 5/14/2020 14:24   Ref. Range 5/1/2018 12:30 5/10/2019 09:14 12/5/2019 09:05 12/17/2019 15:27 5/14/2020 09:28   Absolute Retic Latest Ref Range: 25 - 95 10e9/L 143.5 (H) 192.4 (H) 191.1 (H) 202.9 (H) 241.1 (H)      Peripheral blood smear on May 10, 2019 showed normochromic normocytic anemia with increased red cell regeneration and slight thrombocytopenia and with normal platelet morphology.  Granulocytes were mature and well granulated and not dysplastic.  No circulating blasts were seen.  Autoimmune hemolysis work-up was negative.  He does have borderline hepatosplenomegaly which could be associated with pancytopenia.    He does have s/o peripheral neuropathy in his feet.  He has type 2 DM.  HbA1C was 6.2% in 12/2019. He follows with Dr. Ibarra.  He is back on aspirin 81 mg orally daily although previously per my discussion with Dr. Ibarra this could have been stopped in view of mild thrombocytopenia.  They spend winter in [place].  He has had no bruising or bleeding symptoms.  He has been feeling well.  In addition, a complete 12 point  review of systems is negative.      Past Medical/Surgical History:  Past Medical History:   Diagnosis Date     BPH (benign prostatic hypertrophy)      CARDIOVASCULAR SCREENING; LDL GOAL LESS THAN 100 12/29/2011     CARDIOVASCULAR SCREENING; LDL GOAL LESS THAN 130 12/29/2011     Hypertension goal BP (blood pressure) < 140/90 12/29/2011     Hypothyroidism due to acquired atrophy of thyroid 12/5/2016     Prostate cancer (H) 1/2007    Had Radiation     Type 2 diabetes, HbA1c goal < 7% (H)    He has  peripheral neuropathy and has had laser treatments for it for 6 months.  FHx and SocHx reviewed.     Past Surgical History:   Procedure Laterality Date     COLONOSCOPY       Allergies:  Allergies as of 05/14/2020 - Reviewed 12/17/2019   Allergen Reaction Noted     Lisinopril Cough 06/25/2014     Current Medications:  Current Outpatient Medications   Medication Sig Dispense Refill     amLODIPine (NORVASC) 10 MG tablet Take 1 tablet (10 mg) by mouth daily 90 tablet 3     aspirin (ASA) 81 MG tablet Take 1 tablet (81 mg) by mouth daily 90 tablet 3     blood glucose (ACCU-CHEK SMARTVIEW) test strip TEST BLOOD SUGAR TWICE DAILY  OR AS DIRECTED 200 strip 3     blood glucose (NO BRAND SPECIFIED) test strip Use to test blood sugar 2 times daily or as directed. To accompany: Blood Glucose Monitor Brands: per insurance. 200 strip 3     blood glucose monitoring (ACCU-CHEK MILADYS SMARTVIEW) meter  device kit Use to test blood sugar 2 times daily or as directed. 1 kit 0     blood glucose monitoring (NO BRAND SPECIFIED) meter device kit Use to test blood sugar 2 times daily or as directed. Preferred blood glucose meter OR supplies to accompany: Blood Glucose Monitor Brands: per insurance. 1 kit 0     blood glucose XX lancets standard Use to test blood sugar 2 times daily or as directed. 200 each 0     Cyanocobalamin (VITAMIN B-12 CR PO) Take 1 tablet by mouth 2 times daily       folic acid (FOLVITE) 1 MG tablet Take 1 tablet (1 mg) by mouth daily 90 tablet 3     glimepiride (AMARYL) 4 MG tablet Take 1 tablet (4 mg) by mouth every morning (before breakfast) 90 tablet 1     levothyroxine (SYNTHROID/LEVOTHROID) 75 MCG tablet TAKE 1 TABLET EVERY DAY (PLEASE STOP THE 50 MCG TABLET) 90 tablet 3     losartan (COZAAR) 100 MG tablet TAKE 1 TABLET EVERY DAY 91 tablet 3     Omega-3 Fatty Acids (FISH OIL PO) Take  by mouth daily.       order for DME Equipment being ordered: HAPAD Metatarsal pad, QTY:2 2 Device 2     STATIN NOT PRESCRIBED, INTENTIONAL, 1 each continuous Statin not prescribed intentionally due to does not meet [place] criteria and Other: LDL at goal without the statin. 0 each 0     tamsulosin (FLOMAX) 0.4 MG capsule TAKE 1 CAPSULE EVERY DAY 90 capsule 3      Physical Exam:  Constitutional: alert and in no apparent distress  PSYCH: Alert and oriented times 3; coherent speech, normal   rate and volume, able to articulate logical thoughts, able   to abstract reason, no tangential thoughts, no hallucinations   or delusions  His affect is normal  RESP: No cough, no audible wheezing, able to talk in full sentences  Remainder of exam unable to be completed due to telephone visits  The rest the comprehensive physical examination is deferred due to public health emergency       Laboratory/Imaging Studies  Component      Latest Ref Rng & Units 5/14/2020   WBC      4.0 - 11.0 10e9/L 6.5   RBC Count      4.4 - 5.9  10e12/L 3.92 (L)   Hemoglobin      13.3 - 17.7 g/dL 12.1 (L)   Hematocrit      40.0 - 53.0 % 36.4 (L)   MCV      78 - 100 fl 93   MCH      26.5 - 33.0 pg 30.9   MCHC      31.5 - 36.5 g/dL 33.2   RDW      10.0 - 15.0 % 16.2 (H)   Platelet Count      150 - 450 10e9/L 81 (L)   % Neutrophils      % 57.0   % Lymphocytes      % 28.0   % Monocytes      % 4.0   % Basophils      % 2.0   % Metamyelocytes      % 5.0   % Myelocytes      % 4.0   Absolute Neutrophil      1.6 - 8.3 10e9/L 3.7   Absolute Lymphocytes      0.8 - 5.3 10e9/L 1.8   Absolute Monocytes      0.0 - 1.3 10e9/L 0.3   Absolute Basophils      0.0 - 0.2 10e9/L 0.1   Absolute Metamyelocytes      0 10e9/L 0.3 (H)   Absolute Myelocytes      0 10e9/L 0.3 (H)   RBC Morphology       Normal   Platelet Estimate       Automated count confirmed.  Platelet morphology is normal.   Diff Method       Manual Differential   Bilirubin Direct      0.0 - 0.2 mg/dL 0.3 (H)   Bilirubin Total      0.2 - 1.3 mg/dL 1.3   Albumin      3.4 - 5.0 g/dL 4.2   Protein Total      6.8 - 8.8 g/dL 7.9   Alkaline Phosphatase      40 - 150 U/L 66   ALT      0 - 70 U/L 46   AST      0 - 45 U/L 27   Creatinine      0.66 - 1.25 mg/dL 1.30 (H)   GFR Estimate      >60 mL/min/1.73:m2 50 (L)   GFR Estimate If Black      >60 mL/min/1.73:m2 58 (L)   % Retic      0.5 - 2.0 % 6.2 (H)   Absolute Retic      25 - 95 10e9/L 241.1 (H)   Lactate Dehydrogenase      85 - 227 U/L 194     ASSESSMENT/PLAN:    Mr. Lozano is a 84 year old man, with type 2 DM,  with normocytic anemia and transient borderline leucopenia and mild thrombocytopenia but persistent reticulocytosis.    1. Normocytic anemia, reticulocytosis, thrombocytopenia- He has mild anemia stable, and his thrombocytopenia has worsened in the last year but stable in the last 6 months, with plt count in the 80s. Has immature cells on today's diff as above and we'll proceed with peripheral blood smear (this could not be added to this morning's labs since it  is been over 4 hours since he had his labs drawn), and patient will return in 2 weeks to have repeat CBC deep and peripheral blood smear. He also has marked reticulocytosis,  Worsening.   May need to proceed with bone marrow biopsy for further evaluation pending peripheral blood smear results.  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  Autoimmune hemolysis work-up was negative.  Continue folic acid 1 mg daily.  His anemia is also at least partially related to anemia of kidney disease.   We'll inform the patient of the results and recommendations and  f/up plan based on the results.       2. CKD- creat stable.    3.  Prostate cancer- treated with XRT in 2007. PSA remains undetectable, last <0.01 in May 2019.      At the end of our visit patient verbalized understanding and concurred with the plan.          Again, thank you for allowing me to participate in the care of your patient.        Sincerely,        Karen Hernandez MD, MD

## 2020-05-27 ENCOUNTER — TRANSFERRED RECORDS (OUTPATIENT)
Dept: HEALTH INFORMATION MANAGEMENT | Facility: CLINIC | Age: 85
End: 2020-05-27

## 2020-05-28 DIAGNOSIS — R79.89 ABNORMAL COMPLETE BLOOD COUNT: ICD-10-CM

## 2020-05-28 LAB
BASOPHILS # BLD AUTO: 0.1 10E9/L (ref 0–0.2)
BASOPHILS NFR BLD AUTO: 1 %
DIFFERENTIAL METHOD BLD: ABNORMAL
ERYTHROCYTE [DISTWIDTH] IN BLOOD BY AUTOMATED COUNT: 16.3 % (ref 10–15)
HCT VFR BLD AUTO: 37.2 % (ref 40–53)
HGB BLD-MCNC: 12.3 G/DL (ref 13.3–17.7)
LYMPHOCYTES # BLD AUTO: 2.6 10E9/L (ref 0.8–5.3)
LYMPHOCYTES NFR BLD AUTO: 32 %
MCH RBC QN AUTO: 30.8 PG (ref 26.5–33)
MCHC RBC AUTO-ENTMCNC: 33.1 G/DL (ref 31.5–36.5)
MCV RBC AUTO: 93 FL (ref 78–100)
METAMYELOCYTES # BLD: 0.1 10E9/L
METAMYELOCYTES NFR BLD MANUAL: 1 %
MONOCYTES # BLD AUTO: 0.9 10E9/L (ref 0–1.3)
MONOCYTES NFR BLD AUTO: 11 %
MYELOCYTES # BLD: 0.3 10E9/L
MYELOCYTES NFR BLD MANUAL: 4 %
NEUTROPHILS # BLD AUTO: 4 10E9/L (ref 1.6–8.3)
NEUTROPHILS NFR BLD AUTO: 51 %
PLATELET # BLD AUTO: 74 10E9/L (ref 150–450)
PLATELET # BLD EST: ABNORMAL 10*3/UL
RBC # BLD AUTO: 4 10E12/L (ref 4.4–5.9)
RBC MORPH BLD: NORMAL
RETICS # AUTO: 240.4 10E9/L (ref 25–95)
RETICS/RBC NFR AUTO: 6 % (ref 0.5–2)
WBC # BLD AUTO: 8 10E9/L (ref 4–11)

## 2020-05-28 PROCEDURE — 85025 COMPLETE CBC W/AUTO DIFF WBC: CPT | Performed by: INTERNAL MEDICINE

## 2020-05-28 PROCEDURE — 85045 AUTOMATED RETICULOCYTE COUNT: CPT | Performed by: INTERNAL MEDICINE

## 2020-05-28 PROCEDURE — 36415 COLL VENOUS BLD VENIPUNCTURE: CPT | Performed by: INTERNAL MEDICINE

## 2020-05-28 PROCEDURE — 85060 BLOOD SMEAR INTERPRETATION: CPT | Performed by: INTERNAL MEDICINE

## 2020-05-29 LAB — COPATH REPORT: NORMAL

## 2020-06-02 ENCOUNTER — PATIENT OUTREACH (OUTPATIENT)
Dept: ONCOLOGY | Facility: CLINIC | Age: 85
End: 2020-06-02

## 2020-06-02 NOTE — PROGRESS NOTES
Call placed to patient to communicate his blood counts still show low platelets and immature white cells. Dr. Hernandez would like patient to proceed with a bone marrow biopsy to see why this might be the case. Explained bone marrow biopsy procedure to patient and he would like to think about it and discuss with his wife. Will contact patient later in the week.

## 2020-06-04 NOTE — PROGRESS NOTES
Call placed to patient to follow up on his decision to have the bone marrow biopsy. Patient had his wife get on the phone also. Wife encouraged patient to go forward with the procedure. Patient states he is overwhelmed as he is having cataract surgery this month.

## 2020-06-08 NOTE — PROGRESS NOTES
[address] [address]  [phone]  Dept: [phone]    PRE-OP EVALUATION:  Today's date: 2020    Milo Lozano (: 1935) presents for pre-operative evaluation assessment as requested by Dr. Hassan.  He requires evaluation and anesthesia risk assessment prior to undergoing surgery/procedure for treatment of Cataracts .    Proposed Surgery/ Procedure: cataract  Date of Surgery/ Procedure: 2020  Time of Surgery/ Procedure: [facility]  Hospital/Surgical Facility: [facility]  Fax number for surgical facility: [phone]  Primary Physician: Laurence Ibarra  Type of Anesthesia Anticipated: to be determined    Patient has a Health Care Directive or Living Will:  YES     1. NO - Do you have a history of heart attack, stroke, stent, bypass or surgery on an artery in the head, neck, heart or legs?  2. NO - Do you ever have any pain or discomfort in your chest?  3. NO - Do you have a history of  Heart Failure?  4. YES - ARE YOUR TROUBLED BY SHORTNESS OF BREATH WHEN WALKING ON THE LEVEL, UP A SLIGHT HILL OR AT NIGHT?   5. NO - Do you currently have a cold, bronchitis or other respiratory infection?  6. NO - Do you have a cough, shortness of breath or wheezing?  7. NO - Do you sometimes get pains in the calves of your legs when you walk?  8. NO - Do you or anyone in your family have previous history of blood clots?  9. NO - Do you or does anyone in your family have a serious bleeding problem such as prolonged bleeding following surgeries or cuts?  10. NO - Have you ever had problems with anemia or been told to take iron pills?  11. NO - Have you had any abnormal blood loss such as black, tarry or bloody stools, or abnormal vaginal bleeding?  12. NO - Have you ever had a blood transfusion?  13. NO - Have you or any of your relatives ever had problems with anesthesia?  14. YES - DO YOU HAVE SLEEP APNEA, EXCESSIVE SNORING OR DAYTIME DROWSINESS?   15. NO - Do you  have any prosthetic heart valves?  16. NO - Do you have prosthetic joints?  17. NO - Is there any chance that you may be pregnant?      HPI:     HPI related to upcoming procedure: Patient scheduled for the above surgery for treatment of bilateral cataract.    DIABETES - Patient has a longstanding history of Diabetes Type Type II . Patient is being treated with oral agents and denies significant side effects. Control has been good. Complicating factors include but are not limited to: hypertension.     ANEMIA - Patient has a recent history of moderate-severe anemia, which has not been symptomatic.     HYPERTENSION - Patient has longstanding history of HTN , currently denies any symptoms referable to elevated blood pressure. Specifically denies chest pain, palpitations, dyspnea, orthopnea, PND or peripheral edema. Blood pressure readings have been in normal range. Current medication regimen is as listed below. Patient denies any side effects of medication.     HYPOTHYROIDISM - Patient has a longstanding history of chronic Hypothyroidism. Patient has been doing well, noting no tremor, insomnia, hair loss or changes in skin texture. Continues to take medications as directed, without adverse reactions or side effects. Last TSH   Lab Results   Component Value Date    TSH 2.48 05/24/2019   .      RENAL INSUFFICIENCY - Patient has a longstanding history of moderate-severe chronic renal insufficiency.       MEDICAL HISTORY:     Patient Active Problem List    Diagnosis Date Noted     CKD (chronic kidney disease) stage 3, GFR 30-59 ml/min (H) 06/09/2020     Priority: Medium     Diabetic polyneuropathy associated with type 2 diabetes mellitus (H) 07/15/2017     Priority: Medium     DEMOND (obstructive sleep apnea) 03/06/2017     Priority: Medium     CPAP       Hypothyroidism due to acquired atrophy of thyroid 12/05/2016     Priority: Medium     White coat hypertension 10/13/2015     Priority: Medium     Diabetic peripheral  neuropathy (H) 06/19/2015     Priority: Medium     Advanced directives, counseling/discussion 10/17/2014     Priority: Medium     Advance Care Planning:   ACP Review and Resources Provided:  Reviewed chart for advance care plan.  Milo Lozano has no plan or code status on file. Discussed available resources and provided with information. Confirmed code status reflects current choices pending further ACP discussions.  Confirmed/documented designated decision maker(s). See permanent comments section of demographics in clinical tab.   Added by Sarita Santos on 10/17/2014             Diabetes type 2, controlled (H) 09/28/2014     Priority: Medium     Hyperlipidemia with target LDL less than 100 09/22/2014     Priority: Medium     Diagnosis updated by automated process. Provider to review and confirm.       Hypertension goal BP (blood pressure) < 140/90 12/29/2011     Priority: Medium     CARDIOVASCULAR SCREENING; LDL GOAL LESS THAN 100 12/29/2011     Priority: Medium     Prostate cancer (H) 01/01/2007     Priority: Medium     Diagnosed in 2007 in [place], Had Radiation        Past Medical History:   Diagnosis Date     BPH (benign prostatic hypertrophy)      CARDIOVASCULAR SCREENING; LDL GOAL LESS THAN 100 12/29/2011     CARDIOVASCULAR SCREENING; LDL GOAL LESS THAN 130 12/29/2011     Hypertension goal BP (blood pressure) < 140/90 12/29/2011     Hypothyroidism due to acquired atrophy of thyroid 12/5/2016     Prostate cancer (H) 1/2007    Had Radiation     Type 2 diabetes, HbA1c goal < 7% (H)      Past Surgical History:   Procedure Laterality Date     COLONOSCOPY       Current Outpatient Medications   Medication Sig Dispense Refill     amLODIPine (NORVASC) 10 MG tablet Take 1 tablet (10 mg) by mouth daily 90 tablet 3     aspirin (ASA) 81 MG tablet Take 1 tablet (81 mg) by mouth daily 90 tablet 3     blood glucose (ACCU-CHEK SMARTVIEW) test strip TEST BLOOD SUGAR TWICE DAILY  OR AS DIRECTED 200 strip 3     blood  glucose (NO BRAND SPECIFIED) test strip Use to test blood sugar 2 times daily or as directed. To accompany: Blood Glucose Monitor Brands: per insurance. 200 strip 3     blood glucose monitoring (ACCU-CHEK MILADYS SMARTVIEW) meter device kit Use to test blood sugar 2 times daily or as directed. 1 kit 0     blood glucose monitoring (NO BRAND SPECIFIED) meter device kit Use to test blood sugar 2 times daily or as directed. Preferred blood glucose meter OR supplies to accompany: Blood Glucose Monitor Brands: per insurance. 1 kit 0     blood glucose XX lancets standard Use to test blood sugar 2 times daily or as directed. 200 each 0     folic acid (FOLVITE) 1 MG tablet Take 1 tablet (1 mg) by mouth daily 90 tablet 3     glimepiride (AMARYL) 4 MG tablet Take 1 tablet (4 mg) by mouth every morning (before breakfast) 90 tablet 1     levothyroxine (SYNTHROID/LEVOTHROID) 75 MCG tablet TAKE 1 TABLET EVERY DAY (PLEASE STOP THE 50 MCG TABLET) 90 tablet 3     losartan (COZAAR) 100 MG tablet TAKE 1 TABLET EVERY DAY 91 tablet 3     tamsulosin (FLOMAX) 0.4 MG capsule TAKE 1 CAPSULE EVERY DAY 90 capsule 3     OTC products: None, except as noted above    Allergies   Allergen Reactions     Lisinopril Cough     Developed an ACE cough on the Lisinopril      Latex Allergy: NO    Social History     Tobacco Use     Smoking status: Never Smoker     Smokeless tobacco: Never Used   Substance Use Topics     Alcohol use: No     History   Drug Use No       REVIEW OF SYSTEMS:   CONSTITUTIONAL: NEGATIVE for fever, chills, change in weight  INTEGUMENTARY/SKIN: NEGATIVE for worrisome rashes, moles or lesions  EYES: As in HPI  ENT/MOUTH: NEGATIVE for ear, mouth and throat problems  RESP: NEGATIVE for significant cough or SOB  CV: NEGATIVE for chest pain, palpitations or peripheral edema  GI: NEGATIVE for nausea, abdominal pain, heartburn, or change in bowel habits  : NEGATIVE for frequency, dysuria, or hematuria  MUSCULOSKELETAL: NEGATIVE for  "significant arthralgias or myalgia  NEURO: NEGATIVE for weakness, dizziness or paresthesias  ENDOCRINE: NEGATIVE for temperature intolerance, skin/hair changes  HEME: NEGATIVE for bleeding problems  PSYCHIATRIC: NEGATIVE for changes in mood or affect    EXAM:   /80   Pulse 83   Temp 98.2  F (36.8  C) (Temporal)   Ht 1.702 m (5' 7\")   Wt 86.9 kg (191 lb 9.6 oz)   SpO2 99%   BMI 30.01 kg/m      GENERAL APPEARANCE: healthy, alert and no distress     EYES: EOMI,  PERRL     HENT: ear canals and TM's normal and nose and mouth without ulcers or lesions     NECK: no adenopathy, no asymmetry, masses, or scars and thyroid normal to palpation     RESP: lungs clear to auscultation - no rales, rhonchi or wheezes     CV: regular rates and rhythm, normal S1 S2, no S3 or S4 and no murmur, click or rub     ABDOMEN:  soft, nontender, no HSM or masses and bowel sounds normal     MS: extremities normal- no gross deformities noted, no evidence of inflammation in joints, FROM in all extremities.     SKIN: no suspicious lesions or rashes     NEURO: Normal strength and tone, sensory exam grossly normal, mentation intact and speech normal     PSYCH: mentation appears normal. and affect normal/bright     LYMPHATICS: No cervical adenopathy    DIAGNOSTICS:       Recent Labs   Lab Test 05/28/20  1024 05/14/20  0928  12/05/19  0905 05/24/19  1047 05/10/19  0914  05/01/18  1230   HGB 12.3* 12.1*   < > 11.9*  --  11.3*  --  11.7*   PLT 74* 81*   < > 86*  --  103*  --  155   NA  --   --   --   --   --  140  --  135   POTASSIUM  --   --   --   --   --  3.7  --  3.5   CR  --  1.30*  --  1.43*  --  1.48*   < > 1.30*   A1C  --   --   --  6.2* 5.9* 6.0*   < > 8.0*    < > = values in this interval not displayed.        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    REVISED CARDIAC RISK INDEX  The patient has the following serious cardiovascular risks for perioperative complications such as (MI, PE, VFib and 3  AV Block):  Diabetes Mellitus (on Insulin)  INTERPRETATION: 1 risks: Class II (low risk - 0.9% complication rate)    The patient has the following additional risks for perioperative complications:  No identified additional risks        RECOMMENDATIONS:       Anemia  Anemia and does not require treatment prior to surgery.  Monitor Hemoglobin postoperatively.      --Patient is to take all scheduled medications on the day of surgery EXCEPT for modifications listed below.  DO NOT TAKE GLIMEPIRIDE ( AMARYL) MORNING OF PROCEDURE 6/24  HOLD ASA 81 MG 5 DAYS PRIOR TO SURGERY  COVID SCREEN 3 DAYS PRIOR TO SURGERY done at [facility] [place], [address] [address] Call for appointment [phone]  APPROVAL GIVEN to proceed with proposed procedure       Signed Electronically by: Deep Rico MD    Copy of this evaluation report is provided to requesting physician.    [facility] Preop Guidelines    Revised Cardiac Risk Index

## 2020-06-08 NOTE — PATIENT INSTRUCTIONS
Before Your Surgery      Call your surgeon if there is any change in your health. This includes signs of a cold or flu (such as a sore throat, runny nose, cough, rash or fever).    Do not smoke, drink alcohol or take over the counter medicine (unless your surgeon or primary care doctor tells you to) for the 24 hours before and after surgery.    If you take prescribed drugs: Follow your doctor s orders about which medicines to take and which to stop until after surgery.    Eating and drinking prior to surgery: follow the instructions from your surgeon    Take a shower or bath the night before surgery. Use the soap your surgeon gave you to gently clean your skin. If you do not have soap from your surgeon, use your regular soap. Do not shave or scrub the surgery site.  Wear clean pajamas and have clean sheets on your bed.       DO NOT TAKE GLIMEPIRIDE ( AMARYL) MORNING OF PROCEDURE 6/24  HOLD ASA 81 MG 5 DAYS PRIOR TO SURGERY  COVID SCREEN 3 DAYS PRIOR TO SURGERY done at [facility] [address] Call for appointment [phone]

## 2020-06-09 ENCOUNTER — OFFICE VISIT (OUTPATIENT)
Dept: PEDIATRICS | Facility: CLINIC | Age: 85
End: 2020-06-09
Payer: COMMERCIAL

## 2020-06-09 DIAGNOSIS — N18.30 CKD (CHRONIC KIDNEY DISEASE) STAGE 3, GFR 30-59 ML/MIN (H): ICD-10-CM

## 2020-06-09 DIAGNOSIS — E11.9 TYPE 2 DIABETES MELLITUS WITHOUT COMPLICATION, WITHOUT LONG-TERM CURRENT USE OF INSULIN (H): ICD-10-CM

## 2020-06-09 DIAGNOSIS — E78.5 HYPERLIPIDEMIA WITH TARGET LDL LESS THAN 100: ICD-10-CM

## 2020-06-09 DIAGNOSIS — Z01.818 PREOP GENERAL PHYSICAL EXAM: Primary | ICD-10-CM

## 2020-06-09 DIAGNOSIS — I10 HYPERTENSION GOAL BP (BLOOD PRESSURE) < 140/90: ICD-10-CM

## 2020-06-09 DIAGNOSIS — D58.0 ANEMIA DUE TO HEREDITARY SPHEROCYTOSIS (H): ICD-10-CM

## 2020-06-09 DIAGNOSIS — E03.4 HYPOTHYROIDISM DUE TO ACQUIRED ATROPHY OF THYROID: ICD-10-CM

## 2020-06-09 LAB — HBA1C MFR BLD: 6.9 % (ref 0–5.6)

## 2020-06-09 PROCEDURE — 99215 OFFICE O/P EST HI 40 MIN: CPT | Performed by: FAMILY MEDICINE

## 2020-06-09 PROCEDURE — 83036 HEMOGLOBIN GLYCOSYLATED A1C: CPT | Performed by: FAMILY MEDICINE

## 2020-06-09 PROCEDURE — 36415 COLL VENOUS BLD VENIPUNCTURE: CPT | Performed by: FAMILY MEDICINE

## 2020-06-09 ASSESSMENT — MIFFLIN-ST. JEOR: SCORE: 1522.72

## 2020-06-09 ASSESSMENT — PAIN SCALES - GENERAL: PAINLEVEL: NO PAIN (0)

## 2020-06-11 VITALS
OXYGEN SATURATION: 99 % | DIASTOLIC BLOOD PRESSURE: 80 MMHG | HEART RATE: 83 BPM | TEMPERATURE: 98.2 F | HEIGHT: 67 IN | WEIGHT: 191.6 LBS | BODY MASS INDEX: 30.07 KG/M2 | SYSTOLIC BLOOD PRESSURE: 130 MMHG

## 2020-06-19 ENCOUNTER — OFFICE VISIT (OUTPATIENT)
Dept: ONCOLOGY | Facility: CLINIC | Age: 85
End: 2020-06-19
Attending: PHYSICIAN ASSISTANT
Payer: COMMERCIAL

## 2020-06-19 VITALS
DIASTOLIC BLOOD PRESSURE: 84 MMHG | SYSTOLIC BLOOD PRESSURE: 181 MMHG | OXYGEN SATURATION: 98 % | HEART RATE: 89 BPM | TEMPERATURE: 98 F | RESPIRATION RATE: 16 BRPM

## 2020-06-19 DIAGNOSIS — D59.9 ACQUIRED HEMOLYTIC ANEMIA (H): Primary | ICD-10-CM

## 2020-06-19 DIAGNOSIS — R79.89 ABNORMAL COMPLETE BLOOD COUNT: ICD-10-CM

## 2020-06-19 DIAGNOSIS — D69.6 THROMBOCYTOPENIA (H): ICD-10-CM

## 2020-06-19 LAB
ANISOCYTOSIS BLD QL SMEAR: SLIGHT
BASOPHILS # BLD AUTO: 0 10E9/L (ref 0–0.2)
BASOPHILS NFR BLD AUTO: 0 %
DACRYOCYTES BLD QL SMEAR: SLIGHT
DIFFERENTIAL METHOD BLD: ABNORMAL
EOSINOPHIL # BLD AUTO: 0 10E9/L (ref 0–0.7)
EOSINOPHIL NFR BLD AUTO: 0 %
ERYTHROCYTE [DISTWIDTH] IN BLOOD BY AUTOMATED COUNT: 16.2 % (ref 10–15)
HCT VFR BLD AUTO: 38.6 % (ref 40–53)
HGB BLD-MCNC: 12.6 G/DL (ref 13.3–17.7)
LYMPHOCYTES # BLD AUTO: 2 10E9/L (ref 0.8–5.3)
LYMPHOCYTES NFR BLD AUTO: 21.9 %
MCH RBC QN AUTO: 31.6 PG (ref 26.5–33)
MCHC RBC AUTO-ENTMCNC: 32.6 G/DL (ref 31.5–36.5)
MCV RBC AUTO: 97 FL (ref 78–100)
METAMYELOCYTES # BLD: 0.1 10E9/L
METAMYELOCYTES NFR BLD MANUAL: 0.9 %
MONOCYTES # BLD AUTO: 0.8 10E9/L (ref 0–1.3)
MONOCYTES NFR BLD AUTO: 8.8 %
MYELOCYTES # BLD: 0.6 10E9/L
MYELOCYTES NFR BLD MANUAL: 7 %
NEUTROPHILS # BLD AUTO: 5.5 10E9/L (ref 1.6–8.3)
NEUTROPHILS NFR BLD AUTO: 61.4 %
NRBC # BLD AUTO: 0.1 10*3/UL
NRBC BLD AUTO-RTO: 1 /100
PLATELET # BLD AUTO: 80 10E9/L (ref 150–450)
PLATELET # BLD EST: ABNORMAL 10*3/UL
POIKILOCYTOSIS BLD QL SMEAR: SLIGHT
POLYCHROMASIA BLD QL SMEAR: SLIGHT
RBC # BLD AUTO: 3.99 10E12/L (ref 4.4–5.9)
WBC # BLD AUTO: 9 10E9/L (ref 4–11)

## 2020-06-19 PROCEDURE — 88313 SPECIAL STAINS GROUP 2: CPT | Performed by: PHYSICIAN ASSISTANT

## 2020-06-19 PROCEDURE — 88271 CYTOGENETICS DNA PROBE: CPT | Performed by: INTERNAL MEDICINE

## 2020-06-19 PROCEDURE — 88161 CYTOPATH SMEAR OTHER SOURCE: CPT | Performed by: PHYSICIAN ASSISTANT

## 2020-06-19 PROCEDURE — 88264 CHROMOSOME ANALYSIS 20-25: CPT | Performed by: INTERNAL MEDICINE

## 2020-06-19 PROCEDURE — 40000951 ZZHCL STATISTIC BONE MARROW INTERP TC 85097: Performed by: PHYSICIAN ASSISTANT

## 2020-06-19 PROCEDURE — 00000161 ZZHCL STATISTIC H-SPHEME PROCESS B/S: Performed by: PHYSICIAN ASSISTANT

## 2020-06-19 PROCEDURE — 40000611 ZZHCL STATISTIC MORPHOLOGY W/INTERP HEMEPATH TC 85060: Performed by: PHYSICIAN ASSISTANT

## 2020-06-19 PROCEDURE — 40000795 ZZHCL STATISTIC DNA PROCESS AND HOLD: Performed by: INTERNAL MEDICINE

## 2020-06-19 PROCEDURE — 88280 CHROMOSOME KARYOTYPE STUDY: CPT | Performed by: INTERNAL MEDICINE

## 2020-06-19 PROCEDURE — 88185 FLOWCYTOMETRY/TC ADD-ON: CPT | Performed by: INTERNAL MEDICINE

## 2020-06-19 PROCEDURE — 88237 TISSUE CULTURE BONE MARROW: CPT | Performed by: INTERNAL MEDICINE

## 2020-06-19 PROCEDURE — 88184 FLOWCYTOMETRY/ TC 1 MARKER: CPT | Performed by: INTERNAL MEDICINE

## 2020-06-19 PROCEDURE — 38222 DX BONE MARROW BX & ASPIR: CPT | Mod: ZF | Performed by: PHYSICIAN ASSISTANT

## 2020-06-19 PROCEDURE — 88311 DECALCIFY TISSUE: CPT | Performed by: PHYSICIAN ASSISTANT

## 2020-06-19 PROCEDURE — 85025 COMPLETE CBC W/AUTO DIFF WBC: CPT | Performed by: PHYSICIAN ASSISTANT

## 2020-06-19 PROCEDURE — 88275 CYTOGENETICS 100-300: CPT | Performed by: INTERNAL MEDICINE

## 2020-06-19 PROCEDURE — 88305 TISSUE EXAM BY PATHOLOGIST: CPT | Performed by: PHYSICIAN ASSISTANT

## 2020-06-19 PROCEDURE — 40001005 ZZHCL STATISTIC FLOW >15 ABY TC 88189: Performed by: INTERNAL MEDICINE

## 2020-06-19 ASSESSMENT — PAIN SCALES - GENERAL: PAINLEVEL: MILD PAIN (2)

## 2020-06-19 NOTE — NURSING NOTE
"Oncology Rooming Note    June 19, 2020 12:42 PM   Milo Lozano is a 85 year old male who presents for:    Chief Complaint   Patient presents with     Bone Marrow Biopsy     here for BMBX procedure HX: Anemia     Initial Vitals: BP (!) 181/84   Pulse 89   Temp 98  F (36.7  C)   Resp 16   SpO2 98%  Estimated body mass index is 30.01 kg/m  as calculated from the following:    Height as of 6/9/20: 1.702 m (5' 7\").    Weight as of 6/9/20: 86.9 kg (191 lb 9.6 oz). There is no height or weight on file to calculate BSA.  Mild Pain (2) Comment: toe   No LMP for male patient.  Allergies reviewed: Yes  Medications reviewed: Yes    Medications: Medication refills not needed today.  Pharmacy name entered into EPIC:    [facility] FA[facility] ONLY - NEW RX ONLY  [id number] - [place], [place] - [address] [address]  [facility] [address] [facility] [employer] - [address]  [facility] [address] [facility]  [id number] - [place] [address] [facility] [address] [facility]  [facility] - [place] - [address] AT [address]    Clinical concerns: PT here for BMBX procedure.  PT received much verbal teaching re: rationale for BMBX today and current questions answered.  Pt denied need for additional sedation medication prior to procedure.  Provider notified.      Karrie Decker RN              "

## 2020-06-19 NOTE — LETTER
6/19/2020         RE: Milo Lozano  [address]  [place] [address]        Dear Colleague,    Thank you for referring your patient, Milo Lozano, to the [facility]. Please see a copy of my visit note below.    BMT ONC Adult Bone Marrow Biopsy Procedure Note  June 19, 2020  BP (!) 181/84   Pulse 89   Temp 98  F (36.7  C)   Resp 16   SpO2 98%      Learning needs assessment complete within 12 months? unknown    DIAGNOSIS: cytopenias     PROCEDURE: Unilateral Bone Marrow Biopsy and Unilateral Aspirate    LOCATION: [facility] 2nd Floor    Patient s identification was positively verified by verbal identification and invasive procedure safety checklist was completed. Informed consent was obtained. Patient declined premedications.  Patient was placed in the prone position and prepped and draped in a sterile manner. Approximately 15 cc of 1% Lidocaine was used over the right posterior iliac spine. Following this a 3 mm incision was made. Trephine bone marrow core(s) was (were) obtained from the [facility]. Bone marrow aspirates were obtained from the [facility]. Aspirates were sent for morphology, immunophenotyping, cytogenetics and molecular diagnostics Process/HOLD DNA. A total of approximately 20 ml of marrow was aspirated. Following this procedure a sterile dressing was applied to the bone marrow biopsy site(s). The patient was placed in the supine position to maintain pressure on the biopsy site. Post-procedure wound care instructions were given.     Complications: NO    Pre-procedural pain: 0 out of 10 on the numeric pain rating scale.     Procedural pain: 5 out of 10 on the numeric pain rating scale.     Post-procedural pain assessment: 0 out of 10 on the numeric pain rating scale.     Interventions: NO    Length of procedure:21 minutes to 45 minutes    Procedure performed by: Rufina Kumar PA-C       Again, thank you for allowing me to participate in the care of your patient.         Sincerely,        Rufina Kumar PA-C

## 2020-06-19 NOTE — NURSING NOTE
Post bmbx:  Patient lied on his back for 30 minutes post procedure.  Patient has no complaints of pain post procedure.  BMBX dressing is clean dry and intact.  Patient verbalizes understanding of post procedure care of dressing.  Patient verbalizes understanding of next appointment.

## 2020-06-19 NOTE — PROGRESS NOTES
BMT ONC Adult Bone Marrow Biopsy Procedure Note  June 19, 2020  BP (!) 181/84   Pulse 89   Temp 98  F (36.7  C)   Resp 16   SpO2 98%      Learning needs assessment complete within 12 months? unknown    DIAGNOSIS: cytopenias     PROCEDURE: Unilateral Bone Marrow Biopsy and Unilateral Aspirate    LOCATION: [facility]    Patient s identification was positively verified by verbal identification and invasive procedure safety checklist was completed. Informed consent was obtained. Patient declined premedications.  Patient was placed in the prone position and prepped and draped in a sterile manner. Approximately 15 cc of 1% Lidocaine was used over the right posterior iliac spine. Following this a 3 mm incision was made. Trephine bone marrow core(s) was (were) obtained from the [facility]. Bone marrow aspirates were obtained from the [facility]. Aspirates were sent for morphology, immunophenotyping, cytogenetics and molecular diagnostics Process/HOLD DNA. A total of approximately 20 ml of marrow was aspirated. Following this procedure a sterile dressing was applied to the bone marrow biopsy site(s). The patient was placed in the supine position to maintain pressure on the biopsy site. Post-procedure wound care instructions were given.     Complications: NO    Pre-procedural pain: 0 out of 10 on the numeric pain rating scale.     Procedural pain: 5 out of 10 on the numeric pain rating scale.     Post-procedural pain assessment: 0 out of 10 on the numeric pain rating scale.     Interventions: NO    Length of procedure:21 minutes to 45 minutes    Procedure performed by: Rufina Kumar PA-C

## 2020-06-21 DIAGNOSIS — Z01.818 PREOP GENERAL PHYSICAL EXAM: ICD-10-CM

## 2020-06-21 PROCEDURE — U0003 INFECTIOUS AGENT DETECTION BY NUCLEIC ACID (DNA OR RNA); SEVERE ACUTE RESPIRATORY SYNDROME CORONAVIRUS 2 (SARS-COV-2) (CORONAVIRUS DISEASE [COVID-19]), AMPLIFIED PROBE TECHNIQUE, MAKING USE OF HIGH THROUGHPUT TECHNOLOGIES AS DESCRIBED BY CMS-2020-01-R: HCPCS | Mod: 90 | Performed by: FAMILY MEDICINE

## 2020-06-21 PROCEDURE — 99000 SPECIMEN HANDLING OFFICE-LAB: CPT | Performed by: FAMILY MEDICINE

## 2020-06-22 LAB
COPATH REPORT: NORMAL
COPATH REPORT: NORMAL
SARS-COV-2 RNA SPEC QL NAA+PROBE: NOT DETECTED
SPECIMEN SOURCE: NORMAL

## 2020-06-23 LAB — COPATH REPORT: NORMAL

## 2020-07-02 DIAGNOSIS — I10 HYPERTENSION GOAL BP (BLOOD PRESSURE) < 140/90: ICD-10-CM

## 2020-07-03 ENCOUNTER — VIRTUAL VISIT (OUTPATIENT)
Dept: ONCOLOGY | Facility: CLINIC | Age: 85
End: 2020-07-03
Payer: COMMERCIAL

## 2020-07-03 DIAGNOSIS — D69.6 THROMBOCYTOPENIA (H): ICD-10-CM

## 2020-07-03 DIAGNOSIS — D59.9 ACQUIRED HEMOLYTIC ANEMIA (H): Primary | ICD-10-CM

## 2020-07-03 PROCEDURE — 99214 OFFICE O/P EST MOD 30 MIN: CPT | Mod: 95 | Performed by: INTERNAL MEDICINE

## 2020-07-03 NOTE — NURSING NOTE
"Milo Lozano is a 85 year old male who is being evaluated via a billable video visit.      The patient has been notified of following:     \"This video visit will be conducted via a call between you and your physician/provider. We have found that certain health care needs can be provided without the need for an in-person physical exam.  This service lets us provide the care you need with a video conversation.  If a prescription is necessary we can send it directly to your pharmacy.  If lab work is needed we can place an order for that and you can then stop by our lab to have the test done at a later time.    Video visits are billed at different rates depending on your insurance coverage.  Please reach out to your insurance provider with any questions.    If during the course of the call the physician/provider feels a video visit is not appropriate, you will not be charged for this service.\"    Patient has given verbal consent for Video visit? Yes  How would you like to obtain your AVS? Rafita  Patient would like the video invitation sent by: Rafita  Will anyone else be joining your video visit? No        Video-Visit Details    Type of service:  Video Visit    Originating Location (pt. Location): Home    Distant Location (provider location):  [facility]     Platform used for Video Visit: Lydia Irwin CMA        "

## 2020-07-03 NOTE — LETTER
"    7/3/2020         RE: Milo Lozano  [address] [facility] [id number]        Dear Colleague,    Thank you for referring your patient, Milo Lozano, to the [facility]. Please see a copy of my visit note below.    Milo Lozano is a 85 year old male who is being evaluated via a billable video visit.      The patient has been notified of following:     \"This video visit will be conducted via a call between you and your physician/provider. We have found that certain health care needs can be provided without the need for an in-person physical exam.  This service lets us provide the care you need with a video conversation.  If a prescription is necessary we can send it directly to your pharmacy.  If lab work is needed we can place an order for that and you can then stop by our lab to have the test done at a later time.    Video visits are billed at different rates depending on your insurance coverage.  Please reach out to your insurance provider with any questions.    If during the course of the call the physician/provider feels a video visit is not appropriate, you will not be charged for this service.\"    Patient has given verbal consent for Video visit? yes    Video-Visit Details    Type of service:  Video Visit    Video visit duration: 20 min  Originating Location (pt. 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 When his CBC was repeated on 8/15/2017, he was mildly pancytopenic, with Hb of 12.1 g/dl, MCV of 93,  mild leucopenia and borderline thrombocytopenia.   Absolute differential counts were normal and his peripheral blood smear " showed slight normochromic normocytic anemia; minimal poikilocytosis and slight thrombocytopenia with overall normal platelet morphology. There was polychromasia. Absolute reticulocyte count was elevated at 165.7. B12 was normal in 07/2017 at 494. Ferritin was elevated at 412. Creatinine was mildly elevated at 1.32. FERMIN screen was negative. No M protein was noted on serum immunofixation. TSH was normal. Stool hemoccult was negative in 07/2017. He has never had a colonoscopy. UA showed no hematuria but did glucosuria and proteinuria. He still had persisted marked reticulocytosis in 09/2017 with absolute retic count of 143. He had a normal LDH, negative LAURA, normal haptoglobin. Peripheral blood smear on 9/5/17 showed normocytic slight anemia with slight morphologic evidence of increase in red cell regeneration with no morphologic evidence of hemolysis. There was reactive lymphocyte morphology.   He had f/up labs on 5/1/2018. Hb was 11.7 g/dl with normal MCV. WBC and platelet counts were low normal. Absolute retic count was still elevated at 143. Peripheral blood smear on 5/1/2018 showed Mild normochromic normocytic anemia. Adequate neutrophils with mild shift to immaturity. The lymphocyte population shows a population   of both small mature lymphocytes, large granular lymphocytes and reactive-appearing lymphocytes.  The morphology of the platelets was overall normal including large platelets.  US abdomen on 5/29/2018 showed findings c/w hepatosplenomegaly- hyperechoic liver, suspicious for fatty liver and enlarged spleen-  measuring 12.7 x 6.6 x 13.5 cm as well as gallstones.  CT scan of the chest abdomen and pelvis without IV contrast showed no evidence of splenomegaly on the CT, with spleen size measuring 11 cm.  There was borderline hepatomegaly and gallstones noted as well.   Osmotic fragility testing demonstrated mildly increased osmotic fragility. Occasional spherocytes were noted on peripheral blood smear. I  felt  his diagnosis was mild hereditary spherocytosis (HS) (retic count <6% is c/w mild HS) albeit MCHC is normal. HS can present in 9th decade. He has mild anemia with Hb >11 g/dl and gallstones.   He has no family history of anemia.   G6PD deficiency screen was negative. He was started on daily folic acid supplementation, 1 mg PO Qday.     Peripheral blood smear on May 10, 2019 showed normochromic normocytic anemia with increased red cell regeneration and slight thrombocytopenia and with normal platelet morphology.  Granulocytes were mature and well granulated and not dysplastic.  No circulating blasts were seen. Autoimmune hemolysis work-up was negative.      2. Prostate cancer- treated with XRT in 2007. PSA remains undetectable, last <0.01 in May 2019.    History Of Present Illness:  Mr. Hsieh is a 85 year old male who presents for f/up of pancytopenia and abnormal peripheral blood smear. Has mildly increased osmotic fragility, possibly late onset mild HS. WBC is normal.  Hb has been stable:  Results for [facility] (MRN [id number]) as of 7/8/2020 09:18   Ref. Range 12/5/2019 09:05 12/17/2019 15:27 5/14/2020 09:28 5/28/2020 10:24 6/19/2020 12:36   Hemoglobin Latest Ref Range: 13.3 - 17.7 g/dL 11.9 (L) 11.8 (L) 12.1 (L) 12.3 (L) 12.6 (L)   He has moderate thrombocytopenia, as below:  Results for [facility] (MRN [id number]) as of 7/8/2020 09:18   Ref. Range 12/5/2019 09:05 12/17/2019 15:27 5/14/2020 09:28 5/28/2020 10:24 6/19/2020 12:36   Platelet Count Latest Ref Range: 150 - 450 10e9/L 86 (L) 85 (L) 81 (L) 74 (L) 80 (L)   Prior to that, in 2019, platelet count was over 100k.  Creat stable around 1.3-1.4. LDH is normal. LFTs normal except borderline direct marifer at 0.3.  Absolute retic count is elevated as below:  Results for [facility] (MRN [id number]) as of 7/8/2020 09:18   Ref. Range 5/10/2019 09:14 12/5/2019 09:05 12/17/2019 15:27 5/14/2020 09:28 5/28/2020 10:24   Absolute Retic Latest Ref  Range: 25 - 95 10e9/L 192.4 (H) 191.1 (H) 202.9 (H) 241.1 (H) 240.4 (H)     He had myelocytes on differential in 12/2019.   Repeat peripheral blood smear on 5/28/2020 showed slight normochromic, normocytic anemia; increased erythrocyte   regeneration as well as slight neutrophilic left shift with rare cells suspicious for circulating blasts. Moderate thrombocytopenia. Platelet count was 72.  We proceeded with a bone marrow biopsy on 6/19/2020 which showed:  Hypercellular marrow for age (80%) with trilineage hematopoiesis     - No overt dysplasia and no increase in marrow blasts   Flow cytometry showed polytypic B cells; no aberrant immunophenotype on T cells and no increase in myeloid blasts and no definitive abnormal myeloid   blast population. Cytogenetics was normal - 46 XY.  He does have s/o peripheral neuropathy in his feet, stable.   He follows with Dr. Ibarra.  He has had no bruising or bleeding symptoms.  He has been feeling well.  In addition, a complete 12 point  review of systems is negative.      Past Medical/Surgical History:  Past Medical History:   Diagnosis Date     BPH (benign prostatic hypertrophy)      CARDIOVASCULAR SCREENING; LDL GOAL LESS THAN 100 12/29/2011     CARDIOVASCULAR SCREENING; LDL GOAL LESS THAN 130 12/29/2011     Hypertension goal BP (blood pressure) < 140/90 12/29/2011     Hypothyroidism due to acquired atrophy of thyroid 12/5/2016     Prostate cancer (H) 1/2007    Had Radiation     Type 2 diabetes, HbA1c goal < 7% (H)    He has  peripheral neuropathy and has had laser treatments for it for 6 months.  FHx and SocHx reviewed.     Past Surgical History:   Procedure Laterality Date     COLONOSCOPY       Allergies:  Allergies as of 07/03/2020 - Reviewed 06/19/2020   Allergen Reaction Noted     Lisinopril Cough 06/25/2014     Current Medications:  Current Outpatient Medications   Medication Sig Dispense Refill     amLODIPine (NORVASC) 10 MG tablet Take 1 tablet (10 mg) by mouth daily 90  tablet 3     aspirin (ASA) 81 MG tablet Take 1 tablet (81 mg) by mouth daily 90 tablet 3     blood glucose (ACCU-CHEK SMARTVIEW) test strip TEST BLOOD SUGAR TWICE DAILY  OR AS DIRECTED 200 strip 3     blood glucose (NO BRAND SPECIFIED) test strip Use to test blood sugar 2 times daily or as directed. To accompany: Blood Glucose Monitor Brands: per insurance. 200 strip 3     blood glucose monitoring (ACCU-CHEK MILADYS SMARTVIEW) meter device kit Use to test blood sugar 2 times daily or as directed. 1 kit 0     blood glucose monitoring (NO BRAND SPECIFIED) meter device kit Use to test blood sugar 2 times daily or as directed. Preferred blood glucose meter OR supplies to accompany: Blood Glucose Monitor Brands: per insurance. 1 kit 0     blood glucose XX lancets standard Use to test blood sugar 2 times daily or as directed. 200 each 0     folic acid (FOLVITE) 1 MG tablet Take 1 tablet (1 mg) by mouth daily 90 tablet 3     glimepiride (AMARYL) 4 MG tablet Take 1 tablet (4 mg) by mouth every morning (before breakfast) 90 tablet 1     levothyroxine (SYNTHROID/LEVOTHROID) 75 MCG tablet TAKE 1 TABLET EVERY DAY (PLEASE STOP THE 50 MCG TABLET) 90 tablet 3     losartan (COZAAR) 100 MG tablet TAKE 1 TABLET EVERY DAY 91 tablet 3     tamsulosin (FLOMAX) 0.4 MG capsule TAKE 1 CAPSULE EVERY DAY 90 capsule 3      Physical Exam:  Constitutional: alert and in no distress  Eyes: No redness or discharge  Respiratory: No cough or labored breathing.  Musculoskeletal: Full range of motion in extremities.  Skin: no visible skin lesions or discoloration  Neurological: No tremors and denies headache.  Psychiatric: Mentation appears normal and affect is normal as well.  Alert and oriented x3.  The rest the comprehensive physical examination is deferred due to public health emergency video visit restrictions.        Laboratory/Imaging Studies  Lab Results   Component Value Date    WBC 9.0 06/19/2020     Lab Results   Component Value Date    RBC  3.99 06/19/2020     Lab Results   Component Value Date    HGB 12.6 06/19/2020     Lab Results   Component Value Date    HCT 38.6 06/19/2020     No components found for: MCT  Lab Results   Component Value Date    MCV 97 06/19/2020     Lab Results   Component Value Date    MCH 31.6 06/19/2020     Lab Results   Component Value Date    MCHC 32.6 06/19/2020     Lab Results   Component Value Date    RDW 16.2 06/19/2020     Lab Results   Component Value Date    PLT 80 06/19/2020     Labs reviewed and documented in the EMR.      ASSESSMENT/PLAN:    Mr. Lozano is a 85 year old man, with type 2 DM,  with normocytic anemia and transient borderline leucopenia and mild thrombocytopenia but persistent reticulocytosis.    1. Normocytic anemia, reticulocytosis, thrombocytopenia- He has mild anemia stable, and his thrombocytopenia has worsened in the last year but stable in the last 6 months, with plt count in the 80s. peripheral blood smear in May 2020 showed findings suspicious for circulating blasts but a  bone marrow biopsy did not show increased blasts or e/o leukemia or MDS. Cytogenetics was normal 46 XY.   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  Autoimmune hemolysis work-up was negative.  Continue folic acid 1 mg daily.  His anemia is also at least partially related to anemia of kidney disease.    He does have borderline hepatosplenomegaly which could be associated with pancytopenia. If platelet count continues to decline, consider repeating abdominal US for evaluation of progressive hepatosplenomegaly.  F/up in 3 months with cbcd, retic count, LDH, LFTs, creat.    2. CKD- creat stable.    3.  Prostate cancer- treated with XRT in 2007. PSA remains undetectable, last <0.01 in May 2019.      At the end of our visit patient verbalized understanding and concurred with the plan.        Again, thank you for allowing me to participate in the care of your patient.         Sincerely,        Karen Hernandez MD, MD

## 2020-07-03 NOTE — PROGRESS NOTES
"Milo Lozano is a 85 year old male who is being evaluated via a billable video visit.      The patient has been notified of following:     \"This video visit will be conducted via a call between you and your physician/provider. We have found that certain health care needs can be provided without the need for an in-person physical exam.  This service lets us provide the care you need with a video conversation.  If a prescription is necessary we can send it directly to your pharmacy.  If lab work is needed we can place an order for that and you can then stop by our lab to have the test done at a later time.    Video visits are billed at different rates depending on your insurance coverage.  Please reach out to your insurance provider with any questions.    If during the course of the call the physician/provider feels a video visit is not appropriate, you will not be charged for this service.\"    Patient has given verbal consent for Video visit? yes    Video-Visit Details    Type of service:  Video Visit    Video visit duration: 20 min  Originating Location (pt. 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 When his CBC was repeated on 8/15/2017, he was mildly pancytopenic, with Hb of 12.1 g/dl, MCV of 93,  mild leucopenia and borderline thrombocytopenia.   Absolute differential counts were normal and his peripheral blood smear showed slight normochromic normocytic anemia; minimal poikilocytosis and slight thrombocytopenia with overall normal platelet morphology. There was polychromasia. Absolute reticulocyte count was elevated at 165.7. B12 was normal in 07/2017 at 494. Ferritin " was elevated at 412. Creatinine was mildly elevated at 1.32. FERMIN screen was negative. No M protein was noted on serum immunofixation. TSH was normal. Stool hemoccult was negative in 07/2017. He has never had a colonoscopy. UA showed no hematuria but did glucosuria and proteinuria. He still had persisted marked reticulocytosis in 09/2017 with absolute retic count of 143. He had a normal LDH, negative LAURA, normal haptoglobin. Peripheral blood smear on 9/5/17 showed normocytic slight anemia with slight morphologic evidence of increase in red cell regeneration with no morphologic evidence of hemolysis. There was reactive lymphocyte morphology.   He had f/up labs on 5/1/2018. Hb was 11.7 g/dl with normal MCV. WBC and platelet counts were low normal. Absolute retic count was still elevated at 143. Peripheral blood smear on 5/1/2018 showed Mild normochromic normocytic anemia. Adequate neutrophils with mild shift to immaturity. The lymphocyte population shows a population   of both small mature lymphocytes, large granular lymphocytes and reactive-appearing lymphocytes.  The morphology of the platelets was overall normal including large platelets.  US abdomen on 5/29/2018 showed findings c/w hepatosplenomegaly- hyperechoic liver, suspicious for fatty liver and enlarged spleen-  measuring 12.7 x 6.6 x 13.5 cm as well as gallstones.  CT scan of the chest abdomen and pelvis without IV contrast showed no evidence of splenomegaly on the CT, with spleen size measuring 11 cm.  There was borderline hepatomegaly and gallstones noted as well.   Osmotic fragility testing demonstrated mildly increased osmotic fragility. Occasional spherocytes were noted on peripheral blood smear. I felt  his diagnosis was mild hereditary spherocytosis (HS) (retic count <6% is c/w mild HS) albeit MCHC is normal. HS can present in 9th decade. He has mild anemia with Hb >11 g/dl and gallstones.   He has no family history of anemia.   G6PD deficiency screen  was negative. He was started on daily folic acid supplementation, 1 mg PO Qday.     Peripheral blood smear on May 10, 2019 showed normochromic normocytic anemia with increased red cell regeneration and slight thrombocytopenia and with normal platelet morphology.  Granulocytes were mature and well granulated and not dysplastic.  No circulating blasts were seen. Autoimmune hemolysis work-up was negative.      2. Prostate cancer- treated with XRT in 2007. PSA remains undetectable, last <0.01 in May 2019.    History Of Present Illness:  Mr. Hsieh is a 85 year old male who presents for f/up of pancytopenia and abnormal peripheral blood smear. Has mildly increased osmotic fragility, possibly late onset mild HS. WBC is normal.  Hb has been stable:  Results for [facility] (MRN [id number]) as of 7/8/2020 09:18   Ref. Range 12/5/2019 09:05 12/17/2019 15:27 5/14/2020 09:28 5/28/2020 10:24 6/19/2020 12:36   Hemoglobin Latest Ref Range: 13.3 - 17.7 g/dL 11.9 (L) 11.8 (L) 12.1 (L) 12.3 (L) 12.6 (L)   He has moderate thrombocytopenia, as below:  Results for [facility] (MRN [id number]) as of 7/8/2020 09:18   Ref. Range 12/5/2019 09:05 12/17/2019 15:27 5/14/2020 09:28 5/28/2020 10:24 6/19/2020 12:36   Platelet Count Latest Ref Range: 150 - 450 10e9/L 86 (L) 85 (L) 81 (L) 74 (L) 80 (L)   Prior to that, in 2019, platelet count was over 100k.  Creat stable around 1.3-1.4. LDH is normal. LFTs normal except borderline direct marifer at 0.3.  Absolute retic count is elevated as below:  Results for [facility] (MRN [id number]) as of 7/8/2020 09:18   Ref. Range 5/10/2019 09:14 12/5/2019 09:05 12/17/2019 15:27 5/14/2020 09:28 5/28/2020 10:24   Absolute Retic Latest Ref Range: 25 - 95 10e9/L 192.4 (H) 191.1 (H) 202.9 (H) 241.1 (H) 240.4 (H)     He had myelocytes on differential in 12/2019.   Repeat peripheral blood smear on 5/28/2020 showed slight normochromic, normocytic anemia; increased erythrocyte   regeneration as well  as slight neutrophilic left shift with rare cells suspicious for circulating blasts. Moderate thrombocytopenia. Platelet count was 72.  We proceeded with a bone marrow biopsy on 6/19/2020 which showed:  Hypercellular marrow for age (80%) with trilineage hematopoiesis     - No overt dysplasia and no increase in marrow blasts   Flow cytometry showed polytypic B cells; no aberrant immunophenotype on T cells and no increase in myeloid blasts and no definitive abnormal myeloid   blast population. Cytogenetics was normal - 46 XY.  He does have s/o peripheral neuropathy in his feet, stable.   He follows with Dr. Ibarra.  He has had no bruising or bleeding symptoms.  He has been feeling well.  In addition, a complete 12 point  review of systems is negative.      Past Medical/Surgical History:  Past Medical History:   Diagnosis Date     BPH (benign prostatic hypertrophy)      CARDIOVASCULAR SCREENING; LDL GOAL LESS THAN 100 12/29/2011     CARDIOVASCULAR SCREENING; LDL GOAL LESS THAN 130 12/29/2011     Hypertension goal BP (blood pressure) < 140/90 12/29/2011     Hypothyroidism due to acquired atrophy of thyroid 12/5/2016     Prostate cancer (H) 1/2007    Had Radiation     Type 2 diabetes, HbA1c goal < 7% (H)    He has  peripheral neuropathy and has had laser treatments for it for 6 months.  FHx and SocHx reviewed.     Past Surgical History:   Procedure Laterality Date     COLONOSCOPY       Allergies:  Allergies as of 07/03/2020 - Reviewed 06/19/2020   Allergen Reaction Noted     Lisinopril Cough 06/25/2014     Current Medications:  Current Outpatient Medications   Medication Sig Dispense Refill     amLODIPine (NORVASC) 10 MG tablet Take 1 tablet (10 mg) by mouth daily 90 tablet 3     aspirin (ASA) 81 MG tablet Take 1 tablet (81 mg) by mouth daily 90 tablet 3     blood glucose (ACCU-CHEK SMARTVIEW) test strip TEST BLOOD SUGAR TWICE DAILY  OR AS DIRECTED 200 strip 3     blood glucose (NO BRAND SPECIFIED) test strip Use to test  blood sugar 2 times daily or as directed. To accompany: Blood Glucose Monitor Brands: per insurance. 200 strip 3     blood glucose monitoring (ACCU-CHEK MILADYS SMARTVIEW) meter device kit Use to test blood sugar 2 times daily or as directed. 1 kit 0     blood glucose monitoring (NO BRAND SPECIFIED) meter device kit Use to test blood sugar 2 times daily or as directed. Preferred blood glucose meter OR supplies to accompany: Blood Glucose Monitor Brands: per insurance. 1 kit 0     blood glucose XX lancets standard Use to test blood sugar 2 times daily or as directed. 200 each 0     folic acid (FOLVITE) 1 MG tablet Take 1 tablet (1 mg) by mouth daily 90 tablet 3     glimepiride (AMARYL) 4 MG tablet Take 1 tablet (4 mg) by mouth every morning (before breakfast) 90 tablet 1     levothyroxine (SYNTHROID/LEVOTHROID) 75 MCG tablet TAKE 1 TABLET EVERY DAY (PLEASE STOP THE 50 MCG TABLET) 90 tablet 3     losartan (COZAAR) 100 MG tablet TAKE 1 TABLET EVERY DAY 91 tablet 3     tamsulosin (FLOMAX) 0.4 MG capsule TAKE 1 CAPSULE EVERY DAY 90 capsule 3      Physical Exam:  Constitutional: alert and in no distress  Eyes: No redness or discharge  Respiratory: No cough or labored breathing.  Musculoskeletal: Full range of motion in extremities.  Skin: no visible skin lesions or discoloration  Neurological: No tremors and denies headache.  Psychiatric: Mentation appears normal and affect is normal as well.  Alert and oriented x3.  The rest the comprehensive physical examination is deferred due to public health emergency video visit restrictions.        Laboratory/Imaging Studies  Lab Results   Component Value Date    WBC 9.0 06/19/2020     Lab Results   Component Value Date    RBC 3.99 06/19/2020     Lab Results   Component Value Date    HGB 12.6 06/19/2020     Lab Results   Component Value Date    HCT 38.6 06/19/2020     No components found for: MCT  Lab Results   Component Value Date    MCV 97 06/19/2020     Lab Results   Component  Value Date    MCH 31.6 06/19/2020     Lab Results   Component Value Date    MCHC 32.6 06/19/2020     Lab Results   Component Value Date    RDW 16.2 06/19/2020     Lab Results   Component Value Date    PLT 80 06/19/2020     Labs reviewed and documented in the EMR.      ASSESSMENT/PLAN:    Mr. Lozano is a 85 year old man, with type 2 DM,  with normocytic anemia and transient borderline leucopenia and mild thrombocytopenia but persistent reticulocytosis.    1. Normocytic anemia, reticulocytosis, thrombocytopenia- He has mild anemia stable, and his thrombocytopenia has worsened in the last year but stable in the last 6 months, with plt count in the 80s. peripheral blood smear in May 2020 showed findings suspicious for circulating blasts but a  bone marrow biopsy did not show increased blasts or e/o leukemia or MDS. Cytogenetics was normal 46 XY.   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  Autoimmune hemolysis work-up was negative.  Continue folic acid 1 mg daily.  His anemia is also at least partially related to anemia of kidney disease.    He does have borderline hepatosplenomegaly which could be associated with pancytopenia. If platelet count continues to decline, consider repeating abdominal US for evaluation of progressive hepatosplenomegaly.  F/up in 3 months with cbcd, retic count, LDH, LFTs, creat.    2. CKD- creat stable.    3.  Prostate cancer- treated with XRT in 2007. PSA remains undetectable, last <0.01 in May 2019.      At the end of our visit patient verbalized understanding and concurred with the plan.

## 2020-07-06 RX ORDER — LOSARTAN POTASSIUM 100 MG/1
TABLET ORAL
Qty: 90 TABLET | OUTPATIENT
Start: 2020-07-06

## 2020-07-07 LAB
COPATH REPORT: NORMAL
COPATH REPORT: NORMAL

## 2020-07-08 RX ORDER — FOLIC ACID 1 MG/1
1 TABLET ORAL DAILY
Qty: 90 TABLET | Refills: 3 | Status: SHIPPED | OUTPATIENT
Start: 2020-07-08 | End: 2020-10-06

## 2020-09-16 ENCOUNTER — OFFICE VISIT (OUTPATIENT)
Dept: PEDIATRICS | Facility: CLINIC | Age: 85
End: 2020-09-16
Payer: COMMERCIAL

## 2020-09-16 VITALS
DIASTOLIC BLOOD PRESSURE: 68 MMHG | OXYGEN SATURATION: 98 % | SYSTOLIC BLOOD PRESSURE: 173 MMHG | BODY MASS INDEX: 29.95 KG/M2 | WEIGHT: 191.2 LBS | HEART RATE: 83 BPM | TEMPERATURE: 99 F

## 2020-09-16 DIAGNOSIS — Z53.9 ERRONEOUS ENCOUNTER--DISREGARD: Primary | ICD-10-CM

## 2020-09-16 PROCEDURE — 99207 ZZC NON-BILLABLE SERV PER CHARTING: CPT | Performed by: INTERNAL MEDICINE

## 2020-09-16 NOTE — PROGRESS NOTES
Subjective     Milo Lozano is a 85 year old male who presents to clinic today for the following health issues:    Since I last saw him in October 2019, he has been diagnosed with hereditary spherocytosis with acquired hemolytic anemia, followed by hematology.     Today patient came for appointment for diabetes follow up but stated that he had not trust in me and wished to change to a different provider.     I had his glucose readings downloaded prior to seeing him but we didn't address this due to patient's desire to see a different PCP.     We set up his visit with my colleague Dr. Garces tomorrow and will leave glucose readings with him to review at the appointment.     Patient will not be charged for this visit.     Laurence Ibarra MD PhD           HPI       Hypertension Follow-up      Do you check your blood pressure regularly outside of the clinic? Yes  Sometimes. BP are sometimes high at home.     Are you following a low salt diet? No    Are your blood pressures ever more than 140 on the top number (systolic) OR more   than 90 on the bottom number (diastolic), for example 140/90? Yes    Hypothyroidism Follow-up      Since last visit, patient describes the following symptoms: Weight stable, no hair loss, no skin changes, no constipation, no loose stools      How many servings of fruits and vegetables do you eat daily?  2-3    On average, how many sweetened beverages do you drink each day (Examples: soda, juice, sweet tea, etc.  Do NOT count diet or artificially sweetened beverages)?   0    How many days per week do you exercise enough to make your heart beat faster? 7    How many minutes a day do you exercise enough to make your heart beat faster? 10 - 19    How many days per week do you miss taking your medication? 0

## 2020-09-17 ENCOUNTER — OFFICE VISIT (OUTPATIENT)
Dept: PEDIATRICS | Facility: CLINIC | Age: 85
End: 2020-09-17
Payer: COMMERCIAL

## 2020-09-17 VITALS
DIASTOLIC BLOOD PRESSURE: 74 MMHG | HEART RATE: 77 BPM | BODY MASS INDEX: 30.21 KG/M2 | TEMPERATURE: 97.9 F | WEIGHT: 192.9 LBS | SYSTOLIC BLOOD PRESSURE: 159 MMHG | OXYGEN SATURATION: 97 %

## 2020-09-17 DIAGNOSIS — E66.811 CLASS 1 OBESITY DUE TO EXCESS CALORIES WITH SERIOUS COMORBIDITY AND BODY MASS INDEX (BMI) OF 31.0 TO 31.9 IN ADULT: ICD-10-CM

## 2020-09-17 DIAGNOSIS — E11.21 CONTROLLED TYPE 2 DIABETES MELLITUS WITH DIABETIC NEPHROPATHY, WITHOUT LONG-TERM CURRENT USE OF INSULIN (H): Primary | ICD-10-CM

## 2020-09-17 DIAGNOSIS — I10 HYPERTENSION GOAL BP (BLOOD PRESSURE) < 140/90: ICD-10-CM

## 2020-09-17 DIAGNOSIS — E11.42 DIABETIC PERIPHERAL NEUROPATHY (H): ICD-10-CM

## 2020-09-17 DIAGNOSIS — Z23 NEED FOR PROPHYLACTIC VACCINATION AND INOCULATION AGAINST INFLUENZA: ICD-10-CM

## 2020-09-17 DIAGNOSIS — L57.0 ACTINIC KERATOSIS: ICD-10-CM

## 2020-09-17 DIAGNOSIS — E78.5 HYPERLIPIDEMIA WITH TARGET LDL LESS THAN 100: ICD-10-CM

## 2020-09-17 DIAGNOSIS — N18.30 CKD (CHRONIC KIDNEY DISEASE) STAGE 3, GFR 30-59 ML/MIN (H): ICD-10-CM

## 2020-09-17 DIAGNOSIS — C61 PROSTATE CANCER (H): ICD-10-CM

## 2020-09-17 DIAGNOSIS — E66.09 CLASS 1 OBESITY DUE TO EXCESS CALORIES WITH SERIOUS COMORBIDITY AND BODY MASS INDEX (BMI) OF 31.0 TO 31.9 IN ADULT: ICD-10-CM

## 2020-09-17 DIAGNOSIS — N13.8 BPH WITH OBSTRUCTION/LOWER URINARY TRACT SYMPTOMS: ICD-10-CM

## 2020-09-17 DIAGNOSIS — N40.1 BPH WITH OBSTRUCTION/LOWER URINARY TRACT SYMPTOMS: ICD-10-CM

## 2020-09-17 PROCEDURE — 90662 IIV NO PRSV INCREASED AG IM: CPT | Performed by: INTERNAL MEDICINE

## 2020-09-17 PROCEDURE — 99214 OFFICE O/P EST MOD 30 MIN: CPT | Mod: 25 | Performed by: INTERNAL MEDICINE

## 2020-09-17 PROCEDURE — G0008 ADMIN INFLUENZA VIRUS VAC: HCPCS | Performed by: INTERNAL MEDICINE

## 2020-09-17 RX ORDER — METFORMIN HCL 500 MG
1000 TABLET, EXTENDED RELEASE 24 HR ORAL
Qty: 180 TABLET | Refills: 3 | Status: SHIPPED | OUTPATIENT
Start: 2020-09-17 | End: 2021-09-20

## 2020-09-17 NOTE — PROGRESS NOTES
Subjective     Milo Lozano is a 85 year old male who presents to clinic today for the following health issues:    HPI    84-year-old gentleman comes to establish care with me.  He has type 2 diabetes and been taking glimepiride 4 mg a day.  As of late his blood sugars have been running high.  He has Negin glucose monitor and his average blood sugar has been 239 with the lowest being 214 and highest being 263.  He checks his sugar once a day.  He denies chest pain dizziness or lightheadedness.  He will get some shortness of breath on climbing stairs.    He also has history of chronic kidney disease stage III, hypertension, hyperlipidemia and diabetic neuropathy.  He has lesion on his scalp and left ear that he wanted me to check.  He takes his medication as prescribed.  He did have a good understanding of each of his medication was for.  So I went over that with him today.  He denies having hypoglycemia.      Medication Followup     Taking Medication as prescribed: yes    Side Effects:  None    Medication Helping Symptoms:  NO     Skin Lesion  Onset/Duration: years  Description  Location: scalp on left ear  Color: brown  Border description: irregular border  Character: round  Itching: no  Bleeding:  no  Intensity:  moderate  Progression of Symptoms:  worsening  Accompanying signs and symptoms:   Bleeding: no  Scaling: no  Excessive sun exposure/tanning: no  Sunscreen used: no  History:           Any previous history of skin cancer: no  Any family history of melanoma: no  Previous episodes of similar lesion: no  Precipitating or alleviating factors: none  Therapies tried and outcome: none    Review of Systems   Constitutional, HEENT, cardiovascular, pulmonary, GI, , musculoskeletal, neuro, skin, endocrine and psych systems are negative, except as otherwise noted.      Objective    There were no vitals taken for this visit.  There is no height or weight on file to calculate BMI.  Physical Exam   GENERAL:  healthy, alert and no distress  NECK: no adenopathy, no asymmetry, masses, or scars and thyroid normal to palpation  RESP: lungs clear to auscultation - no rales, rhonchi or wheezes  CV: regular rate and rhythm, normal S1 S2, no S3 or S4, no murmur, click or rub, no peripheral edema and peripheral pulses strong  ABDOMEN: soft, nontender, no hepatosplenomegaly, no masses and bowel sounds normal  MS: no gross musculoskeletal defects noted, no edema  Diabetic foot exam: no trophic changes or ulcerative lesions, DP reduced bilateral, PT reduced bilateral and reduced sensation at both feet. Absence of touch both feet.    No results found for any visits on 09/17/20.        Assessment & Plan      1.  Type 2 diabetes mellitus with blood sugars running high recently.  Currently on glimepiride 4 mg a day and last hemoglobin A1c was 6.9 on 06/09/2020.  After much discussion he agreed to take Metformin XR start with 5 mg daily and after a week or so increased to thousand milligrams daily.  Return in early December for follow-up with lab.  2.  Essential hypertension with blood pressure in the clinic elevated.  He indicates he has a whitecoat syndrome.  I briefly discussed 24-hour blood pressure monitoring.  Next clinic visit I will probably set him up for that.  Currently on amlodipine and losartan  3.  Chronic kidney disease stage III  4.  Hyperlipidemia by history but no recent lipid profile.  We will do fasting lipids next visit.  5.  Diabetic peripheral neuropathy.  6.  Actinic keratotic lesion on the scalp and what appears to be a seborrheic keratosis left ear.  Will refer to dermatology for consultation.  Skin cancer cannot be ruled out.  7.  BPH with obstructive symptoms patient has been taking tamsulosin 0.4 mg daily.  He has nocturia x3.  Urine stream is poor.  8.  History of prostate cancer treated with radiation implant in 2007.    Patient will return in early December with urine microalbumin, BMP, hemoglobin A1c and  "a lipid panel.      BMI:   Estimated body mass index is 30.21 kg/m  as calculated from the following:    Height as of 6/9/20: 1.702 m (5' 7\").    Weight as of this encounter: 87.5 kg (192 lb 14.4 oz).   Weight management plan: Discussed healthy diet and exercise guidelines            Return in about 11 weeks (around 12/3/2020) for visit with fasting lab.    Aj Garces MD  [facility]    "

## 2020-09-18 DIAGNOSIS — E03.4 HYPOTHYROIDISM DUE TO ACQUIRED ATROPHY OF THYROID: ICD-10-CM

## 2020-09-18 DIAGNOSIS — I10 HYPERTENSION GOAL BP (BLOOD PRESSURE) < 140/90: ICD-10-CM

## 2020-09-18 DIAGNOSIS — N40.0 BENIGN PROSTATIC HYPERPLASIA WITHOUT LOWER URINARY TRACT SYMPTOMS: ICD-10-CM

## 2020-09-22 DIAGNOSIS — E03.4 HYPOTHYROIDISM DUE TO ACQUIRED ATROPHY OF THYROID: Primary | ICD-10-CM

## 2020-09-22 RX ORDER — TAMSULOSIN HYDROCHLORIDE 0.4 MG/1
CAPSULE ORAL
Qty: 90 CAPSULE | Refills: 3 | Status: SHIPPED | OUTPATIENT
Start: 2020-09-22 | End: 2021-08-04

## 2020-09-22 RX ORDER — AMLODIPINE BESYLATE 10 MG/1
10 TABLET ORAL DAILY
Qty: 90 TABLET | Refills: 3 | Status: SHIPPED | OUTPATIENT
Start: 2020-09-22 | End: 2021-12-21

## 2020-09-22 RX ORDER — LEVOTHYROXINE SODIUM 75 UG/1
TABLET ORAL
Qty: 90 TABLET | Refills: 0 | Status: SHIPPED | OUTPATIENT
Start: 2020-09-22 | End: 2020-12-03

## 2020-09-22 NOTE — TELEPHONE ENCOUNTER
levothyroxine (SYNTHROID/LEVOTHROID) 75 MCG tablet TAKE 1 TABLET EVERY DAY (PLEASE STOP THE 50 MCG TABLET)   Last Written Prescription Date:  10/16/2019  Last Fill Quantity: 90,   # refills: 3  TSH due   Ref. Range 5/24/2019 10:47   TSH Latest Ref Range: 0.40 - 4.00 mU/L 2.48      tamsulosin (FLOMAX) 0.4 MG capsule   Last Written Prescription Date:  10/16/2019  Last Fill Quantity: 90,   # refills: 3       amLODIPine (NORVASC) 10 MG tablet      Last Written Prescription Date:  10/16/2019  Last Fill Quantity: 90,   # refills: 3  Last Office Visit : 9/17/20 Dr Garces  Future Office visit:  12/3/20 Dr Garces      Routing refill request to provider for review/approval because:  TSH due,   BP elevated, no change in plan per 9/17/20 note  09/17/20 (!) 159/74   09/16/20 (!) 173/68   06/19/20 (!) 181/84   Per Dr Garces 9/17/20: Essential hypertension with blood pressure in the clinic elevated.  He indicates he has a whitecoat syndrome.  I briefly discussed 24-hour blood pressure monitoring.  Next clinic visit I will probably set him up for that.  Currently on amlodipine and losartan.

## 2020-09-29 DIAGNOSIS — D59.9 ACQUIRED HEMOLYTIC ANEMIA (H): ICD-10-CM

## 2020-09-29 DIAGNOSIS — E03.4 HYPOTHYROIDISM DUE TO ACQUIRED ATROPHY OF THYROID: ICD-10-CM

## 2020-09-29 DIAGNOSIS — D69.6 THROMBOCYTOPENIA (H): ICD-10-CM

## 2020-09-29 LAB
ALBUMIN SERPL-MCNC: 4.2 G/DL (ref 3.4–5)
ALP SERPL-CCNC: 68 U/L (ref 40–150)
ALT SERPL W P-5'-P-CCNC: 47 U/L (ref 0–70)
AST SERPL W P-5'-P-CCNC: 29 U/L (ref 0–45)
BILIRUB DIRECT SERPL-MCNC: 0.2 MG/DL (ref 0–0.2)
BILIRUB SERPL-MCNC: 1.3 MG/DL (ref 0.2–1.3)
CREAT SERPL-MCNC: 1.25 MG/DL (ref 0.66–1.25)
DIFFERENTIAL METHOD BLD: ABNORMAL
EOSINOPHIL # BLD AUTO: 0.1 10E9/L (ref 0–0.7)
EOSINOPHIL NFR BLD AUTO: 1 %
ERYTHROCYTE [DISTWIDTH] IN BLOOD BY AUTOMATED COUNT: 16.3 % (ref 10–15)
GFR SERPL CREATININE-BSD FRML MDRD: 53 ML/MIN/{1.73_M2}
HCT VFR BLD AUTO: 35.8 % (ref 40–53)
HGB BLD-MCNC: 12 G/DL (ref 13.3–17.7)
LDH SERPL L TO P-CCNC: 240 U/L (ref 85–227)
LYMPHOCYTES # BLD AUTO: 2.2 10E9/L (ref 0.8–5.3)
LYMPHOCYTES NFR BLD AUTO: 27 %
MCH RBC QN AUTO: 30.9 PG (ref 26.5–33)
MCHC RBC AUTO-ENTMCNC: 33.5 G/DL (ref 31.5–36.5)
MCV RBC AUTO: 92 FL (ref 78–100)
METAMYELOCYTES # BLD: 0.2 10E9/L
METAMYELOCYTES NFR BLD MANUAL: 3 %
MICROCYTES BLD QL SMEAR: PRESENT
MONOCYTES # BLD AUTO: 0.4 10E9/L (ref 0–1.3)
MONOCYTES NFR BLD AUTO: 5 %
NEUTROPHILS # BLD AUTO: 5.3 10E9/L (ref 1.6–8.3)
NEUTROPHILS NFR BLD AUTO: 64 %
PLATELET # BLD AUTO: 79 10E9/L (ref 150–450)
PLATELET # BLD EST: ABNORMAL 10*3/UL
POLYCHROMASIA BLD QL SMEAR: ABNORMAL
PROT SERPL-MCNC: 7.9 G/DL (ref 6.8–8.8)
RBC # BLD AUTO: 3.88 10E12/L (ref 4.4–5.9)
RETICS # AUTO: 242.1 10E9/L (ref 25–95)
RETICS/RBC NFR AUTO: 6.2 % (ref 0.5–2)
TSH SERPL DL<=0.005 MIU/L-ACNC: 3.44 MU/L (ref 0.4–4)
WBC # BLD AUTO: 8.2 10E9/L (ref 4–11)

## 2020-09-29 PROCEDURE — 80076 HEPATIC FUNCTION PANEL: CPT | Performed by: INTERNAL MEDICINE

## 2020-09-29 PROCEDURE — 84443 ASSAY THYROID STIM HORMONE: CPT | Performed by: INTERNAL MEDICINE

## 2020-09-29 PROCEDURE — 85045 AUTOMATED RETICULOCYTE COUNT: CPT | Performed by: INTERNAL MEDICINE

## 2020-09-29 PROCEDURE — 85025 COMPLETE CBC W/AUTO DIFF WBC: CPT | Performed by: INTERNAL MEDICINE

## 2020-09-29 PROCEDURE — 82565 ASSAY OF CREATININE: CPT | Performed by: INTERNAL MEDICINE

## 2020-09-29 PROCEDURE — 36415 COLL VENOUS BLD VENIPUNCTURE: CPT | Performed by: INTERNAL MEDICINE

## 2020-09-29 PROCEDURE — 83615 LACTATE (LD) (LDH) ENZYME: CPT | Performed by: INTERNAL MEDICINE

## 2020-10-03 NOTE — PROGRESS NOTES
"Milo Lozano is a 85 year old male who is being evaluated via a billable video visit.      The patient has been notified of following:     \"This video visit will be conducted via a call between you and your physician/provider. We have found that certain health care needs can be provided without the need for an in-person physical exam.  This service lets us provide the care you need with a video conversation.  If a prescription is necessary we can send it directly to your pharmacy.  If lab work is needed we can place an order for that and you can then stop by our lab to have the test done at a later time.    Video visits are billed at different rates depending on your insurance coverage.  Please reach out to your insurance provider with any questions.    If during the course of the call the physician/provider feels a video visit is not appropriate, you will not be charged for this service.\"    Patient has given verbal consent for Video visit? yes    Video-Visit Details    Type of service:  Video Visit    Video visit duration: 28 min  Originating Location (pt. Location): home    Distant Location (provider location):  [facility]     Platform used for Video Visit:JUAN R Hernandez MD, MD      Hematology Follow-up visit:  Date on this visit: Oct 6, 2020  Primary Care Physician: Laurence Fitzgerald    Pancytopenia; likely mild late onset hereditary spherocytosis (HS)   He was noted to be newly anemic on his cbc on 7/12/2017. When his CBC was repeated on 8/15/2017, he was mildly pancytopenic, with Hb of 12.1 g/dl, MCV of 93,  mild leucopenia and borderline thrombocytopenia.   Absolute differential counts were normal and his peripheral blood smear showed slight normochromic normocytic anemia; minimal poikilocytosis and slight thrombocytopenia with overall normal platelet morphology. There was polychromasia. Absolute reticulocyte count was elevated at 165.7. B12 was normal in 07/2017 at 494. Ferritin " was elevated at 412. Creatinine was mildly elevated at 1.32. FERMIN screen was negative. No M protein was noted on serum immunofixation. TSH was normal. Stool hemoccult was negative in 07/2017. He has never had a colonoscopy. UA showed no hematuria but did glucosuria and proteinuria. He still had persisted marked reticulocytosis in 09/2017 with absolute retic count of 143. He had a normal LDH, negative LAURA, normal haptoglobin. Peripheral blood smear on 9/5/17 showed normocytic slight anemia with slight morphologic evidence of increase in red cell regeneration with no morphologic evidence of hemolysis. There was reactive lymphocyte morphology.   He had f/up labs on 5/1/2018. Hb was 11.7 g/dl with normal MCV. WBC and platelet counts were low normal. Absolute retic count was still elevated at 143. Peripheral blood smear on 5/1/2018 showed Mild normochromic normocytic anemia. Adequate neutrophils with mild shift to immaturity. The lymphocyte population shows a population   of both small mature lymphocytes, large granular lymphocytes and reactive-appearing lymphocytes.  The morphology of the platelets was overall normal including large platelets.  US abdomen on 5/29/2018 showed findings c/w hepatosplenomegaly- hyperechoic liver, suspicious for fatty liver and enlarged spleen-  measuring 12.7 x 6.6 x 13.5 cm as well as gallstones.  CT scan of the chest abdomen and pelvis without IV contrast showed no evidence of splenomegaly on the CT, with spleen size measuring 11 cm.  There was borderline hepatomegaly and gallstones noted as well.   Osmotic fragility testing demonstrated mildly increased osmotic fragility. Occasional spherocytes were noted on peripheral blood smear. I felt  his diagnosis was mild hereditary spherocytosis (HS) (retic count <6% is c/w mild HS) albeit MCHC is normal. HS can present in 9th decade. He has mild anemia with Hb >11 g/dl and gallstones.   He has no family history of anemia.   G6PD deficiency screen  was negative. He was started on daily folic acid supplementation, 1 mg PO Qday.     Peripheral blood smear on May 10, 2019 showed normochromic normocytic anemia with increased red cell regeneration and slight thrombocytopenia and with normal platelet morphology.  Granulocytes were mature and well granulated and not dysplastic.  No circulating blasts were seen. Autoimmune hemolysis work-up was negative.   He had myelocytes on differential in 12/2019.   Repeat peripheral blood smear on 5/28/2020 showed slight normochromic, normocytic anemia; increased erythrocyte   regeneration as well as slight neutrophilic left shift with rare cells suspicious for circulating blasts. Moderate thrombocytopenia. Platelet count was 72.  We proceeded with a bone marrow biopsy on 6/19/2020 which showed:  Hypercellular marrow for age (80%) with trilineage hematopoiesis     - No overt dysplasia and no increase in marrow blasts   Flow cytometry showed polytypic B cells; no aberrant immunophenotype on T cells and no increase in myeloid blasts and no definitive abnormal myeloid   blast population. Cytogenetics was normal - 46 XY.       2. Prostate cancer- treated with XRT in 2007. PSA remains undetectable.    History Of Present Illness:  Mr. Lozano is a 85 year old male who presents for f/up of pancytopenia and abnormal peripheral blood smear. Has mildly increased osmotic fragility, possibly late onset mild HS. WBC is normal.  Hb has been stable around 12 g/dl and platelet count is stable around 80k. Creat stable around 1.3-1.4. LDH is mildly elevated. LFTs normal.  Absolute retic count remains elevated. We have reviewed his labs. He has not taken folate in the last 3 months.   He does have s/o peripheral neuropathy in his feet, stable. He has seen neurology in the past and currently declines.   He follows with Dr. Ibarra.  He has had no bruising or bleeding symptoms.  He has been feeling well.  In addition, a complete 12 point  review of  systems is negative.      Past Medical/Surgical History:  Past Medical History:   Diagnosis Date     BPH (benign prostatic hypertrophy)      BPH with obstruction/lower urinary tract symptoms 9/17/2020     CARDIOVASCULAR SCREENING; LDL GOAL LESS THAN 100 12/29/2011     CARDIOVASCULAR SCREENING; LDL GOAL LESS THAN 130 12/29/2011     Class 1 obesity due to excess calories with body mass index (BMI) of 31.0 to 31.9 in adult 9/17/2020     Hypertension goal BP (blood pressure) < 140/90 12/29/2011     Hypothyroidism due to acquired atrophy of thyroid 12/5/2016     Prostate cancer (H) 1/2007    Had Radiation     Type 2 diabetes, HbA1c goal < 7% (H)    He has  peripheral neuropathy and has had laser treatments for it for 6 months.  FHx and SocHx reviewed.     Past Surgical History:   Procedure Laterality Date     COLONOSCOPY       Allergies:  Allergies as of 10/06/2020 - Reviewed 09/17/2020   Allergen Reaction Noted     Lisinopril Cough 06/25/2014     Current Medications:  Current Outpatient Medications   Medication Sig Dispense Refill     amLODIPine (NORVASC) 10 MG tablet Take 1 tablet (10 mg) by mouth daily 90 tablet 3     aspirin (ASA) 81 MG tablet Take 1 tablet (81 mg) by mouth daily 90 tablet 3     blood glucose (ACCU-CHEK SMARTVIEW) test strip TEST BLOOD SUGAR TWICE DAILY  OR AS DIRECTED 200 strip 3     blood glucose monitoring (ACCU-CHEK MILADYS SMARTVIEW) meter device kit Use to test blood sugar 2 times daily or as directed. 1 kit 0     blood glucose XX lancets standard Use to test blood sugar 2 times daily or as directed. 200 each 0     folic acid (FOLVITE) 1 MG tablet Take 1 tablet (1 mg) by mouth daily (Patient not taking: Reported on 9/16/2020) 90 tablet 3     folic acid (FOLVITE) 1 MG tablet Take 1 tablet (1 mg) by mouth daily (Patient not taking: Reported on 9/16/2020) 90 tablet 3     glimepiride (AMARYL) 4 MG tablet Take 1 tablet (4 mg) by mouth every morning (before breakfast) 90 tablet 1     levothyroxine  (SYNTHROID/LEVOTHROID) 75 MCG tablet TAKE 1 TABLET EVERY DAY 90 tablet 0     losartan (COZAAR) 100 MG tablet TAKE 1 TABLET BY MOUTH EVERY DAY 90 tablet 1     metFORMIN (GLUCOPHAGE-XR) 500 MG 24 hr tablet Take 2 tablets (1,000 mg) by mouth daily (with dinner) 180 tablet 3     tamsulosin (FLOMAX) 0.4 MG capsule TAKE 1 CAPSULE EVERY DAY 90 capsule 3      Physical Exam:  Constitutional: alert and in no distress  Eyes: No redness or discharge  Respiratory: No cough or labored breathing.  Musculoskeletal: Full range of motion in extremities.  Skin: no visible skin lesions or discoloration  Neurological: No tremors and denies headache.  Psychiatric: Mentation appears normal and affect is normal as well.  Alert and oriented x3.  The rest the comprehensive physical examination is deferred due to public health emergency video visit restrictions.        Laboratory/Imaging Studies  Results for [facility] (MRN [id number]) as of 10/6/2020 12:46   Ref. Range 12/17/2019 15:27 5/14/2020 09:28 5/28/2020 10:24 6/19/2020 12:36 9/29/2020 11:04   Hemoglobin Latest Ref Range: 13.3 - 17.7 g/dL 11.8 (L) 12.1 (L) 12.3 (L) 12.6 (L) 12.0 (L)     Orders Only on 09/29/2020   Component Date Value Ref Range Status     Creatinine 09/29/2020 1.25  0.66 - 1.25 mg/dL Final     GFR Estimate 09/29/2020 53* >60 mL/min/[1.73_m2] Final    Comment: Non  GFR Calc  Starting 12/18/2018, serum creatinine based estimated GFR (eGFR) will be   calculated using the Chronic Kidney Disease Epidemiology Collaboration   (CKD-EPI) equation.       GFR Estimate If Black 09/29/2020 61  >60 mL/min/[1.73_m2] Final    Comment:  GFR Calc  Starting 12/18/2018, serum creatinine based estimated GFR (eGFR) will be   calculated using the Chronic Kidney Disease Epidemiology Collaboration   (CKD-EPI) equation.       Lactate Dehydrogenase 09/29/2020 240* 85 - 227 U/L Final     Bilirubin Direct 09/29/2020 0.2  0.0 - 0.2 mg/dL Final     Bilirubin  Total 09/29/2020 1.3  0.2 - 1.3 mg/dL Final     Albumin 09/29/2020 4.2  3.4 - 5.0 g/dL Final     Protein Total 09/29/2020 7.9  6.8 - 8.8 g/dL Final     Alkaline Phosphatase 09/29/2020 68  40 - 150 U/L Final     ALT 09/29/2020 47  0 - 70 U/L Final     AST 09/29/2020 29  0 - 45 U/L Final     WBC 09/29/2020 8.2  4.0 - 11.0 10e9/L Final     RBC Count 09/29/2020 3.88* 4.4 - 5.9 10e12/L Final     Hemoglobin 09/29/2020 12.0* 13.3 - 17.7 g/dL Final     Hematocrit 09/29/2020 35.8* 40.0 - 53.0 % Final     MCV 09/29/2020 92  78 - 100 fl Final     MCH 09/29/2020 30.9  26.5 - 33.0 pg Final     MCHC 09/29/2020 33.5  31.5 - 36.5 g/dL Final     RDW 09/29/2020 16.3* 10.0 - 15.0 % Final     Platelet Count 09/29/2020 79* 150 - 450 10e9/L Final     % Neutrophils 09/29/2020 64.0  % Final     % Lymphocytes 09/29/2020 27.0  % Final     % Monocytes 09/29/2020 5.0  % Final     % Eosinophils 09/29/2020 1.0  % Final     % Metamyelocytes 09/29/2020 3.0  % Final     Absolute Neutrophil 09/29/2020 5.3  1.6 - 8.3 10e9/L Final     Absolute Lymphocytes 09/29/2020 2.2  0.8 - 5.3 10e9/L Final     Absolute Monocytes 09/29/2020 0.4  0.0 - 1.3 10e9/L Final     Absolute Eosinophils 09/29/2020 0.1  0.0 - 0.7 10e9/L Final     Absolute Metamyelocytes 09/29/2020 0.2* 0 10e9/L Final     Polychromasia 09/29/2020 Slight Increase   Final     Microcytes 09/29/2020 Present   Final     Platelet Estimate 09/29/2020 Automated count confirmed.  Platelet morphology is normal.   Final     Diff Method 09/29/2020 Manual Differential   Final     % Retic 09/29/2020 6.2* 0.5 - 2.0 % Final     Absolute Retic 09/29/2020 242.1* 25 - 95 10e9/L Final     TSH 09/29/2020 3.44  0.40 - 4.00 mU/L Final         ASSESSMENT/PLAN:    Mr. Lozano is a 85 year old man, with type 2 DM,  with normocytic anemia and transient borderline leucopenia and mild thrombocytopenia but persistent reticulocytosis.    1. Normocytic anemia, reticulocytosis, thrombocytopenia- He has mild anemia stable,  and his thrombocytopenia has worsened in the last year but stable in the last 10 months, with plt count in the 80s. peripheral blood smear in May 2020 showed findings suspicious for circulating blasts but a  bone marrow biopsy did not show increased blasts or e/o leukemia or MDS. Cytogenetics was normal 46 XY.   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  Autoimmune hemolysis work-up was negative.  Resume folic acid 1 mg daily. Rx renewed.  His anemia is also at least partially related to anemia of kidney disease.    He does have borderline hepatosplenomegaly which could be associated with pancytopenia. If platelet count continues to decline, consider repeating abdominal US for evaluation of progressive hepatosplenomegaly.  F/up in 6 months with cbcd, retic count, LDH, LFTs, creat, peripheral blood smear.    2. CKD- creat stable.    3.  Prostate cancer- treated with XRT in 2007. PSA remains undetectable, last <0.01 in May 2019. We'll recheck with next visit.      At the end of our visit patient verbalized understanding and concurred with the plan.

## 2020-10-06 ENCOUNTER — VIRTUAL VISIT (OUTPATIENT)
Dept: ONCOLOGY | Facility: CLINIC | Age: 85
End: 2020-10-06
Payer: COMMERCIAL

## 2020-10-06 DIAGNOSIS — D69.6 THROMBOCYTOPENIA (H): ICD-10-CM

## 2020-10-06 DIAGNOSIS — C61 PROSTATE CANCER (H): Primary | ICD-10-CM

## 2020-10-06 DIAGNOSIS — D59.9 ACQUIRED HEMOLYTIC ANEMIA (H): ICD-10-CM

## 2020-10-06 PROCEDURE — 99214 OFFICE O/P EST MOD 30 MIN: CPT | Mod: 95 | Performed by: INTERNAL MEDICINE

## 2020-10-06 RX ORDER — FOLIC ACID 1 MG/1
1 TABLET ORAL DAILY
Qty: 90 TABLET | Refills: 3 | Status: SHIPPED | OUTPATIENT
Start: 2020-10-06 | End: 2021-09-20

## 2020-10-06 NOTE — NURSING NOTE
"Milo Lozano is a 85 year old male who is being evaluated via a billable video visit.      The patient has been notified of following:     \"This video visit will be conducted via a call between you and your physician/provider. We have found that certain health care needs can be provided without the need for an in-person physical exam.  This service lets us provide the care you need with a video conversation.  If a prescription is necessary we can send it directly to your pharmacy.  If lab work is needed we can place an order for that and you can then stop by our lab to have the test done at a later time.    Video visits are billed at different rates depending on your insurance coverage.  Please reach out to your insurance provider with any questions.    If during the course of the call the physician/provider feels a video visit is not appropriate, you will not be charged for this service.\"    Patient has given verbal consent for Video visit? Yes  How would you like to obtain your AVS? MyChart  If you are dropped from the video visit, the video invite should be resent to: Text to cell phone: [phone]  Will anyone else be joining your video visit? No      Video-Visit Details    Type of service:  Video Visit    Originating Location (pt. Location): Home    Distant Location (provider location):  [facility]     Platform used for Video Visit: Lydia Irwin CMA        "

## 2020-10-06 NOTE — LETTER
"    10/6/2020         RE: Milo Lozano  [address] [facility] [address]        Dear Colleague,    Thank you for referring your patient, Milo Lozano, to the [facility]. Please see a copy of my visit note below.    Milo Lozano is a 85 year old male who is being evaluated via a billable video visit.      The patient has been notified of following:     \"This video visit will be conducted via a call between you and your physician/provider. We have found that certain health care needs can be provided without the need for an in-person physical exam.  This service lets us provide the care you need with a video conversation.  If a prescription is necessary we can send it directly to your pharmacy.  If lab work is needed we can place an order for that and you can then stop by our lab to have the test done at a later time.    Video visits are billed at different rates depending on your insurance coverage.  Please reach out to your insurance provider with any questions.    If during the course of the call the physician/provider feels a video visit is not appropriate, you will not be charged for this service.\"    Patient has given verbal consent for Video visit? yes    Video-Visit Details    Type of service:  Video Visit    Video visit duration: 28 min  Originating Location (pt. Location): home    Distant Location (provider location):  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 When his CBC was repeated on 8/15/2017, he was mildly pancytopenic, with Hb of 12.1 g/dl, MCV of 93,  mild leucopenia and borderline thrombocytopenia.   Absolute differential counts were normal and his peripheral " blood smear showed slight normochromic normocytic anemia; minimal poikilocytosis and slight thrombocytopenia with overall normal platelet morphology. There was polychromasia. Absolute reticulocyte count was elevated at 165.7. B12 was normal in 07/2017 at 494. Ferritin was elevated at 412. Creatinine was mildly elevated at 1.32. FERMIN screen was negative. No M protein was noted on serum immunofixation. TSH was normal. Stool hemoccult was negative in 07/2017. He has never had a colonoscopy. UA showed no hematuria but did glucosuria and proteinuria. He still had persisted marked reticulocytosis in 09/2017 with absolute retic count of 143. He had a normal LDH, negative LAURA, normal haptoglobin. Peripheral blood smear on 9/5/17 showed normocytic slight anemia with slight morphologic evidence of increase in red cell regeneration with no morphologic evidence of hemolysis. There was reactive lymphocyte morphology.   He had f/up labs on 5/1/2018. Hb was 11.7 g/dl with normal MCV. WBC and platelet counts were low normal. Absolute retic count was still elevated at 143. Peripheral blood smear on 5/1/2018 showed Mild normochromic normocytic anemia. Adequate neutrophils with mild shift to immaturity. The lymphocyte population shows a population   of both small mature lymphocytes, large granular lymphocytes and reactive-appearing lymphocytes.  The morphology of the platelets was overall normal including large platelets.  US abdomen on 5/29/2018 showed findings c/w hepatosplenomegaly- hyperechoic liver, suspicious for fatty liver and enlarged spleen-  measuring 12.7 x 6.6 x 13.5 cm as well as gallstones.  CT scan of the chest abdomen and pelvis without IV contrast showed no evidence of splenomegaly on the CT, with spleen size measuring 11 cm.  There was borderline hepatomegaly and gallstones noted as well.   Osmotic fragility testing demonstrated mildly increased osmotic fragility. Occasional spherocytes were noted on peripheral blood  smear. I felt  his diagnosis was mild hereditary spherocytosis (HS) (retic count <6% is c/w mild HS) albeit MCHC is normal. HS can present in 9th decade. He has mild anemia with Hb >11 g/dl and gallstones.   He has no family history of anemia.   G6PD deficiency screen was negative. He was started on daily folic acid supplementation, 1 mg PO Qday.     Peripheral blood smear on May 10, 2019 showed normochromic normocytic anemia with increased red cell regeneration and slight thrombocytopenia and with normal platelet morphology.  Granulocytes were mature and well granulated and not dysplastic.  No circulating blasts were seen. Autoimmune hemolysis work-up was negative.   He had myelocytes on differential in 12/2019.   Repeat peripheral blood smear on 5/28/2020 showed slight normochromic, normocytic anemia; increased erythrocyte   regeneration as well as slight neutrophilic left shift with rare cells suspicious for circulating blasts. Moderate thrombocytopenia. Platelet count was 72.  We proceeded with a bone marrow biopsy on 6/19/2020 which showed:  Hypercellular marrow for age (80%) with trilineage hematopoiesis     - No overt dysplasia and no increase in marrow blasts   Flow cytometry showed polytypic B cells; no aberrant immunophenotype on T cells and no increase in myeloid blasts and no definitive abnormal myeloid   blast population. Cytogenetics was normal - 46 XY.       2. Prostate cancer- treated with XRT in 2007. PSA remains undetectable.    History Of Present Illness:  Mr. Lozano is a 85 year old male who presents for f/up of pancytopenia and abnormal peripheral blood smear. Has mildly increased osmotic fragility, possibly late onset mild HS. WBC is normal.  Hb has been stable around 12 g/dl and platelet count is stable around 80k. Creat stable around 1.3-1.4. LDH is mildly elevated. LFTs normal.  Absolute retic count remains elevated. We have reviewed his labs. He has not taken folate in the last 3  months.   He does have s/o peripheral neuropathy in his feet, stable. He has seen neurology in the past and currently declines.   He follows with Dr. Ibarra.  He has had no bruising or bleeding symptoms.  He has been feeling well.  In addition, a complete 12 point  review of systems is negative.      Past Medical/Surgical History:  Past Medical History:   Diagnosis Date     BPH (benign prostatic hypertrophy)      BPH with obstruction/lower urinary tract symptoms 9/17/2020     CARDIOVASCULAR SCREENING; LDL GOAL LESS THAN 100 12/29/2011     CARDIOVASCULAR SCREENING; LDL GOAL LESS THAN 130 12/29/2011     Class 1 obesity due to excess calories with body mass index (BMI) of 31.0 to 31.9 in adult 9/17/2020     Hypertension goal BP (blood pressure) < 140/90 12/29/2011     Hypothyroidism due to acquired atrophy of thyroid 12/5/2016     Prostate cancer (H) 1/2007    Had Radiation     Type 2 diabetes, HbA1c goal < 7% (H)    He has  peripheral neuropathy and has had laser treatments for it for 6 months.  FHx and SocHx reviewed.     Past Surgical History:   Procedure Laterality Date     COLONOSCOPY       Allergies:  Allergies as of 10/06/2020 - Reviewed 09/17/2020   Allergen Reaction Noted     Lisinopril Cough 06/25/2014     Current Medications:  Current Outpatient Medications   Medication Sig Dispense Refill     amLODIPine (NORVASC) 10 MG tablet Take 1 tablet (10 mg) by mouth daily 90 tablet 3     aspirin (ASA) 81 MG tablet Take 1 tablet (81 mg) by mouth daily 90 tablet 3     blood glucose (ACCU-CHEK SMARTVIEW) test strip TEST BLOOD SUGAR TWICE DAILY  OR AS DIRECTED 200 strip 3     blood glucose monitoring (ACCU-CHEK MILADYS SMARTVIEW) meter device kit Use to test blood sugar 2 times daily or as directed. 1 kit 0     blood glucose XX lancets standard Use to test blood sugar 2 times daily or as directed. 200 each 0     folic acid (FOLVITE) 1 MG tablet Take 1 tablet (1 mg) by mouth daily (Patient not taking: Reported on 9/16/2020) 90  tablet 3     folic acid (FOLVITE) 1 MG tablet Take 1 tablet (1 mg) by mouth daily (Patient not taking: Reported on 9/16/2020) 90 tablet 3     glimepiride (AMARYL) 4 MG tablet Take 1 tablet (4 mg) by mouth every morning (before breakfast) 90 tablet 1     levothyroxine (SYNTHROID/LEVOTHROID) 75 MCG tablet TAKE 1 TABLET EVERY DAY 90 tablet 0     losartan (COZAAR) 100 MG tablet TAKE 1 TABLET BY MOUTH EVERY DAY 90 tablet 1     metFORMIN (GLUCOPHAGE-XR) 500 MG 24 hr tablet Take 2 tablets (1,000 mg) by mouth daily (with dinner) 180 tablet 3     tamsulosin (FLOMAX) 0.4 MG capsule TAKE 1 CAPSULE EVERY DAY 90 capsule 3      Physical Exam:  Constitutional: alert and in no distress  Eyes: No redness or discharge  Respiratory: No cough or labored breathing.  Musculoskeletal: Full range of motion in extremities.  Skin: no visible skin lesions or discoloration  Neurological: No tremors and denies headache.  Psychiatric: Mentation appears normal and affect is normal as well.  Alert and oriented x3.  The rest the comprehensive physical examination is deferred due to public health emergency video visit restrictions.        Laboratory/Imaging Studies  Results for [facility] (MRN [id number]) as of 10/6/2020 12:46   Ref. Range 12/17/2019 15:27 5/14/2020 09:28 5/28/2020 10:24 6/19/2020 12:36 9/29/2020 11:04   Hemoglobin Latest Ref Range: 13.3 - 17.7 g/dL 11.8 (L) 12.1 (L) 12.3 (L) 12.6 (L) 12.0 (L)     Orders Only on 09/29/2020   Component Date Value Ref Range Status     Creatinine 09/29/2020 1.25  0.66 - 1.25 mg/dL Final     GFR Estimate 09/29/2020 53* >60 mL/min/[1.73_m2] Final    Comment: Non  GFR Calc  Starting 12/18/2018, serum creatinine based estimated GFR (eGFR) will be   calculated using the Chronic Kidney Disease Epidemiology Collaboration   (CKD-EPI) equation.       GFR Estimate If Black 09/29/2020 61  >60 mL/min/[1.73_m2] Final    Comment:  GFR Calc  Starting 12/18/2018, serum creatinine  based estimated GFR (eGFR) will be   calculated using the Chronic Kidney Disease Epidemiology Collaboration   (CKD-EPI) equation.       Lactate Dehydrogenase 09/29/2020 240* 85 - 227 U/L Final     Bilirubin Direct 09/29/2020 0.2  0.0 - 0.2 mg/dL Final     Bilirubin Total 09/29/2020 1.3  0.2 - 1.3 mg/dL Final     Albumin 09/29/2020 4.2  3.4 - 5.0 g/dL Final     Protein Total 09/29/2020 7.9  6.8 - 8.8 g/dL Final     Alkaline Phosphatase 09/29/2020 68  40 - 150 U/L Final     ALT 09/29/2020 47  0 - 70 U/L Final     AST 09/29/2020 29  0 - 45 U/L Final     WBC 09/29/2020 8.2  4.0 - 11.0 10e9/L Final     RBC Count 09/29/2020 3.88* 4.4 - 5.9 10e12/L Final     Hemoglobin 09/29/2020 12.0* 13.3 - 17.7 g/dL Final     Hematocrit 09/29/2020 35.8* 40.0 - 53.0 % Final     MCV 09/29/2020 92  78 - 100 fl Final     MCH 09/29/2020 30.9  26.5 - 33.0 pg Final     MCHC 09/29/2020 33.5  31.5 - 36.5 g/dL Final     RDW 09/29/2020 16.3* 10.0 - 15.0 % Final     Platelet Count 09/29/2020 79* 150 - 450 10e9/L Final     % Neutrophils 09/29/2020 64.0  % Final     % Lymphocytes 09/29/2020 27.0  % Final     % Monocytes 09/29/2020 5.0  % Final     % Eosinophils 09/29/2020 1.0  % Final     % Metamyelocytes 09/29/2020 3.0  % Final     Absolute Neutrophil 09/29/2020 5.3  1.6 - 8.3 10e9/L Final     Absolute Lymphocytes 09/29/2020 2.2  0.8 - 5.3 10e9/L Final     Absolute Monocytes 09/29/2020 0.4  0.0 - 1.3 10e9/L Final     Absolute Eosinophils 09/29/2020 0.1  0.0 - 0.7 10e9/L Final     Absolute Metamyelocytes 09/29/2020 0.2* 0 10e9/L Final     Polychromasia 09/29/2020 Slight Increase   Final     Microcytes 09/29/2020 Present   Final     Platelet Estimate 09/29/2020 Automated count confirmed.  Platelet morphology is normal.   Final     Diff Method 09/29/2020 Manual Differential   Final     % Retic 09/29/2020 6.2* 0.5 - 2.0 % Final     Absolute Retic 09/29/2020 242.1* 25 - 95 10e9/L Final     TSH 09/29/2020 3.44  0.40 - 4.00 mU/L Final          ASSESSMENT/PLAN:    Mr. Lozano is a 85 year old man, with type 2 DM,  with normocytic anemia and transient borderline leucopenia and mild thrombocytopenia but persistent reticulocytosis.    1. Normocytic anemia, reticulocytosis, thrombocytopenia- He has mild anemia stable, and his thrombocytopenia has worsened in the last year but stable in the last 10 months, with plt count in the 80s. peripheral blood smear in May 2020 showed findings suspicious for circulating blasts but a  bone marrow biopsy did not show increased blasts or e/o leukemia or MDS. Cytogenetics was normal 46 XY.   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  Autoimmune hemolysis work-up was negative.  Resume folic acid 1 mg daily. Rx renewed.  His anemia is also at least partially related to anemia of kidney disease.    He does have borderline hepatosplenomegaly which could be associated with pancytopenia. If platelet count continues to decline, consider repeating abdominal US for evaluation of progressive hepatosplenomegaly.  F/up in 6 months with cbcd, retic count, LDH, LFTs, creat, peripheral blood smear.    2. CKD- creat stable.    3.  Prostate cancer- treated with XRT in 2007. PSA remains undetectable, last <0.01 in May 2019. We'll recheck with next visit.      At the end of our visit patient verbalized understanding and concurred with the plan.          Again, thank you for allowing me to participate in the care of your patient.        Sincerely,        Karen Hernandez MD, MD

## 2020-12-03 ENCOUNTER — OFFICE VISIT (OUTPATIENT)
Dept: PEDIATRICS | Facility: CLINIC | Age: 85
End: 2020-12-03
Payer: COMMERCIAL

## 2020-12-03 VITALS
SYSTOLIC BLOOD PRESSURE: 163 MMHG | WEIGHT: 190 LBS | HEART RATE: 75 BPM | TEMPERATURE: 97.6 F | OXYGEN SATURATION: 100 % | DIASTOLIC BLOOD PRESSURE: 78 MMHG | BODY MASS INDEX: 29.76 KG/M2

## 2020-12-03 DIAGNOSIS — I10 HYPERTENSION GOAL BP (BLOOD PRESSURE) < 140/90: ICD-10-CM

## 2020-12-03 DIAGNOSIS — E11.21 CONTROLLED TYPE 2 DIABETES MELLITUS WITH DIABETIC NEPHROPATHY, WITHOUT LONG-TERM CURRENT USE OF INSULIN (H): ICD-10-CM

## 2020-12-03 DIAGNOSIS — N18.30 CKD (CHRONIC KIDNEY DISEASE) STAGE 3, GFR 30-59 ML/MIN (H): ICD-10-CM

## 2020-12-03 DIAGNOSIS — G47.33 OSA ON CPAP: ICD-10-CM

## 2020-12-03 DIAGNOSIS — E11.40 TYPE 2 DIABETES MELLITUS WITH DIABETIC NEUROPATHY, WITHOUT LONG-TERM CURRENT USE OF INSULIN (H): ICD-10-CM

## 2020-12-03 DIAGNOSIS — E11.42 DIABETIC POLYNEUROPATHY ASSOCIATED WITH TYPE 2 DIABETES MELLITUS (H): Primary | ICD-10-CM

## 2020-12-03 DIAGNOSIS — E03.4 HYPOTHYROIDISM DUE TO ACQUIRED ATROPHY OF THYROID: ICD-10-CM

## 2020-12-03 DIAGNOSIS — E78.5 HYPERLIPIDEMIA WITH TARGET LDL LESS THAN 100: ICD-10-CM

## 2020-12-03 DIAGNOSIS — N18.31 STAGE 3A CHRONIC KIDNEY DISEASE (H): ICD-10-CM

## 2020-12-03 LAB
ANION GAP SERPL CALCULATED.3IONS-SCNC: 4 MMOL/L (ref 3–14)
BUN SERPL-MCNC: 23 MG/DL (ref 7–30)
CALCIUM SERPL-MCNC: 9.1 MG/DL (ref 8.5–10.1)
CHLORIDE SERPL-SCNC: 106 MMOL/L (ref 94–109)
CHOLEST SERPL-MCNC: 119 MG/DL
CO2 SERPL-SCNC: 29 MMOL/L (ref 20–32)
CREAT SERPL-MCNC: 1.21 MG/DL (ref 0.66–1.25)
CREAT UR-MCNC: 136 MG/DL
GFR SERPL CREATININE-BSD FRML MDRD: 55 ML/MIN/{1.73_M2}
GLUCOSE SERPL-MCNC: 198 MG/DL (ref 70–99)
HBA1C MFR BLD: 6.2 % (ref 0–5.6)
HDLC SERPL-MCNC: 34 MG/DL
LDLC SERPL CALC-MCNC: 52 MG/DL
MICROALBUMIN UR-MCNC: 242 MG/L
MICROALBUMIN/CREAT UR: 177.94 MG/G CR (ref 0–17)
NONHDLC SERPL-MCNC: 85 MG/DL
POTASSIUM SERPL-SCNC: 3.9 MMOL/L (ref 3.4–5.3)
SODIUM SERPL-SCNC: 139 MMOL/L (ref 133–144)
TRIGL SERPL-MCNC: 166 MG/DL

## 2020-12-03 PROCEDURE — 99214 OFFICE O/P EST MOD 30 MIN: CPT | Performed by: INTERNAL MEDICINE

## 2020-12-03 PROCEDURE — 36415 COLL VENOUS BLD VENIPUNCTURE: CPT | Performed by: INTERNAL MEDICINE

## 2020-12-03 PROCEDURE — 83036 HEMOGLOBIN GLYCOSYLATED A1C: CPT | Performed by: INTERNAL MEDICINE

## 2020-12-03 PROCEDURE — 82043 UR ALBUMIN QUANTITATIVE: CPT | Performed by: INTERNAL MEDICINE

## 2020-12-03 PROCEDURE — 80061 LIPID PANEL: CPT | Performed by: INTERNAL MEDICINE

## 2020-12-03 PROCEDURE — 80048 BASIC METABOLIC PNL TOTAL CA: CPT | Performed by: INTERNAL MEDICINE

## 2020-12-03 RX ORDER — GLIMEPIRIDE 2 MG/1
2 TABLET ORAL
Qty: 90 TABLET | Refills: 3 | Status: SHIPPED | OUTPATIENT
Start: 2020-12-03 | End: 2021-10-20

## 2020-12-03 RX ORDER — BLOOD SUGAR DIAGNOSTIC
STRIP MISCELLANEOUS
Qty: 200 STRIP | Refills: 3 | Status: SHIPPED | OUTPATIENT
Start: 2020-12-03

## 2020-12-03 RX ORDER — LEVOTHYROXINE SODIUM 75 UG/1
TABLET ORAL
Qty: 90 TABLET | Refills: 3 | Status: SHIPPED | OUTPATIENT
Start: 2020-12-03 | End: 2021-10-20

## 2020-12-03 RX ORDER — LOSARTAN POTASSIUM AND HYDROCHLOROTHIAZIDE 25; 100 MG/1; MG/1
1 TABLET ORAL EVERY MORNING
Qty: 90 TABLET | Refills: 3 | Status: SHIPPED | OUTPATIENT
Start: 2020-12-03 | End: 2021-12-30

## 2020-12-03 RX ORDER — LOSARTAN POTASSIUM 100 MG/1
TABLET ORAL
Qty: 90 TABLET | Refills: 1 | Status: CANCELLED | OUTPATIENT
Start: 2020-12-03

## 2020-12-03 NOTE — PATIENT INSTRUCTIONS
Reducing Glimepiride to 2 mg once a day.  Stop the Losartan 100 mg and start Losartan Hydrochlorothiazide  100/25 mg daily

## 2020-12-03 NOTE — PROGRESS NOTES
Subjective     Milo Lozano is a 85 year old male who presents to clinic today for the following health issues:    HPI         Diabetes Follow-up    How often are you checking your blood sugar? One time daily  What time of day are you checking your blood sugars (select all that apply)?  Before meals  Have you had any blood sugars above 200?  Yes occas  Have you had any blood sugars below 70?  No    What symptoms do you notice when your blood sugar is low?  None    What concerns do you have today about your diabetes? None     Do you have any of these symptoms? (Select all that apply)  Numbness in feet and Burning in feet    Patient is coming for follow-up on diabetes, hypertension, chronic kidney disease, hypothyroidism and other health problems as stated in the past medical history.  He states he is overall doing well.  His blood pressure is elevated today in the clinic but at home it is better.  Even at home he is getting systolic up to 150.  He denies chest pain or shortness of breath.  Takes all his medication as prescribed.  The couple of times when he felt a little shaky and wondered if his sugar was low.    BP Readings from Last 2 Encounters:   09/17/20 (!) 159/74   09/16/20 (!) 173/68     Hemoglobin A1C (%)   Date Value   12/03/2020 6.2 (H)   06/09/2020 6.9 (H)     LDL Cholesterol Calculated (mg/dL)   Date Value   12/03/2020 52   05/10/2019 39                 How many servings of fruits and vegetables do you eat daily?  0-1    On average, how many sweetened beverages do you drink each day (Examples: soda, juice, sweet tea, etc.  Do NOT count diet or artificially sweetened beverages)?   0    How many days per week do you exercise enough to make your heart beat faster? 3 or less    How many minutes a day do you exercise enough to make your heart beat faster? 9 or less    How many days per week do you miss taking your medication? 0        Review of Systems   Constitutional, HEENT, cardiovascular,  pulmonary, GI, , musculoskeletal, neuro, skin, endocrine and psych systems are negative, except as otherwise noted.      Objective    There were no vitals taken for this visit.  There is no height or weight on file to calculate BMI.  Physical Exam   GENERAL: healthy, alert and no distress  NECK: no adenopathy, no asymmetry, masses, or scars and thyroid normal to palpation  RESP: lungs clear to auscultation - no rales, rhonchi or wheezes  CV: regular rate and rhythm, normal S1 S2, no S3 or S4, no murmur, click or rub, no peripheral edema and peripheral pulses strong  ABDOMEN: soft, nontender, no hepatosplenomegaly, no masses and bowel sounds normal  MS: no gross musculoskeletal defects noted, no edema  Diabetic foot exam: normal DP and PT pulses, no trophic changes or ulcerative lesions and reduced sensation at both feet.  Touch perception pretty much nonexistent on the plantar surface of the feet.    Results for orders placed or performed in visit on 12/03/20 (from the past 24 hour(s))   Basic metabolic panel   Result Value Ref Range    Sodium 139 133 - 144 mmol/L    Potassium 3.9 3.4 - 5.3 mmol/L    Chloride 106 94 - 109 mmol/L    Carbon Dioxide 29 20 - 32 mmol/L    Anion Gap 4 3 - 14 mmol/L    Glucose 198 (H) 70 - 99 mg/dL    Urea Nitrogen 23 7 - 30 mg/dL    Creatinine 1.21 0.66 - 1.25 mg/dL    GFR Estimate 55 (L) >60 mL/min/[1.73_m2]    GFR Estimate If Black 63 >60 mL/min/[1.73_m2]    Calcium 9.1 8.5 - 10.1 mg/dL   Hemoglobin A1c   Result Value Ref Range    Hemoglobin A1C 6.2 (H) 0 - 5.6 %   Lipid Profile   Result Value Ref Range    Cholesterol 119 <200 mg/dL    Triglycerides 166 (H) <150 mg/dL    HDL Cholesterol 34 (L) >39 mg/dL    LDL Cholesterol Calculated 52 <100 mg/dL    Non HDL Cholesterol 85 <130 mg/dL           Assessment & Plan     1.  Type 2 diabetes mellitus under good control with A1c 6.2.  In fact he might have had couple of low blood sugar episodes have decided that he continue with Metformin 2 g  a day but reduce glimepiride from 4 mg to 2 mg a day.  Recheck A1c in 3 months.  2.  Essential hypertension with elevated blood pressure.  Currently on amlodipine 10 mg a day, losartan 100 mg a day and also on tamsulosin 0.4 mg a day.  After discussion he agreed to switch to losartan /25 daily in the morning.  Return in 3 months and will check BMP.  3.  Autoimmune hypothyroidism under control with TSH therapeutic at 3.44 on 9/29/2020.  Continue present dose of levothyroxine at 75 mcg daily.  4.  Normal lipid profile as mentioned above.  Patient not on statin therapy.  5.  Stage IIIa chronic kidney disease remaining stable creatinine 1.2 and GFR 55 today.  We will recheck in 3 months.  5.  Obstructive sleep apnea on CPAP.  6.  History of prostate cancer.  Radiation therapy in 2007.  Last PSA was a year ago.            Return in about 3 months (around 3/3/2021) for Visit with none fasting lab.    Aj Garces MD  [facility]

## 2020-12-09 ENCOUNTER — TELEPHONE (OUTPATIENT)
Dept: PEDIATRICS | Facility: CLINIC | Age: 85
End: 2020-12-09

## 2020-12-09 DIAGNOSIS — E11.40 TYPE 2 DIABETES MELLITUS WITH DIABETIC NEUROPATHY, WITHOUT LONG-TERM CURRENT USE OF INSULIN (H): Primary | ICD-10-CM

## 2020-12-09 NOTE — TELEPHONE ENCOUNTER
[facility] Call Center    Phone Message    May a detailed message be left on voicemail: yes     Reason for Call: Medication Question or concern regarding medication   Prescription Clarification  Question about blood glucose device. Patient provided no details.         Action Taken: message to primary care  Travel Screening: Not Applicable

## 2020-12-09 NOTE — TELEPHONE ENCOUNTER
Message left for patient to return call, forgot to inquire if patient wants all testing supplies sent in now, and if he wants them to the [facility] pharmacy that his recent Rx's have been sent to.     Yasmine Carlos RN

## 2020-12-09 NOTE — TELEPHONE ENCOUNTER
Contacted patient, states he has an Accu-Chek glucose testing device.  His insurance will no longer cover the testing strips or lancets.  Patient informed will send new Rx for all glucose testing supplies with no brand specified so it can be refilled when needed with whatever brand is covered.      Patient also inquiring if Dr. Garces is a primary care physician or specialist?  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  Patient states he is being billed as a specialist when he sees provider, which has an increased copay.  Advised will check into this.  Message sent to coding.    Yasmine Carlos RN

## 2020-12-10 RX ORDER — LANCETS
EACH MISCELLANEOUS
Qty: 200 EACH | Refills: 3 | Status: SHIPPED | OUTPATIENT
Start: 2020-12-10

## 2020-12-10 NOTE — TELEPHONE ENCOUNTER
Patient contacted, requests that prescriptions go to the mail order pharmacy on file.  Forwarding to provider for review/signature.    Yasmine Carlos RN

## 2020-12-11 ENCOUNTER — TELEPHONE (OUTPATIENT)
Dept: PEDIATRICS | Facility: CLINIC | Age: 85
End: 2020-12-11

## 2020-12-14 NOTE — TELEPHONE ENCOUNTER
PA Initiation    Medication: ACCU-CHEK SMARTVIEW STRIPS  Insurance Company: [employer] [place] - Phone [phone] Fax [phone]  Pharmacy Filling the Rx: [facility] - [place], [place] - [address] AT [facility]  Filling Pharmacy Phone: [phone]  Filling Pharmacy Fax: [phone]  Start Date: 12/14/2020

## 2020-12-15 NOTE — TELEPHONE ENCOUNTER
Prior Authorization Approval    Authorization Effective Date: 12/14/2020  Authorization Expiration Date: 3/13/2021  Medication: ACCU-CHEK SMARTVIEW STRIPS--APPROVED  Approved Dose/Quantity:   Reference #:     Insurance Company: [employer] [place] - Phone [phone] Fax [phone]  Expected CoPay:       CoPay Card Available:      Foundation Assistance Needed:    Which Pharmacy is filling the prescription (Not needed for infusion/clinic administered): [facility] - [place], [place] - [address] AT [facility]  Pharmacy Notified: Yes  Patient Notified: Yes **Instructed pharmacy to notify patient when script is ready to /ship.**

## 2020-12-16 DIAGNOSIS — E11.40 TYPE 2 DIABETES MELLITUS WITH DIABETIC NEUROPATHY, WITHOUT LONG-TERM CURRENT USE OF INSULIN (H): ICD-10-CM

## 2020-12-16 RX ORDER — GLIMEPIRIDE 4 MG/1
4 TABLET ORAL
Qty: 90 TABLET | Refills: 1 | Status: CANCELLED | OUTPATIENT
Start: 2020-12-16

## 2020-12-17 NOTE — TELEPHONE ENCOUNTER
Call to pharmacy after received paper refill request, spoke with staff, reviewed prescription sent 12/3/20 good for 1 year which is 2 mg not 4 mg as requested as his dose was decreased.  Pharmacy has current RX, 4 mg dose has now been removed from profile

## 2021-01-04 ENCOUNTER — OFFICE VISIT (OUTPATIENT)
Dept: DERMATOLOGY | Facility: CLINIC | Age: 86
End: 2021-01-04
Payer: COMMERCIAL

## 2021-01-04 ENCOUNTER — ANCILLARY PROCEDURE (OUTPATIENT)
Dept: GENERAL RADIOLOGY | Facility: CLINIC | Age: 86
End: 2021-01-04
Attending: FAMILY MEDICINE
Payer: COMMERCIAL

## 2021-01-04 ENCOUNTER — OFFICE VISIT (OUTPATIENT)
Dept: ORTHOPEDICS | Facility: CLINIC | Age: 86
End: 2021-01-04
Payer: COMMERCIAL

## 2021-01-04 VITALS — WEIGHT: 190 LBS | BODY MASS INDEX: 29.76 KG/M2

## 2021-01-04 DIAGNOSIS — M17.12 PRIMARY OSTEOARTHRITIS OF LEFT KNEE: ICD-10-CM

## 2021-01-04 DIAGNOSIS — G89.29 CHRONIC PAIN OF LEFT KNEE: Primary | ICD-10-CM

## 2021-01-04 DIAGNOSIS — L82.1 SK (SEBORRHEIC KERATOSIS): ICD-10-CM

## 2021-01-04 DIAGNOSIS — M25.562 CHRONIC PAIN OF LEFT KNEE: Primary | ICD-10-CM

## 2021-01-04 DIAGNOSIS — M25.562 LEFT KNEE PAIN: ICD-10-CM

## 2021-01-04 DIAGNOSIS — L57.0 AK (ACTINIC KERATOSIS): Primary | ICD-10-CM

## 2021-01-04 PROCEDURE — 17000 DESTRUCT PREMALG LESION: CPT | Mod: GC | Performed by: DERMATOLOGY

## 2021-01-04 PROCEDURE — 99202 OFFICE O/P NEW SF 15 MIN: CPT | Mod: 25 | Performed by: DERMATOLOGY

## 2021-01-04 PROCEDURE — 99203 OFFICE O/P NEW LOW 30 MIN: CPT | Performed by: FAMILY MEDICINE

## 2021-01-04 PROCEDURE — 17003 DESTRUCT PREMALG LES 2-14: CPT | Mod: GC | Performed by: DERMATOLOGY

## 2021-01-04 PROCEDURE — 73562 X-RAY EXAM OF KNEE 3: CPT | Mod: LT | Performed by: RADIOLOGY

## 2021-01-04 ASSESSMENT — PAIN SCALES - GENERAL: PAINLEVEL: NO PAIN (0)

## 2021-01-04 NOTE — NURSING NOTE
Milo Lozano's goals for this visit include:   Chief Complaint   Patient presents with     Derm Problem     No personal or family history of skin cancer. Spots check on scalp, left helix, and left wrist.      New Patient       He requests these members of his care team be copied on today's visit information: Yes     PCP: Aj Garces    Referring Provider:  Laurence Ibarra MD PhD  [id number] [place] [address]    There were no vitals taken for this visit.    Do you need any medication refills at today's visit? No   [id number], MEDICAL ASSISTANT

## 2021-01-04 NOTE — LETTER
1/4/2021         RE: Milo Lozano  [address] [facility] [address]        Dear Colleague,    Thank you for referring your patient, Milo Lozano, to the [facility] [place]. Please see a copy of my visit note below.          [facility]  1/4/2021    Milo Lozano's chief complaint for this visit includes:  Chief Complaint   Patient presents with     Consult     left knee pain, no known injury,      PCP: Aj Garces    Referring Provider:  No referring provider defined for this encounter.    Wt 86.2 kg (190 lb)   BMI 29.76 kg/m    Data Unavailable       Reason for visit:     What part of your body is injured / painful?  left knee    What caused the injury /pain? No inciting event     How long ago did your injury occur or pain begin? problem is longstanding    What are your most bothersome symptoms? Pain    How would you characterize your symptom?  aching    What makes your symptoms better? Rest    What makes your symptoms worse? Movement    Have you been previously seen for this problem? No    Medical History:    Any recent changes to your medical history? No    Any new medication prescribed since last visit? No    Have you had surgery on this body part before? No    Social History:    Occupation: Retired     Review of Systems:    Do you have fever, chills, weight loss? No    Do you have any vision problems? No    Do you have any chest pain or edema? No    Do you have any shortness of breath or wheezing?  No    Do you have stomach problems? No    Do you have any urinary track issues? No    Do you have any numbness or focal weakness? No    Do you have diabetes? No    Do you have problems with bleeding or clotting? No    Do you have an rashes or other skin lesions? No       CHIEF COMPLAINT:  Consult (left knee pain, no known injury, )       HISTORY OF PRESENT ILLNESS  Mr. Lozano is a pleasant 85 year old male who presents to  clinic today with left knee pain.  Alex has had left knee issues for many years.  He attributes his pain to kneeling on his left knee often throughout 50 years of working.  He feels pain deep inside his knee, at times he notices swelling and stiffness, he is feeling well today.  He has not had any known significant injury, no history of surgery on this knee.      Additional history: as documented    MEDICAL HISTORY  Patient Active Problem List   Diagnosis     Hypertension goal BP (blood pressure) < 140/90     CARDIOVASCULAR SCREENING; LDL GOAL LESS THAN 100     Prostate cancer (H)     Hyperlipidemia with target LDL less than 100     Advanced directives, counseling/discussion     White coat hypertension     Hypothyroidism due to acquired atrophy of thyroid     DEMOND on CPAP     Diabetic polyneuropathy associated with type 2 diabetes mellitus (H)     CKD (chronic kidney disease) stage 3, GFR 30-59 ml/min     BPH with obstruction/lower urinary tract symptoms     Class 1 obesity due to excess calories with body mass index (BMI) of 31.0 to 31.9 in adult       Current Outpatient Medications   Medication Sig Dispense Refill     amLODIPine (NORVASC) 10 MG tablet Take 1 tablet (10 mg) by mouth daily 90 tablet 3     aspirin (ASA) 81 MG tablet Take 1 tablet (81 mg) by mouth daily 90 tablet 3     blood glucose (ACCU-CHEK SMARTVIEW) test strip TEST BLOOD SUGAR TWICE DAILY  OR AS DIRECTED 200 strip 3     blood glucose (NO BRAND SPECIFIED) lancets standard Use to test blood sugar 2 times daily or as directed. 200 each 0     blood glucose (NO BRAND SPECIFIED) test strip Use to test blood sugar 2 times daily or as directed. To accompany: Blood Glucose Monitor Brands: per insurance 200 strip 3     blood glucose calibration (NO BRAND SPECIFIED) solution To accompany: Blood Glucose Monitor Brands: per insurance. 1 Bottle 3     blood glucose monitoring (ACCU-CHEK MILADYS SMARTVIEW) meter device kit Use to test blood sugar 2 times daily or as  directed. 1 kit 0     blood glucose monitoring (NO BRAND SPECIFIED) meter device kit Use to test blood sugar 2 times daily or as directed. Preferred blood glucose meter OR supplies to accompany: Blood Glucose Monitor Brands: per insurance 1 kit 0     folic acid (FOLVITE) 1 MG tablet Take 1 tablet (1 mg) by mouth daily 90 tablet 3     glimepiride (AMARYL) 2 MG tablet Take 1 tablet (2 mg) by mouth every morning (before breakfast) 90 tablet 3     levothyroxine (SYNTHROID/LEVOTHROID) 75 MCG tablet TAKE 1 TABLET EVERY DAY 90 tablet 3     losartan-hydrochlorothiazide (HYZAAR) 100-25 MG tablet Take 1 tablet by mouth every morning 90 tablet 3     metFORMIN (GLUCOPHAGE-XR) 500 MG 24 hr tablet Take 2 tablets (1,000 mg) by mouth daily (with dinner) 180 tablet 3     tamsulosin (FLOMAX) 0.4 MG capsule TAKE 1 CAPSULE EVERY DAY 90 capsule 3     thin (NO BRAND SPECIFIED) lancets Use with lanceting device to test blood sugar 2 times daily or as directed. To accompany: Blood Glucose Monitor Brands: per insurance. 200 each 3       Allergies   Allergen Reactions     Lisinopril Cough     Developed an ACE cough on the Lisinopril, pt denies this is a current allergy as of 6/19/2020.       Family History   Problem Relation Age of Onset     Hypertension Mother      Alzheimer Disease Mother      Prostate Cancer Paternal Grandfather      Cancer No family hx of      Diabetes No family hx of      Cerebrovascular Disease No family hx of      Thyroid Disease No family hx of      Glaucoma No family hx of      Macular Degeneration No family hx of        Additional medical/Social/Surgical histories reviewed in [facility] and updated as appropriate.        PHYSICAL EXAM  Wt 86.2 kg (190 lb)   BMI 29.76 kg/m      General  - normal appearance, in no obvious distress  HEENT  - conjunctivae not injected, moist mucous membranes  CV  - normal popliteal pulse  Pulm  - normal respiratory pattern, non-labored  Musculoskeletal - left knee  - stance: mildly antalgic  gait  - inspection: no swelling  - palpation: no tenderness  - ROM: 100 degrees flexion, 0 degrees extension, crepitus with ROM  - strength: 5/5 in flexion, 5/5 in extension   - special tests:  (-) Lachman  (-) Roscoe  (-) varus at 0 and 30 degrees flexion  (-) valgus at 0 and 30 degrees flexion  Neuro  - no sensory or motor deficit, grossly normal coordination, normal muscle tone  Skin  - no ecchymosis, erythema, warmth, or induration, no obvious rash  Psych  - interactive, appropriate, normal mood and affect    Xray - Reviewed and interpreted by me.  Left knee x-ray shows moderate osteoarthritis, mostly medial compartment predominant.      ASSESSMENT & PLAN  Mr. Lozano is a 85 year old male who presents to clinic today with left knee pain.    Assessment & Plan     Chronic pain of left knee  Secondary to osteoarthritis of the knee  - XR Knee Left 3 Views, as above    Milo and I had a good discussion centering around the spectrum of treatment options for osteoarthritis that preclude surgery.  We discussed nonoperative treatment with pain relievers, icing, low impact exercise, and weight loss.  We also talked about the role of injection therapies.    Given that he is feeling well today Jack would like to apply watchful waiting, which I do think is reasonable.  He can follow-up with us as needed for this and other issues, if his knee is bothering him in the future we could consider knee injections, physical therapy, or any combination of further nonoperative treatment.    Trever Schmidt DO  [facility]      This note was constructed using Dragon dictation software, please excuse any minor errors in spelling, grammar, or syntax.        Again, thank you for allowing me to participate in the care of your patient.        Sincerely,        Trever Schmidt DO

## 2021-01-04 NOTE — PROGRESS NOTES
[facility] Dermatology Note      Dermatology Problem List:  1. AKs on scalp    Encounter Date: Jan 4, 2021    CC:   Chief Complaint   Patient presents with     Derm Problem   Spot on left wrist      History of Present Illness:  Mr. Milo Lozano is a 85 year old male who presents for evaluation of a new spot on the left wrist present for several months and not growing or changing or causing any symptoms. He has not treated with anything. He picks at it and it flakes off temporarily and comes back. He has other asymptomatic rough spots on the left ear, right cheek, and scalp. 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 He denies history of skin cancer. He denies any other painful, bleeding, or nonhealing skin lesions. Otherwise in normal state of health.    Patient declined skin check today.    Past Medical History:   Patient Active Problem List   Diagnosis     Hypertension goal BP (blood pressure) < 140/90     CARDIOVASCULAR SCREENING; LDL GOAL LESS THAN 100     Prostate cancer (H)     Hyperlipidemia with target LDL less than 100     Advanced directives, counseling/discussion     White coat hypertension     Hypothyroidism due to acquired atrophy of thyroid     DEMOND on CPAP     Diabetic polyneuropathy associated with type 2 diabetes mellitus (H)     CKD (chronic kidney disease) stage 3, GFR 30-59 ml/min     BPH with obstruction/lower urinary tract symptoms     Class 1 obesity due to excess calories with body mass index (BMI) of 31.0 to 31.9 in adult     Past Medical History:   Diagnosis Date     BPH (benign prostatic hypertrophy)      BPH with obstruction/lower urinary tract symptoms 9/17/2020     CARDIOVASCULAR SCREENING; LDL GOAL LESS THAN 100 12/29/2011     CARDIOVASCULAR SCREENING; LDL GOAL LESS THAN 130 12/29/2011     Class 1 obesity due to excess calories with body mass index (BMI) of 31.0 to 31.9 in adult 9/17/2020     Hypertension goal BP (blood  pressure) < 140/90 12/29/2011     Hypothyroidism due to acquired atrophy of thyroid 12/5/2016     Prostate cancer (H) 1/2007    Had Radiation     Type 2 diabetes, HbA1c goal < 7% (H)      Past Surgical History:   Procedure Laterality Date     COLONOSCOPY         Social History:  Patient reports that he has never smoked. He has never used smokeless tobacco. He reports that he does not drink alcohol or use drugs.    Family History:  Family History   Problem Relation Age of Onset     Hypertension Mother      Alzheimer Disease Mother      Prostate Cancer Paternal Grandfather      Cancer No family hx of      Diabetes No family hx of      Cerebrovascular Disease No family hx of      Thyroid Disease No family hx of      Glaucoma No family hx of      Macular Degeneration No family hx of        Medications:  Current Outpatient Medications   Medication Sig Dispense Refill     amLODIPine (NORVASC) 10 MG tablet Take 1 tablet (10 mg) by mouth daily 90 tablet 3     aspirin (ASA) 81 MG tablet Take 1 tablet (81 mg) by mouth daily 90 tablet 3     blood glucose (ACCU-CHEK SMARTVIEW) test strip TEST BLOOD SUGAR TWICE DAILY  OR AS DIRECTED 200 strip 3     blood glucose (NO BRAND SPECIFIED) lancets standard Use to test blood sugar 2 times daily or as directed. 200 each 0     blood glucose (NO BRAND SPECIFIED) test strip Use to test blood sugar 2 times daily or as directed. To accompany: Blood Glucose Monitor Brands: per insurance 200 strip 3     blood glucose calibration (NO BRAND SPECIFIED) solution To accompany: Blood Glucose Monitor Brands: per insurance. 1 Bottle 3     blood glucose monitoring (ACCU-CHEK MILADYS SMARTVIEW) meter device kit Use to test blood sugar 2 times daily or as directed. 1 kit 0     blood glucose monitoring (NO BRAND SPECIFIED) meter device kit Use to test blood sugar 2 times daily or as directed. Preferred blood glucose meter OR supplies to accompany: Blood Glucose Monitor Brands: per insurance 1 kit 0     folic  acid (FOLVITE) 1 MG tablet Take 1 tablet (1 mg) by mouth daily 90 tablet 3     glimepiride (AMARYL) 2 MG tablet Take 1 tablet (2 mg) by mouth every morning (before breakfast) 90 tablet 3     levothyroxine (SYNTHROID/LEVOTHROID) 75 MCG tablet TAKE 1 TABLET EVERY DAY 90 tablet 3     losartan-hydrochlorothiazide (HYZAAR) 100-25 MG tablet Take 1 tablet by mouth every morning 90 tablet 3     metFORMIN (GLUCOPHAGE-XR) 500 MG 24 hr tablet Take 2 tablets (1,000 mg) by mouth daily (with dinner) 180 tablet 3     tamsulosin (FLOMAX) 0.4 MG capsule TAKE 1 CAPSULE EVERY DAY 90 capsule 3     thin (NO BRAND SPECIFIED) lancets Use with lanceting device to test blood sugar 2 times daily or as directed. To accompany: Blood Glucose Monitor Brands: per insurance. 200 each 3        Allergies   Allergen Reactions     Lisinopril Cough     Developed an ACE cough on the Lisinopril, pt denies this is a current allergy as of 6/19/2020.         Review of Systems:  -Skin/Heme New Pt: The patient admits to frequent sun exposure. The patient denies excessive scarring or problems healing except as per HPI. The patient denies excessive bleeding.  -Constitutional: Otherwise feeling well today, in usual state of health.  -HEENT: Patient denies nonhealing oral sores.  -Skin: As above in HPI. No additional skin concerns.    Physical exam:  Vitals: There were no vitals taken for this visit.  GEN: Well-developed, well-nourished, no acute distress, pleasant mood.  SKIN: Exam including the face, scalp, cheeks, ears, nose, neck, hands, and left forearm was performed.  -Ponce skin type: II  -There are numerous erythematous papules with overyling adherent scale on the scalp.   -There are waxy stuck on tan to brown papules on the left helix, right cheek, and left wrist.  -No other lesions of concern on areas examined.     Impression/Plan:    1. Actinic keratosis  - Cryotherapy procedure note: After verbal consent and discussion of risks and benefits  including but no limited to dyspigmentation/scar, blister, and pain, 8 was(were) treated with 1-2mm freeze border for 2 cycles with liquid nitrogen. Post cryotherapy instructions were provided., Sun precaution was advised including the use of sun screens of SPF 30 or higher, sun protective clothing, and avoidance of tanning beds.  - Consider field therapy at next visit, discussed today but patient opted for cryotherapy  - Recommended skin check but patient declined today, will see back in 3 months to reassess AKs, discuss field therapy, and encourage skin check    2. Seborrheic keratosis, non irritated  - No further intervention required. Patient to report changes.     CC Laurence Ibarra MD PhD  [address] [place] [address] on close of this encounter.  Follow-up in 3 months, earlier for new or changing lesions.     Dr. Peralta staffed the patient.    Staff Involved:  Resident(Enmanuel)/Staff    Staff Physician Comments:   I saw and evaluated the patient with the resident and I agree with the assessment and plan.  I was present for the entire minor procedure and examination.    Hayden Peralta MD  Pronouns: he/him/his    Department of Dermatology  [facility]: Phone: [phone], Fax:[phone]  [facility]: Phone: [phone] Fax: [phone]

## 2021-01-04 NOTE — LETTER
1/4/2021         RE: Milo Lozano  [address] [facility] [address] [facility] [address]        Dear Colleague,    Thank you for referring your patient, Milo Lozano, to the [facility]. Please see a copy of my visit note below.    [facility] Dermatology Note      Dermatology Problem List:  1. AKs on scalp    Encounter Date: Jan 4, 2021    CC:   Chief Complaint   Patient presents with     Derm Problem   Spot on left wrist      History of Present Illness:  Mr. Milo Lozano is a 85 year old male who presents for evaluation of a new spot on the left wrist present for several months and not growing or changing or causing any symptoms. He has not treated with anything. He picks at it and it flakes off temporarily and comes back. He has other asymptomatic rough spots on the left ear, right cheek, and scalp. 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 He denies history of skin cancer. He denies any other painful, bleeding, or nonhealing skin lesions. Otherwise in normal state of health.    Patient declined skin check today.    Past Medical History:   Patient Active Problem List   Diagnosis     Hypertension goal BP (blood pressure) < 140/90     CARDIOVASCULAR SCREENING; LDL GOAL LESS THAN 100     Prostate cancer (H)     Hyperlipidemia with target LDL less than 100     Advanced directives, counseling/discussion     White coat hypertension     Hypothyroidism due to acquired atrophy of thyroid     DEMOND on CPAP     Diabetic polyneuropathy associated with type 2 diabetes mellitus (H)     CKD (chronic kidney disease) stage 3, GFR 30-59 ml/min     BPH with obstruction/lower urinary tract symptoms     Class 1 obesity due to excess calories with body mass index (BMI) of 31.0 to 31.9 in adult     Past Medical History:   Diagnosis Date     BPH (benign prostatic hypertrophy)      BPH with obstruction/lower urinary tract symptoms 9/17/2020      CARDIOVASCULAR SCREENING; LDL GOAL LESS THAN 100 12/29/2011     CARDIOVASCULAR SCREENING; LDL GOAL LESS THAN 130 12/29/2011     Class 1 obesity due to excess calories with body mass index (BMI) of 31.0 to 31.9 in adult 9/17/2020     Hypertension goal BP (blood pressure) < 140/90 12/29/2011     Hypothyroidism due to acquired atrophy of thyroid 12/5/2016     Prostate cancer (H) 1/2007    Had Radiation     Type 2 diabetes, HbA1c goal < 7% (H)      Past Surgical History:   Procedure Laterality Date     COLONOSCOPY         Social History:  Patient reports that he has never smoked. He has never used smokeless tobacco. He reports that he does not drink alcohol or use drugs.    Family History:  Family History   Problem Relation Age of Onset     Hypertension Mother      Alzheimer Disease Mother      Prostate Cancer Paternal Grandfather      Cancer No family hx of      Diabetes No family hx of      Cerebrovascular Disease No family hx of      Thyroid Disease No family hx of      Glaucoma No family hx of      Macular Degeneration No family hx of        Medications:  Current Outpatient Medications   Medication Sig Dispense Refill     amLODIPine (NORVASC) 10 MG tablet Take 1 tablet (10 mg) by mouth daily 90 tablet 3     aspirin (ASA) 81 MG tablet Take 1 tablet (81 mg) by mouth daily 90 tablet 3     blood glucose (ACCU-CHEK SMARTVIEW) test strip TEST BLOOD SUGAR TWICE DAILY  OR AS DIRECTED 200 strip 3     blood glucose (NO BRAND SPECIFIED) lancets standard Use to test blood sugar 2 times daily or as directed. 200 each 0     blood glucose (NO BRAND SPECIFIED) test strip Use to test blood sugar 2 times daily or as directed. To accompany: Blood Glucose Monitor Brands: per insurance 200 strip 3     blood glucose calibration (NO BRAND SPECIFIED) solution To accompany: Blood Glucose Monitor Brands: per insurance. 1 Bottle 3     blood glucose monitoring (ACCU-CHEK MILADYS SMARTVIEW) meter device kit Use to test blood sugar 2 times daily or  as directed. 1 kit 0     blood glucose monitoring (NO BRAND SPECIFIED) meter device kit Use to test blood sugar 2 times daily or as directed. Preferred blood glucose meter OR supplies to accompany: Blood Glucose Monitor Brands: per insurance 1 kit 0     folic acid (FOLVITE) 1 MG tablet Take 1 tablet (1 mg) by mouth daily 90 tablet 3     glimepiride (AMARYL) 2 MG tablet Take 1 tablet (2 mg) by mouth every morning (before breakfast) 90 tablet 3     levothyroxine (SYNTHROID/LEVOTHROID) 75 MCG tablet TAKE 1 TABLET EVERY DAY 90 tablet 3     losartan-hydrochlorothiazide (HYZAAR) 100-25 MG tablet Take 1 tablet by mouth every morning 90 tablet 3     metFORMIN (GLUCOPHAGE-XR) 500 MG 24 hr tablet Take 2 tablets (1,000 mg) by mouth daily (with dinner) 180 tablet 3     tamsulosin (FLOMAX) 0.4 MG capsule TAKE 1 CAPSULE EVERY DAY 90 capsule 3     thin (NO BRAND SPECIFIED) lancets Use with lanceting device to test blood sugar 2 times daily or as directed. To accompany: Blood Glucose Monitor Brands: per insurance. 200 each 3        Allergies   Allergen Reactions     Lisinopril Cough     Developed an ACE cough on the Lisinopril, pt denies this is a current allergy as of 6/19/2020.         Review of Systems:  -Skin/Heme New Pt: The patient admits to frequent sun exposure. The patient denies excessive scarring or problems healing except as per HPI. The patient denies excessive bleeding.  -Constitutional: Otherwise feeling well today, in usual state of health.  -HEENT: Patient denies nonhealing oral sores.  -Skin: As above in HPI. No additional skin concerns.    Physical exam:  Vitals: There were no vitals taken for this visit.  GEN: Well-developed, well-nourished, no acute distress, pleasant mood.  SKIN: Exam including the face, scalp, cheeks, ears, nose, neck, hands, and left forearm was performed.  -Ponce skin type: II  -There are numerous erythematous papules with overyling adherent scale on the scalp.   -There are waxy stuck  on tan to brown papules on the left helix, right cheek, and left wrist.  -No other lesions of concern on areas examined.     Impression/Plan:    1. Actinic keratosis  - Cryotherapy procedure note: After verbal consent and discussion of risks and benefits including but no limited to dyspigmentation/scar, blister, and pain, 8 was(were) treated with 1-2mm freeze border for 2 cycles with liquid nitrogen. Post cryotherapy instructions were provided., Sun precaution was advised including the use of sun screens of SPF 30 or higher, sun protective clothing, and avoidance of tanning beds.  - Consider field therapy at next visit, discussed today but patient opted for cryotherapy  - Recommended skin check but patient declined today, will see back in 3 months to reassess AKs, discuss field therapy, and encourage skin check    2. Seborrheic keratosis, non irritated  - No further intervention required. Patient to report changes.     CC Laurence Ibarra MD PhD  [address] [place] [id number] on close of this encounter.  Follow-up in 3 months, earlier for new or changing lesions.     Dr. Peralta staffed the patient.    Staff Involved:  Resident(Enmanuel)/Staff    Staff Physician Comments:   I saw and evaluated the patient with the resident and I agree with the assessment and plan.  I was present for the entire minor procedure and examination.    Hayden Peralta MD  Pronouns: he/him/his    Department of Dermatology  [facility]: Phone: [phone], Fax:[phone]  [facility]: Phone: [phone] Fax: [phone]               Again, thank you for allowing me to participate in the care of your patient.        Sincerely,        Hayden Peralta MD

## 2021-01-04 NOTE — PATIENT INSTRUCTIONS
Cryotherapy    What is it?    Use of a very cold liquid, such as liquid nitrogen, to freeze and destroy abnormal skin cells that need to be removed    What should I expect?    Tenderness and redness    A small blister that might grow and fill with dark purple blood. There may be crusting.    More than one treatment may be needed if the lesions do not go away.    How do I care for the treated area?    Gently wash the area with your hands when bathing.    Use a thin layer of Vaseline to help with healing. You may use a Band-Aid.     The area should heal within 7-10 days and may leave behind a pink or lighter color.     Do not use an antibiotic or Neosporin ointment.     You may take acetaminophen (Tylenol) for pain.     Call your Doctor if you have:    Severe pain    Signs of infection (warmth, redness, cloudy yellow drainage, and or a bad smell)    Questions or concerns    Who should I call with questions?       [facility]: [phone]       [facility]: [phone]       For urgent needs outside of business hours call the [facility] at [phone]        and ask for the dermatology resident on call

## 2021-01-08 NOTE — TELEPHONE ENCOUNTER
Contacted patient to follow up on question regarding copays for visits with Dr. 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  Advised to check with insurance company to verify that they have the correct information for Dr. Garces and ask if they consider internal medicine as specialty, rather than primary care.  Another option would be to contact the billing office to inquire about why he is being billed for copays for primary care services.    Patient verbalizes understanding and agrees with recommendations.    Yasmine Carlos RN

## 2021-01-10 ENCOUNTER — HEALTH MAINTENANCE LETTER (OUTPATIENT)
Age: 86
End: 2021-01-10

## 2021-01-20 NOTE — PROGRESS NOTES
[facility]  1/4/2021    Milo Lozano's chief complaint for this visit includes:  Chief Complaint   Patient presents with     Consult     left knee pain, no known injury,      PCP: Aj Garces    Referring Provider:  No referring provider defined for this encounter.    Wt 86.2 kg (190 lb)   BMI 29.76 kg/m    Data Unavailable       Reason for visit:     What part of your body is injured / painful?  left knee    What caused the injury /pain? No inciting event     How long ago did your injury occur or pain begin? problem is longstanding    What are your most bothersome symptoms? Pain    How would you characterize your symptom?  aching    What makes your symptoms better? Rest    What makes your symptoms worse? Movement    Have you been previously seen for this problem? No    Medical History:    Any recent changes to your medical history? No    Any new medication prescribed since last visit? No    Have you had surgery on this body part before? No    Social History:    Occupation: Retired     Review of Systems:    Do you have fever, chills, weight loss? No    Do you have any vision problems? No    Do you have any chest pain or edema? No    Do you have any shortness of breath or wheezing?  No    Do you have stomach problems? No    Do you have any urinary track issues? No    Do you have any numbness or focal weakness? No    Do you have diabetes? No    Do you have problems with bleeding or clotting? No    Do you have an rashes or other skin lesions? No       CHIEF COMPLAINT:  Consult (left knee pain, no known injury, )       HISTORY OF PRESENT ILLNESS  Mr. Lozano is a pleasant 85 year old male who presents to clinic today with left knee pain.  Alex has had left knee issues for many years.  He attributes his pain to kneeling on his left knee often throughout 50 years of working.  He feels pain deep inside his knee, at times he notices swelling and stiffness, he is feeling well today.  He  has not had any known significant injury, no history of surgery on this knee.      Additional history: as documented    MEDICAL HISTORY  Patient Active Problem List   Diagnosis     Hypertension goal BP (blood pressure) < 140/90     CARDIOVASCULAR SCREENING; LDL GOAL LESS THAN 100     Prostate cancer (H)     Hyperlipidemia with target LDL less than 100     Advanced directives, counseling/discussion     White coat hypertension     Hypothyroidism due to acquired atrophy of thyroid     DEMOND on CPAP     Diabetic polyneuropathy associated with type 2 diabetes mellitus (H)     CKD (chronic kidney disease) stage 3, GFR 30-59 ml/min     BPH with obstruction/lower urinary tract symptoms     Class 1 obesity due to excess calories with body mass index (BMI) of 31.0 to 31.9 in adult       Current Outpatient Medications   Medication Sig Dispense Refill     amLODIPine (NORVASC) 10 MG tablet Take 1 tablet (10 mg) by mouth daily 90 tablet 3     aspirin (ASA) 81 MG tablet Take 1 tablet (81 mg) by mouth daily 90 tablet 3     blood glucose (ACCU-CHEK SMARTVIEW) test strip TEST BLOOD SUGAR TWICE DAILY  OR AS DIRECTED 200 strip 3     blood glucose (NO BRAND SPECIFIED) lancets standard Use to test blood sugar 2 times daily or as directed. 200 each 0     blood glucose (NO BRAND SPECIFIED) test strip Use to test blood sugar 2 times daily or as directed. To accompany: Blood Glucose Monitor Brands: per insurance 200 strip 3     blood glucose calibration (NO BRAND SPECIFIED) solution To accompany: Blood Glucose Monitor Brands: per insurance. 1 Bottle 3     blood glucose monitoring (ACCU-CHEK MILADYS SMARTVIEW) meter device kit Use to test blood sugar 2 times daily or as directed. 1 kit 0     blood glucose monitoring (NO BRAND SPECIFIED) meter device kit Use to test blood sugar 2 times daily or as directed. Preferred blood glucose meter OR supplies to accompany: Blood Glucose Monitor Brands: per insurance 1 kit 0     folic acid (FOLVITE) 1 MG  tablet Take 1 tablet (1 mg) by mouth daily 90 tablet 3     glimepiride (AMARYL) 2 MG tablet Take 1 tablet (2 mg) by mouth every morning (before breakfast) 90 tablet 3     levothyroxine (SYNTHROID/LEVOTHROID) 75 MCG tablet TAKE 1 TABLET EVERY DAY 90 tablet 3     losartan-hydrochlorothiazide (HYZAAR) 100-25 MG tablet Take 1 tablet by mouth every morning 90 tablet 3     metFORMIN (GLUCOPHAGE-XR) 500 MG 24 hr tablet Take 2 tablets (1,000 mg) by mouth daily (with dinner) 180 tablet 3     tamsulosin (FLOMAX) 0.4 MG capsule TAKE 1 CAPSULE EVERY DAY 90 capsule 3     thin (NO BRAND SPECIFIED) lancets Use with lanceting device to test blood sugar 2 times daily or as directed. To accompany: Blood Glucose Monitor Brands: per insurance. 200 each 3       Allergies   Allergen Reactions     Lisinopril Cough     Developed an ACE cough on the Lisinopril, pt denies this is a current allergy as of 6/19/2020.       Family History   Problem Relation Age of Onset     Hypertension Mother      Alzheimer Disease Mother      Prostate Cancer Paternal Grandfather      Cancer No family hx of      Diabetes No family hx of      Cerebrovascular Disease No family hx of      Thyroid Disease No family hx of      Glaucoma No family hx of      Macular Degeneration No family hx of        Additional medical/Social/Surgical histories reviewed in [employer] and updated as appropriate.        PHYSICAL EXAM  Wt 86.2 kg (190 lb)   BMI 29.76 kg/m      General  - normal appearance, in no obvious distress  HEENT  - conjunctivae not injected, moist mucous membranes  CV  - normal popliteal pulse  Pulm  - normal respiratory pattern, non-labored  Musculoskeletal - left knee  - stance: mildly antalgic gait  - inspection: no swelling  - palpation: no tenderness  - ROM: 100 degrees flexion, 0 degrees extension, crepitus with ROM  - strength: 5/5 in flexion, 5/5 in extension   - special tests:  (-) Lachman  (-) Roscoe  (-) varus at 0 and 30 degrees flexion  (-) valgus at 0  and 30 degrees flexion  Neuro  - no sensory or motor deficit, grossly normal coordination, normal muscle tone  Skin  - no ecchymosis, erythema, warmth, or induration, no obvious rash  Psych  - interactive, appropriate, normal mood and affect    Xray - Reviewed and interpreted by me.  Left knee x-ray shows moderate osteoarthritis, mostly medial compartment predominant.      ASSESSMENT & PLAN  Mr. Lozano is a 85 year old male who presents to clinic today with left knee pain.    Assessment & Plan     Chronic pain of left knee  Secondary to osteoarthritis of the knee  - XR Knee Left 3 Views, as above    Milo and I had a good discussion centering around the spectrum of treatment options for osteoarthritis that preclude surgery.  We discussed nonoperative treatment with pain relievers, icing, low impact exercise, and weight loss.  We also talked about the role of injection therapies.    Given that he is feeling well today Jack would like to apply watchful waiting, which I do think is reasonable.  He can follow-up with us as needed for this and other issues, if his knee is bothering him in the future we could consider knee injections, physical therapy, or any combination of further nonoperative treatment.    Trever Schmidt DO  [facility] [place]      This note was constructed using Dragon dictation software, please excuse any minor errors in spelling, grammar, or syntax.     No

## 2021-02-16 ENCOUNTER — IMMUNIZATION (OUTPATIENT)
Dept: PEDIATRICS | Facility: CLINIC | Age: 86
End: 2021-02-16
Payer: COMMERCIAL

## 2021-02-16 PROCEDURE — 0001A PR COVID VAC PFIZER DIL RECON 30 MCG/0.3 ML IM: CPT

## 2021-02-16 PROCEDURE — 91300 PR COVID VAC PFIZER DIL RECON 30 MCG/0.3 ML IM: CPT

## 2021-03-09 ENCOUNTER — IMMUNIZATION (OUTPATIENT)
Dept: PEDIATRICS | Facility: CLINIC | Age: 86
End: 2021-03-09
Attending: INTERNAL MEDICINE
Payer: COMMERCIAL

## 2021-03-09 PROCEDURE — 91300 PR COVID VAC PFIZER DIL RECON 30 MCG/0.3 ML IM: CPT

## 2021-03-09 PROCEDURE — 0002A PR COVID VAC PFIZER DIL RECON 30 MCG/0.3 ML IM: CPT

## 2021-04-08 DIAGNOSIS — D59.9 ACQUIRED HEMOLYTIC ANEMIA (H): ICD-10-CM

## 2021-04-08 DIAGNOSIS — D69.6 THROMBOCYTOPENIA (H): ICD-10-CM

## 2021-04-08 DIAGNOSIS — N18.31 STAGE 3A CHRONIC KIDNEY DISEASE (H): ICD-10-CM

## 2021-04-08 DIAGNOSIS — I10 HYPERTENSION GOAL BP (BLOOD PRESSURE) < 140/90: ICD-10-CM

## 2021-04-08 DIAGNOSIS — C61 PROSTATE CANCER (H): ICD-10-CM

## 2021-04-08 DIAGNOSIS — E11.40 TYPE 2 DIABETES MELLITUS WITH DIABETIC NEUROPATHY, WITHOUT LONG-TERM CURRENT USE OF INSULIN (H): ICD-10-CM

## 2021-04-08 LAB
ALBUMIN SERPL-MCNC: 4.2 G/DL (ref 3.4–5)
ALP SERPL-CCNC: 64 U/L (ref 40–150)
ALT SERPL W P-5'-P-CCNC: 36 U/L (ref 0–70)
ANION GAP SERPL CALCULATED.3IONS-SCNC: 4 MMOL/L (ref 3–14)
AST SERPL W P-5'-P-CCNC: 19 U/L (ref 0–45)
BILIRUB DIRECT SERPL-MCNC: 0.3 MG/DL (ref 0–0.2)
BILIRUB SERPL-MCNC: 1.3 MG/DL (ref 0.2–1.3)
BUN SERPL-MCNC: 23 MG/DL (ref 7–30)
CALCIUM SERPL-MCNC: 8.8 MG/DL (ref 8.5–10.1)
CHLORIDE SERPL-SCNC: 104 MMOL/L (ref 94–109)
CO2 SERPL-SCNC: 28 MMOL/L (ref 20–32)
CREAT SERPL-MCNC: 1.43 MG/DL (ref 0.66–1.25)
DIFFERENTIAL METHOD BLD: ABNORMAL
EOSINOPHIL # BLD AUTO: 0.2 10E9/L (ref 0–0.7)
EOSINOPHIL NFR BLD AUTO: 2 %
ERYTHROCYTE [DISTWIDTH] IN BLOOD BY AUTOMATED COUNT: 17.3 % (ref 10–15)
GFR SERPL CREATININE-BSD FRML MDRD: 45 ML/MIN/{1.73_M2}
GLUCOSE SERPL-MCNC: 278 MG/DL (ref 70–99)
HBA1C MFR BLD: 7 % (ref 0–5.6)
HCT VFR BLD AUTO: 35.6 % (ref 40–53)
HGB BLD-MCNC: 11.6 G/DL (ref 13.3–17.7)
LDH SERPL L TO P-CCNC: 202 U/L (ref 85–227)
LYMPHOCYTES # BLD AUTO: 1.3 10E9/L (ref 0.8–5.3)
LYMPHOCYTES NFR BLD AUTO: 15 %
MCH RBC QN AUTO: 30.6 PG (ref 26.5–33)
MCHC RBC AUTO-ENTMCNC: 32.6 G/DL (ref 31.5–36.5)
MCV RBC AUTO: 94 FL (ref 78–100)
METAMYELOCYTES # BLD: 0.4 10E9/L
METAMYELOCYTES NFR BLD MANUAL: 4 %
MONOCYTES # BLD AUTO: 1 10E9/L (ref 0–1.3)
MONOCYTES NFR BLD AUTO: 11 %
MYELOCYTES # BLD: 0.4 10E9/L
MYELOCYTES NFR BLD MANUAL: 4 %
NEUTROPHILS # BLD AUTO: 5.6 10E9/L (ref 1.6–8.3)
NEUTROPHILS NFR BLD AUTO: 64 %
PLATELET # BLD AUTO: 66 10E9/L (ref 150–450)
PLATELET # BLD EST: ABNORMAL 10*3/UL
POTASSIUM SERPL-SCNC: 4.4 MMOL/L (ref 3.4–5.3)
PROT SERPL-MCNC: 7.9 G/DL (ref 6.8–8.8)
PSA SERPL-MCNC: <0.01 UG/L (ref 0–4)
RBC # BLD AUTO: 3.79 10E12/L (ref 4.4–5.9)
RBC MORPH BLD: NORMAL
RETICS # AUTO: 258.1 10E9/L (ref 25–95)
RETICS/RBC NFR AUTO: 6.8 % (ref 0.5–2)
SODIUM SERPL-SCNC: 136 MMOL/L (ref 133–144)
WBC # BLD AUTO: 8.9 10E9/L (ref 4–11)

## 2021-04-08 PROCEDURE — 85025 COMPLETE CBC W/AUTO DIFF WBC: CPT | Performed by: INTERNAL MEDICINE

## 2021-04-08 PROCEDURE — 80048 BASIC METABOLIC PNL TOTAL CA: CPT | Performed by: INTERNAL MEDICINE

## 2021-04-08 PROCEDURE — 83615 LACTATE (LD) (LDH) ENZYME: CPT | Performed by: INTERNAL MEDICINE

## 2021-04-08 PROCEDURE — 99N1086 PR STATISTIC MORPHOLOGY W/INTERP HEMEPATH TC 85060: Performed by: INTERNAL MEDICINE

## 2021-04-08 PROCEDURE — 85060 BLOOD SMEAR INTERPRETATION: CPT | Performed by: PATHOLOGY

## 2021-04-08 PROCEDURE — 85045 AUTOMATED RETICULOCYTE COUNT: CPT | Performed by: INTERNAL MEDICINE

## 2021-04-08 PROCEDURE — 80076 HEPATIC FUNCTION PANEL: CPT | Performed by: INTERNAL MEDICINE

## 2021-04-08 PROCEDURE — 84153 ASSAY OF PSA TOTAL: CPT | Performed by: INTERNAL MEDICINE

## 2021-04-08 PROCEDURE — 83036 HEMOGLOBIN GLYCOSYLATED A1C: CPT | Performed by: INTERNAL MEDICINE

## 2021-04-08 PROCEDURE — 36415 COLL VENOUS BLD VENIPUNCTURE: CPT | Performed by: INTERNAL MEDICINE

## 2021-04-09 LAB — COPATH REPORT: NORMAL

## 2021-04-15 ENCOUNTER — VIRTUAL VISIT (OUTPATIENT)
Dept: ONCOLOGY | Facility: CLINIC | Age: 86
End: 2021-04-15
Attending: INTERNAL MEDICINE
Payer: COMMERCIAL

## 2021-04-15 VITALS — HEIGHT: 67 IN | BODY MASS INDEX: 29.03 KG/M2 | WEIGHT: 185 LBS

## 2021-04-15 DIAGNOSIS — D59.9 ACQUIRED HEMOLYTIC ANEMIA (H): Primary | ICD-10-CM

## 2021-04-15 DIAGNOSIS — D69.6 THROMBOCYTOPENIA (H): ICD-10-CM

## 2021-04-15 PROCEDURE — 99214 OFFICE O/P EST MOD 30 MIN: CPT | Mod: 95 | Performed by: INTERNAL MEDICINE

## 2021-04-15 ASSESSMENT — MIFFLIN-ST. JEOR: SCORE: 1487.78

## 2021-04-15 NOTE — LETTER
"    4/15/2021         RE: Milo Lozano  [address] [place] [address]        Dear Colleague,    Thank you for referring your patient, Milo Lozano, to the [facility]. Please see a copy of my visit note below.    Milo Lozano is a 85 year old male who is being evaluated via a billable video visit.      The patient has been notified of following:     \"This video visit will be conducted via a call between you and your physician/provider. We have found that certain health care needs can be provided without the need for an in-person physical exam.  This service lets us provide the care you need with a video conversation.  If a prescription is necessary we can send it directly to your pharmacy.  If lab work is needed we can place an order for that and you can then stop by our lab to have the test done at a later time.    Video visits are billed at different rates depending on your insurance coverage.  Please reach out to your insurance provider with any questions.    If during the course of the call the physician/provider feels a video visit is not appropriate, you will not be charged for this service.\"    Patient has given verbal consent for Video visit? yes    Video-Visit Details    Type of service:  Video Visit    Video visit duration: 11 min  Originating Location (pt. 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 When his CBC was repeated on 8/15/2017, he was mildly pancytopenic, with Hb of 12.1 g/dl, MCV of 93,  mild leucopenia and borderline thrombocytopenia.   Absolute differential counts " were normal and his peripheral blood smear showed slight normochromic normocytic anemia; minimal poikilocytosis and slight thrombocytopenia with overall normal platelet morphology. There was polychromasia. Absolute reticulocyte count was elevated at 165.7. B12 was normal in 07/2017 at 494. Ferritin was elevated at 412. Creatinine was mildly elevated at 1.32. FERMIN screen was negative. No M protein was noted on serum immunofixation. TSH was normal. Stool hemoccult was negative in 07/2017. He has never had a colonoscopy. UA showed no hematuria but did glucosuria and proteinuria. He still had persisted marked reticulocytosis in 09/2017 with absolute retic count of 143. He had a normal LDH, negative LAURA, normal haptoglobin. Peripheral blood smear on 9/5/17 showed normocytic slight anemia with slight morphologic evidence of increase in red cell regeneration with no morphologic evidence of hemolysis. There was reactive lymphocyte morphology.   He had f/up labs on 5/1/2018. Hb was 11.7 g/dl with normal MCV. WBC and platelet counts were low normal. Absolute retic count was still elevated at 143. Peripheral blood smear on 5/1/2018 showed Mild normochromic normocytic anemia. Adequate neutrophils with mild shift to immaturity. The lymphocyte population shows a population   of both small mature lymphocytes, large granular lymphocytes and reactive-appearing lymphocytes.  The morphology of the platelets was overall normal including large platelets.  US abdomen on 5/29/2018 showed findings c/w hepatosplenomegaly- hyperechoic liver, suspicious for fatty liver and enlarged spleen-  measuring 12.7 x 6.6 x 13.5 cm as well as gallstones.  CT scan of the chest abdomen and pelvis without IV contrast showed no evidence of splenomegaly on the CT, with spleen size measuring 11 cm.  There was borderline hepatomegaly and gallstones noted as well.   Osmotic fragility testing demonstrated mildly increased osmotic fragility. Occasional spherocytes  were noted on peripheral blood smear. I felt  his diagnosis was mild hereditary spherocytosis (HS) (retic count <6% is c/w mild HS) albeit MCHC is normal. HS can present in 9th decade. He has mild anemia with Hb >11 g/dl and gallstones.   He has no family history of anemia.   G6PD deficiency screen was negative. He was started on daily folic acid supplementation, 1 mg PO Qday.     Peripheral blood smear on May 10, 2019 showed normochromic normocytic anemia with increased red cell regeneration and slight thrombocytopenia and with normal platelet morphology.  Granulocytes were mature and well granulated and not dysplastic.  No circulating blasts were seen. Autoimmune hemolysis work-up was negative.   He had myelocytes on differential in 12/2019.   Repeat peripheral blood smear on 5/28/2020 showed slight normochromic, normocytic anemia; increased erythrocyte   regeneration as well as slight neutrophilic left shift with rare cells suspicious for circulating blasts. Moderate thrombocytopenia. Platelet count was 72.  We proceeded with a bone marrow biopsy on 6/19/2020 which showed:  Hypercellular marrow for age (80%) with trilineage hematopoiesis     - No overt dysplasia and no increase in marrow blasts   Flow cytometry showed polytypic B cells; no aberrant immunophenotype on T cells and no increase in myeloid blasts and no definitive abnormal myeloid   blast population. Cytogenetics was normal - 46 XY.       2. Prostate cancer- treated with XRT in 2007. PSA remains undetectable.    History Of Present Illness:  Mr. Lozano is a 85 year old male who presents for f/up of pancytopenia and abnormal peripheral blood smear. Has mildly increased osmotic fragility, possibly late onset mild HS. WBC is normal.  Hb has been stable around 11-12 g/dl and platelet count is 66k. Creat stable around 1.3-1.4. LDH is normal.  Transaminases are normal.  Absolute reticulocyte count is elevated at 258.  This is relatively stable as  below:Results for LANDON HSIEH (MRN [id number]) as of 5/5/2021 18:48   Ref. Range 12/17/2019 15:27 5/14/2020 09:28 5/28/2020 10:24 9/29/2020 11:04 4/8/2021 10:55   Absolute Retic Latest Ref Range: 25 - 95 10e9/L 202.9 (H) 241.1 (H) 240.4 (H) 242.1 (H) 258.1 (H)       He continues to have s/o peripheral neuropathy in his feet, stable. He has seen neurology in the past and preferred not to follow-up with them.   He follows with Dr. Ibarra.  He has had no bruising or bleeding symptoms.  He has been feeling well.  In addition, a complete 12 point  review of systems is negative.      Past Medical/Surgical History:  Past Medical History:   Diagnosis Date     BPH (benign prostatic hypertrophy)      BPH with obstruction/lower urinary tract symptoms 9/17/2020     CARDIOVASCULAR SCREENING; LDL GOAL LESS THAN 100 12/29/2011     CARDIOVASCULAR SCREENING; LDL GOAL LESS THAN 130 12/29/2011     Class 1 obesity due to excess calories with body mass index (BMI) of 31.0 to 31.9 in adult 9/17/2020     Hypertension goal BP (blood pressure) < 140/90 12/29/2011     Hypothyroidism due to acquired atrophy of thyroid 12/5/2016     Prostate cancer (H) 1/2007    Had Radiation     Type 2 diabetes, HbA1c goal < 7% (H)    He has  peripheral neuropathy and has had laser treatments for it for 6 months.  FHx and SocHx reviewed.     Past Surgical History:   Procedure Laterality Date     COLONOSCOPY       Allergies:  Allergies as of 04/15/2021 - Reviewed 04/15/2021   Allergen Reaction Noted     Lisinopril Cough 06/25/2014     Current Medications:  Current Outpatient Medications   Medication Sig Dispense Refill     amLODIPine (NORVASC) 10 MG tablet Take 1 tablet (10 mg) by mouth daily 90 tablet 3     aspirin (ASA) 81 MG tablet Take 1 tablet (81 mg) by mouth daily 90 tablet 3     blood glucose (ACCU-CHEK SMARTVIEW) test strip TEST BLOOD SUGAR TWICE DAILY  OR AS DIRECTED 200 strip 3     blood glucose (NO BRAND SPECIFIED) lancets standard Use to test  blood sugar 2 times daily or as directed. 200 each 0     blood glucose (NO BRAND SPECIFIED) test strip Use to test blood sugar 2 times daily or as directed. To accompany: Blood Glucose Monitor Brands: per insurance 200 strip 3     blood glucose calibration (NO BRAND SPECIFIED) solution To accompany: Blood Glucose Monitor Brands: per insurance. 1 Bottle 3     blood glucose monitoring (ACCU-CHEK MILADYS SMARTVIEW) meter device kit Use to test blood sugar 2 times daily or as directed. 1 kit 0     blood glucose monitoring (NO BRAND SPECIFIED) meter device kit Use to test blood sugar 2 times daily or as directed. Preferred blood glucose meter OR supplies to accompany: Blood Glucose Monitor Brands: per insurance 1 kit 0     folic acid (FOLVITE) 1 MG tablet Take 1 tablet (1 mg) by mouth daily 90 tablet 3     glimepiride (AMARYL) 2 MG tablet Take 1 tablet (2 mg) by mouth every morning (before breakfast) 90 tablet 3     levothyroxine (SYNTHROID/LEVOTHROID) 75 MCG tablet TAKE 1 TABLET EVERY DAY 90 tablet 3     losartan-hydrochlorothiazide (HYZAAR) 100-25 MG tablet Take 1 tablet by mouth every morning 90 tablet 3     metFORMIN (GLUCOPHAGE-XR) 500 MG 24 hr tablet Take 2 tablets (1,000 mg) by mouth daily (with dinner) 180 tablet 3     tamsulosin (FLOMAX) 0.4 MG capsule TAKE 1 CAPSULE EVERY DAY 90 capsule 3     thin (NO BRAND SPECIFIED) lancets Use with lanceting device to test blood sugar 2 times daily or as directed. To accompany: Blood Glucose Monitor Brands: per insurance. 200 each 3      Physical Exam:  Wt Readings from Last 5 Encounters:   04/15/21 83.9 kg (185 lb)   01/04/21 86.2 kg (190 lb)   12/03/20 86.2 kg (190 lb)   09/17/20 87.5 kg (192 lb 14.4 oz)   09/16/20 86.7 kg (191 lb 3.2 oz)       Constitutional: alert and in no distress  Eyes: No redness or discharge  Respiratory: No cough or labored breathing.  Musculoskeletal: Full range of motion in extremities.  Skin: no visible skin lesions or discoloration  Neurological:  No tremors and denies headache.  Psychiatric: Mentation appears normal and affect is normal as well.  Alert and oriented x3.  The rest the comprehensive physical examination is deferred due to public health emergency video visit restrictions.        Laboratory/Imaging Studies  Component      Latest Ref Rng & Units 4/8/2021   WBC      4.0 - 11.0 10e9/L 8.9   RBC Count      4.4 - 5.9 10e12/L 3.79 (L)   Hemoglobin      13.3 - 17.7 g/dL 11.6 (L)   Hematocrit      40.0 - 53.0 % 35.6 (L)   MCV      78 - 100 fl 94   MCH      26.5 - 33.0 pg 30.6   MCHC      31.5 - 36.5 g/dL 32.6   RDW      10.0 - 15.0 % 17.3 (H)   Platelet Count      150 - 450 10e9/L 66 (L)   % Neutrophils      % 64.0   % Lymphocytes      % 15.0   % Monocytes      % 11.0   % Eosinophils      % 2.0   % Metamyelocytes      % 4.0   % Myelocytes      % 4.0   Absolute Neutrophil      1.6 - 8.3 10e9/L 5.6   Absolute Lymphocytes      0.8 - 5.3 10e9/L 1.3   Absolute Monocytes      0.0 - 1.3 10e9/L 1.0   Absolute Eosinophils      0.0 - 0.7 10e9/L 0.2   Absolute Metamyelocytes      0 10e9/L 0.4 (H)   Absolute Myelocytes      0 10e9/L 0.4 (H)   RBC Morphology       Normal   Platelet Estimate       Automated count confirmed.  Platelet morphology is normal.   Diff Method       Manual Differential   % Retic      0.5 - 2.0 % 6.8 (H)   Absolute Retic      25 - 95 10e9/L 258.1 (H)   Lactate Dehydrogenase      85 - 227 U/L 202   PSA      0 - 4 ug/L <0.01   Results for MYADAYANJABARI LANDON MEI (MRN [id number]) as of 4/15/2021 11:11   Ref. Range 5/14/2020 09:28 5/28/2020 10:24 6/19/2020 12:36 9/29/2020 11:04 4/8/2021 10:55   Platelet Count Latest Ref Range: 150 - 450 10e9/L 81 (L) 74 (L) 80 (L) 79 (L) 66 (L)   Results for LANDON HSIEH MEI (MRN [id number]) as of 4/15/2021 11:11   Ref. Range 12/17/2019 15:27 5/14/2020 09:28 5/28/2020 10:24 6/19/2020 12:36 9/29/2020 11:04 4/8/2021 10:55   Hemoglobin Latest Ref Range: 13.3 - 17.7 g/dL 11.8 (L) 12.1 (L) 12.3 (L) 12.6 (L) 12.0 (L) 11.6  (L)     ASSESSMENT/PLAN:    Mr. Lozano is a 85 year old man, with type 2 DM,  with normocytic anemia and transient borderline leucopenia and mild thrombocytopenia but persistent reticulocytosis.    1. Normocytic anemia, reticulocytosis, thrombocytopenia- He has mild anemia stable, and his thrombocytopenia is overall relatively stable. Peripheral blood smear in May 2020 showed findings suspicious for circulating blasts but a  bone marrow biopsy did not show increased blasts or e/o leukemia or MDS. Cytogenetics was normal 46 XY.   Repeat peripheral blood smear on April 8, 2021 showed slight normochromic normocytic anemia, increased erythrocyte regeneration, rare spherocytes.  There was neutrophilic left shift.  There was moderate thrombocytopenia with normal platelet morphology.  He is still on aspirin, and I wonder if benefits outweigh the risks with platelet count of 66,000.  I will communicate with Dr. Garces to see if he recommends for the patient to remain on aspirin.  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  Autoimmune hemolysis work-up was negative.  Continue folic acid 1 mg daily. Rx renewed.  His anemia is also at least partially related to anemia of kidney disease.    He does have borderline hepatosplenomegaly which could be associated with pancytopenia.   We will plan to see the patient in 6 months, with labs.    If platelet count continues to decline, consider repeating abdominal US for evaluation of progressive hepatosplenomegaly.  F/up in 6 months with cbcd, retic count, LDH, LFTs, creat, peripheral blood smear.    2. CKD- creat stable.    3.  Prostate cancer- treated with XRT in 2007. PSA remains undetectable, last checked by us on April 8, 2021.       At the end of our visit patient verbalized understanding and concurred with the plan.          Again, thank you for allowing me to participate in the care of your patient.        Sincerely,        Karen  MD David, MD

## 2021-04-15 NOTE — NURSING NOTE
SYMPTOM QUESTIONNAIRE    Pain: neuropathy pain in feet    Nausea/Vomiting: no    Mouth Sores: no    Shortness of Breath: no    Smoking: no    Fever or Chills: no    Hard Stools: no    Loose Stools: no    Weight Loss: no    Weakness: a little    Burning, numbness or tingling in hands or feet: yes    Problems with skin or swelling: no    Memory Loss: no    Anxiety or Depression: no    Trouble Sleeping: no    Karin Faustin LPN on 4/15/2021 at 10:49 AM

## 2021-04-15 NOTE — PROGRESS NOTES
"Milo Lozano is a 85 year old male who is being evaluated via a billable video visit.      The patient has been notified of following:     \"This video visit will be conducted via a call between you and your physician/provider. We have found that certain health care needs can be provided without the need for an in-person physical exam.  This service lets us provide the care you need with a video conversation.  If a prescription is necessary we can send it directly to your pharmacy.  If lab work is needed we can place an order for that and you can then stop by our lab to have the test done at a later time.    Video visits are billed at different rates depending on your insurance coverage.  Please reach out to your insurance provider with any questions.    If during the course of the call the physician/provider feels a video visit is not appropriate, you will not be charged for this service.\"    Patient has given verbal consent for Video visit? yes    Video-Visit Details    Type of service:  Video Visit    Video visit duration: 11 min  Originating Location (pt. 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 When his CBC was repeated on 8/15/2017, he was mildly pancytopenic, with Hb of 12.1 g/dl, MCV of 93,  mild leucopenia and borderline thrombocytopenia.   Absolute differential counts were normal and his peripheral blood smear showed slight normochromic normocytic anemia; minimal poikilocytosis and slight thrombocytopenia with overall normal platelet morphology. There was polychromasia. Absolute reticulocyte count was elevated at 165.7. B12 was normal " in 07/2017 at 494. Ferritin was elevated at 412. Creatinine was mildly elevated at 1.32. FERMIN screen was negative. No M protein was noted on serum immunofixation. TSH was normal. Stool hemoccult was negative in 07/2017. He has never had a colonoscopy. UA showed no hematuria but did glucosuria and proteinuria. He still had persisted marked reticulocytosis in 09/2017 with absolute retic count of 143. He had a normal LDH, negative LAURA, normal haptoglobin. Peripheral blood smear on 9/5/17 showed normocytic slight anemia with slight morphologic evidence of increase in red cell regeneration with no morphologic evidence of hemolysis. There was reactive lymphocyte morphology.   He had f/up labs on 5/1/2018. Hb was 11.7 g/dl with normal MCV. WBC and platelet counts were low normal. Absolute retic count was still elevated at 143. Peripheral blood smear on 5/1/2018 showed Mild normochromic normocytic anemia. Adequate neutrophils with mild shift to immaturity. The lymphocyte population shows a population   of both small mature lymphocytes, large granular lymphocytes and reactive-appearing lymphocytes.  The morphology of the platelets was overall normal including large platelets.  US abdomen on 5/29/2018 showed findings c/w hepatosplenomegaly- hyperechoic liver, suspicious for fatty liver and enlarged spleen-  measuring 12.7 x 6.6 x 13.5 cm as well as gallstones.  CT scan of the chest abdomen and pelvis without IV contrast showed no evidence of splenomegaly on the CT, with spleen size measuring 11 cm.  There was borderline hepatomegaly and gallstones noted as well.   Osmotic fragility testing demonstrated mildly increased osmotic fragility. Occasional spherocytes were noted on peripheral blood smear. I felt  his diagnosis was mild hereditary spherocytosis (HS) (retic count <6% is c/w mild HS) albeit MCHC is normal. HS can present in 9th decade. He has mild anemia with Hb >11 g/dl and gallstones.   He has no family history of  anemia.   G6PD deficiency screen was negative. He was started on daily folic acid supplementation, 1 mg PO Qday.     Peripheral blood smear on May 10, 2019 showed normochromic normocytic anemia with increased red cell regeneration and slight thrombocytopenia and with normal platelet morphology.  Granulocytes were mature and well granulated and not dysplastic.  No circulating blasts were seen. Autoimmune hemolysis work-up was negative.   He had myelocytes on differential in 12/2019.   Repeat peripheral blood smear on 5/28/2020 showed slight normochromic, normocytic anemia; increased erythrocyte   regeneration as well as slight neutrophilic left shift with rare cells suspicious for circulating blasts. Moderate thrombocytopenia. Platelet count was 72.  We proceeded with a bone marrow biopsy on 6/19/2020 which showed:  Hypercellular marrow for age (80%) with trilineage hematopoiesis     - No overt dysplasia and no increase in marrow blasts   Flow cytometry showed polytypic B cells; no aberrant immunophenotype on T cells and no increase in myeloid blasts and no definitive abnormal myeloid   blast population. Cytogenetics was normal - 46 XY.       2. Prostate cancer- treated with XRT in 2007. PSA remains undetectable.    History Of Present Illness:  Mr. Hsieh is a 85 year old male who presents for f/up of pancytopenia and abnormal peripheral blood smear. Has mildly increased osmotic fragility, possibly late onset mild HS. WBC is normal.  Hb has been stable around 11-12 g/dl and platelet count is 66k. Creat stable around 1.3-1.4. LDH is normal.  Transaminases are normal.  Absolute reticulocyte count is elevated at 258.  This is relatively stable as below:Results for LANDON [facility]SI[facility] (MRN [id number]) as of 5/5/2021 18:48   Ref. Range 12/17/2019 15:27 5/14/2020 09:28 5/28/2020 10:24 9/29/2020 11:04 4/8/2021 10:55   Absolute Retic Latest Ref Range: 25 - 95 10e9/L 202.9 (H) 241.1 (H) 240.4 (H) 242.1 (H) 258.1 (H)        He continues to have s/o peripheral neuropathy in his feet, stable. He has seen neurology in the past and preferred not to follow-up with them.   He follows with Dr. Ibarra.  He has had no bruising or bleeding symptoms.  He has been feeling well.  In addition, a complete 12 point  review of systems is negative.      Past Medical/Surgical History:  Past Medical History:   Diagnosis Date     BPH (benign prostatic hypertrophy)      BPH with obstruction/lower urinary tract symptoms 9/17/2020     CARDIOVASCULAR SCREENING; LDL GOAL LESS THAN 100 12/29/2011     CARDIOVASCULAR SCREENING; LDL GOAL LESS THAN 130 12/29/2011     Class 1 obesity due to excess calories with body mass index (BMI) of 31.0 to 31.9 in adult 9/17/2020     Hypertension goal BP (blood pressure) < 140/90 12/29/2011     Hypothyroidism due to acquired atrophy of thyroid 12/5/2016     Prostate cancer (H) 1/2007    Had Radiation     Type 2 diabetes, HbA1c goal < 7% (H)    He has  peripheral neuropathy and has had laser treatments for it for 6 months.  FHx and SocHx reviewed.     Past Surgical History:   Procedure Laterality Date     COLONOSCOPY       Allergies:  Allergies as of 04/15/2021 - Reviewed 04/15/2021   Allergen Reaction Noted     Lisinopril Cough 06/25/2014     Current Medications:  Current Outpatient Medications   Medication Sig Dispense Refill     amLODIPine (NORVASC) 10 MG tablet Take 1 tablet (10 mg) by mouth daily 90 tablet 3     aspirin (ASA) 81 MG tablet Take 1 tablet (81 mg) by mouth daily 90 tablet 3     blood glucose (ACCU-CHEK SMARTVIEW) test strip TEST BLOOD SUGAR TWICE DAILY  OR AS DIRECTED 200 strip 3     blood glucose (NO BRAND SPECIFIED) lancets standard Use to test blood sugar 2 times daily or as directed. 200 each 0     blood glucose (NO BRAND SPECIFIED) test strip Use to test blood sugar 2 times daily or as directed. To accompany: Blood Glucose Monitor Brands: per insurance 200 strip 3     blood glucose calibration (NO BRAND  SPECIFIED) solution To accompany: Blood Glucose Monitor Brands: per insurance. 1 Bottle 3     blood glucose monitoring (ACCU-CHEK MILADYS SMARTVIEW) meter device kit Use to test blood sugar 2 times daily or as directed. 1 kit 0     blood glucose monitoring (NO BRAND SPECIFIED) meter device kit Use to test blood sugar 2 times daily or as directed. Preferred blood glucose meter OR supplies to accompany: Blood Glucose Monitor Brands: per insurance 1 kit 0     folic acid (FOLVITE) 1 MG tablet Take 1 tablet (1 mg) by mouth daily 90 tablet 3     glimepiride (AMARYL) 2 MG tablet Take 1 tablet (2 mg) by mouth every morning (before breakfast) 90 tablet 3     levothyroxine (SYNTHROID/LEVOTHROID) 75 MCG tablet TAKE 1 TABLET EVERY DAY 90 tablet 3     losartan-hydrochlorothiazide (HYZAAR) 100-25 MG tablet Take 1 tablet by mouth every morning 90 tablet 3     metFORMIN (GLUCOPHAGE-XR) 500 MG 24 hr tablet Take 2 tablets (1,000 mg) by mouth daily (with dinner) 180 tablet 3     tamsulosin (FLOMAX) 0.4 MG capsule TAKE 1 CAPSULE EVERY DAY 90 capsule 3     thin (NO BRAND SPECIFIED) lancets Use with lanceting device to test blood sugar 2 times daily or as directed. To accompany: Blood Glucose Monitor Brands: per insurance. 200 each 3      Physical Exam:  Wt Readings from Last 5 Encounters:   04/15/21 83.9 kg (185 lb)   01/04/21 86.2 kg (190 lb)   12/03/20 86.2 kg (190 lb)   09/17/20 87.5 kg (192 lb 14.4 oz)   09/16/20 86.7 kg (191 lb 3.2 oz)       Constitutional: alert and in no distress  Eyes: No redness or discharge  Respiratory: No cough or labored breathing.  Musculoskeletal: Full range of motion in extremities.  Skin: no visible skin lesions or discoloration  Neurological: No tremors and denies headache.  Psychiatric: Mentation appears normal and affect is normal as well.  Alert and oriented x3.  The rest the comprehensive physical examination is deferred due to public health emergency video visit  restrictions.        Laboratory/Imaging Studies  Component      Latest Ref Rng & Units 4/8/2021   WBC      4.0 - 11.0 10e9/L 8.9   RBC Count      4.4 - 5.9 10e12/L 3.79 (L)   Hemoglobin      13.3 - 17.7 g/dL 11.6 (L)   Hematocrit      40.0 - 53.0 % 35.6 (L)   MCV      78 - 100 fl 94   MCH      26.5 - 33.0 pg 30.6   MCHC      31.5 - 36.5 g/dL 32.6   RDW      10.0 - 15.0 % 17.3 (H)   Platelet Count      150 - 450 10e9/L 66 (L)   % Neutrophils      % 64.0   % Lymphocytes      % 15.0   % Monocytes      % 11.0   % Eosinophils      % 2.0   % Metamyelocytes      % 4.0   % Myelocytes      % 4.0   Absolute Neutrophil      1.6 - 8.3 10e9/L 5.6   Absolute Lymphocytes      0.8 - 5.3 10e9/L 1.3   Absolute Monocytes      0.0 - 1.3 10e9/L 1.0   Absolute Eosinophils      0.0 - 0.7 10e9/L 0.2   Absolute Metamyelocytes      0 10e9/L 0.4 (H)   Absolute Myelocytes      0 10e9/L 0.4 (H)   RBC Morphology       Normal   Platelet Estimate       Automated count confirmed.  Platelet morphology is normal.   Diff Method       Manual Differential   % Retic      0.5 - 2.0 % 6.8 (H)   Absolute Retic      25 - 95 10e9/L 258.1 (H)   Lactate Dehydrogenase      85 - 227 U/L 202   PSA      0 - 4 ug/L <0.01   Results for LANDON [facility]SI[facility] (MRN [id number]) as of 4/15/2021 11:11   Ref. Range 5/14/2020 09:28 5/28/2020 10:24 6/19/2020 12:36 9/29/2020 11:04 4/8/2021 10:55   Platelet Count Latest Ref Range: 150 - 450 10e9/L 81 (L) 74 (L) 80 (L) 79 (L) 66 (L)   Results for LANDON [facility]SI[facility] (MRN [id number]) as of 4/15/2021 11:11   Ref. Range 12/17/2019 15:27 5/14/2020 09:28 5/28/2020 10:24 6/19/2020 12:36 9/29/2020 11:04 4/8/2021 10:55   Hemoglobin Latest Ref Range: 13.3 - 17.7 g/dL 11.8 (L) 12.1 (L) 12.3 (L) 12.6 (L) 12.0 (L) 11.6 (L)     ASSESSMENT/PLAN:    Mr. Hsieh is a 85 year old man, with type 2 DM,  with normocytic anemia and transient borderline leucopenia and mild thrombocytopenia but persistent reticulocytosis.    1. Normocytic anemia,  reticulocytosis, thrombocytopenia- He has mild anemia stable, and his thrombocytopenia is overall relatively stable. Peripheral blood smear in May 2020 showed findings suspicious for circulating blasts but a  bone marrow biopsy did not show increased blasts or e/o leukemia or MDS. Cytogenetics was normal 46 XY.   Repeat peripheral blood smear on April 8, 2021 showed slight normochromic normocytic anemia, increased erythrocyte regeneration, rare spherocytes.  There was neutrophilic left shift.  There was moderate thrombocytopenia with normal platelet morphology.  He is still on aspirin, and I wonder if benefits outweigh the risks with platelet count of 66,000.  I will communicate with Dr. Garces to see if he recommends for the patient to remain on aspirin.  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  Autoimmune hemolysis work-up was negative.  Continue folic acid 1 mg daily. Rx renewed.  His anemia is also at least partially related to anemia of kidney disease.    He does have borderline hepatosplenomegaly which could be associated with pancytopenia.   We will plan to see the patient in 6 months, with labs.    If platelet count continues to decline, consider repeating abdominal US for evaluation of progressive hepatosplenomegaly.  F/up in 6 months with cbcd, retic count, LDH, LFTs, creat, peripheral blood smear.    2. CKD- creat stable.    3.  Prostate cancer- treated with XRT in 2007. PSA remains undetectable, last checked by us on April 8, 2021.   At the end of our visit patient verbalized understanding and concurred with the plan.    Addendum: Per my communication with Dr. Garces, the aspirin was prescribed as a secondary prevention due to diabetes.  He feels that the risk of bleeding with continuation of aspirin outweigh the benefit.  His office will be asking the patient to discontinue aspirin.

## 2021-05-06 NOTE — PROGRESS NOTES
Talk to the patient on phone.  Advised to discontinue aspirin.  Patient informed that I was in communication with Dr. Hernandez regarding the aspirin use with underlying thrombocytopenia.  We both agree that the benefit outweighs the risk.  Patient agreeable and will discontinue aspirin.

## 2021-08-11 DIAGNOSIS — E11.21 CONTROLLED TYPE 2 DIABETES MELLITUS WITH DIABETIC NEPHROPATHY, WITHOUT LONG-TERM CURRENT USE OF INSULIN (H): ICD-10-CM

## 2021-08-11 RX ORDER — METFORMIN HCL 500 MG
TABLET, EXTENDED RELEASE 24 HR ORAL
Qty: 180 TABLET | Refills: 3 | OUTPATIENT
Start: 2021-08-11

## 2021-09-20 DIAGNOSIS — E11.21 CONTROLLED TYPE 2 DIABETES MELLITUS WITH DIABETIC NEPHROPATHY, WITHOUT LONG-TERM CURRENT USE OF INSULIN (H): ICD-10-CM

## 2021-09-20 DIAGNOSIS — D59.9 ACQUIRED HEMOLYTIC ANEMIA (H): ICD-10-CM

## 2021-09-20 DIAGNOSIS — N40.0 BENIGN PROSTATIC HYPERPLASIA WITHOUT LOWER URINARY TRACT SYMPTOMS: ICD-10-CM

## 2021-09-20 RX ORDER — FOLIC ACID 1 MG/1
1 TABLET ORAL DAILY
Qty: 90 TABLET | Refills: 3 | Status: SHIPPED | OUTPATIENT
Start: 2021-09-20 | End: 2022-07-28

## 2021-09-20 NOTE — TELEPHONE ENCOUNTER
SUBJECTIVE/OBJECTIVE:                                                    Refill request receive for:  Folic Acid    Last refill per fax: not listed  Date of LOV r/t Medication: 4/15/21  Future appt scheduled? Yes: 11/9/21   Date faxed to clinic: 9/20/21    RECENT LABS/VITALS                                                      Recent Labs   Lab Test 12/03/20  1055 05/10/19  0914 07/05/16  0801 06/22/15  0900 09/23/14  0755 09/23/14  0755   CHOL 119 122   < > 128   < > 134   HDL 34* 33*   < > 34*   < > 31*   LDL 52 39   < > 53   < > 49   TRIG 166* 250*   < > 204*   < > 269*   CHOLHDLRATIO  --   --   --  3.8  --  4.3    < > = values in this interval not displayed.     Lab Results   Component Value Date    AST 19 04/08/2021     Lab Results   Component Value Date    ALT 36 04/08/2021     Lab Results   Component Value Date    A1C 7.0 04/08/2021    A1C 6.2 12/03/2020    A1C 6.9 06/09/2020    A1C 6.2 12/05/2019    A1C 5.9 05/24/2019     Creatinine   Date Value Ref Range Status   04/08/2021 1.43 (H) 0.66 - 1.25 mg/dL Final   ]  Potassium   Date Value Ref Range Status   04/08/2021 4.4 3.4 - 5.3 mmol/L Final   ]  TSH   Date Value Ref Range Status   09/29/2020 3.44 0.40 - 4.00 mU/L Final   05/24/2019 2.48 0.40 - 4.00 mU/L Final   09/26/2018 1.28 0.40 - 4.00 mU/L Final   05/01/2018 5.33 (H) 0.40 - 4.00 mU/L Final     BP Readings from Last 4 Encounters:   12/03/20 (!) 163/78   09/17/20 (!) 159/74   09/16/20 (!) 173/68   06/19/20 (!) 181/84       PLAN:                                                      Sent to provider to advise.

## 2021-09-21 RX ORDER — METFORMIN HCL 500 MG
TABLET, EXTENDED RELEASE 24 HR ORAL
Qty: 180 TABLET | Refills: 3 | OUTPATIENT
Start: 2021-09-21

## 2021-09-21 NOTE — TELEPHONE ENCOUNTER
"Routing refill request to provider for review/approval because:  Requested Prescriptions   Pending Prescriptions Disp Refills    tamsulosin (FLOMAX) 0.4 MG capsule [Pharmacy Med Name: TAMSULOSIN 0.4MG CAPSULES] 30 capsule 0     Sig: TAKE ONE CAPSULE BY MOUTH EVERY DAY. LAST REFILL, NEED APPOINTMENT        Alpha Blockers Failed - 9/20/2021  8:32 AM        Failed - Blood pressure under 140/90 in past 12 months       BP Readings from Last 3 Encounters:   12/03/20 (!) 163/78   09/17/20 (!) 159/74   09/16/20 (!) 173/68                 Passed - Recent (12 mo) or future (30 days) visit within the authorizing provider's specialty     Patient has had an office visit with the authorizing provider or a provider within the authorizing providers department within the previous 12 mos or has a future within next 30 days. See \"Patient Info\" tab in inbasket, or \"Choose Columns\" in Meds & Orders section of the refill encounter.              Passed - Patient does not have Tadalafil, Vardenafil, or Sildenafil on their medication list        Passed - Medication is active on med list        Passed - Patient is 18 years of age or older          Refused Prescriptions Disp Refills    metFORMIN (GLUCOPHAGE-XR) 500 MG 24 hr tablet [Pharmacy Med Name: METFORMIN ER 500MG 24HR TABS] 180 tablet 3     Sig: TAKE 2 TABLETS BY MOUTH DAILY WITH DINNER. GENERIC EQUIVALENT FOR GLUCOPHAGE XR        Biguanide Agents Failed - 9/20/2021  8:32 AM        Failed - Patient's CR is NOT>1.4 OR Patient's EGFR is NOT<45 within past 12 mos.       Recent Labs   Lab Test 04/08/21  1055   GFRESTIMATED 45*   GFRESTBLACK 52*       Recent Labs   Lab Test 04/08/21  1055   CR 1.43*             Failed - Recent (6 mo) or future (30 days) visit within the authorizing provider's specialty     Patient had office visit in the last 6 months or has a visit in the next 30 days with authorizing provider or within the authorizing provider's specialty.  See \"Patient Info\" tab in " "inbasket, or \"Choose Columns\" in Meds & Orders section of the refill encounter.            Passed - Patient is age 10 or older        Passed - Patient has documented A1c within the specified period of time.     If HgbA1C is 8 or greater, it needs to be on file within the past 3 months.  If less than 8, must be on file within the past 6 months.     Recent Labs   Lab Test 04/08/21  1055   A1C 7.0*             Passed - Patient does NOT have a diagnosis of CHF.        Passed - Medication is active on med list                Ivis EMMANUEL, RN        "

## 2021-09-22 RX ORDER — TAMSULOSIN HYDROCHLORIDE 0.4 MG/1
CAPSULE ORAL
Qty: 30 CAPSULE | Refills: 0 | Status: SHIPPED | OUTPATIENT
Start: 2021-09-22 | End: 2021-10-20

## 2021-09-22 NOTE — TELEPHONE ENCOUNTER
Patient has not seen Dr. Garces for a year.  I gave him 1 month supply.  Please assist patient to schedule an appointment Dr. Garces within the next month.

## 2021-10-16 NOTE — PROGRESS NOTES
Assessment & Plan     1.  Type 2 diabetes mellitus with hyperglycemia.  Patient currently on Metformin 1 g a day and glimepiride 2 mg a day.  Blood sugar at home running high.  Will check A1c and urine microalbumin today.  I will get back to him with recommendation and adjustment of medication.  2.  Essential hypertension with elevated blood pressure.  Blood pressure at home also running high.  Patient currently on amlodipine 10 mg a day and hydrochlorothiazide /25 daily.  We will add Inderal LA 80 mg a day.  Follow-up in 6 weeks.  3.  Hypothyroidism treated with levothyroxine 75 mcg a day.  Will check TSH today and get back to him with recommendation.  4.  Diabetic polyneuropathy with increased symptoms of shooting pain and numbness of feet.  Discussed considering treatment with gabapentin or Lyrica.  Patient would like to wait for now and reevaluate when he returns.  5.  Benign positional vertigo x3 months.  Will refer to physical therapy for vestibular treatment (Epley's maneuver).  6.  Poor balance.  Multifactorial related to age and diabetic neuropathy.  Advised to use a cane.  A handicap permit completed and given to patient.  He is still capable of driving.  7.  Stage IIIa chronic kidney disease.  Will check renal panel today and get back to him with recommendation.  8.  Essential tremor.  Has had symptoms for 2 years.  Seems to be getting worse.  I start him on Inderal LA 80 mg a day for hypertension and to help with the tremors.  9.  Obstructive sleep apnea on CPAP.  Patient consistently uses a CPAP  10.  Normocytic anemia with some mild hemolysis probably secondary to hepatomegaly being followed by hematology service.  11.  Thrombocytopenia.  Been followed by hematology service.  12.  Class I obesity.  Not likely to be impacted due age and inability to exercise.  13.  Prostate cancer she with radiation therapy 2007 with no evidence of recurrence     Total time spent 50 minutes with face-to-face  "consultation, examination,, review of test results, review review of chart, interpretation of test, care planning and documentation.      BMI:   Estimated body mass index is 29.44 kg/m  as calculated from the following:    Height as of 4/15/21: 1.702 m (5' 7\").    Weight as of this encounter: 85.3 kg (188 lb).   Weight management plan: Discussed healthy diet and exercise guidelines        No follow-ups on file.    Aj Garces MD  [facility] ELSI Johnson is a 86 year old who presents for the following health issues     HPI     Diabetes Follow-up    How often are you checking your blood sugar? One time daily  What time of day are you checking your blood sugars (select all that apply)?  Before meals  Have you had any blood sugars above 200?  Yes   Have you had any blood sugars below 70?  No    What symptoms do you notice when your blood sugar is low?  Shaky, Dizzy and Blurred vision    What concerns do you have today about your diabetes? None     Do you have any of these symptoms? (Select all that apply)  Numbness in feet    Have you had a diabetic eye exam in the last 12 months? Yes- Date of last eye exam: unknown,  Location: [facility]       Would like to discuss getting a handicap sticker due to always being dizzy very hard to walk        BP Readings from Last 2 Encounters:   10/21/21 (!) 174/64   12/03/20 (!) 163/78     Hemoglobin A1C (%)   Date Value   04/08/2021 7.0 (H)   12/03/2020 6.2 (H)     LDL Cholesterol Calculated (mg/dL)   Date Value   12/03/2020 52   05/10/2019 39               Hypertension Follow-up      Do you check your blood pressure regularly outside of the clinic? Yes     Are you following a low salt diet? Yes    Are your blood pressures ever more than 140 on the top number (systolic) OR more   than 90 on the bottom number (diastolic), for example 140/90? Yes    Hypothyroidism Follow-up      Since last visit, patient describes the following symptoms: Weight " stable, no hair loss, no skin changes, no constipation, no loose stools      How many servings of fruits and vegetables do you eat daily?  2-3    On average, how many sweetened beverages do you drink each day (Examples: soda, juice, sweet tea, etc.  Do NOT count diet or artificially sweetened beverages)?   0    How many days per week do you exercise enough to make your heart beat faster? 3 or less    How many minutes a day do you exercise enough to make your heart beat faster? 9 or less    How many days per week do you miss taking your medication? 0    86-year-old gentleman with history of diabetes, peripheral neuropathy, hypertension, hypothyroidism, chronic kidney disease, obstructive sleep apnea, normocytic anemia and thrombocytopenia is coming for follow-up.  Indicates that his blood sugars have been running high for the past 3 months.  In 200 range.  Denies hypoglycemia.  For the past 3 months he has had problems with dizziness which he describes as a spinning type episodes occurring 2-3 times a day associated with change in position.  Denies hearing problem or tinnitus.  His balance is getting worst.  He has not fallen.  He is now using cane off and on.  He is requesting a handicap permit.  He denies chest pain or shortness of breath unless he were to climb flight of stairs he will get some dyspnea.  No change in bowel habits and no evidence of bleeding.  He sleeps well.  He has tremor for the past 2 years that is getting worse.  Sometimes difficulty eating because of the tremors.  Has obstructive sleep apnea and uses CPAP consistently.    Review of Systems   Constitutional, HEENT, cardiovascular, pulmonary, GI, , musculoskeletal, neuro, skin, endocrine and psych systems are negative, except as otherwise noted.      Objective    There were no vitals taken for this visit.  There is no height or weight on file to calculate BMI.  Physical Exam   GENERAL: healthy, alert and no distress  NECK: no adenopathy, no  asymmetry, masses, or scars and thyroid normal to palpation  RESP: lungs clear to auscultation - no rales, rhonchi or wheezes  CV: regular rate and rhythm, normal S1 S2, no S3 or S4, no murmur, click or rub, no peripheral edema and peripheral pulses strong  ABDOMEN: soft, nontender, bowel sounds normal and liver edge is palpable  MS: no gross musculoskeletal defects noted, no edema  Diabetic foot exam: normal DP and PT pulses, no trophic changes or ulcerative lesions and reduced sensation both feet. Unable to perceive touch  Neurological exam: Poor balance.  Tremors of both hands that got worse with activity.  Strength is otherwise bilaterally symmetrical.  Touch and pain perception in the sole of the feet is absent bilaterally.  Decreased touch perception lower leg and feet.

## 2021-10-21 ENCOUNTER — OFFICE VISIT (OUTPATIENT)
Dept: FAMILY MEDICINE | Facility: CLINIC | Age: 86
End: 2021-10-21
Payer: COMMERCIAL

## 2021-10-21 VITALS
HEART RATE: 97 BPM | WEIGHT: 188 LBS | RESPIRATION RATE: 18 BRPM | OXYGEN SATURATION: 98 % | BODY MASS INDEX: 29.44 KG/M2 | SYSTOLIC BLOOD PRESSURE: 160 MMHG | TEMPERATURE: 98 F | DIASTOLIC BLOOD PRESSURE: 65 MMHG

## 2021-10-21 DIAGNOSIS — E11.42 DIABETIC POLYNEUROPATHY ASSOCIATED WITH TYPE 2 DIABETES MELLITUS (H): ICD-10-CM

## 2021-10-21 DIAGNOSIS — E66.09 CLASS 1 OBESITY DUE TO EXCESS CALORIES WITH SERIOUS COMORBIDITY AND BODY MASS INDEX (BMI) OF 31.0 TO 31.9 IN ADULT: ICD-10-CM

## 2021-10-21 DIAGNOSIS — G47.33 OSA ON CPAP: ICD-10-CM

## 2021-10-21 DIAGNOSIS — E03.4 HYPOTHYROIDISM DUE TO ACQUIRED ATROPHY OF THYROID: ICD-10-CM

## 2021-10-21 DIAGNOSIS — G25.0 ESSENTIAL TREMOR: ICD-10-CM

## 2021-10-21 DIAGNOSIS — H81.10 BPV (BENIGN POSITIONAL VERTIGO), UNSPECIFIED LATERALITY: ICD-10-CM

## 2021-10-21 DIAGNOSIS — D69.6 THROMBOCYTOPENIA (H): ICD-10-CM

## 2021-10-21 DIAGNOSIS — E11.65 TYPE 2 DIABETES MELLITUS WITH HYPERGLYCEMIA, WITHOUT LONG-TERM CURRENT USE OF INSULIN (H): Primary | ICD-10-CM

## 2021-10-21 DIAGNOSIS — I10 HYPERTENSION GOAL BP (BLOOD PRESSURE) < 140/90: ICD-10-CM

## 2021-10-21 DIAGNOSIS — E66.811 CLASS 1 OBESITY DUE TO EXCESS CALORIES WITH SERIOUS COMORBIDITY AND BODY MASS INDEX (BMI) OF 31.0 TO 31.9 IN ADULT: ICD-10-CM

## 2021-10-21 DIAGNOSIS — R26.89 POOR BALANCE: ICD-10-CM

## 2021-10-21 DIAGNOSIS — C61 PROSTATE CANCER (H): ICD-10-CM

## 2021-10-21 DIAGNOSIS — N18.31 STAGE 3A CHRONIC KIDNEY DISEASE (H): ICD-10-CM

## 2021-10-21 DIAGNOSIS — D64.9 NORMOCYTIC ANEMIA: ICD-10-CM

## 2021-10-21 LAB
ALBUMIN SERPL-MCNC: 4.4 G/DL (ref 3.4–5)
ANION GAP SERPL CALCULATED.3IONS-SCNC: 7 MMOL/L (ref 3–14)
BUN SERPL-MCNC: 31 MG/DL (ref 7–30)
CALCIUM SERPL-MCNC: 9.1 MG/DL (ref 8.5–10.1)
CHLORIDE BLD-SCNC: 100 MMOL/L (ref 94–109)
CO2 SERPL-SCNC: 28 MMOL/L (ref 20–32)
CREAT SERPL-MCNC: 1.43 MG/DL (ref 0.66–1.25)
CREAT UR-MCNC: 81 MG/DL
GFR SERPL CREATININE-BSD FRML MDRD: 45 ML/MIN/1.73M2
GLUCOSE BLD-MCNC: 278 MG/DL (ref 70–99)
HBA1C MFR BLD: 7.2 % (ref 0–5.6)
MICROALBUMIN UR-MCNC: 91 MG/L
MICROALBUMIN/CREAT UR: 112.35 MG/G CR (ref 0–17)
PHOSPHATE SERPL-MCNC: 2.3 MG/DL (ref 2.5–4.5)
POTASSIUM BLD-SCNC: 3.6 MMOL/L (ref 3.4–5.3)
SODIUM SERPL-SCNC: 135 MMOL/L (ref 133–144)
T4 FREE SERPL-MCNC: 1.29 NG/DL (ref 0.76–1.46)
TSH SERPL DL<=0.005 MIU/L-ACNC: 6.19 MU/L (ref 0.4–4)

## 2021-10-21 PROCEDURE — 84443 ASSAY THYROID STIM HORMONE: CPT | Performed by: INTERNAL MEDICINE

## 2021-10-21 PROCEDURE — 82043 UR ALBUMIN QUANTITATIVE: CPT | Performed by: INTERNAL MEDICINE

## 2021-10-21 PROCEDURE — 99215 OFFICE O/P EST HI 40 MIN: CPT | Performed by: INTERNAL MEDICINE

## 2021-10-21 PROCEDURE — 36415 COLL VENOUS BLD VENIPUNCTURE: CPT | Performed by: INTERNAL MEDICINE

## 2021-10-21 PROCEDURE — 80069 RENAL FUNCTION PANEL: CPT | Performed by: INTERNAL MEDICINE

## 2021-10-21 PROCEDURE — 83036 HEMOGLOBIN GLYCOSYLATED A1C: CPT | Performed by: INTERNAL MEDICINE

## 2021-10-21 PROCEDURE — 84439 ASSAY OF FREE THYROXINE: CPT | Performed by: INTERNAL MEDICINE

## 2021-10-21 RX ORDER — PROPRANOLOL HYDROCHLORIDE 80 MG/1
80 CAPSULE, EXTENDED RELEASE ORAL DAILY
Qty: 90 CAPSULE | Refills: 3 | Status: SHIPPED | OUTPATIENT
Start: 2021-10-21 | End: 2022-11-01

## 2021-10-21 ASSESSMENT — PAIN SCALES - GENERAL: PAINLEVEL: MILD PAIN (3)

## 2021-10-25 ENCOUNTER — ALLIED HEALTH/NURSE VISIT (OUTPATIENT)
Dept: NURSING | Facility: CLINIC | Age: 86
End: 2021-10-25
Payer: COMMERCIAL

## 2021-10-25 DIAGNOSIS — Z23 HIGH PRIORITY FOR 2019-NCOV VACCINE: Primary | ICD-10-CM

## 2021-10-25 DIAGNOSIS — E11.40 TYPE 2 DIABETES MELLITUS WITH DIABETIC NEUROPATHY, WITHOUT LONG-TERM CURRENT USE OF INSULIN (H): ICD-10-CM

## 2021-10-25 PROCEDURE — 91300 COVID-19,PF,PFIZER (12+ YRS): CPT

## 2021-10-25 PROCEDURE — 0004A COVID-19,PF,PFIZER (12+ YRS): CPT

## 2021-10-25 RX ORDER — GLIMEPIRIDE 4 MG/1
4 TABLET ORAL
Qty: 90 TABLET | Refills: 3 | Status: SHIPPED | OUTPATIENT
Start: 2021-10-25 | End: 2022-11-29

## 2021-10-26 ENCOUNTER — OFFICE VISIT (OUTPATIENT)
Dept: ORTHOPEDICS | Facility: CLINIC | Age: 86
End: 2021-10-26
Payer: COMMERCIAL

## 2021-10-26 DIAGNOSIS — M25.562 CHRONIC PAIN OF LEFT KNEE: Primary | ICD-10-CM

## 2021-10-26 DIAGNOSIS — G89.29 CHRONIC PAIN OF LEFT KNEE: Primary | ICD-10-CM

## 2021-10-26 PROCEDURE — 99213 OFFICE O/P EST LOW 20 MIN: CPT | Mod: 25 | Performed by: FAMILY MEDICINE

## 2021-10-26 PROCEDURE — 20610 DRAIN/INJ JOINT/BURSA W/O US: CPT | Mod: LT | Performed by: FAMILY MEDICINE

## 2021-10-26 RX ORDER — TRIAMCINOLONE ACETONIDE 40 MG/ML
40 INJECTION, SUSPENSION INTRA-ARTICULAR; INTRAMUSCULAR
Status: DISCONTINUED | OUTPATIENT
Start: 2021-10-26 | End: 2022-05-24

## 2021-10-26 RX ADMIN — TRIAMCINOLONE ACETONIDE 40 MG: 40 INJECTION, SUSPENSION INTRA-ARTICULAR; INTRAMUSCULAR at 10:40

## 2021-10-26 NOTE — PROGRESS NOTES
HISTORY OF PRESENT ILLNESS  Mr. Lozano is a pleasant 86 year old male following up with left knee pain.  Alex unfortunately feels no better after seeing me earlier this year.  He continues to feel pain inside his knee.  Physical therapy exercises have not helped him very much.  He points to the front of his knee.  He also feels clicking in his right knee.  This is worse with motion, worse with walking and happens a few times a day.  He has no pain in the right knee.     PHYSICAL EXAM  General  - normal appearance, in no obvious distress  HEENT  - conjunctivae not injected, moist mucous membranes  CV  - normal popliteal pulse  Pulm  - normal respiratory pattern, non-labored  Musculoskeletal - left and right knee  - stance: gait slow  - inspection: no swelling or effusion, normal bone and joint alignment, no obvious deformity  - palpation: no joint line tenderness, patella and patellar tendon non-tender  - ROM: 135 degrees flexion, -5 degrees extension, not painful, normal actively and passively compared to contralateral  - strength: 5/5 in flexion, 5/5 in extension  - special tests:  (-) Lachman  (-) Roscoe  (-) varus at 0 and 30 degrees flexion  (-) valgus at 0 and 30 degrees flexion    Neuro  - no sensory or motor deficit, grossly normal coordination, normal muscle tone  Skin  - no ecchymosis, erythema, warmth, or induration, no obvious rash  Psych  - interactive, appropriate, normal mood and affect              ASSESSMENT & PLAN  Mr. Lozano is a 86 year old male following up with left knee pain and clicking, now with clicking in the right knee    I reviewed his previous left knee x-ray the room with him, this shows mild osteoarthritic change.    I suspect that Alex is experiencing patellofemoral related osteoarthritic pain.  We discussed pathophysiology and some treatment strategies.    Through shared decision-making I did inject his knee today (see procedure note).  If this injection helps and this is  something he can have up to a few times a year.  If this injection is not helpful at all I would likely consider advanced imaging.    It was a pleasure seeing Alex.        Trever Schmidt DO, CAQSM      This note was constructed using Dragon dictation software, please excuse any minor errors in spelling, grammar, or syntax.      This note was constructed using Dragon dictation software, please excuse any minor errors in spelling, grammar, or syntax.    Large Joint Injection/Arthocentesis: L knee joint    Date/Time: 10/26/2021 10:40 AM  Performed by: Trever Schmidt DO  Authorized by: Trever Schmidt DO     Indications:  Pain  Needle Size:  22 G  Guidance: landmark guided    Approach:  Lateral  Location:  Knee      Medications:  40 mg triamcinolone 40 MG/ML  Outcome:  Tolerated well, no immediate complications  Procedure discussed: discussed risks, benefits, and alternatives    Consent Given by:  Patient  Timeout: timeout called immediately prior to procedure    Prep: patient was prepped and draped in usual sterile fashion       PROCEDURE    Knee Injection - Intraarticular  The patient was informed of the risks and the benefits of the procedure and a written consent was signed.  The patient s left knee was prepped with chlorhexidine in sterile fashion.   40 mg of triamcinolone suspension was drawn up into a 5 mL syringe with 4 mL of 1% lidocaine.  Injection was performed using substerile technique.  A 1.5-inch 22-gauge needle was used to enter the lateral aspect of the knee.  Injection performed successfully without difficulty.  There were no complications. The patient tolerated the procedure well. There was negligible bleeding.

## 2021-10-26 NOTE — LETTER
10/26/2021         RE: Milo Lozano  [address] [id number]        Dear Colleague,    Thank you for referring your patient, Milo Lozano, to the [facility] [place]. Please see a copy of my visit note below.      HISTORY OF PRESENT ILLNESS  Mr. Lozano is a pleasant 86 year old male following up with left knee pain.  Alex unfortunately feels no better after seeing me earlier this year.  He continues to feel pain inside his knee.  Physical therapy exercises have not helped him very much.  He points to the front of his knee.  He also feels clicking in his right knee.  This is worse with motion, worse with walking and happens a few times a day.  He has no pain in the right knee.     PHYSICAL EXAM  General  - normal appearance, in no obvious distress  HEENT  - conjunctivae not injected, moist mucous membranes  CV  - normal popliteal pulse  Pulm  - normal respiratory pattern, non-labored  Musculoskeletal - left and right knee  - stance: gait slow  - inspection: no swelling or effusion, normal bone and joint alignment, no obvious deformity  - palpation: no joint line tenderness, patella and patellar tendon non-tender  - ROM: 135 degrees flexion, -5 degrees extension, not painful, normal actively and passively compared to contralateral  - strength: 5/5 in flexion, 5/5 in extension  - special tests:  (-) Lachman  (-) Roscoe  (-) varus at 0 and 30 degrees flexion  (-) valgus at 0 and 30 degrees flexion    Neuro  - no sensory or motor deficit, grossly normal coordination, normal muscle tone  Skin  - no ecchymosis, erythema, warmth, or induration, no obvious rash  Psych  - interactive, appropriate, normal mood and affect              ASSESSMENT & PLAN  Mr. Lozano is a 86 year old male following up with left knee pain and clicking, now with clicking in the right knee    I reviewed his previous left knee x-ray the room with him, this shows mild  osteoarthritic change.    I suspect that Alex is experiencing patellofemoral related osteoarthritic pain.  We discussed pathophysiology and some treatment strategies.    Through shared decision-making I did inject his knee today (see procedure note).  If this injection helps and this is something he can have up to a few times a year.  If this injection is not helpful at all I would likely consider advanced imaging.    It was a pleasure seeing Alex.        Trever Schmidt DO, CAQSM      This note was constructed using Dragon dictation software, please excuse any minor errors in spelling, grammar, or syntax.      This note was constructed using Dragon dictation software, please excuse any minor errors in spelling, grammar, or syntax.    Large Joint Injection/Arthocentesis: L knee joint    Date/Time: 10/26/2021 10:40 AM  Performed by: Trever Schmidt DO  Authorized by: Trever Schmidt DO     Indications:  Pain  Needle Size:  22 G  Guidance: landmark guided    Approach:  Lateral  Location:  Knee      Medications:  40 mg triamcinolone 40 MG/ML  Outcome:  Tolerated well, no immediate complications  Procedure discussed: discussed risks, benefits, and alternatives    Consent Given by:  Patient  Timeout: timeout called immediately prior to procedure    Prep: patient was prepped and draped in usual sterile fashion       PROCEDURE    Knee Injection - Intraarticular  The patient was informed of the risks and the benefits of the procedure and a written consent was signed.  The patient s left knee was prepped with chlorhexidine in sterile fashion.   40 mg of triamcinolone suspension was drawn up into a 5 mL syringe with 4 mL of 1% lidocaine.  Injection was performed using substerile technique.  A 1.5-inch 22-gauge needle was used to enter the lateral aspect of the knee.  Injection performed successfully without difficulty.  There were no complications. The patient tolerated the procedure well. There was negligible bleeding.                    Again, thank you for allowing me to participate in the care of your patient.        Sincerely,        Trever Schmidt, DO

## 2021-10-27 NOTE — PROGRESS NOTES
"Alex is a 86 year old who is being evaluated via a billable video visit.        The patient has been notified of following:     \"This video visit will be conducted via a call between you and your physician/provider. We have found that certain health care needs can be provided without the need for an in-person physical exam.  This service lets us provide the care you need with a video conversation.  If a prescription is necessary we can send it directly to your pharmacy.  If lab work is needed we can place an order for that and you can then stop by our lab to have the test done at a later time.    Video visits are billed at different rates depending on your insurance coverage.  Please reach out to your insurance provider with any questions.    If during the course of the call the physician/provider feels a video visit is not appropriate, you will not be charged for this service.\"    Patient has given verbal consent for Video visit? yes    Video-Visit Details    Type of service:  Video Visit    Video visit duration: 11 min  Originating Location (pt. 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 When his CBC was repeated on 8/15/2017, he was mildly pancytopenic, with Hb of 12.1 g/dl, MCV of 93,  mild leucopenia and borderline thrombocytopenia.   Absolute differential counts were normal and his peripheral blood smear showed slight normochromic normocytic anemia; minimal poikilocytosis and slight thrombocytopenia with overall normal platelet morphology. There was polychromasia. Absolute reticulocyte count was elevated at 165.7. B12 was normal in 07/2017 at " 494. Ferritin was elevated at 412. Creatinine was mildly elevated at 1.32. FERMIN screen was negative. No M protein was noted on serum immunofixation. TSH was normal. Stool hemoccult was negative in 07/2017. He has never had a colonoscopy. UA showed no hematuria but did glucosuria and proteinuria. He still had persisted marked reticulocytosis in 09/2017 with absolute retic count of 143. He had a normal LDH, negative LAURA, normal haptoglobin. Peripheral blood smear on 9/5/17 showed normocytic slight anemia with slight morphologic evidence of increase in red cell regeneration with no morphologic evidence of hemolysis. There was reactive lymphocyte morphology.   He had f/up labs on 5/1/2018. Hb was 11.7 g/dl with normal MCV. WBC and platelet counts were low normal. Absolute retic count was still elevated at 143. Peripheral blood smear on 5/1/2018 showed Mild normochromic normocytic anemia. Adequate neutrophils with mild shift to immaturity. The lymphocyte population shows a population   of both small mature lymphocytes, large granular lymphocytes and reactive-appearing lymphocytes.  The morphology of the platelets was overall normal including large platelets.  US abdomen on 5/29/2018 showed findings c/w hepatosplenomegaly- hyperechoic liver, suspicious for fatty liver and enlarged spleen-  measuring 12.7 x 6.6 x 13.5 cm as well as gallstones.  CT scan of the chest abdomen and pelvis without IV contrast showed no evidence of splenomegaly on the CT, with spleen size measuring 11 cm.  There was borderline hepatomegaly and gallstones noted as well.   Osmotic fragility testing demonstrated mildly increased osmotic fragility. Occasional spherocytes were noted on peripheral blood smear. I felt  his diagnosis was mild hereditary spherocytosis (HS) (retic count <6% is c/w mild HS) albeit MCHC is normal. HS can present in 9th decade. He has mild anemia with Hb >11 g/dl and gallstones.   He has no family history of anemia.   G6PD  deficiency screen was negative. He was started on daily folic acid supplementation, 1 mg PO Qday.     Peripheral blood smear on May 10, 2019 showed normochromic normocytic anemia with increased red cell regeneration and slight thrombocytopenia and with normal platelet morphology.  Granulocytes were mature and well granulated and not dysplastic.  No circulating blasts were seen. Autoimmune hemolysis work-up was negative.   He had myelocytes on differential in 12/2019.   Repeat peripheral blood smear on 5/28/2020 showed slight normochromic, normocytic anemia; increased erythrocyte   regeneration as well as slight neutrophilic left shift with rare cells suspicious for circulating blasts. Moderate thrombocytopenia. Platelet count was 72.  We proceeded with a bone marrow biopsy on 6/19/2020 which showed:  Hypercellular marrow for age (80%) with trilineage hematopoiesis     - No overt dysplasia and no increase in marrow blasts   Flow cytometry showed polytypic B cells; no aberrant immunophenotype on T cells and no increase in myeloid blasts and no definitive abnormal myeloid   blast population. Cytogenetics was normal - 46 XY.       2. Prostate cancer- treated with XRT in 2007. PSA remains undetectable.    History Of Present Illness:  Mr. Hsieh is a 86 year old male who presents for f/up of pancytopenia and abnormal peripheral blood smear. Has mildly increased osmotic fragility, possibly late onset mild HS.   Labs were reviewed as below:  Results for [facility] (MRN [id number]) as of 11/9/2021 12:06   Ref. Range 9/29/2020 11:04 12/3/2020 10:55 4/8/2021 10:55 10/21/2021 09:37 11/2/2021 09:26   Creatinine Latest Ref Range: 0.66 - 1.25 mg/dL 1.25 1.21 1.43 (H) 1.43 (H) 1.82 (H)   Hb lower as below:  Results for [facility] (MRN [id number]) as of 11/9/2021 12:06   Ref. Range 5/28/2020 10:24 6/19/2020 12:36 9/29/2020 11:04 4/8/2021 10:55 11/2/2021 09:26   Hemoglobin Latest Ref Range: 13.3 - 17.7 g/dL 12.3  (L) 12.6 (L) 12.0 (L) 11.6 (L) 10.7 (L)   MCV normal at 95.   Platelet count is stable:  Results for [facility] (MRN [id number]) as of 11/9/2021 12:06   Ref. Range 5/28/2020 10:24 6/19/2020 12:36 9/29/2020 11:04 4/8/2021 10:55 11/2/2021 09:26   Platelet Count Latest Ref Range: 150 - 450 10e3/uL 74 (L) 80 (L) 79 (L) 66 (L) 76 (L)   Haptoglobin normal. LFTs normal except borderline elevated direct marifer at 0.3.  Results for [facility] (MRN [id number]) as of 11/9/2021 12:06   Ref. Range 5/14/2020 09:28 5/28/2020 10:24 9/29/2020 11:04 4/8/2021 10:55 11/2/2021 09:26   Absolute Retic Latest Ref Range: 0.025 - 0.095 10e6/uL 241.1 (H) 240.4 (H) 242.1 (H) 258.1 (H) 0.269 (H)     LDH normal. Creatinine 1.82.  Absolute metamyelocytes 0.4. He has had high systolic blood pressures.  His SBP has been running high for the past few visits in 160s-170s.  Creatinine is higher at over 1.8 when his baseline is 1.2-1.4. He has DM.   ASA has been discontinued due to thrombocytopenia. He was on it for secondary prevention.  He continues to have s/o peripheral neuropathy in his feet, stable. He has had no bruising or bleeding symptoms.  He has been feeling well.    Past Medical/Surgical History:  Past Medical History:   Diagnosis Date     BPH (benign prostatic hypertrophy)      BPH with obstruction/lower urinary tract symptoms 9/17/2020     BPV (benign positional vertigo), unspecified laterality 10/21/2021     CARDIOVASCULAR SCREENING; LDL GOAL LESS THAN 100 12/29/2011     CARDIOVASCULAR SCREENING; LDL GOAL LESS THAN 130 12/29/2011     Class 1 obesity due to excess calories with body mass index (BMI) of 31.0 to 31.9 in adult 9/17/2020     Essential tremor 10/21/2019     Hypertension goal BP (blood pressure) < 140/90 12/29/2011     Hypothyroidism due to acquired atrophy of thyroid 12/5/2016     Normocytic anemia 8/15/2017     Poor balance 10/21/2021     Prostate cancer (H) 1/2007    Had Radiation     Thrombocytopenia (H)  9/18/2019     Type 2 diabetes mellitus with hyperglycemia (H) 10/21/2010     Type 2 diabetes, HbA1c goal < 7% (H)    He has  peripheral neuropathy and has had laser treatments for it for 6 months.  FHx and SocHx reviewed.     Past Surgical History:   Procedure Laterality Date     COLONOSCOPY       Allergies:  Allergies as of 11/09/2021 - Reviewed 10/26/2021   Allergen Reaction Noted     Lisinopril Cough 06/25/2014     Current Medications:  Current Outpatient Medications   Medication Sig Dispense Refill     amLODIPine (NORVASC) 10 MG tablet Take 1 tablet (10 mg) by mouth daily 90 tablet 3     blood glucose (ACCU-CHEK SMARTVIEW) test strip TEST BLOOD SUGAR TWICE DAILY  OR AS DIRECTED 200 strip 3     blood glucose (NO BRAND SPECIFIED) lancets standard Use to test blood sugar 2 times daily or as directed. 200 each 0     blood glucose (NO BRAND SPECIFIED) test strip Use to test blood sugar 2 times daily or as directed. To accompany: Blood Glucose Monitor Brands: per insurance 200 strip 3     blood glucose calibration (NO BRAND SPECIFIED) solution To accompany: Blood Glucose Monitor Brands: per insurance. 1 Bottle 3     blood glucose monitoring (ACCU-CHEK MILADYS SMARTVIEW) meter device kit Use to test blood sugar 2 times daily or as directed. 1 kit 0     blood glucose monitoring (NO BRAND SPECIFIED) meter device kit Use to test blood sugar 2 times daily or as directed. Preferred blood glucose meter OR supplies to accompany: Blood Glucose Monitor Brands: per insurance 1 kit 0     folic acid (FOLVITE) 1 MG tablet Take 1 tablet (1 mg) by mouth daily 90 tablet 3     glimepiride (AMARYL) 4 MG tablet Take 1 tablet (4 mg) by mouth every morning (before breakfast) 90 tablet 3     levothyroxine (SYNTHROID/LEVOTHROID) 75 MCG tablet TAKE 1 TABLET BY MOUTH EVERY DAY. GENERIC EQUIVALENT FOR SYNTHROID 90 tablet 3     losartan-hydrochlorothiazide (HYZAAR) 100-25 MG tablet Take 1 tablet by mouth every morning 90 tablet 3     metFORMIN  (GLUCOPHAGE-XR) 500 MG 24 hr tablet TAKE 2 TABLETS BY MOUTH DAILY WITH DINNER. GENERIC EQUIVALENT FOR GLUCOPHAGE  tablet 0     propranolol ER (INDERAL LA) 80 MG 24 hr capsule Take 1 capsule (80 mg) by mouth daily 90 capsule 3     tamsulosin (FLOMAX) 0.4 MG capsule TAKE ONE CAPSULE BY MOUTH EVERY DAY. LAST REFILL, NEED APPOINTMENT 30 capsule 0     thin (NO BRAND SPECIFIED) lancets Use with lanceting device to test blood sugar 2 times daily or as directed. To accompany: Blood Glucose Monitor Brands: per insurance. 200 each 3      Physical Exam:  Wt Readings from Last 5 Encounters:   10/21/21 85.3 kg (188 lb)   04/15/21 83.9 kg (185 lb)   01/04/21 86.2 kg (190 lb)   12/03/20 86.2 kg (190 lb)   09/17/20 87.5 kg (192 lb 14.4 oz)       Constitutional: alert and in no distress  Eyes: No redness or discharge  Respiratory: No cough or labored breathing.  Musculoskeletal: Full range of motion in extremities.  Skin: no visible skin lesions or discoloration  Neurological: No tremors and denies headache.  Psychiatric: Mentation appears normal and affect is normal as well.  Alert and oriented x3.  The rest the comprehensive physical examination is deferred due to public health emergency video visit restrictions.        Laboratory/Imaging Studies  Labs from November 2, 2021 reviewed.  Absolute reticulocyte count relatively stable as below:  Results for [facility] (MRN [id number]) as of 11/21/2021 08:56   Ref. Range 5/28/2020 10:24 9/29/2020 11:04 4/8/2021 10:55 11/2/2021 09:26   Absolute Retic Latest Ref Range: 0.025 - 0.095 10e6/uL 240.4 (H) 242.1 (H) 258.1 (H) 0.269 (H)   Mild anemia with slight worsening of hemoglobin:  Results for [facility] (MRN [id number]) as of 11/21/2021 08:56   Ref. Range 6/19/2020 12:36 9/29/2020 11:04 4/8/2021 10:55 11/2/2021 09:26   Hemoglobin Latest Ref Range: 13.3 - 17.7 g/dL 12.6 (L) 12.0 (L) 11.6 (L) 10.7 (L)   Platelet count stable:  Results for [facility] (MRN  [id number]) as of 11/21/2021 08:56   Ref. Range 6/19/2020 12:36 9/29/2020 11:04 4/8/2021 10:55 11/2/2021 09:26   Platelet Count Latest Ref Range: 150 - 450 10e3/uL 80 (L) 79 (L) 66 (L) 76 (L)   MCV 95  Creatinine 1.82  Hepatic panel direct bilirubin 0.3. AST, ALT    Haptoglobin 129  Direct bilirubin 0.3    ASSESSMENT/PLAN:    Mr. Lozano is a 86 year old man, with type 2 DM,  with normocytic anemia and transient borderline leucopenia and mild thrombocytopenia but persistent reticulocytosis.    1. Normocytic anemia, reticulocytosis, thrombocytopenia- Anemia worse, likely secondary to increased creatinine.   Thrombocytopenia relatively stable.  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  Autoimmune hemolysis work-up was negative.  Continue folic acid 1 mg daily. Rx renewed.  His anemia is also at least partially related to anemia of kidney disease.    He does have borderline hepatosplenomegaly which could be associated with pancytopenia.   We will plan to see the patient in 6 months, with labs.  CT imaging 05/2019 Borderline hepatomegaly. No splenomegaly.  F/up in 6 months with cbcd, retic count, LDH, LFTs, creat, peripheral blood smear, and abdominal US.  Also, add folate, B12, iron studies to future labs to evaluate additionally for anemia.    2. CKD- increased creatinine. I will communicate with Dr. Garces today, regarding saadia's elevated creat and elevated SBP.  Dr. Garces saw the patient in a visit on 10/21/2021 and added a nonselective beta-blocker for both hypertension and migraine along with his current antihypertensive medication.      3.  Prostate cancer- treated with XRT in 2007. PSA remains undetectable, last checked by us on April 8, 2021. Recheck with return visit.  At the end of our visit patient verbalized understanding and concurred with the plan.

## 2021-11-02 ENCOUNTER — LAB (OUTPATIENT)
Dept: LAB | Facility: CLINIC | Age: 86
End: 2021-11-02
Payer: COMMERCIAL

## 2021-11-02 DIAGNOSIS — D69.6 THROMBOCYTOPENIA (H): ICD-10-CM

## 2021-11-02 DIAGNOSIS — D59.9 ACQUIRED HEMOLYTIC ANEMIA (H): ICD-10-CM

## 2021-11-02 LAB
ALBUMIN SERPL-MCNC: 4.2 G/DL (ref 3.4–5)
ALP SERPL-CCNC: 58 U/L (ref 40–150)
ALT SERPL W P-5'-P-CCNC: 30 U/L (ref 0–70)
AST SERPL W P-5'-P-CCNC: 17 U/L (ref 0–45)
BASOPHILS # BLD MANUAL: 0 10E3/UL (ref 0–0.2)
BASOPHILS NFR BLD MANUAL: 0 %
BILIRUB DIRECT SERPL-MCNC: 0.3 MG/DL (ref 0–0.2)
BILIRUB SERPL-MCNC: 1 MG/DL (ref 0.2–1.3)
CREAT SERPL-MCNC: 1.82 MG/DL (ref 0.66–1.25)
EOSINOPHIL # BLD MANUAL: 0.1 10E3/UL (ref 0–0.7)
EOSINOPHIL NFR BLD MANUAL: 1 %
ERYTHROCYTE [DISTWIDTH] IN BLOOD BY AUTOMATED COUNT: 17 % (ref 10–15)
GFR SERPL CREATININE-BSD FRML MDRD: 33 ML/MIN/1.73M2
HCT VFR BLD AUTO: 32.9 % (ref 40–53)
HGB BLD-MCNC: 10.7 G/DL (ref 13.3–17.7)
LDH SERPL L TO P-CCNC: 170 U/L (ref 85–227)
LYMPHOCYTES # BLD MANUAL: 1.5 10E3/UL (ref 0.8–5.3)
LYMPHOCYTES NFR BLD MANUAL: 16 %
MCH RBC QN AUTO: 30.7 PG (ref 26.5–33)
MCHC RBC AUTO-ENTMCNC: 32.5 G/DL (ref 31.5–36.5)
MCV RBC AUTO: 95 FL (ref 78–100)
METAMYELOCYTES # BLD MANUAL: 0.4 10E3/UL
METAMYELOCYTES NFR BLD MANUAL: 4 %
MONOCYTES # BLD MANUAL: 1 10E3/UL (ref 0–1.3)
MONOCYTES NFR BLD MANUAL: 10 %
NEUTROPHILS # BLD MANUAL: 6.6 10E3/UL (ref 1.6–8.3)
NEUTROPHILS NFR BLD MANUAL: 69 %
PLAT MORPH BLD: ABNORMAL
PLATELET # BLD AUTO: 76 10E3/UL (ref 150–450)
PROT SERPL-MCNC: 7.8 G/DL (ref 6.8–8.8)
RBC # BLD AUTO: 3.48 10E6/UL (ref 4.4–5.9)
RBC MORPH BLD: ABNORMAL
RETICS # AUTO: 0.27 10E6/UL (ref 0.03–0.1)
RETICS/RBC NFR AUTO: 7.7 % (ref 0.5–2)
WBC # BLD AUTO: 9.6 10E3/UL (ref 4–11)

## 2021-11-02 PROCEDURE — 83010 ASSAY OF HAPTOGLOBIN QUANT: CPT

## 2021-11-02 PROCEDURE — 85045 AUTOMATED RETICULOCYTE COUNT: CPT

## 2021-11-02 PROCEDURE — 80076 HEPATIC FUNCTION PANEL: CPT

## 2021-11-02 PROCEDURE — 36415 COLL VENOUS BLD VENIPUNCTURE: CPT

## 2021-11-02 PROCEDURE — 82565 ASSAY OF CREATININE: CPT

## 2021-11-02 PROCEDURE — 83615 LACTATE (LD) (LDH) ENZYME: CPT

## 2021-11-02 PROCEDURE — 85027 COMPLETE CBC AUTOMATED: CPT

## 2021-11-03 LAB — HAPTOGLOB SERPL-MCNC: 129 MG/DL (ref 32–197)

## 2021-11-08 ENCOUNTER — HOSPITAL ENCOUNTER (OUTPATIENT)
Dept: PHYSICAL THERAPY | Facility: CLINIC | Age: 86
Setting detail: THERAPIES SERIES
End: 2021-11-08
Attending: INTERNAL MEDICINE
Payer: COMMERCIAL

## 2021-11-08 DIAGNOSIS — H81.10 BPV (BENIGN POSITIONAL VERTIGO), UNSPECIFIED LATERALITY: ICD-10-CM

## 2021-11-08 PROCEDURE — 97161 PT EVAL LOW COMPLEX 20 MIN: CPT | Mod: GP | Performed by: PHYSICAL THERAPIST

## 2021-11-08 PROCEDURE — 97112 NEUROMUSCULAR REEDUCATION: CPT | Mod: GP | Performed by: PHYSICAL THERAPIST

## 2021-11-09 ENCOUNTER — VIRTUAL VISIT (OUTPATIENT)
Dept: ONCOLOGY | Facility: CLINIC | Age: 86
End: 2021-11-09
Payer: COMMERCIAL

## 2021-11-09 DIAGNOSIS — D69.6 THROMBOCYTOPENIA (H): ICD-10-CM

## 2021-11-09 DIAGNOSIS — D59.9 ACQUIRED HEMOLYTIC ANEMIA (H): Primary | ICD-10-CM

## 2021-11-09 DIAGNOSIS — C61 PROSTATE CANCER (H): ICD-10-CM

## 2021-11-09 DIAGNOSIS — R16.0 HEPATOMEGALY: ICD-10-CM

## 2021-11-09 PROCEDURE — 99214 OFFICE O/P EST MOD 30 MIN: CPT | Mod: 95 | Performed by: INTERNAL MEDICINE

## 2021-11-09 NOTE — PROGRESS NOTES
Rehabilitation Services        OUTPATIENT PHYSICAL THERAPY FUNCTIONAL EVALUATION  PLAN OF TREATMENT FOR OUTPATIENT REHABILITATION  (COMPLETE FOR INITIAL CLAIMS ONLY)  Patient's Last Name, First Name, M.I.  YOB: 1935  Milo Lozano     Provider's Name   [place] Gunderson PT   Medical Record No.  [id number]     Start of Care Date:  11/08/21   Onset Date:    Order date 10/21/2021   Type:     _X__PT   ____OT  ____SLP Medical Diagnosis:   BPPV     PT Diagnosis:  fall risk Visits from SOC:  1                              __________________________________________________________________________________  Plan of Treatment/Functional Goals:     GOALS  gait with head motion  He will improve on gait with head motion horizontal and vertical  fro moderate impaired to minimal impaired consistently for community mobility  02/09/22    episodes of spinning  He will report improvement in his episodes of spinning from once a week to once a month  02/09/22         Therapy Frequency:  other (see comments) (every few weeks for up to 3 months)   Predicted Duration of Therapy Intervention:  3 months    [place] Gunderson PT                                    I CERTIFY THE NEED FOR THESE SERVICES FURNISHED UNDER        THIS PLAN OF TREATMENT AND WHILE UNDER MY CARE     (Physician co-signature of this document indicates review and certification of the therapy plan).                Certification Date From:  11/8/2021    Certification Date To:   2/5/2022    Referring Provider:  Dr Garces    Initial Assessment  See Epic Evaluation- Start of Care Date: 11/08/21

## 2021-11-09 NOTE — LETTER
"    11/9/2021         RE: Milo Lozano  [address]  [facility] [address]        Dear Colleague,    Thank you for referring your patient, Milo Lozano, to the [facility]. Please see a copy of my visit note below.    Alex is a 86 year old who is being evaluated via a billable video visit.        The patient has been notified of following:     \"This video visit will be conducted via a call between you and your physician/provider. We have found that certain health care needs can be provided without the need for an in-person physical exam.  This service lets us provide the care you need with a video conversation.  If a prescription is necessary we can send it directly to your pharmacy.  If lab work is needed we can place an order for that and you can then stop by our lab to have the test done at a later time.    Video visits are billed at different rates depending on your insurance coverage.  Please reach out to your insurance provider with any questions.    If during the course of the call the physician/provider feels a video visit is not appropriate, you will not be charged for this service.\"    Patient has given verbal consent for Video visit? yes    Video-Visit Details    Type of service:  Video Visit    Video visit duration: 11 min  Originating Location (pt. 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 When his CBC was repeated on 8/15/2017, he was mildly pancytopenic, with Hb of 12.1 g/dl, MCV of 93,  mild leucopenia and borderline thrombocytopenia.   Absolute differential counts were normal " and his peripheral blood smear showed slight normochromic normocytic anemia; minimal poikilocytosis and slight thrombocytopenia with overall normal platelet morphology. There was polychromasia. Absolute reticulocyte count was elevated at 165.7. B12 was normal in 07/2017 at 494. Ferritin was elevated at 412. Creatinine was mildly elevated at 1.32. FERMIN screen was negative. No M protein was noted on serum immunofixation. TSH was normal. Stool hemoccult was negative in 07/2017. He has never had a colonoscopy. UA showed no hematuria but did glucosuria and proteinuria. He still had persisted marked reticulocytosis in 09/2017 with absolute retic count of 143. He had a normal LDH, negative LAURA, normal haptoglobin. Peripheral blood smear on 9/5/17 showed normocytic slight anemia with slight morphologic evidence of increase in red cell regeneration with no morphologic evidence of hemolysis. There was reactive lymphocyte morphology.   He had f/up labs on 5/1/2018. Hb was 11.7 g/dl with normal MCV. WBC and platelet counts were low normal. Absolute retic count was still elevated at 143. Peripheral blood smear on 5/1/2018 showed Mild normochromic normocytic anemia. Adequate neutrophils with mild shift to immaturity. The lymphocyte population shows a population   of both small mature lymphocytes, large granular lymphocytes and reactive-appearing lymphocytes.  The morphology of the platelets was overall normal including large platelets.  US abdomen on 5/29/2018 showed findings c/w hepatosplenomegaly- hyperechoic liver, suspicious for fatty liver and enlarged spleen-  measuring 12.7 x 6.6 x 13.5 cm as well as gallstones.  CT scan of the chest abdomen and pelvis without IV contrast showed no evidence of splenomegaly on the CT, with spleen size measuring 11 cm.  There was borderline hepatomegaly and gallstones noted as well.   Osmotic fragility testing demonstrated mildly increased osmotic fragility. Occasional spherocytes were noted  on peripheral blood smear. I felt  his diagnosis was mild hereditary spherocytosis (HS) (retic count <6% is c/w mild HS) albeit MCHC is normal. HS can present in 9th decade. He has mild anemia with Hb >11 g/dl and gallstones.   He has no family history of anemia.   G6PD deficiency screen was negative. He was started on daily folic acid supplementation, 1 mg PO Qday.     Peripheral blood smear on May 10, 2019 showed normochromic normocytic anemia with increased red cell regeneration and slight thrombocytopenia and with normal platelet morphology.  Granulocytes were mature and well granulated and not dysplastic.  No circulating blasts were seen. Autoimmune hemolysis work-up was negative.   He had myelocytes on differential in 12/2019.   Repeat peripheral blood smear on 5/28/2020 showed slight normochromic, normocytic anemia; increased erythrocyte   regeneration as well as slight neutrophilic left shift with rare cells suspicious for circulating blasts. Moderate thrombocytopenia. Platelet count was 72.  We proceeded with a bone marrow biopsy on 6/19/2020 which showed:  Hypercellular marrow for age (80%) with trilineage hematopoiesis     - No overt dysplasia and no increase in marrow blasts   Flow cytometry showed polytypic B cells; no aberrant immunophenotype on T cells and no increase in myeloid blasts and no definitive abnormal myeloid   blast population. Cytogenetics was normal - 46 XY.       2. Prostate cancer- treated with XRT in 2007. PSA remains undetectable.    History Of Present Illness:  Mr. Hsieh is a 86 year old male who presents for f/up of pancytopenia and abnormal peripheral blood smear. Has mildly increased osmotic fragility, possibly late onset mild HS.   Labs were reviewed as below:  Results for L[facility] (MRN [id number]) as of 11/9/2021 12:06   Ref. Range 9/29/2020 11:04 12/3/2020 10:55 4/8/2021 10:55 10/21/2021 09:37 11/2/2021 09:26   Creatinine Latest Ref Range: 0.66 - 1.25 mg/dL 1.25  1.21 1.43 (H) 1.43 (H) 1.82 (H)   Hb lower as below:  Results for L[facility]ON [facility] (MRN [id number]) as of 11/9/2021 12:06   Ref. Range 5/28/2020 10:24 6/19/2020 12:36 9/29/2020 11:04 4/8/2021 10:55 11/2/2021 09:26   Hemoglobin Latest Ref Range: 13.3 - 17.7 g/dL 12.3 (L) 12.6 (L) 12.0 (L) 11.6 (L) 10.7 (L)   MCV normal at 95.   Platelet count is stable:  Results for L[facility]ON [facility] (MRN [id number]) as of 11/9/2021 12:06   Ref. Range 5/28/2020 10:24 6/19/2020 12:36 9/29/2020 11:04 4/8/2021 10:55 11/2/2021 09:26   Platelet Count Latest Ref Range: 150 - 450 10e3/uL 74 (L) 80 (L) 79 (L) 66 (L) 76 (L)   Haptoglobin normal. LFTs normal except borderline elevated direct marifer at 0.3.  Results for L[facility]ON [facility] (MRN [id number]) as of 11/9/2021 12:06   Ref. Range 5/14/2020 09:28 5/28/2020 10:24 9/29/2020 11:04 4/8/2021 10:55 11/2/2021 09:26   Absolute Retic Latest Ref Range: 0.025 - 0.095 10e6/uL 241.1 (H) 240.4 (H) 242.1 (H) 258.1 (H) 0.269 (H)     LDH normal. Creatinine 1.82.  Absolute metamyelocytes 0.4. He has had high systolic blood pressures.  His SBP has been running high for the past few visits in 160s-170s.  Creatinine is higher at over 1.8 when his baseline is 1.2-1.4. He has DM.   ASA has been discontinued due to thrombocytopenia. He was on it for secondary prevention.  He continues to have s/o peripheral neuropathy in his feet, stable. He has had no bruising or bleeding symptoms.  He has been feeling well.    Past Medical/Surgical History:  Past Medical History:   Diagnosis Date     BPH (benign prostatic hypertrophy)      BPH with obstruction/lower urinary tract symptoms 9/17/2020     BPV (benign positional vertigo), unspecified laterality 10/21/2021     CARDIOVASCULAR SCREENING; LDL GOAL LESS THAN 100 12/29/2011     CARDIOVASCULAR SCREENING; LDL GOAL LESS THAN 130 12/29/2011     Class 1 obesity due to excess calories with body mass index (BMI) of 31.0 to 31.9 in adult 9/17/2020     Essential tremor  10/21/2019     Hypertension goal BP (blood pressure) < 140/90 12/29/2011     Hypothyroidism due to acquired atrophy of thyroid 12/5/2016     Normocytic anemia 8/15/2017     Poor balance 10/21/2021     Prostate cancer (H) 1/2007    Had Radiation     Thrombocytopenia (H) 9/18/2019     Type 2 diabetes mellitus with hyperglycemia (H) 10/21/2010     Type 2 diabetes, HbA1c goal < 7% (H)    He has  peripheral neuropathy and has had laser treatments for it for 6 months.  FHx and SocHx reviewed.     Past Surgical History:   Procedure Laterality Date     COLONOSCOPY       Allergies:  Allergies as of 11/09/2021 - Reviewed 10/26/2021   Allergen Reaction Noted     Lisinopril Cough 06/25/2014     Current Medications:  Current Outpatient Medications   Medication Sig Dispense Refill     amLODIPine (NORVASC) 10 MG tablet Take 1 tablet (10 mg) by mouth daily 90 tablet 3     blood glucose (ACCU-CHEK SMARTVIEW) test strip TEST BLOOD SUGAR TWICE DAILY  OR AS DIRECTED 200 strip 3     blood glucose (NO BRAND SPECIFIED) lancets standard Use to test blood sugar 2 times daily or as directed. 200 each 0     blood glucose (NO BRAND SPECIFIED) test strip Use to test blood sugar 2 times daily or as directed. To accompany: Blood Glucose Monitor Brands: per insurance 200 strip 3     blood glucose calibration (NO BRAND SPECIFIED) solution To accompany: Blood Glucose Monitor Brands: per insurance. 1 Bottle 3     blood glucose monitoring (ACCU-CHEK MILADYS SMARTVIEW) meter device kit Use to test blood sugar 2 times daily or as directed. 1 kit 0     blood glucose monitoring (NO BRAND SPECIFIED) meter device kit Use to test blood sugar 2 times daily or as directed. Preferred blood glucose meter OR supplies to accompany: Blood Glucose Monitor Brands: per insurance 1 kit 0     folic acid (FOLVITE) 1 MG tablet Take 1 tablet (1 mg) by mouth daily 90 tablet 3     glimepiride (AMARYL) 4 MG tablet Take 1 tablet (4 mg) by mouth every morning (before breakfast) 90  tablet 3     levothyroxine (SYNTHROID/LEVOTHROID) 75 MCG tablet TAKE 1 TABLET BY MOUTH EVERY DAY. GENERIC EQUIVALENT FOR SYNTHROID 90 tablet 3     losartan-hydrochlorothiazide (HYZAAR) 100-25 MG tablet Take 1 tablet by mouth every morning 90 tablet 3     metFORMIN (GLUCOPHAGE-XR) 500 MG 24 hr tablet TAKE 2 TABLETS BY MOUTH DAILY WITH DINNER. GENERIC EQUIVALENT FOR GLUCOPHAGE  tablet 0     propranolol ER (INDERAL LA) 80 MG 24 hr capsule Take 1 capsule (80 mg) by mouth daily 90 capsule 3     tamsulosin (FLOMAX) 0.4 MG capsule TAKE ONE CAPSULE BY MOUTH EVERY DAY. LAST REFILL, NEED APPOINTMENT 30 capsule 0     thin (NO BRAND SPECIFIED) lancets Use with lanceting device to test blood sugar 2 times daily or as directed. To accompany: Blood Glucose Monitor Brands: per insurance. 200 each 3      Physical Exam:  Wt Readings from Last 5 Encounters:   10/21/21 85.3 kg (188 lb)   04/15/21 83.9 kg (185 lb)   01/04/21 86.2 kg (190 lb)   12/03/20 86.2 kg (190 lb)   09/17/20 87.5 kg (192 lb 14.4 oz)       Constitutional: alert and in no distress  Eyes: No redness or discharge  Respiratory: No cough or labored breathing.  Musculoskeletal: Full range of motion in extremities.  Skin: no visible skin lesions or discoloration  Neurological: No tremors and denies headache.  Psychiatric: Mentation appears normal and affect is normal as well.  Alert and oriented x3.  The rest the comprehensive physical examination is deferred due to public health emergency video visit restrictions.        Laboratory/Imaging Studies  Labs from November 2, 2021 reviewed.  Absolute reticulocyte count relatively stable as below:  Results for L[facility]ON [facility] (MRN [id number]) as of 11/21/2021 08:56   Ref. Range 5/28/2020 10:24 9/29/2020 11:04 4/8/2021 10:55 11/2/2021 09:26   Absolute Retic Latest Ref Range: 0.025 - 0.095 10e6/uL 240.4 (H) 242.1 (H) 258.1 (H) 0.269 (H)   Mild anemia with slight worsening of hemoglobin:  Results for L[facility]ON [facility]  (MRN [id number]) as of 11/21/2021 08:56   Ref. Range 6/19/2020 12:36 9/29/2020 11:04 4/8/2021 10:55 11/2/2021 09:26   Hemoglobin Latest Ref Range: 13.3 - 17.7 g/dL 12.6 (L) 12.0 (L) 11.6 (L) 10.7 (L)   Platelet count stable:  Results for LANDON [facility] (MRN [id number]) as of 11/21/2021 08:56   Ref. Range 6/19/2020 12:36 9/29/2020 11:04 4/8/2021 10:55 11/2/2021 09:26   Platelet Count Latest Ref Range: 150 - 450 10e3/uL 80 (L) 79 (L) 66 (L) 76 (L)   MCV 95  Creatinine 1.82  Hepatic panel direct bilirubin 0.3. AST, ALT    Haptoglobin 129  Direct bilirubin 0.3    ASSESSMENT/PLAN:    Mr. Hsieh is a 86 year old man, with type 2 DM,  with normocytic anemia and transient borderline leucopenia and mild thrombocytopenia but persistent reticulocytosis.    1. Normocytic anemia, reticulocytosis, thrombocytopenia- Anemia worse, likely secondary to increased creatinine.   Thrombocytopenia relatively stable.  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  Autoimmune hemolysis work-up was negative.  Continue folic acid 1 mg daily. Rx renewed.  His anemia is also at least partially related to anemia of kidney disease.    He does have borderline hepatosplenomegaly which could be associated with pancytopenia.   We will plan to see the patient in 6 months, with labs.  CT imaging 05/2019 Borderline hepatomegaly. No splenomegaly.  F/up in 6 months with cbcd, retic count, LDH, LFTs, creat, peripheral blood smear, and abdominal US.  Also, add folate, B12, iron studies to future labs to evaluate additionally for anemia.    2. CKD- increased creatinine. I will communicate with Dr. Garces today, regarding saadia's elevated creat and elevated SBP.  Dr. Garces saw the patient in a visit on 10/21/2021 and added a nonselective beta-blocker for both hypertension and migraine along with his current antihypertensive medication.      3.  Prostate cancer- treated with XRT in 2007. PSA remains  undetectable, last checked by us on April 8, 2021. Recheck with return visit.  At the end of our visit patient verbalized understanding and concurred with the plan.            Again, thank you for allowing me to participate in the care of your patient.        Sincerely,        Karen Hernandez MD, MD

## 2021-11-09 NOTE — PROGRESS NOTES
21 0900   Signing Clinician's Name / Credentials   Signing clinician's name / credentials Anita Gunderson DPT NCS   Dynamic Gait Index ([employer], 1995)   Gait Level Surface 2  (weaves, slow)   Change in Gait Speed 2   Gait and Horizontal Head Turns 1   Gait with Vertical Head Turns 1   Gait and Pivot Turns 1   Step Over Obstacle 0   Step Around Obstacles 2   Steps 2   Total Dynamic Gait Index Score  (A score of 19 or less has been correlated to an increased risk of falls in community dwelling older adults, patients with vestibular disorders, and patients with MS.)   Total Score (out of 24) 11   Dynamic Gait Index (DGI):The DGI is a measure of balance during gait that is reliable and valid for the elderly and individuals with Parkinson's disease, MS, vestibular disorders, or s/p stroke. Gait assistive device used: None    Patient score:   Scores ?19/24 indicate an increased risk for falls according to Mellissa et al 2000  Minimal Detectable Change = 2.9 in community dwelling elderly according to Becker et al 2011    Assessment (rationale for performing, application to patient s function & care plan): he is at risk for falls. Higher risk with head turns, 180 turns or stepping over obstacle  Minutes billed as physical performance test: part of evaluation    Anita Gunderson DPT, MPT, NCS  Physical Therapist   Board Certified Neurologic Clinical Specialist     [facility], [facility]   [address] [place] [address]   [email or web address]   [phone]   Clinic: [phone] //   Fax: [phone]

## 2021-11-09 NOTE — PROGRESS NOTES
11/08/21 0800   General Information   Start of Care Date 11/08/21   Referring Physician Dr Garces   Orders Evaluate and Treat as Indicated   Order Date 10/21/21   Medical Diagnosis BPPV   Surgical/Medical history reviewed Yes  (DM2, HTN, neuropathy, essential tremor, left knee pain)   Pertinent history of current problem (include personal factors and/or comorbidities that impact the POC) Balance is biggest problem. No falls. Its irritating. dizziness/spinning about once a week, Lasts just a few seconds. Not noticing it in bed. No nausea. 2-3 years of this. Could get a bad 10 second episode on treadmill. My biggest friend is the wall. I dont go walking because of the foot pain.    Pertinent Visual History  glasses   Prior level of function comment some household chores, townhome so no yard work, driving, 1-2 times a week. Treadmill 10 minutes in basement. ??at 2.0 MPHs every day for the past year, just stopped 2 weeks ago. No walking outside.   Previous/Current Treatment Physical Therapy;Other   Improvement after PT No  ([place] of [place] twice a [place] in [place], )   Other treatment Had some PT her in  also in 2019 without much change per pt. HE tried laser treatment for neuropathy also.    Patient role/Employment history Retired   Living [facility]   Current Assistive Devices Standard Cane  (not with him today)   Assistive Devices Comments cane half time out of house, dont use in house. It does help.   Patient/Family Goals Statement To have some balance   Fall Risk Screen   Fall screen completed by PT   Have you fallen 2 or more times in the past year? No   Have you fallen and had an injury in the past year? No   Is patient a fall risk? Yes   Functional Scales   Functional Scales and Outcomes DHI 42/100   Pain   Patient currently in pain Yes   Pain location feet   Pain rating 2   Pain description   (numb sensation in feet)   Pain description comment it can get up to a 10, takes my breathe  away. Lasts about 10 seconds. Never goes away. SOme days it never happens and other days 2-3 times a day has bad episodes. It can wake him but doesnt prevent going to sleep.   Additional pain locations? Pain location 2   Pain location 2 left knee  (injection on 10/26, helped for a few days)   Pain comments right knee once in awhile   Posture   Posture Normal   Range of Motion (ROM)   ROM Comment CROM WNLs   Gait   Gait Comments 25fTW at fast speed 8.19 seconds and 15 steps.    Gait Special Tests 25 Foot Timed Walk   Seconds 11.31  (10.35)   Steps 18 Steps  (17)   Comments normal LEXI, symmetrical steps, weaves a little at times   Gait Special Tests Dynamic Gait Index   Score out of 24 11   Balance Special Tests   Balance special tests other 5 reps of STS from 18 inch ht without arms in 18.31 seconds ( this is WFLs)   Balance Special Tests Timed Up and Go   Seconds 14.4 Seconds  (12.44)   Comments This is in the fall risk range   Infrared Goggle Exam or Frenzel Lense Exam   Vestibular Suppressant in Last 24 Hours? No   Exam completed with Infrared Goggles   Spontaneous Nystagmus Negative   Gaze Evoked Nystagmus Negative   Head Shake Horizontal Nystagmus Negative   Benld-Hallpike (right) Negative   Reuben-Hallpike (Left) Negative   [facility] Sup[facility] Roll Test (Right) Negative   HSCC Supine Roll Test (Left) Negative   Clinical Impression   Criteria for Skilled Therapeutic Interventions Met yes, treatment indicated   PT Diagnosis fall risk   Influenced by the following impairments numbness in both feet, pain in both feet, dynamic postural control, knee pain L>R, decreasd activity tolerance, and brief episodes of spinning    Functional limitations due to impairments affects MRADLS, household/community mobility and recreational activity   Clinical Presentation Stable/Uncomplicated   Clinical Presentation Rationale medical history (neuropathy-chronic and previous PT experience), impairments, DGI, positional testing, STS test, TUG and  clinical judgement   Clinical Decision Making (Complexity) Low complexity   Therapy Frequency other (see comments)  (every few weeks for up to 3 months)   Predicted Duration of Therapy Intervention (days/wks) 3 months   Risk & Benefits of therapy have been explained Yes   Patient, Family & other staff in agreement with plan of care Yes   Clinical Impression Comments He is very clear that previous PT did not help his balance. I was clear that progressive neuropathy so I will not improve his balance but we need to work on preventing it from getting worse. he needs to be using his cane for all community mobility and he needs to keep using treadmill (walking). Positional testing was neagtive for BPPV but I further assess this.    Goal 1   Goal Identifier gait with head motion   Goal Description He will improve on gait with head motion horizontal and vertical  fro moderate impaired to minimal impaired consistently for community mobility   Target Date 22   Goal 2   Goal Identifier episodes of spinning   Goal Description He will report improvement in his episodes of spinning from once a week to once a month   Target Date 22   Total Evaluation Time   PT Royal, Low Complexity Minutes (56499) 40   Anita Gunderson DPT, MPT, NCS  Physical Therapist   Board Certified Neurologic Clinical Specialist     [facility], [facility]   [address] [place] [address]   [email or web address]  [email or web address]   [phone]   Clinic: [phone] //   Fax: [phone]

## 2021-11-12 ENCOUNTER — TELEPHONE (OUTPATIENT)
Dept: FAMILY MEDICINE | Facility: CLINIC | Age: 86
End: 2021-11-12
Payer: COMMERCIAL

## 2021-11-12 DIAGNOSIS — E11.42 DIABETIC POLYNEUROPATHY ASSOCIATED WITH TYPE 2 DIABETES MELLITUS (H): Primary | ICD-10-CM

## 2021-11-12 RX ORDER — PREGABALIN 75 MG/1
75 CAPSULE ORAL 2 TIMES DAILY
Qty: 180 CAPSULE | Refills: 1 | Status: SHIPPED | OUTPATIENT
Start: 2021-11-12 | End: 2022-05-12

## 2021-11-12 NOTE — TELEPHONE ENCOUNTER
Pt called saying that he is still having severe feet pain and is looking to try the medication that Dr. Garces recommended on his 10/21/21 visit. As seen bellow:    4.  Diabetic polyneuropathy with increased symptoms of shooting pain and numbness of feet.  Discussed considering treatment with gabapentin or Lyrica.  Patient would like to wait for now and reevaluate when he returns.      Would like any medications to be sent to    [facility] [place] [facility] [address] [facility].    Routing to provider to review and advise.      Dayami Gomez RN  [facility]

## 2021-12-06 ENCOUNTER — HOSPITAL ENCOUNTER (OUTPATIENT)
Dept: PHYSICAL THERAPY | Facility: CLINIC | Age: 86
Setting detail: THERAPIES SERIES
End: 2021-12-06
Attending: INTERNAL MEDICINE
Payer: COMMERCIAL

## 2021-12-06 PROCEDURE — 97112 NEUROMUSCULAR REEDUCATION: CPT | Mod: GP | Performed by: PHYSICAL THERAPIST

## 2021-12-12 NOTE — PROGRESS NOTES
Assessment & Plan     1.  Hypertension with a goal of less than 150/90 because of age and other multiple risk factors.  Blood pressure today at goal at 140/56.  Continue with propranolol 80 mg extended release (also for essential tremor), amlodipine 10 mg a day, losartan /25 daily and tamsulosin (BPH).  Follow-up in May with lab.  2.  Essential tremors seem improved with Inderal LA.  3.  Diabetic polyneuropathy.  Patient symptom improved with Lyrica 75 mg twice daily that was recently started.  He will continue same.  4.  Poor balance multifactorial including polyneuropathy and age.  Advised to use aids such as cane for even a walker.  Fall prevention discussed.  5.  Type 2 diabetes mellitus been treated with Metformin 1 g a day and glimepiride 4 mg a day.  Last A1c 7.2 on 10/21/2021.  We will recheck A1c and urine microalbumin in May.  Continue current treatment.  6.  Hypothyroidism with TSH borderline elevated 6.19 on 10/21/2021 but T4 was normal range so did not make a change in medication.  Recheck TSH in May.  7.  Stage III chronic kidney disease with recent jump in her creatinine in a matter of less than 20 days probably because of inadequate fluid intake.  Discussed increasing fluid intake.  Recheck renal function in May.  8.  Other health problems as mentioned in the past medical history.  9.  Normocytic anemia been followed by oncology/hematology service.  Part of the anemia is related to chronic kidney disease.  10.  Thrombocytopenia with platelet count in the 70s.  Been followed by oncology service.        Aj Garces MD  [facility]    Florin Johnson is a 86 year old who presents for the following health issues     HPI     Medication check     86-year-old gentleman comes in for follow-up regarding hypertension, polyneuropathy and multiple other health problems as mentioned in the problem list.  He no longer has issue with positional vertigo.  Since starting Lyrica  the pain in his feet has improved.  His balance remains poor and in fact on November 25 he had fallen backwards when he bent over to  a newspaper.  He denies chest pain or shortness of breath.  Essential tremor seems to better with addition of Inderal.      Review of Systems   Constitutional, HEENT, cardiovascular, pulmonary, GI, , musculoskeletal, neuro, skin, endocrine and psych systems are negative, except as otherwise noted.      Objective    There were no vitals taken for this visit.  There is no height or weight on file to calculate BMI.  Physical Exam   GENERAL: healthy, alert and no distress  NECK: no adenopathy, no asymmetry, masses, or scars and thyroid normal to palpation  RESP: lungs clear to auscultation - no rales, rhonchi or wheezes  CV: regular rate and rhythm, normal S1 S2, no S3 or S4, no murmur, click or rub, no peripheral edema and peripheral pulses strong  ABDOMEN: soft, nontender, no hepatosplenomegaly, no masses and bowel sounds normal  MS: no gross musculoskeletal defects noted, no edema

## 2021-12-14 ENCOUNTER — OFFICE VISIT (OUTPATIENT)
Dept: FAMILY MEDICINE | Facility: CLINIC | Age: 86
End: 2021-12-14
Payer: COMMERCIAL

## 2021-12-14 VITALS
WEIGHT: 193.3 LBS | DIASTOLIC BLOOD PRESSURE: 56 MMHG | RESPIRATION RATE: 18 BRPM | OXYGEN SATURATION: 98 % | TEMPERATURE: 97.9 F | SYSTOLIC BLOOD PRESSURE: 140 MMHG | BODY MASS INDEX: 30.28 KG/M2 | HEART RATE: 65 BPM

## 2021-12-14 DIAGNOSIS — E11.42 DIABETIC POLYNEUROPATHY ASSOCIATED WITH TYPE 2 DIABETES MELLITUS (H): ICD-10-CM

## 2021-12-14 DIAGNOSIS — R26.89 POOR BALANCE: ICD-10-CM

## 2021-12-14 DIAGNOSIS — I10 HYPERTENSION GOAL BP (BLOOD PRESSURE) < 150/90: ICD-10-CM

## 2021-12-14 DIAGNOSIS — N18.31 STAGE 3A CHRONIC KIDNEY DISEASE (H): ICD-10-CM

## 2021-12-14 DIAGNOSIS — E03.4 HYPOTHYROIDISM DUE TO ACQUIRED ATROPHY OF THYROID: ICD-10-CM

## 2021-12-14 DIAGNOSIS — D64.9 NORMOCYTIC ANEMIA: ICD-10-CM

## 2021-12-14 DIAGNOSIS — G25.0 ESSENTIAL TREMOR: ICD-10-CM

## 2021-12-14 DIAGNOSIS — E11.65 TYPE 2 DIABETES MELLITUS WITH HYPERGLYCEMIA, WITHOUT LONG-TERM CURRENT USE OF INSULIN (H): Primary | ICD-10-CM

## 2021-12-14 PROBLEM — H81.10 BPV (BENIGN POSITIONAL VERTIGO), UNSPECIFIED LATERALITY: Status: RESOLVED | Noted: 2021-10-21 | Resolved: 2021-12-14

## 2021-12-14 PROCEDURE — 99214 OFFICE O/P EST MOD 30 MIN: CPT | Performed by: INTERNAL MEDICINE

## 2021-12-28 DIAGNOSIS — I10 HYPERTENSION GOAL BP (BLOOD PRESSURE) < 140/90: ICD-10-CM

## 2021-12-30 RX ORDER — LOSARTAN POTASSIUM AND HYDROCHLOROTHIAZIDE 25; 100 MG/1; MG/1
1 TABLET ORAL EVERY MORNING
Qty: 90 TABLET | Refills: 1 | Status: SHIPPED | OUTPATIENT
Start: 2021-12-30 | End: 2022-05-11

## 2021-12-30 NOTE — TELEPHONE ENCOUNTER
"  Requested Prescriptions   Pending Prescriptions Disp Refills    losartan-hydrochlorothiazide (HYZAAR) 100-25 MG tablet 90 tablet 3     Sig: Take 1 tablet by mouth every morning        Angiotensin-II Receptors Failed - 12/28/2021 10:40 AM        Failed - Last blood pressure under 140/90 in past 12 months       BP Readings from Last 3 Encounters:   12/14/21 (!) 140/56   10/21/21 (!) 160/65   12/03/20 (!) 163/78                 Failed - Normal serum creatinine on file in past 12 months     Recent Labs   Lab Test 11/02/21  0926   CR 1.82*       Ok to refill medication if creatinine is low          Passed - Recent (12 mo) or future (30 days) visit within the authorizing provider's specialty     Patient has had an office visit with the authorizing provider or a provider within the authorizing providers department within the previous 12 mos or has a future within next 30 days. See \"Patient Info\" tab in inbasket, or \"Choose Columns\" in Meds & Orders section of the refill encounter.              Passed - Medication is active on med list        Passed - Patient is age 18 or older        Passed - Normal serum potassium on file in past 12 months       Recent Labs   Lab Test 10/21/21  0937   POTASSIUM 3.6                   Diuretics (Including Combos) Protocol Failed - 12/28/2021 10:40 AM        Failed - Blood pressure under 140/90 in past 12 months       BP Readings from Last 3 Encounters:   12/14/21 (!) 140/56   10/21/21 (!) 160/65   12/03/20 (!) 163/78                 Failed - Normal serum creatinine on file in past 12 months       Recent Labs   Lab Test 11/02/21  0926   CR 1.82*              Passed - Recent (12 mo) or future (30 days) visit within the authorizing provider's specialty     Patient has had an office visit with the authorizing provider or a provider within the authorizing providers department within the previous 12 mos or has a future within next 30 days. See \"Patient Info\" tab in inbasket, or \"Choose Columns\" " in Meds & Orders section of the refill encounter.              Passed - Medication is active on med list        Passed - Patient is age 18 or older        Passed - Normal serum potassium on file in past 12 months       Recent Labs   Lab Test 10/21/21  0937   POTASSIUM 3.6                    Passed - Normal serum sodium on file in past 12 months       Recent Labs   Lab Test 10/21/21  0937

## 2022-01-01 ENCOUNTER — TRANSFERRED RECORDS (OUTPATIENT)
Dept: MULTI SPECIALTY CLINIC | Facility: CLINIC | Age: 87
End: 2022-01-01

## 2022-01-01 LAB — RETINOPATHY: NORMAL

## 2022-02-12 ENCOUNTER — HEALTH MAINTENANCE LETTER (OUTPATIENT)
Age: 87
End: 2022-02-12

## 2022-02-25 ENCOUNTER — TELEPHONE (OUTPATIENT)
Dept: FAMILY MEDICINE | Facility: CLINIC | Age: 87
End: 2022-02-25
Payer: COMMERCIAL

## 2022-02-25 NOTE — TELEPHONE ENCOUNTER
Left message for patient to return call to reschedule 5/12/22 appointment with Dr. Garces, he is not in clinic that day

## 2022-04-07 NOTE — PROGRESS NOTES
Functional Gait Assessment (FGA): The FGA assesses postural stability during various walking tasks.   Gait assistive device used: none     Patient Score: 14/30  Scores of <22/30 have been correlated with predicting falls in community-dwelling older adults according to Jeannie & Jose Maria 2010.   Scores of <18/30 have been correlated with increased risk for falls in patients with Parkinsons Disease according to Cali Spencer Zhou et al 2014.  Minimal Detectable Change for patients with acute/chronic stroke = 4.2 according to Clair & Kulwinder 2009  Minimal Detectable Change for patients with vestibular disorder = 8 according to Jeannie Moise 2010    Assessment (rationale for performing, application to patient s function & care plan): Performed to assess balance with functional mobility-- patient scoring at risk of falls as indicated by testing, he has trouble turning head when walking and keeping his balance and demo's instability with higher level tasks. Justifies use cane longer community ambulation.   Minutes billed as physical performance test: 10         08/13/19 1500   Signing Clinician's Name / Credentials   Signing clinician's name / credentials Estefanía Gusman, PT, DPT   Functional Gait Assessment (BRIAN Dennis, Linnette GHood F., et al. (2004))   1. GAIT LEVEL SURFACE 2   2. CHANGE IN GAIT SPEED 2   3. GAIT WITH HORIZONTAL HEAD TURNS 2   4. GAIT WITH VERTICAL HEAD TURNS 1   5. GAIT AND PIVOT TURN 2   6. STEP OVER OBSTACLE 1   7. GAIT WITH NARROW BASE OF SUPPORT 0   8. GAIT WITH EYES CLOSED 0   9. AMBULATING BACKWARDS 2   10. STEPS 2   Total Functional Gait Assessment Score   TOTAL SCORE: (MAXIMUM SCORE 30) 14      Message sent.

## 2022-05-09 ENCOUNTER — LAB (OUTPATIENT)
Dept: LAB | Facility: CLINIC | Age: 87
End: 2022-05-09
Payer: COMMERCIAL

## 2022-05-09 ENCOUNTER — ONCOLOGY VISIT (OUTPATIENT)
Dept: ONCOLOGY | Facility: CLINIC | Age: 87
End: 2022-05-09
Payer: COMMERCIAL

## 2022-05-09 VITALS
HEART RATE: 74 BPM | BODY MASS INDEX: 29.98 KG/M2 | OXYGEN SATURATION: 99 % | WEIGHT: 191.4 LBS | DIASTOLIC BLOOD PRESSURE: 63 MMHG | SYSTOLIC BLOOD PRESSURE: 161 MMHG | TEMPERATURE: 98.7 F

## 2022-05-09 DIAGNOSIS — Z12.5 SCREENING FOR PROSTATE CANCER: ICD-10-CM

## 2022-05-09 DIAGNOSIS — D69.6 THROMBOCYTOPENIA (H): ICD-10-CM

## 2022-05-09 DIAGNOSIS — N13.8 BPH WITH OBSTRUCTION/LOWER URINARY TRACT SYMPTOMS: ICD-10-CM

## 2022-05-09 DIAGNOSIS — N40.1 BPH WITH OBSTRUCTION/LOWER URINARY TRACT SYMPTOMS: ICD-10-CM

## 2022-05-09 DIAGNOSIS — E11.65 TYPE 2 DIABETES MELLITUS WITH HYPERGLYCEMIA, WITHOUT LONG-TERM CURRENT USE OF INSULIN (H): ICD-10-CM

## 2022-05-09 DIAGNOSIS — D59.9 ACQUIRED HEMOLYTIC ANEMIA (H): ICD-10-CM

## 2022-05-09 DIAGNOSIS — C61 PROSTATE CANCER (H): ICD-10-CM

## 2022-05-09 DIAGNOSIS — D61.818 PANCYTOPENIA (H): Primary | ICD-10-CM

## 2022-05-09 DIAGNOSIS — E03.4 HYPOTHYROIDISM DUE TO ACQUIRED ATROPHY OF THYROID: ICD-10-CM

## 2022-05-09 LAB
ALBUMIN SERPL-MCNC: 4.1 G/DL (ref 3.4–5)
ALP SERPL-CCNC: 76 U/L (ref 40–150)
ALT SERPL W P-5'-P-CCNC: 27 U/L (ref 0–70)
AST SERPL W P-5'-P-CCNC: 19 U/L (ref 0–45)
BASOPHILS # BLD MANUAL: 0 10E3/UL (ref 0–0.2)
BASOPHILS NFR BLD MANUAL: 0 %
BILIRUB DIRECT SERPL-MCNC: 0.3 MG/DL (ref 0–0.2)
BILIRUB SERPL-MCNC: 1.3 MG/DL (ref 0.2–1.3)
CHOLEST SERPL-MCNC: 123 MG/DL
CREAT SERPL-MCNC: 1.34 MG/DL (ref 0.66–1.25)
CREAT UR-MCNC: 83 MG/DL
EOSINOPHIL # BLD MANUAL: 0 10E3/UL (ref 0–0.7)
EOSINOPHIL NFR BLD MANUAL: 0 %
ERYTHROCYTE [DISTWIDTH] IN BLOOD BY AUTOMATED COUNT: 17.2 % (ref 10–15)
FASTING STATUS PATIENT QL REPORTED: YES
FERRITIN SERPL-MCNC: 373 NG/ML (ref 26–388)
FOLATE SERPL-MCNC: 53.8 NG/ML
GFR SERPL CREATININE-BSD FRML MDRD: 52 ML/MIN/1.73M2
HBA1C MFR BLD: 9.1 % (ref 0–5.6)
HCT VFR BLD AUTO: 36 % (ref 40–53)
HDLC SERPL-MCNC: 27 MG/DL
HGB BLD-MCNC: 12.1 G/DL (ref 13.3–17.7)
IRON SATN MFR SERPL: 32 % (ref 15–46)
IRON SERPL-MCNC: 84 UG/DL (ref 35–180)
LDLC SERPL CALC-MCNC: 16 MG/DL
LYMPHOCYTES # BLD MANUAL: 2.1 10E3/UL (ref 0.8–5.3)
LYMPHOCYTES NFR BLD MANUAL: 25 %
MCH RBC QN AUTO: 31.3 PG (ref 26.5–33)
MCHC RBC AUTO-ENTMCNC: 33.6 G/DL (ref 31.5–36.5)
MCV RBC AUTO: 93 FL (ref 78–100)
MICROALBUMIN UR-MCNC: 239 MG/L
MICROALBUMIN/CREAT UR: 287.95 MG/G CR (ref 0–17)
MONOCYTES # BLD MANUAL: 1 10E3/UL (ref 0–1.3)
MONOCYTES NFR BLD MANUAL: 12 %
MYELOCYTES # BLD MANUAL: 0.2 10E3/UL
MYELOCYTES NFR BLD MANUAL: 2 %
NEUTROPHILS # BLD MANUAL: 5.2 10E3/UL (ref 1.6–8.3)
NEUTROPHILS NFR BLD MANUAL: 61 %
NONHDLC SERPL-MCNC: 96 MG/DL
PLAT MORPH BLD: ABNORMAL
PLATELET # BLD AUTO: 67 10E3/UL (ref 150–450)
PROT SERPL-MCNC: 8.1 G/DL (ref 6.8–8.8)
PSA SERPL-MCNC: 0.01 UG/L (ref 0–4)
PSA SERPL-MCNC: <0.01 UG/L (ref 0–4)
RBC # BLD AUTO: 3.87 10E6/UL (ref 4.4–5.9)
RBC MORPH BLD: ABNORMAL
RBC MORPH BLD: NORMAL
RETICS # AUTO: 0.3 10E6/UL (ref 0.03–0.1)
RETICS/RBC NFR AUTO: 7.7 % (ref 0.5–2)
T4 FREE SERPL-MCNC: 1.45 NG/DL (ref 0.76–1.46)
TIBC SERPL-MCNC: 265 UG/DL (ref 240–430)
TRIGL SERPL-MCNC: 400 MG/DL
TSH SERPL DL<=0.005 MIU/L-ACNC: 6.79 MU/L (ref 0.4–4)
VIT B12 SERPL-MCNC: 650 PG/ML (ref 193–986)
WBC # BLD AUTO: 8.5 10E3/UL (ref 4–11)

## 2022-05-09 PROCEDURE — 82728 ASSAY OF FERRITIN: CPT

## 2022-05-09 PROCEDURE — 82607 VITAMIN B-12: CPT

## 2022-05-09 PROCEDURE — 85007 BL SMEAR W/DIFF WBC COUNT: CPT

## 2022-05-09 PROCEDURE — 84439 ASSAY OF FREE THYROXINE: CPT

## 2022-05-09 PROCEDURE — 99214 OFFICE O/P EST MOD 30 MIN: CPT | Performed by: INTERNAL MEDICINE

## 2022-05-09 PROCEDURE — 80076 HEPATIC FUNCTION PANEL: CPT

## 2022-05-09 PROCEDURE — 85027 COMPLETE CBC AUTOMATED: CPT

## 2022-05-09 PROCEDURE — 82746 ASSAY OF FOLIC ACID SERUM: CPT

## 2022-05-09 PROCEDURE — 84153 ASSAY OF PSA TOTAL: CPT

## 2022-05-09 PROCEDURE — 80061 LIPID PANEL: CPT

## 2022-05-09 PROCEDURE — 82565 ASSAY OF CREATININE: CPT

## 2022-05-09 PROCEDURE — 85045 AUTOMATED RETICULOCYTE COUNT: CPT

## 2022-05-09 PROCEDURE — G0103 PSA SCREENING: HCPCS

## 2022-05-09 PROCEDURE — 83010 ASSAY OF HAPTOGLOBIN QUANT: CPT

## 2022-05-09 PROCEDURE — 83550 IRON BINDING TEST: CPT

## 2022-05-09 PROCEDURE — 36415 COLL VENOUS BLD VENIPUNCTURE: CPT

## 2022-05-09 PROCEDURE — 82043 UR ALBUMIN QUANTITATIVE: CPT

## 2022-05-09 PROCEDURE — 84443 ASSAY THYROID STIM HORMONE: CPT

## 2022-05-09 PROCEDURE — 83036 HEMOGLOBIN GLYCOSYLATED A1C: CPT

## 2022-05-09 ASSESSMENT — PAIN SCALES - GENERAL: PAINLEVEL: NO PAIN (0)

## 2022-05-09 NOTE — LETTER
2022         RE: Milo Lozano  [address]  [place] [id number]        Dear Colleague,    Thank you for referring your patient, Milo Lozano, to the [facility]. Please see a copy of my visit note below.    [facility] Hematology / Oncology  Progress Note  Name: Milo Lozano  :  1935    MRN:  [id number]    --------------------    Assessment / Plan:  Pancytopenia, asymptomatic.    Milo and I reviewed his progressive pancytopenia.  I am not sure a diagnosis of spherocytosis explains the entirety of his blood counts as well as the trajectory of progressive anemia and thrombocytopenia measured over the last several years.  I would be more worried about a smoldering progressive process going on the marrow.  He did have a marrow several years ago that was nondiagnostic but at that time myeloid markers were not sent and as such she could have a progressive process still yet undiagnosed.  Milo is pretty nonchalant about the use cytopenias understandably as he has no direct symptoms but I reviewed the importance of keeping an eye on things and convince Milo that a follow-up visit in 12 months would be a good thing.  I like to see him stay on folic acid.  We reviewed a number of red flag signs and symptoms including recurrent or severe infections, progressive fatigue, bleeding or bruising that would warrant earlier follow-up.  I do think his evaluation has been fairly thorough to date.  Certainly could have multifactorial issues as well as potentially some liver dysfunction or another process contributing to his thrombocytopenia as well.  Milo is pretty clear if we are going to to do another bone marrow he will needed to be sedated.    Federico Prince MD    --------------------    Interval History:  Milo returns for follow up of pancytopenia.  All in all, he is doing quite well.  His biggest issue remains progressive neuropathy  complicating walking and ambulation.  He uses a cane.  No recurrent or severe infections.  No unexplained fevers, chills or night sweats.  Stable appetite and energy.  No stamina concerns beyond his age.  No bleeding or bruising without reason.  He remains on folic acid supplementation without issue..    --------------------    Physical Exam:  VS: BP (!) 161/63 (BP Location: Left arm, Patient Position: Sitting)   Pulse 74   Temp 98.7  F (37.1  C) (Oral)   Wt 86.8 kg (191 lb 6.4 oz)   SpO2 99%   BMI 29.98 kg/m    GEN: Well appearing.    Labs / Imaging / Path:  We reviewed labs, personally reviewed imaging and reviewed pathology reports      Again, thank you for allowing me to participate in the care of your patient.        Sincerely,        Federico Prince MD

## 2022-05-10 ENCOUNTER — TELEPHONE (OUTPATIENT)
Dept: FAMILY MEDICINE | Facility: CLINIC | Age: 87
End: 2022-05-10
Payer: COMMERCIAL

## 2022-05-10 LAB — HAPTOGLOB SERPL-MCNC: 182 MG/DL (ref 32–197)

## 2022-05-10 NOTE — TELEPHONE ENCOUNTER
Patient called, he is asking what he should take with regard to Glimepiride.       Patient is taking glimepiride 2 mg daily,   not 4 mg.  Patient has both dosages of the bottles at home,    Note that patient had A1c completed yesterday.     Lab Results   Component Value Date    A1C 9.1 05/09/2022    A1C 7.2 10/21/2021    A1C 7.0 04/08/2021    A1C 6.2 12/03/2020    A1C 6.9 06/09/2020    A1C 6.2 12/05/2019    A1C 5.9 05/24/2019       Routing to the provider to please review when able.    Miesha Bazan RN, [facility]

## 2022-05-10 NOTE — TELEPHONE ENCOUNTER
Records indicate that he ought to be on glimepiride 4 mg a day.  The patient had lab but there is no appointment set up with me.  He needs to be seen in the clinic by me to discuss his results.

## 2022-05-10 NOTE — PROGRESS NOTES
[facility] Hematology / Oncology  Progress Note  Name: Milo Lozano  :  1935    MRN:  [id number]    --------------------    Assessment / Plan:  Pancytopenia, asymptomatic.    Milo and I reviewed his progressive pancytopenia.  I am not sure a diagnosis of spherocytosis explains the entirety of his blood counts as well as the trajectory of progressive anemia and thrombocytopenia measured over the last several years.  I would be more worried about a smoldering progressive process going on the marrow.  He did have a marrow several years ago that was nondiagnostic but at that time myeloid markers were not sent and as such she could have a progressive process still yet undiagnosed.  Milo is pretty nonchalant about the use cytopenias understandably as he has no direct symptoms but I reviewed the importance of keeping an eye on things and convince Milo that a follow-up visit in 12 months would be a good thing.  I like to see him stay on folic acid.  We reviewed a number of red flag signs and symptoms including recurrent or severe infections, progressive fatigue, bleeding or bruising that would warrant earlier follow-up.  I do think his evaluation has been fairly thorough to date.  Certainly could have multifactorial issues as well as potentially some liver dysfunction or another process contributing to his thrombocytopenia as well.  Milo is pretty clear if we are going to to do another bone marrow he will needed to be sedated.    Federico Prince MD    --------------------    Interval History:  Milo returns for follow up of pancytopenia.  All in all, he is doing quite well.  His biggest issue remains progressive neuropathy complicating walking and ambulation.  He uses a cane.  No recurrent or severe infections.  No unexplained fevers, chills or night sweats.  Stable appetite and energy.  No stamina concerns beyond his age.  No bleeding or bruising without reason.  He remains on folic acid  supplementation without issue..    --------------------    Physical Exam:  VS: BP (!) 161/63 (BP Location: Left arm, Patient Position: Sitting)   Pulse 74   Temp 98.7  F (37.1  C) (Oral)   Wt 86.8 kg (191 lb 6.4 oz)   SpO2 99%   BMI 29.98 kg/m    GEN: Well appearing.    Labs / Imaging / Path:  We reviewed labs, personally reviewed imaging and reviewed pathology reports

## 2022-05-11 NOTE — TELEPHONE ENCOUNTER
Attempted to contact patient to set up appointment - no answer, left VM requesting a call back to set up a visit with Dr. Garces.     Per Dr. Garces, OK To use same day slot for this follow-up.     If Pt returns call, please assist in setting up a visit with Dr. Garces to discuss labs and diabetes meds.     Thank you,   Rubina Braxton RN, BSN  [facility]

## 2022-05-12 NOTE — TELEPHONE ENCOUNTER
Contact patient to set up diabetes Follow-up with Dr. Garces - he is scheudled for 5/24/22 at 2:00pm.     Rubina Braxton RN, BSN  [facility]

## 2022-05-24 ENCOUNTER — OFFICE VISIT (OUTPATIENT)
Dept: FAMILY MEDICINE | Facility: CLINIC | Age: 87
End: 2022-05-24
Payer: COMMERCIAL

## 2022-05-24 VITALS
HEIGHT: 68 IN | BODY MASS INDEX: 28.93 KG/M2 | OXYGEN SATURATION: 98 % | RESPIRATION RATE: 18 BRPM | HEART RATE: 71 BPM | SYSTOLIC BLOOD PRESSURE: 144 MMHG | DIASTOLIC BLOOD PRESSURE: 64 MMHG | WEIGHT: 190.9 LBS | TEMPERATURE: 97.9 F

## 2022-05-24 DIAGNOSIS — G25.0 ESSENTIAL TREMOR: ICD-10-CM

## 2022-05-24 DIAGNOSIS — C61 PROSTATE CANCER (H): ICD-10-CM

## 2022-05-24 DIAGNOSIS — I10 HYPERTENSION GOAL BP (BLOOD PRESSURE) < 150/90: ICD-10-CM

## 2022-05-24 DIAGNOSIS — N40.1 BPH WITH OBSTRUCTION/LOWER URINARY TRACT SYMPTOMS: ICD-10-CM

## 2022-05-24 DIAGNOSIS — D69.6 THROMBOCYTOPENIA (H): ICD-10-CM

## 2022-05-24 DIAGNOSIS — N18.31 STAGE 3A CHRONIC KIDNEY DISEASE (H): ICD-10-CM

## 2022-05-24 DIAGNOSIS — Z23 HIGH PRIORITY FOR 2019-NCOV VACCINE: ICD-10-CM

## 2022-05-24 DIAGNOSIS — D64.9 NORMOCYTIC ANEMIA: ICD-10-CM

## 2022-05-24 DIAGNOSIS — E03.4 HYPOTHYROIDISM DUE TO ACQUIRED ATROPHY OF THYROID: ICD-10-CM

## 2022-05-24 DIAGNOSIS — E11.65 TYPE 2 DIABETES MELLITUS WITH HYPERGLYCEMIA, WITHOUT LONG-TERM CURRENT USE OF INSULIN (H): Primary | ICD-10-CM

## 2022-05-24 DIAGNOSIS — N13.8 BPH WITH OBSTRUCTION/LOWER URINARY TRACT SYMPTOMS: ICD-10-CM

## 2022-05-24 DIAGNOSIS — G47.33 OSA ON CPAP: ICD-10-CM

## 2022-05-24 DIAGNOSIS — E11.42 DIABETIC POLYNEUROPATHY ASSOCIATED WITH TYPE 2 DIABETES MELLITUS (H): ICD-10-CM

## 2022-05-24 DIAGNOSIS — R26.89 POOR BALANCE: ICD-10-CM

## 2022-05-24 PROCEDURE — 99214 OFFICE O/P EST MOD 30 MIN: CPT | Mod: 25 | Performed by: INTERNAL MEDICINE

## 2022-05-24 PROCEDURE — 0054A COVID-19,PF,PFIZER (12+ YRS): CPT | Performed by: INTERNAL MEDICINE

## 2022-05-24 PROCEDURE — 91305 COVID-19,PF,PFIZER (12+ YRS): CPT | Performed by: INTERNAL MEDICINE

## 2022-05-24 RX ORDER — METFORMIN HCL 500 MG
TABLET, EXTENDED RELEASE 24 HR ORAL
Qty: 360 TABLET | Refills: 3 | Status: SHIPPED | OUTPATIENT
Start: 2022-05-24 | End: 2022-06-01

## 2022-05-24 ASSESSMENT — PAIN SCALES - GENERAL: PAINLEVEL: NO PAIN (0)

## 2022-05-24 NOTE — PROGRESS NOTES
"  Assessment & Plan     1.  Type 2 diabetes mellitus with hyperglycemia.  Hemoglobin A1c 9.1 on 5/9/2022.  Currently taking metformin 1 g a day and glimepiride 4 mg a day.  Will increase metformin to 1 g twice a day and have patient return in 3 months with A1c.  Low-carb diet discussed.  2.  Stage IIIa chronic kidney disease remaining stable with creatinine of 1.34 and GFR of 52 measured on 5/9/2022.  Does have micro proteinuria.  3.  Essential hypertension with blood pressure borderline today.  Currently on amlodipine 10 mg a day, losartan /25 daily metoprolol extended release 80 mg a day.  4.  Essential tremors little better with metoprolol.  5.  Obstructive sleep apnea uses CPAP consistently.  7.  Diabetic polyp polyneuropathy.  By persistent symptoms.  Currently on Lyrica and seems to help.  8.  Poor balance uses cane.  No recent fall.  9.  COVID-19 Pfizer vaccine provided.  This is his second booster.  10.  Hypothyroidism with TSH borderline elevated however T4 is therapeutic so continue present dose of levothyroxine 75 mcg daily.  11.  BPH with lower urinary symptoms.  Stream is still slow but doing okay with tamsulosin 0.4 mg daily which she will continue.  12.  Normocytic anemia and thrombocytopenia.  Has had pancytopenia etiology unclear.  Been followed closely by hematology.  13.  Prostate cancer treated radiation therapy in 2007.  No recurrence.  PSA is less than 0.01       BMI:   Estimated body mass index is 29.46 kg/m  as calculated from the following:    Height as of this encounter: 1.715 m (5' 7.5\").    Weight as of this encounter: 86.6 kg (190 lb 14.4 oz).   Weight management plan: Discussed healthy diet and exercise guidelines        Return in about 3 months (around 8/18/2022) for Visit with none fasting lab. lab day before appointment with me.    Aj Garces MD  [facility] ELSI Johnson is a 87 year old who presents for the following health issues "     History of Present Illness       Hypertension: He presents for follow up of hypertension.  He does not check blood pressure  regularly outside of the clinic. Outside blood pressures have been over 140/90. He does not follow a low salt diet.       The patient states that he has been about the same no new change.  He has no new symptoms to offer.  His activity is still limited.  He uses cane for ambulation.  Has not fallen since last clinic visit.  He has multiple health problems as outlined in the problem list.    Review of Systems   Constitutional, HEENT, cardiovascular, pulmonary, GI, , musculoskeletal, neuro, skin, endocrine and psych systems are negative, except as otherwise noted.      Objective    There were no vitals taken for this visit.  There is no height or weight on file to calculate BMI.  Physical Exam   GENERAL: healthy, alert and no distress  NECK: no adenopathy, no asymmetry, masses, or scars and thyroid normal to palpation  RESP: lungs clear to auscultation - no rales, rhonchi or wheezes  CV: regular rate and rhythm, normal S1 S2, no S3 or S4, no murmur, click or rub, no peripheral edema and peripheral pulses strong  ABDOMEN: soft, nontender, no hepatosplenomegaly, no masses and bowel sounds normal  MS: no gross musculoskeletal defects noted, no edema    Lab: Lab performed on 5/9/2022.  Creatinine 1.34 GFR 52.  Electrolytes normal.  LFTs normal.  Urine microalbumin creatinine ratio elevated to 87.  Ferritin normal at 373.  Folic acid normal at 53.8.  Iron normal at 84.  Iron saturation 32.  Cholesterol 123, HDL 27, LDL 16 and triglyceride 400  PSA less than 0.01  Vitamin B12 level normal 650.  WBC 8.5, hemoglobin 12.1 hematocrit 36.0, platelet count 67,000, percentage reticulocyte elevated at 7.7, absolute reticulocyte count elevated 0.298 and haptoglobin normal at 182.

## 2022-05-31 ENCOUNTER — TELEPHONE (OUTPATIENT)
Dept: FAMILY MEDICINE | Facility: CLINIC | Age: 87
End: 2022-05-31
Payer: COMMERCIAL

## 2022-05-31 NOTE — TELEPHONE ENCOUNTER
Pharmacy calling to clarify the quantity prescribed for the patients metformin. Let them know it should be 180 not 360.    Yocasta Steele RN [facility]

## 2022-07-11 ENCOUNTER — TELEPHONE (OUTPATIENT)
Dept: FAMILY MEDICINE | Facility: CLINIC | Age: 87
End: 2022-07-11

## 2022-07-11 ENCOUNTER — OFFICE VISIT (OUTPATIENT)
Dept: FAMILY MEDICINE | Facility: CLINIC | Age: 87
End: 2022-07-11
Payer: COMMERCIAL

## 2022-07-11 VITALS
WEIGHT: 191 LBS | TEMPERATURE: 98.2 F | HEART RATE: 73 BPM | OXYGEN SATURATION: 98 % | RESPIRATION RATE: 24 BRPM | SYSTOLIC BLOOD PRESSURE: 142 MMHG | DIASTOLIC BLOOD PRESSURE: 51 MMHG | BODY MASS INDEX: 29.47 KG/M2

## 2022-07-11 DIAGNOSIS — H61.23 BILATERAL IMPACTED CERUMEN: ICD-10-CM

## 2022-07-11 DIAGNOSIS — H90.3 BILATERAL SENSORINEURAL HEARING LOSS: Primary | ICD-10-CM

## 2022-07-11 PROCEDURE — 99213 OFFICE O/P EST LOW 20 MIN: CPT | Performed by: FAMILY MEDICINE

## 2022-07-11 ASSESSMENT — PAIN SCALES - GENERAL: PAINLEVEL: NO PAIN (0)

## 2022-07-11 NOTE — PROGRESS NOTES
ASSESSMENT / PLAN:  (H90.3) Bilateral sensorineural hearing loss  (primary encounter diagnosis)  Comment: stable  Plan: continue hearing aides per audiology    (H61.23) Bilateral impacted cerumen  Comment: lavaged by MA clear  Plan: over the counter debrox and/or olive oil. Return to clinic if worse.        Subjective   Alex is a 87 year old presenting for the following health issues:  Ear plugged. Hearing aides x7 years. Hydroperoxide. Some sinus discharge. No fevers or chills. No ear pain.   Balance issues - chronicalaly  No chief complaint on file.      HPI     Patient here to have wax flushed from both ears.      Review of Systems         Objective    There were no vitals taken for this visit.  There is no height or weight on file to calculate BMI.   BP (!) 142/51   Pulse 73   Temp 98.2  F (36.8  C) (Oral)   Resp 24   Wt 86.6 kg (191 lb)   SpO2 98%   BMI 29.47 kg/m     Physical Exam   GENERAL: healthy, alert and no distress  HENT: bilateral cerumen impaction right >>left. Hearing aides.   RESP: lungs clear to auscultation - no rales, rhonchi or wheezes  CV: regular rate and rhythm, normal S1 S2, no S3 or S4, no murmur, click or rub, no peripheral edema and peripheral pulses strong  NEURO: Normal strength and tone, mentation intact and speech normal  PSYCH: mentation appears normal, affect normal/bright                .  ..

## 2022-07-11 NOTE — TELEPHONE ENCOUNTER
Pt calling clinic, he went to [facility] for hearing aids and they said they could not help him because he has wax in his ears.  Pt requesting appointment with ENT.  Advised to make appointment with family practice or come into urgent care.  Pt transferred to scheduling.  Poly ADORNON, RN

## 2022-07-11 NOTE — NURSING NOTE
Patient identified using two patient identifiers.  Ear exam showing wax occlusion completed by provider.  Solution: warm water was placed in the bilateral ear(s) via irrigation tool: elephant ear.    Kylah Steel, KELL

## 2022-07-28 DIAGNOSIS — D59.9 ACQUIRED HEMOLYTIC ANEMIA (H): ICD-10-CM

## 2022-07-28 RX ORDER — FOLIC ACID 1 MG/1
1 TABLET ORAL DAILY
Qty: 90 TABLET | Refills: 3 | Status: SHIPPED | OUTPATIENT
Start: 2022-07-28 | End: 2023-08-23

## 2022-07-28 NOTE — TELEPHONE ENCOUNTER
SUBJECTIVE/OBJECTIVE:                                                    Refill request receive for:  Folic Acid    Date of LOV r/t Medication: 5/9/22  Future appt scheduled? No   Date faxed to clinic: 7/26/22    RECENT LABS/VITALS                                                      Recent Labs   Lab Test 05/09/22  0930 12/03/20  1055 07/05/16  0801 06/22/15  0900 09/23/14  0755   CHOL 123 119   < > 128 134   HDL 27* 34*   < > 34* 31*   LDL 16 52   < > 53 49   TRIG 400* 166*   < > 204* 269*   CHOLHDLRATIO  --   --   --  3.8 4.3    < > = values in this interval not displayed.     Lab Results   Component Value Date    AST 19 05/09/2022    AST 19 04/08/2021     Lab Results   Component Value Date    ALT 27 05/09/2022    ALT 36 04/08/2021     Lab Results   Component Value Date    A1C 9.1 05/09/2022    A1C 7.2 10/21/2021    A1C 7.0 04/08/2021    A1C 6.2 12/03/2020    A1C 6.9 06/09/2020    A1C 6.2 12/05/2019    A1C 5.9 05/24/2019     Creatinine   Date Value Ref Range Status   05/09/2022 1.34 (H) 0.66 - 1.25 mg/dL Final   04/08/2021 1.43 (H) 0.66 - 1.25 mg/dL Final   ]  Potassium   Date Value Ref Range Status   10/21/2021 3.6 3.4 - 5.3 mmol/L Final   04/08/2021 4.4 3.4 - 5.3 mmol/L Final   ]  TSH   Date Value Ref Range Status   05/09/2022 6.79 (H) 0.40 - 4.00 mU/L Final   10/21/2021 6.19 (H) 0.40 - 4.00 mU/L Final   09/29/2020 3.44 0.40 - 4.00 mU/L Final   05/24/2019 2.48 0.40 - 4.00 mU/L Final   09/26/2018 1.28 0.40 - 4.00 mU/L Final   05/01/2018 5.33 (H) 0.40 - 4.00 mU/L Final     BP Readings from Last 4 Encounters:   07/11/22 (!) 142/51   05/24/22 (!) 144/64   05/09/22 (!) 161/63   12/14/21 (!) 140/56       PLAN:                                                      Sent to provider to advise.      Jeni Irwin, CMA

## 2022-08-23 ENCOUNTER — LAB (OUTPATIENT)
Dept: LAB | Facility: CLINIC | Age: 87
End: 2022-08-23
Payer: COMMERCIAL

## 2022-08-23 DIAGNOSIS — D61.818 PANCYTOPENIA (H): ICD-10-CM

## 2022-08-23 DIAGNOSIS — E11.65 TYPE 2 DIABETES MELLITUS WITH HYPERGLYCEMIA, WITHOUT LONG-TERM CURRENT USE OF INSULIN (H): ICD-10-CM

## 2022-08-23 LAB
BASOPHILS # BLD MANUAL: 0 10E3/UL (ref 0–0.2)
BASOPHILS NFR BLD MANUAL: 0 %
EOSINOPHIL # BLD MANUAL: 0.1 10E3/UL (ref 0–0.7)
EOSINOPHIL NFR BLD MANUAL: 1 %
ERYTHROCYTE [DISTWIDTH] IN BLOOD BY AUTOMATED COUNT: 15.9 % (ref 10–15)
HBA1C MFR BLD: 8.4 % (ref 0–5.6)
HCT VFR BLD AUTO: 34.8 % (ref 40–53)
HGB BLD-MCNC: 11.5 G/DL (ref 13.3–17.7)
LYMPHOCYTES # BLD MANUAL: 2.5 10E3/UL (ref 0.8–5.3)
LYMPHOCYTES NFR BLD MANUAL: 26 %
MCH RBC QN AUTO: 30.3 PG (ref 26.5–33)
MCHC RBC AUTO-ENTMCNC: 33 G/DL (ref 31.5–36.5)
MCV RBC AUTO: 92 FL (ref 78–100)
METAMYELOCYTES # BLD MANUAL: 0.1 10E3/UL
METAMYELOCYTES NFR BLD MANUAL: 1 %
MONOCYTES # BLD MANUAL: 1.4 10E3/UL (ref 0–1.3)
MONOCYTES NFR BLD MANUAL: 15 %
MYELOCYTES # BLD MANUAL: 0.1 10E3/UL
MYELOCYTES NFR BLD MANUAL: 1 %
NEUTROPHILS # BLD MANUAL: 5.3 10E3/UL (ref 1.6–8.3)
NEUTROPHILS NFR BLD MANUAL: 56 %
PLAT MORPH BLD: ABNORMAL
PLATELET # BLD AUTO: 68 10E3/UL (ref 150–450)
RBC # BLD AUTO: 3.8 10E6/UL (ref 4.4–5.9)
RBC MORPH BLD: ABNORMAL
WBC # BLD AUTO: 9.5 10E3/UL (ref 4–11)

## 2022-08-23 PROCEDURE — 85007 BL SMEAR W/DIFF WBC COUNT: CPT

## 2022-08-23 PROCEDURE — 36415 COLL VENOUS BLD VENIPUNCTURE: CPT

## 2022-08-23 PROCEDURE — 83036 HEMOGLOBIN GLYCOSYLATED A1C: CPT

## 2022-08-23 PROCEDURE — 85027 COMPLETE CBC AUTOMATED: CPT

## 2022-08-25 ENCOUNTER — OFFICE VISIT (OUTPATIENT)
Dept: FAMILY MEDICINE | Facility: CLINIC | Age: 87
End: 2022-08-25
Payer: COMMERCIAL

## 2022-08-25 VITALS
HEART RATE: 70 BPM | RESPIRATION RATE: 14 BRPM | SYSTOLIC BLOOD PRESSURE: 144 MMHG | OXYGEN SATURATION: 96 % | TEMPERATURE: 98.1 F | DIASTOLIC BLOOD PRESSURE: 66 MMHG | BODY MASS INDEX: 29.27 KG/M2 | WEIGHT: 189.7 LBS

## 2022-08-25 DIAGNOSIS — E11.21 TYPE 2 DIABETES MELLITUS WITH DIABETIC NEPHROPATHY, WITHOUT LONG-TERM CURRENT USE OF INSULIN (H): Primary | ICD-10-CM

## 2022-08-25 DIAGNOSIS — E03.4 HYPOTHYROIDISM DUE TO ACQUIRED ATROPHY OF THYROID: ICD-10-CM

## 2022-08-25 DIAGNOSIS — E11.42 DIABETIC POLYNEUROPATHY ASSOCIATED WITH TYPE 2 DIABETES MELLITUS (H): ICD-10-CM

## 2022-08-25 DIAGNOSIS — G25.0 ESSENTIAL TREMOR: ICD-10-CM

## 2022-08-25 DIAGNOSIS — D69.6 THROMBOCYTOPENIA (H): ICD-10-CM

## 2022-08-25 DIAGNOSIS — K52.1 DRUG-INDUCED DIARRHEA: ICD-10-CM

## 2022-08-25 DIAGNOSIS — G47.33 OSA ON CPAP: ICD-10-CM

## 2022-08-25 DIAGNOSIS — R26.89 POOR BALANCE: ICD-10-CM

## 2022-08-25 DIAGNOSIS — N18.31 STAGE 3A CHRONIC KIDNEY DISEASE (H): ICD-10-CM

## 2022-08-25 DIAGNOSIS — E78.5 HYPERLIPIDEMIA WITH TARGET LDL LESS THAN 100: ICD-10-CM

## 2022-08-25 DIAGNOSIS — I10 HYPERTENSION GOAL BP (BLOOD PRESSURE) < 150/90: ICD-10-CM

## 2022-08-25 DIAGNOSIS — B07.8 OTHER VIRAL WARTS: ICD-10-CM

## 2022-08-25 DIAGNOSIS — D64.9 NORMOCYTIC ANEMIA: ICD-10-CM

## 2022-08-25 PROCEDURE — 17110 DESTRUCTION B9 LES UP TO 14: CPT | Performed by: INTERNAL MEDICINE

## 2022-08-25 PROCEDURE — 99214 OFFICE O/P EST MOD 30 MIN: CPT | Mod: 25 | Performed by: INTERNAL MEDICINE

## 2022-08-25 RX ORDER — METFORMIN HCL 500 MG
1000 TABLET, EXTENDED RELEASE 24 HR ORAL
Qty: 180 TABLET | Refills: 3 | Status: SHIPPED | OUTPATIENT
Start: 2022-08-25 | End: 2022-08-26

## 2022-08-25 ASSESSMENT — PAIN SCALES - GENERAL: PAINLEVEL: NO PAIN (0)

## 2022-08-25 NOTE — PROGRESS NOTES
Assessment & Plan     1.  Type 2 diabetes mellitus still not adequately controlled.  His A1c did drop from 9.1 to  now 8.4.  But I found out today that he has been taking glimepiride only 2 mg a day and not 4 mg as prescribed.  Inform patient like to see the A1c at least below 8.  Unfortunately is not able to tolerate 2 g of metformin so we will cut it back to 1 g a day and he will increase glimepiride to 4 mg a day as prescribed.  Recheck in 3 months with hemoglobin A1c and a BMP.  2.  Drug-induced diarrhea.  Patient has urgency and diarrhea related to metformin at higher dose so we will be cutting back to the previously tolerated dose of 1 g a day.  3.  Essential hypertension with blood pressure borderline today which I think at his age is acceptable.  Continue with current blood pressure medication.  4.  Hypothyroidism currently on levothyroxine 75 mcg a day.  I will have TSH checked in 3 months.  5.  Obstructive sleep apnea uses CPAP consistently.  6.  Normocytic anemia being followed by hematology.  His hemoglobin is 11.5.  7.  Thrombocytopenia being followed by hematology.  Platelet count remained stable around 68,000.  8.  Stage IIIa chronic kidney disease.  We will check renal function in 3 months.  9.  Essential tremors unchanged.  10.  Other viral warts.  Benign 2 on the right forearm and 1 on the right temple area.  Treated with liquid nitrogen after taking appropriate verbal consent.  Prior to treatment patient was informed of the risk, benefit and alternate treatments were discussed.  He was given opportunity to ask questions all questions were answered.  Patient agreeable to treatment plan.  11.  Diabetic peripheral neuropathy for which he uses Lyrica.  12.  Poor balance unchanged.  Uses cane.          Return in about 3 months (around 11/25/2022) for Visit with none fasting lab.    Aj Garces MD  [facility] ELSI Johnson is a 87 year old, presenting for the  following health issues:  No chief complaint on file.      History of Present Illness       Reason for visit:  Follow up    He eats 2-3 servings of fruits and vegetables daily.He consumes 0 sweetened beverage(s) daily.He exercises with enough effort to increase his heart rate 9 or less minutes per day.  He exercises with enough effort to increase his heart rate 3 or less days per week.   He is taking medications regularly.       Pt also has 3 raised masses on R side (2 on R arm and one on R side of face) pt would like to discuss having those removed.     Diabetes Follow-up    How often are you checking your blood sugar? A few times a week  What time of day are you checking your blood sugars (select all that apply)?  Before meals  Have you had any blood sugars above 200?  Yes   Have you had any blood sugars below 70?  No    What symptoms do you notice when your blood sugar is low?  None    What concerns do you have today about your diabetes? None     Do you have any of these symptoms? (Select all that apply)  Numbness in feet and Burning in feet    Have you had a diabetic eye exam in the last 12 months? Yes-  Location: [facility]                  Hypertension Follow-up      Do you check your blood pressure regularly outside of the clinic? No     Are you following a low salt diet? Yes, kind of     Are your blood pressures ever more than 140 on the top number (systolic) OR more   than 90 on the bottom number (diastolic), for example 140/90? No       Patient comes in for follow-up on diabetes, chronic kidney disease hypertension and other health problems listed in the problem list.  Continues to have issue with balance.  Left knee bothers him.  The cortisone injection given a year ago by sports medicine did not help.  After increasing metformin to 2 g a day he developed stool urgency and diarrhea.  He did okay with 1 g of metformin a day.  Discovered today that he has been taking glimepiride only 2 mg a day instead  of 4 as prescribed.    BP Readings from Last 2 Encounters:   07/11/22 (!) 142/51   05/24/22 (!) 144/64     Hemoglobin A1C (%)   Date Value   08/23/2022 8.4 (H)   05/09/2022 9.1 (H)   04/08/2021 7.0 (H)   12/03/2020 6.2 (H)     LDL Cholesterol Calculated (mg/dL)   Date Value   05/09/2022 16   12/03/2020 52   05/10/2019 39           Review of Systems   Constitutional, HEENT, cardiovascular, pulmonary, GI, , musculoskeletal, neuro, skin, endocrine and psych systems are negative, except as otherwise noted.      Objective    There were no vitals taken for this visit.  There is no height or weight on file to calculate BMI.  Physical Exam   GENERAL: healthy, alert and no distress  NECK: no adenopathy, no asymmetry, masses, or scars and thyroid normal to palpation  RESP: lungs clear to auscultation - no rales, rhonchi or wheezes  CV: regular rate and rhythm, normal S1 S2, no S3 or S4, no murmur, click or rub, no peripheral edema and peripheral pulses strong  ABDOMEN: soft, nontender, no hepatosplenomegaly, no masses and bowel sounds normal  MS: no gross musculoskeletal defects noted, no edema  Skin:                   .  ..

## 2022-08-26 ENCOUNTER — TELEPHONE (OUTPATIENT)
Dept: FAMILY MEDICINE | Facility: CLINIC | Age: 87
End: 2022-08-26

## 2022-08-26 DIAGNOSIS — E11.21 TYPE 2 DIABETES MELLITUS WITH DIABETIC NEPHROPATHY, WITHOUT LONG-TERM CURRENT USE OF INSULIN (H): Primary | ICD-10-CM

## 2022-08-26 RX ORDER — METFORMIN HCL 500 MG
1000 TABLET, EXTENDED RELEASE 24 HR ORAL
Qty: 180 TABLET | Refills: 3 | Status: SHIPPED | OUTPATIENT
Start: 2022-08-26 | End: 2022-08-30

## 2022-08-26 NOTE — TELEPHONE ENCOUNTER
metFORMIN (GLUCOPHAGE XR) 500 MG 24 hr tablet    Pharmacy request:  Please verify direction  2 sets of directions listed  Please advise which is accurate?  1 tab twice daily or 2 tabs twice daily

## 2022-09-14 DIAGNOSIS — D61.818 PANCYTOPENIA (H): Primary | ICD-10-CM

## 2022-10-10 ENCOUNTER — HEALTH MAINTENANCE LETTER (OUTPATIENT)
Age: 87
End: 2022-10-10

## 2022-10-25 DIAGNOSIS — I10 HYPERTENSION GOAL BP (BLOOD PRESSURE) < 140/90: ICD-10-CM

## 2022-10-25 NOTE — TELEPHONE ENCOUNTER
Medication Question or Refill    Contacts       Type Contact Phone/Fax    10/25/2022 12:40 PM CDT Phone (Incoming) Alex Lozano (Self) [phone] (H)          What medication are you calling about (include dose and sig)?: Amlodipine 10 mg    Controlled Substance Agreement on file:   CSA -- Patient Level:    CSA: None found at the patient level.       Who prescribed the medication?: Dr Garces    Do you need a refill? Yes: per patient-[facility] needs a new RX    When did you use the medication last?     Patient offered an appointment? No    Do you have any questions or concerns?  Yes:     Preferred Pharmacy:       [id number] - [place], [place] - [address] [facility]  [address]  [facility] [address]  Phone: [phone] Fax: [phone]    [facility] (MAIL SERVICE) [facility] - [facility] [address]  [address]  [facility] [address]  Phone: [phone] Fax: [phone] Alternate Fax: [phone]      Could we send this information to you in [facility] or would you prefer to receive a phone call?:   Patient would prefer a phone call   Okay to leave a detailed message?: Yes at Home number on file [phone] (home)    Bety Barclay

## 2022-10-27 RX ORDER — AMLODIPINE BESYLATE 10 MG/1
TABLET ORAL
Qty: 90 TABLET | Refills: 3 | Status: SHIPPED | OUTPATIENT
Start: 2022-10-27 | End: 2023-10-10

## 2022-11-14 DIAGNOSIS — E03.4 HYPOTHYROIDISM DUE TO ACQUIRED ATROPHY OF THYROID: ICD-10-CM

## 2022-11-14 NOTE — TELEPHONE ENCOUNTER
Reason for Call:  Medication or medication refill:    Do you use a [facility]?  Name of the pharmacy and phone number for the current request:  [facility] (MAIL SERVICE) [facility] - [place], [place] - [address] AT [facility]        Name of the medication requested: propranolol ER (INDERAL LA) 80 MG 24 hr capsule    Other request:     Can we leave a detailed message on this number? YES    Phone number patient can be reached at: Home number on file [phone] (home)    Best Time: anytime    Call taken on 11/14/2022 at 12:13 PM by Page Keller       Negative

## 2022-11-14 NOTE — TELEPHONE ENCOUNTER
RN called patient to clarify if he has received his medication yet from his mail in pharmacy. Patient states he has not received the medication.    RN called pharmacy to get an update about medication. Pharmacy states the medication was mailed out on 11/2/2022 and if patient has not received the medication yet he needs to call customer service to track down where the shipment is. Pharmacy provided customer service number [phone].  Pharmacy also requesting refill for Levothyroxine. Refill request routed to refill pool.    RN called patient back and relayed pharmacy message above and provided patient with customer service number. Patient verbalized good understanding and states he will be calling customer service now.     Alyce Powell RN    [facility]

## 2022-11-15 RX ORDER — LEVOTHYROXINE SODIUM 75 UG/1
TABLET ORAL
Qty: 90 TABLET | Refills: 0 | Status: SHIPPED | OUTPATIENT
Start: 2022-11-15 | End: 2022-11-29

## 2022-11-25 ENCOUNTER — NURSE TRIAGE (OUTPATIENT)
Dept: NURSING | Facility: CLINIC | Age: 87
End: 2022-11-25

## 2022-11-25 ENCOUNTER — LAB (OUTPATIENT)
Dept: LAB | Facility: CLINIC | Age: 87
End: 2022-11-25
Payer: COMMERCIAL

## 2022-11-25 DIAGNOSIS — D69.6 THROMBOCYTOPENIA (H): ICD-10-CM

## 2022-11-25 DIAGNOSIS — N18.31 STAGE 3A CHRONIC KIDNEY DISEASE (H): ICD-10-CM

## 2022-11-25 DIAGNOSIS — I10 HYPERTENSION GOAL BP (BLOOD PRESSURE) < 150/90: ICD-10-CM

## 2022-11-25 DIAGNOSIS — D64.9 NORMOCYTIC ANEMIA: ICD-10-CM

## 2022-11-25 DIAGNOSIS — E03.4 HYPOTHYROIDISM DUE TO ACQUIRED ATROPHY OF THYROID: ICD-10-CM

## 2022-11-25 DIAGNOSIS — E11.21 TYPE 2 DIABETES MELLITUS WITH DIABETIC NEPHROPATHY, WITHOUT LONG-TERM CURRENT USE OF INSULIN (H): ICD-10-CM

## 2022-11-25 LAB
ANION GAP SERPL CALCULATED.3IONS-SCNC: 6 MMOL/L (ref 3–14)
BASOPHILS # BLD MANUAL: 0 10E3/UL (ref 0–0.2)
BASOPHILS NFR BLD MANUAL: 0 %
BUN SERPL-MCNC: 22 MG/DL (ref 7–30)
CALCIUM SERPL-MCNC: 9 MG/DL (ref 8.5–10.1)
CHLORIDE BLD-SCNC: 101 MMOL/L (ref 94–109)
CO2 SERPL-SCNC: 28 MMOL/L (ref 20–32)
CREAT SERPL-MCNC: 1.4 MG/DL (ref 0.66–1.25)
EOSINOPHIL # BLD MANUAL: 0.1 10E3/UL (ref 0–0.7)
EOSINOPHIL NFR BLD MANUAL: 1 %
ERYTHROCYTE [DISTWIDTH] IN BLOOD BY AUTOMATED COUNT: 17.2 % (ref 10–15)
GFR SERPL CREATININE-BSD FRML MDRD: 49 ML/MIN/1.73M2
GLUCOSE BLD-MCNC: 441 MG/DL (ref 70–99)
HBA1C MFR BLD: 9.1 % (ref 0–5.6)
HCT VFR BLD AUTO: 35.3 % (ref 40–53)
HGB BLD-MCNC: 11.4 G/DL (ref 13.3–17.7)
LYMPHOCYTES # BLD MANUAL: 2.1 10E3/UL (ref 0.8–5.3)
LYMPHOCYTES NFR BLD MANUAL: 21 %
MCH RBC QN AUTO: 30.1 PG (ref 26.5–33)
MCHC RBC AUTO-ENTMCNC: 32.3 G/DL (ref 31.5–36.5)
MCV RBC AUTO: 93 FL (ref 78–100)
METAMYELOCYTES # BLD MANUAL: 1 10E3/UL
METAMYELOCYTES NFR BLD MANUAL: 10 %
MONOCYTES # BLD MANUAL: 1.7 10E3/UL (ref 0–1.3)
MONOCYTES NFR BLD MANUAL: 17 %
MYELOCYTES # BLD MANUAL: 0.1 10E3/UL
MYELOCYTES NFR BLD MANUAL: 1 %
NEUTROPHILS # BLD MANUAL: 5 10E3/UL (ref 1.6–8.3)
NEUTROPHILS NFR BLD MANUAL: 50 %
NRBC # BLD AUTO: 0 10E3/UL
NRBC BLD AUTO-RTO: 0 /100
PLAT MORPH BLD: ABNORMAL
PLATELET # BLD AUTO: 67 10E3/UL (ref 150–450)
POTASSIUM BLD-SCNC: 4.5 MMOL/L (ref 3.4–5.3)
RBC # BLD AUTO: 3.79 10E6/UL (ref 4.4–5.9)
RBC MORPH BLD: ABNORMAL
SODIUM SERPL-SCNC: 135 MMOL/L (ref 133–144)
T4 FREE SERPL-MCNC: 1.43 NG/DL (ref 0.76–1.46)
TSH SERPL DL<=0.005 MIU/L-ACNC: 6.53 MU/L (ref 0.4–4)
WBC # BLD AUTO: 10 10E3/UL (ref 4–11)

## 2022-11-25 PROCEDURE — 85027 COMPLETE CBC AUTOMATED: CPT

## 2022-11-25 PROCEDURE — 84443 ASSAY THYROID STIM HORMONE: CPT

## 2022-11-25 PROCEDURE — 83036 HEMOGLOBIN GLYCOSYLATED A1C: CPT

## 2022-11-25 PROCEDURE — 84439 ASSAY OF FREE THYROXINE: CPT

## 2022-11-25 PROCEDURE — 85007 BL SMEAR W/DIFF WBC COUNT: CPT

## 2022-11-25 PROCEDURE — 80048 BASIC METABOLIC PNL TOTAL CA: CPT

## 2022-11-25 PROCEDURE — 36415 COLL VENOUS BLD VENIPUNCTURE: CPT

## 2022-11-25 NOTE — TELEPHONE ENCOUNTER
"S: Critical lab.  B: Non fasting blood sugar 441,   A1C 9.1 and creatinine 1.40 (CKD stage 3). Writer called patient at home to see how he was doing,  \"states he is fine\".  He told writer has taken diabetic medications as directed.    Did not want to check a blood sugar for writer.  A: Writer paged on call provider Dr. ARGENIS Kumar and gave him lab results.  Per Dr. Kumar if patient declines to check blood sugar there is no addition treatment at this time.  R: Writer sent message to PCP Dr. Garces.    Lynn Calero RN, MA  [facility] Triage Nurse Advisor      "

## 2022-11-29 ENCOUNTER — OFFICE VISIT (OUTPATIENT)
Dept: FAMILY MEDICINE | Facility: CLINIC | Age: 87
End: 2022-11-29
Payer: COMMERCIAL

## 2022-11-29 VITALS
WEIGHT: 190.1 LBS | BODY MASS INDEX: 28.81 KG/M2 | RESPIRATION RATE: 16 BRPM | HEIGHT: 68 IN | SYSTOLIC BLOOD PRESSURE: 130 MMHG | HEART RATE: 65 BPM | TEMPERATURE: 97.9 F | OXYGEN SATURATION: 99 % | DIASTOLIC BLOOD PRESSURE: 60 MMHG

## 2022-11-29 DIAGNOSIS — E78.6 LOW HDL (UNDER 40): ICD-10-CM

## 2022-11-29 DIAGNOSIS — E03.4 HYPOTHYROIDISM DUE TO ACQUIRED ATROPHY OF THYROID: ICD-10-CM

## 2022-11-29 DIAGNOSIS — E11.21 TYPE 2 DIABETES MELLITUS WITH DIABETIC NEPHROPATHY, WITHOUT LONG-TERM CURRENT USE OF INSULIN (H): Primary | ICD-10-CM

## 2022-11-29 DIAGNOSIS — D64.9 NORMOCYTIC ANEMIA: ICD-10-CM

## 2022-11-29 DIAGNOSIS — N40.0 BENIGN PROSTATIC HYPERPLASIA WITHOUT LOWER URINARY TRACT SYMPTOMS: ICD-10-CM

## 2022-11-29 DIAGNOSIS — I10 HYPERTENSION GOAL BP (BLOOD PRESSURE) < 150/90: ICD-10-CM

## 2022-11-29 DIAGNOSIS — D69.6 THROMBOCYTOPENIA (H): ICD-10-CM

## 2022-11-29 DIAGNOSIS — E11.42 DIABETIC POLYNEUROPATHY ASSOCIATED WITH TYPE 2 DIABETES MELLITUS (H): ICD-10-CM

## 2022-11-29 DIAGNOSIS — G25.0 ESSENTIAL TREMOR: ICD-10-CM

## 2022-11-29 DIAGNOSIS — N18.31 STAGE 3A CHRONIC KIDNEY DISEASE (H): ICD-10-CM

## 2022-11-29 DIAGNOSIS — G47.33 OSA ON CPAP: ICD-10-CM

## 2022-11-29 PROCEDURE — 99214 OFFICE O/P EST MOD 30 MIN: CPT | Performed by: INTERNAL MEDICINE

## 2022-11-29 RX ORDER — PROPRANOLOL HYDROCHLORIDE 80 MG/1
CAPSULE, EXTENDED RELEASE ORAL
Qty: 90 CAPSULE | Refills: 3 | Status: SHIPPED | OUTPATIENT
Start: 2022-11-29 | End: 2023-11-03

## 2022-11-29 RX ORDER — GLIMEPIRIDE 4 MG/1
8 TABLET ORAL
Qty: 180 TABLET | Refills: 3 | Status: SHIPPED | OUTPATIENT
Start: 2022-11-29 | End: 2024-05-13

## 2022-11-29 RX ORDER — PREGABALIN 75 MG/1
CAPSULE ORAL
Qty: 180 CAPSULE | Refills: 1 | Status: SHIPPED | OUTPATIENT
Start: 2022-11-29 | End: 2023-08-18

## 2022-11-29 RX ORDER — TAMSULOSIN HYDROCHLORIDE 0.4 MG/1
CAPSULE ORAL
Qty: 90 CAPSULE | Refills: 3 | Status: SHIPPED | OUTPATIENT
Start: 2022-11-29 | End: 2023-07-13

## 2022-11-29 RX ORDER — LEVOTHYROXINE SODIUM 88 UG/1
TABLET ORAL
Qty: 90 TABLET | Refills: 1 | Status: SHIPPED | OUTPATIENT
Start: 2022-11-29 | End: 2023-06-10

## 2022-11-29 ASSESSMENT — PAIN SCALES - GENERAL: PAINLEVEL: NO PAIN (1)

## 2022-11-29 NOTE — PROGRESS NOTES
"  Assessment & Plan     1.  Type 2 diabetes mellitus with hyperglycemia.  A1c gone up to 9.1.  Currently on metformin 1 g a day and glimepiride 4 mg a day.  After further discussion he decided to opt for increasing glimepiride to 8 mg a day.  He will return in April from [place] and will follow up with A1c and urine microalbumin.  2.  Essential hypertension with blood pressure under control today.  Continue with amlodipine 10 mg a day and losartan /25 daily.  Patient also on propranolol and tamsulosin.  3.  Stage IIIa chronic kidney disease with recent creatinine 1.4 GFR 49.  Electrolytes were good.  We will recheck in April.  4.  Hypothyroidism with recent TSH elevated 6.53 but free T4 good at 1.43.  I will increase levothyroxine from 75 mcg daily to 88 mcg daily and recheck TSH in April.  5.  Obstructive sleep apnea uses CPAP consistently.  6.  Essential tremor under reasonable control with propranolol 80 mg daily.  He will continue same.  7.  Diabetic polyneuropathy.  Unchanged.  Continue with the Lyrica twice a day.  8.  Benign prostatic hypertrophy with lower urinary obstructive symptoms.  Continue with tamsulosin 0.4 mg a day.  Symptoms under reasonable control.  9.  Low HDL.  Not on statin therapy.  Cholesterol 128, HDL 27, LDL 16 measured on 5/9/2022.  10.  Normocytic mild anemia with hemoglobin 11.4 remaining stable.  11.  Chronic thrombocytopenia recent platelet count 67,000.  Been followed by hematology.  12.  Prostate cancer treated with radiation therapy 2007.       BMI:   Estimated body mass index is 28.69 kg/m  as calculated from the following:    Height as of this encounter: 1.734 m (5' 8.25\").    Weight as of this encounter: 86.2 kg (190 lb 1.6 oz).   Weight management plan: Discussed healthy diet and exercise guidelines      Return in about 19 weeks (around 4/11/2023) for Routine Visit.    Aj Garces MD  [facility] ELSI Johnson is a 87 year old, " presenting for the following health issues:  Foot Exam    Patient needs refills for propranolol and pregabalin. Pt stated he does not want an Annual Wellness.    History of Present Illness       Diabetes:   He presents for follow up of diabetes.  He is checking home blood glucose a few times a month. He checks blood glucose before meals.  Blood glucose is sometimes over 200 and never under 70. He is aware of hypoglycemia symptoms including weakness. He is concerned about blood sugar frequently over 200.  He is having numbness in feet and burning in feet.         Hypertension: He presents for follow up of hypertension.  He does not check blood pressure  regularly outside of the clinic. Outside blood pressures have been over 140/90. He does not follow a low salt diet.       87-year-old gentleman with multiple health issues including diabetes, hypertension, chronic kidney disease, hypothyroidism, obstructive sleep apnea etc. has come in for follow-up.  Overall he is doing well.  Offers no new concerns.  No chest pain or shortness of breath.  Takes his medication as prescribed.      Review of Systems   Constitutional, HEENT, cardiovascular, pulmonary, GI, , musculoskeletal, neuro, skin, endocrine and psych systems are negative, except as otherwise noted.      Objective    There were no vitals taken for this visit.  There is no height or weight on file to calculate BMI.  Physical Exam   GENERAL: healthy, alert and no distress  NECK: no adenopathy, no asymmetry, masses, or scars and thyroid normal to palpation  RESP: lungs clear to auscultation - no rales, rhonchi or wheezes  CV: regular rate and rhythm, normal S1 S2, no S3 or S4, no murmur, click or rub, no peripheral edema and peripheral pulses strong  ABDOMEN: soft, nontender, no hepatosplenomegaly, no masses and bowel sounds normal  MS: no gross musculoskeletal defects noted, no edema       Latest Reference Range & Units 11/25/22 11:05   Sodium 133 - 144 mmol/L 135    Potassium 3.4 - 5.3 mmol/L 4.5   Chloride 94 - 109 mmol/L 101   Carbon Dioxide 20 - 32 mmol/L 28   Urea Nitrogen 7 - 30 mg/dL 22   Creatinine 0.66 - 1.25 mg/dL 1.40 (H)   GFR Estimate >60 mL/min/1.73m2 49 (L)   Calcium 8.5 - 10.1 mg/dL 9.0   Anion Gap 3 - 14 mmol/L 6   Hemoglobin A1C 0.0 - 5.6 % 9.1 (H)   T4 Free 0.76 - 1.46 ng/dL 1.43   TSH 0.40 - 4.00 mU/L 6.53 (H)   Glucose 70 - 99 mg/dL 441 (HH)   WBC 4.0 - 11.0 10e3/uL 10.0   Hemoglobin 13.3 - 17.7 g/dL 11.4 (L)   Hematocrit 40.0 - 53.0 % 35.3 (L)   Platelet Count 150 - 450 10e3/uL 67 (L)   RBC Count 4.40 - 5.90 10e6/uL 3.79 (L)   MCV 78 - 100 fL 93   MCH 26.5 - 33.0 pg 30.1   MCHC 31.5 - 36.5 g/dL 32.3   RDW 10.0 - 15.0 % 17.2 (H)   % Neutrophils % 50   % Lymphocytes % 21   % Monocytes % 17   % Eosinophils % 1   % Basophils % 0   % Metamyelocytes % 10   % Myelocytes % 1   Absolute Basophils 0.0 - 0.2 10e3/uL 0.0   Absolute Neutrophil 1.6 - 8.3 10e3/uL 5.0   Absolute Lymphocytes 0.8 - 5.3 10e3/uL 2.1   Absolute Monocytes 0.0 - 1.3 10e3/uL 1.7 (H)   Absolute Eosinophils 0.0 - 0.7 10e3/uL 0.1   Absolute Metamyelocytes <=0.0 10e3/uL 1.0 (H)   Absolute Myelocytes <=0.0 10e3/uL 0.1 (H)   Absolute NRBCs 10e3/uL 0.0   NRBCs per 100 WBC <1 /100 0   RBC Morphology  Confirmed RBC Indices   Platelet Morphology Automated Count Confirmed. Platelet morphology is normal.  Automated Count Confirmed. Platelet morphology is normal.   (HH): Data is critically high  (H): Data is abnormally high  (L): Data is abnormally low

## 2022-12-12 ENCOUNTER — TELEPHONE (OUTPATIENT)
Dept: FAMILY MEDICINE | Facility: CLINIC | Age: 87
End: 2022-12-12

## 2022-12-12 ENCOUNTER — DOCUMENTATION ONLY (OUTPATIENT)
Dept: FAMILY MEDICINE | Facility: CLINIC | Age: 87
End: 2022-12-12

## 2022-12-12 DIAGNOSIS — E11.42 TYPE 2 DIABETES MELLITUS WITH DIABETIC POLYNEUROPATHY, WITHOUT LONG-TERM CURRENT USE OF INSULIN (H): ICD-10-CM

## 2022-12-12 DIAGNOSIS — E11.65 TYPE 2 DIABETES MELLITUS WITH HYPERGLYCEMIA, WITHOUT LONG-TERM CURRENT USE OF INSULIN (H): ICD-10-CM

## 2022-12-12 DIAGNOSIS — E11.42 TYPE 2 DIABETES MELLITUS WITH DIABETIC POLYNEUROPATHY (H): Primary | ICD-10-CM

## 2022-12-12 NOTE — TELEPHONE ENCOUNTER
Forms/Letter Request    Type of form/letter: orthotics and prosthetics order    Have you been seen for this request: N/A    Do we have the form/letter: Yes: faxed to us    When is form/letter needed by: asap    How would you like the form/letter returned: fax to [phone]

## 2022-12-13 ENCOUNTER — MEDICAL CORRESPONDENCE (OUTPATIENT)
Dept: HEALTH INFORMATION MANAGEMENT | Facility: CLINIC | Age: 87
End: 2022-12-13

## 2022-12-20 ENCOUNTER — TELEPHONE (OUTPATIENT)
Dept: FAMILY MEDICINE | Facility: CLINIC | Age: 87
End: 2022-12-20

## 2022-12-20 ENCOUNTER — MEDICAL CORRESPONDENCE (OUTPATIENT)
Dept: HEALTH INFORMATION MANAGEMENT | Facility: CLINIC | Age: 87
End: 2022-12-20

## 2022-12-20 NOTE — TELEPHONE ENCOUNTER
Records indicate that the form was received and completed by me on 12/12/2022.  I would assume that it was faxed after my completion.  I have now completed the new form sent to me today.

## 2022-12-20 NOTE — TELEPHONE ENCOUNTER
Judith form [facility] is calling wondering if you received the [employer] forms. They need to be signed ASAP. She said that this has been faxed a few rimes, and they did not get it back. They had to cancel patient's appointment since it was not returned. Please call them back.Bety Barclay

## 2023-02-18 ENCOUNTER — HEALTH MAINTENANCE LETTER (OUTPATIENT)
Age: 88
End: 2023-02-18

## 2023-04-06 ENCOUNTER — LAB (OUTPATIENT)
Dept: LAB | Facility: CLINIC | Age: 88
End: 2023-04-06
Payer: COMMERCIAL

## 2023-04-06 DIAGNOSIS — N18.31 STAGE 3A CHRONIC KIDNEY DISEASE (H): ICD-10-CM

## 2023-04-06 LAB
ALBUMIN SERPL-MCNC: 4.3 G/DL (ref 3.4–5)
ANION GAP SERPL CALCULATED.3IONS-SCNC: 5 MMOL/L (ref 3–14)
BUN SERPL-MCNC: 33 MG/DL (ref 7–30)
CALCIUM SERPL-MCNC: 9.3 MG/DL (ref 8.5–10.1)
CHLORIDE BLD-SCNC: 102 MMOL/L (ref 94–109)
CO2 SERPL-SCNC: 28 MMOL/L (ref 20–32)
CREAT SERPL-MCNC: 1.54 MG/DL (ref 0.66–1.25)
CREAT UR-MCNC: 126 MG/DL
GFR SERPL CREATININE-BSD FRML MDRD: 44 ML/MIN/1.73M2
GLUCOSE BLD-MCNC: 314 MG/DL (ref 70–99)
HBA1C MFR BLD: 9.6 % (ref 0–5.6)
MICROALBUMIN UR-MCNC: 280 MG/L
MICROALBUMIN/CREAT UR: 222.22 MG/G CR (ref 0–17)
PHOSPHATE SERPL-MCNC: 3.4 MG/DL (ref 2.5–4.5)
POTASSIUM BLD-SCNC: 4 MMOL/L (ref 3.4–5.3)
SODIUM SERPL-SCNC: 135 MMOL/L (ref 133–144)

## 2023-04-06 PROCEDURE — 83036 HEMOGLOBIN GLYCOSYLATED A1C: CPT

## 2023-04-06 PROCEDURE — 82043 UR ALBUMIN QUANTITATIVE: CPT

## 2023-04-06 PROCEDURE — 82570 ASSAY OF URINE CREATININE: CPT

## 2023-04-06 PROCEDURE — 36415 COLL VENOUS BLD VENIPUNCTURE: CPT

## 2023-04-06 PROCEDURE — 80069 RENAL FUNCTION PANEL: CPT

## 2023-04-11 ENCOUNTER — OFFICE VISIT (OUTPATIENT)
Dept: FAMILY MEDICINE | Facility: CLINIC | Age: 88
End: 2023-04-11
Payer: COMMERCIAL

## 2023-04-11 VITALS
DIASTOLIC BLOOD PRESSURE: 60 MMHG | BODY MASS INDEX: 29.63 KG/M2 | HEART RATE: 66 BPM | RESPIRATION RATE: 18 BRPM | HEIGHT: 67 IN | WEIGHT: 188.8 LBS | OXYGEN SATURATION: 99 % | SYSTOLIC BLOOD PRESSURE: 134 MMHG | TEMPERATURE: 98.6 F

## 2023-04-11 DIAGNOSIS — N18.31 STAGE 3A CHRONIC KIDNEY DISEASE (H): ICD-10-CM

## 2023-04-11 DIAGNOSIS — D69.6 THROMBOCYTOPENIA (H): ICD-10-CM

## 2023-04-11 DIAGNOSIS — R80.9 MICROALBUMINURIA: ICD-10-CM

## 2023-04-11 DIAGNOSIS — E78.6 LOW HDL (UNDER 40): ICD-10-CM

## 2023-04-11 DIAGNOSIS — E11.21 TYPE 2 DIABETES MELLITUS WITH DIABETIC NEPHROPATHY, WITHOUT LONG-TERM CURRENT USE OF INSULIN (H): Primary | ICD-10-CM

## 2023-04-11 DIAGNOSIS — G47.33 OSA ON CPAP: ICD-10-CM

## 2023-04-11 DIAGNOSIS — C61 PROSTATE CANCER (H): ICD-10-CM

## 2023-04-11 DIAGNOSIS — R26.89 POOR BALANCE: ICD-10-CM

## 2023-04-11 DIAGNOSIS — N13.8 BPH WITH OBSTRUCTION/LOWER URINARY TRACT SYMPTOMS: ICD-10-CM

## 2023-04-11 DIAGNOSIS — G25.0 ESSENTIAL TREMOR: ICD-10-CM

## 2023-04-11 DIAGNOSIS — I10 HYPERTENSION GOAL BP (BLOOD PRESSURE) < 150/90: ICD-10-CM

## 2023-04-11 DIAGNOSIS — D64.9 NORMOCYTIC ANEMIA: ICD-10-CM

## 2023-04-11 DIAGNOSIS — E11.42 DIABETIC POLYNEUROPATHY ASSOCIATED WITH TYPE 2 DIABETES MELLITUS (H): ICD-10-CM

## 2023-04-11 DIAGNOSIS — E03.4 HYPOTHYROIDISM DUE TO ACQUIRED ATROPHY OF THYROID: ICD-10-CM

## 2023-04-11 DIAGNOSIS — N40.1 BPH WITH OBSTRUCTION/LOWER URINARY TRACT SYMPTOMS: ICD-10-CM

## 2023-04-11 PROCEDURE — 99214 OFFICE O/P EST MOD 30 MIN: CPT | Performed by: INTERNAL MEDICINE

## 2023-04-11 RX ORDER — DAPAGLIFLOZIN 5 MG/1
5 TABLET, FILM COATED ORAL DAILY
Qty: 90 TABLET | Refills: 1 | Status: SHIPPED | OUTPATIENT
Start: 2023-04-11 | End: 2023-07-13

## 2023-04-11 ASSESSMENT — PAIN SCALES - GENERAL: PAINLEVEL: MILD PAIN (2)

## 2023-04-11 NOTE — PROGRESS NOTES
Assessment & Plan     1.  Type 2 diabetes mellitus been treated with glimepiride 8 mg daily and metformin 2 g a day.  Pull control with recent A1c of 9.6.  I discussed adding a long-acting insulin which she declined.  And then I suggested using dapagliflozin at a dose of 5 mg to begin with and see if it have some impact on his diabetes.  It would also help the chronic kidney disease.  He was agreeable to try that.  Cost might be a factor.  He will return in 3 months with hemoglobin A1c.  2.  Microalbuminuria related to diabetes and hypertension.  Recent labs shows mildly elevated microalbumin.  He is already on losartan.  I am adding dapagliflozin today.  3.  Chronic kidney disease stage IIIa with creatinine of 1.54 GFR 44.  It is stable.  I am adding dapagliflozin today and we will see how he does.  The plan would be to increase dose to 10 mg.  4.  Essential hypertension currently controlled with amlodipine, losartan HCT and propanolol.  5.  Obstructive sleep apnea on CPAP.  He uses it regularly.  6.  Imbalance with history of fall.  Previously been to physical therapy for balance.  Is multifactorial including age and diabetic neuropathy.  7.  Diabetic polyneuropathy for which she is currently taking pregabalin 75 mg twice daily.  8.  Low HDL.  Currently not on statin therapy.  9.  Hypothyroidism.  Adequately treated with levothyroxine 88 mcg a day.  When he returns in 3 months we will check TSH.  10.  Essential tremors.  Continues to have tremors but little better with propanolol 80 mg a day.  11.  Microcytic anemia.  Chronic etiology not clear.  Previously seen by hematology.  12.  Chronic thrombocytopenia previously very by hematology.  13.  History of prostate cancer treated with radiation therapy in 2007.  No evidence of recurrence.  14.  BPH with lower urinary symptoms been treated with tamsulosin.    I will see him back in 3 months with TSH renal panel CBC and lipids.      Aj Garces MD  [facility]  "[facility] ELSI Johnson is a 88 year old, presenting for the following health issues:  Diabetes and Hypertension        4/11/2023     2:00 PM   Additional Questions   Roomed by Kip     History of Present Illness       Diabetes:   He presents for follow up of diabetes.  He is not checking blood glucose. He is concerned about blood sugar frequently over 200.  He is having numbness in feet. The patient has not had a diabetic eye exam in the last 12 months.         Hypertension: He presents for follow up of hypertension.  He does not check blood pressure  regularly outside of the clinic. Outside blood pressures have been over 140/90. He does not follow a low salt diet.     He eats 2-3 servings of fruits and vegetables daily.He consumes 0 sweetened beverage(s) daily.He exercises with enough effort to increase his heart rate 9 or less minutes per day.  He exercises with enough effort to increase his heart rate 3 or less days per week.   He is taking medications regularly.     88-year-old gentleman with history of hypertension, diabetes, obstructive sleep apnea, hypothyroidism, chronic kidney disease, essential tremors, diabetic neuropathy has come in for follow-up.  He continues to have issue with poor balance and this past winter he says he is fallen inside his home at least 4-5 times.  In the past he has been to physical therapy regarding his balance issue.  He does not believe it helped.  Denies chest pain or shortness of breath.  He does not check his sugar or blood pressure at home.  Clinically he looks down though when questioned he is denies being depressed.  He indicates he just has aches and pains.    Review of Systems   Constitutional, HEENT, cardiovascular, pulmonary, GI, , musculoskeletal, neuro, skin, endocrine and psych systems are negative, except as otherwise noted.      Objective    BP (!) 146/72   Pulse 66   Temp 98.6  F (37  C) (Oral)   Resp 18   Ht 1.702 m (5' 7\")   Wt " 85.6 kg (188 lb 12.8 oz)   SpO2 99%   BMI 29.57 kg/m    Body mass index is 29.57 kg/m .  Physical Exam   GENERAL: healthy, alert and no distress  NECK: no adenopathy, no asymmetry, masses, or scars and thyroid normal to palpation  RESP: lungs clear to auscultation - no rales, rhonchi or wheezes  CV: regular rate and rhythm, normal S1 S2, no S3 or S4, no murmur, click or rub, no peripheral edema and peripheral pulses strong  ABDOMEN: soft, nontender, no hepatosplenomegaly, no masses and bowel sounds normal  MS: no gross musculoskeletal defects noted, no edema     Latest Reference Range & Units 04/06/23 10:17   Sodium 133 - 144 mmol/L 135   Potassium 3.4 - 5.3 mmol/L 4.0   Chloride 94 - 109 mmol/L 102   Carbon Dioxide 20 - 32 mmol/L 28   Urea Nitrogen 7 - 30 mg/dL 33 (H)   Creatinine 0.66 - 1.25 mg/dL 1.54 (H)   GFR Estimate >60 mL/min/1.73m2 44 (L)   Calcium 8.5 - 10.1 mg/dL 9.3   Anion Gap 3 - 14 mmol/L 5   Phosphorus 2.5 - 4.5 mg/dL 3.4   Albumin 3.4 - 5.0 g/dL 4.3   Hemoglobin A1C 0.0 - 5.6 % 9.6 (H)   Glucose 70 - 99 mg/dL 314 (H)   (H): Data is abnormally high  (L): Data is abnorally low

## 2023-04-12 ENCOUNTER — OFFICE VISIT (OUTPATIENT)
Dept: DERMATOLOGY | Facility: CLINIC | Age: 88
End: 2023-04-12
Payer: COMMERCIAL

## 2023-04-12 DIAGNOSIS — L57.0 ACTINIC KERATOSES: ICD-10-CM

## 2023-04-12 DIAGNOSIS — D48.9 NEOPLASM OF UNCERTAIN BEHAVIOR: Primary | ICD-10-CM

## 2023-04-12 PROCEDURE — 11103 TANGNTL BX SKIN EA SEP/ADDL: CPT | Performed by: DERMATOLOGY

## 2023-04-12 PROCEDURE — 11102 TANGNTL BX SKIN SINGLE LES: CPT | Mod: XS | Performed by: DERMATOLOGY

## 2023-04-12 PROCEDURE — 17004 DESTROY PREMAL LESIONS 15/>: CPT | Performed by: DERMATOLOGY

## 2023-04-12 PROCEDURE — 88305 TISSUE EXAM BY PATHOLOGIST: CPT | Performed by: PATHOLOGY

## 2023-04-12 NOTE — NURSING NOTE
Milo Lozano's chief complaint for this visit includes:    Chief Complaint   Patient presents with     Skin Check     Spot check top of scalp. No personal or family hx of skin cancers.       PCP: Aj Garces         Referring Provider:    Referred Self, MD  No address on file         There were no vitals taken for this visit.    Data Unavailable            Allergies   Allergen Reactions     Lisinopril Cough     Developed an ACE cough on the Lisinopril, pt denies this is a current allergy as of 6/19/2020.                 Do you need any medication refills at today's visit? No    Bharti Bains LPN on 4/12/2023 at 11:22 AM

## 2023-04-12 NOTE — PATIENT INSTRUCTIONS
Wound Care After a Biopsy    What is a skin biopsy?  A skin biopsy allows the doctor to examine a very small piece of tissue under the microscope to determine the diagnosis and the best treatment for the skin condition. A local anesthetic (numbing medicine)  is injected with a very small needle into the skin area to be tested. A small piece of skin is taken from the area. Sometimes a suture (stitch) is used.     What are the risks of a skin biopsy?  I will experience scar, bleeding, swelling, pain, crusting and redness. I may experience incomplete removal or recurrence. Risks of this procedure are excessive bleeding, bruising, infection, nerve damage, numbness, thick (hypertrophic or keloidal) scar and non-diagnostic biopsy.    How should I care for my wound for the first 24 hours?  Keep the wound dry and covered for 24 hours  If it bleeds, hold direct pressure on the area for 15 minutes. If bleeding does not stop then go to the emergency room  Avoid strenuous exercise the first 1-2 days or as your doctor instructs you    How should I care for the wound after 24 hours?  After 24 hours, remove the bandage  You may bathe or shower as normal  If you had a scalp biopsy, you can shampoo as usual and can use shower water to clean the biopsy site daily  Clean the wound twice a day with gentle soap and water  Do not scrub, be gentle  Apply white petroleum/Vaseline after cleaning the wound with a cotton swab or a clean finger, and keep the site covered with a Bandaid /bandage. Bandages are not necessary with a scalp biopsy  If you are unable to cover the site with a Bandaid /bandage, re-apply ointment 2-3 times a day to keep the site moist. Moisture will help with healing  Avoid strenuous activity for first 1-2 days  Avoid lakes, rivers, pools, and oceans until the stitches are removed or the site is healed    How do I clean my wound?  Wash hands thoroughly with soap or use hand  before all wound care  Clean the  wound with gentle soap and water  Apply white petroleum/Vaseline  to wound after it is clean  Replace the Bandaid /bandage to keep the wound covered for the first few days or as instructed by your doctor  If you had a scalp biopsy, warm shower water to the area on a daily basis should suffice    What should I use to clean my wound?   Cotton-tipped applicators (Qtips )  White petroleum jelly (Vaseline ). Use a clean new container and use Q-tips to apply.  Bandaids   as needed  Gentle soap     How should I care for my wound long term?  Do not get your wound dirty  Keep up with wound care for one week or until the area is healed.  A small scab will form and fall off by itself when the area is completely healed. The area will be red and will become pink in color as it heals. Sun protection is very important for how your scar will turn out. Sunscreen with an SPF 30 or greater is recommended once the area is healed.  You should have some soreness but it should be mild and slowly go away over several days. Talk to your doctor about using tylenol for pain,    When should I call my doctor?  If you have increased:   Pain or swelling  Pus or drainage (clear or slightly yellow drainage is ok)  Temperature over 100F  Spreading redness or warmth around wound    When will I hear about my results?  The biopsy results can take 2 weeks to come back.  Your results will automatically release to [employer] before your provider has even reviewed them.  The clinic will call you with the results, send you a [employer] message, or have you schedule a follow-up clinic or phone time to discuss the results.  Contact our clinics if you do not hear from us in 2 weeks.    Who should I call with questions?  [facility]: [phone]  [facility]: [phone]  For urgent needs outside of business hours call the [facility] at [phone] and ask for the dermatology resident on call      Cryotherapy    What is it?  Use of a very cold liquid, such as liquid nitrogen, to freeze and destroy abnormal skin cells that need to be removed    What should I expect?  Tenderness and redness  A small blister that might grow and fill with dark purple blood. There may be crusting.  More than one treatment may be needed if the lesions do not go away.    How do I care for the treated area?  Gently wash the area with your hands when bathing.  Use a thin layer of Vaseline to help with healing. You may use a Band-Aid.   The area should heal within 7-10 days and may leave behind a pink or lighter color.   Do not use an antibiotic or Neosporin ointment.   You may take acetaminophen (Tylenol) for pain.     Call your doctor if you have:  Severe pain  Signs of infection (warmth, redness, cloudy yellow drainage, and or a bad smell)  Questions or concerns    Who should I call with questions?      [facility]: [phone]      [facility]: [phone]      For urgent needs outside of business hours call the [facility] at [phone] and ask for the dermatology resident on call

## 2023-04-12 NOTE — LETTER
4/12/2023         RE: Milo Lozano  [address]  [place] [address]        Dear Colleague,    Thank you for referring your patient, Milo Lozano, to the [facility]. Please see a copy of my visit note below.    [facility] Dermatology Note  Encounter Date: Apr 12, 2023  Office Visit     Dermatology Problem List:  0. Lesion on anterior crown and right crown, s/p bx 4/12/23   1. AKs, s/p cryo    ____________________________________________    Assessment & Plan:    1. Lesion on the anterior crown. The differential diagnosis includes AK vs SCC.  - Photograph obtained.  - See procedure note.    2. Lesion on the right crown. The differential diagnosis includes AK vs SCC.  - Photograph obtained.  - See procedure note.     3. Actinic keratosis - right medial canthus x 1, right cheek x 2, scalp x 12, left temple x 1. Based on the location and chronic irritation to this lesion, treatment with cryotherapy is warranted. Discussed Efudex cream for diffuse actinic damage.  - See cryo procedure note.     Procedures Performed:   - Shave biopsy procedure note, location(s): anterior crown and right crown. After discussion of benefits and risks including but not limited to bleeding, infection, scar, incomplete removal, recurrence, and non-diagnostic biopsy, written consent and photographs were obtained. The area was cleaned with isopropyl alcohol. 0.5mL of 1% lidocaine with epinephrine was injected to obtain adequate anesthesia of lesion(s). Shave biopsy at site(s) performed. Hemostasis was achieved with aluminium chloride and electodesiccation. Petrolatum ointment and a sterile dressing were applied. The patient tolerated the procedure and no complications were noted. The patient was provided with verbal and written post care instructions.      - Cryotherapy procedure note, location(s): scalp, right medial canthus, right cheek, left temple. After verbal consent  "and discussion of risks and benefits including, but not limited to, dyspigmentation/scar, blister, and pain, 15 AK(s) was(were) treated with 1-2 mm freeze border for 1-2 cycles with liquid nitrogen. Post cryotherapy instructions were provided.      Follow-up: 6 month(s) in-person for follow up, or earlier for new or changing lesions    Staff and Scribe:     Scribe Disclosure:   I, Deepak Amaya, am serving as a scribe to document services personally performed by this physician, Dr. Trever Acevedo, based on data collection and the provider's statements to me.       Provider Disclosure:   The documentation recorded by the scribe accurately reflects the services I personally performed and the decisions made by me.    Trever Acevedo MD   of Dermatology  Department of Dermatology  [facility] [employer]      ____________________________________________    CC: Skin Check (Spot check top of scalp. No personal or family hx of skin cancers.)    HPI:  Mr. Milo Lozano is a(n) 88 year old male who presents today as a return patient for a spot check.    Last seen 1/4/21 by Dr. Peralta for a spot check. At that time, 8 AKs on the scalp were treated with cryotherapy.     Today, he reports a \"bump\" on the top of the head that has been present for about 1 year and has been growing recently. Denies any symptoms or bleeding.     Patient is otherwise feeling well, without additional skin concerns.    Labs Reviewed:  N/A    Physical Exam:  Vitals: There were no vitals taken for this visit.  SKIN: Focused examination of scalp was performed.  - Hyperkeratotic papule on the anterior crown.    - Hyperkeratotic papule on the right crown.  - There are erythematous macules with overyling adherent scale on the right medial canthus x 1, right cheek x 2, scalp x 12, left temple x 1.     - No other lesions of concern on areas examined.     Medications:  Current Outpatient Medications   Medication "     amLODIPine (NORVASC) 10 MG tablet     blood glucose (ACCU-CHEK SMARTVIEW) test strip     blood glucose (NO BRAND SPECIFIED) lancets standard     blood glucose (NO BRAND SPECIFIED) test strip     blood glucose calibration (NO BRAND SPECIFIED) solution     blood glucose monitoring (ACCU-CHEK MILADYS SMARTVIEW) meter device kit     blood glucose monitoring (NO BRAND SPECIFIED) meter device kit     dapagliflozin (FARXIGA) 5 MG TABS tablet     folic acid (FOLVITE) 1 MG tablet     glimepiride (AMARYL) 4 MG tablet     levothyroxine (SYNTHROID/LEVOTHROID) 88 MCG tablet     losartan-hydrochlorothiazide (HYZAAR) 100-25 MG tablet     metFORMIN (GLUCOPHAGE XR) 500 MG 24 hr tablet     pregabalin (LYRICA) 75 MG capsule     propranolol ER (INDERAL LA) 80 MG 24 hr capsule     tamsulosin (FLOMAX) 0.4 MG capsule     thin (NO BRAND SPECIFIED) lancets     No current facility-administered medications for this visit.      Past Medical History:   Patient Active Problem List   Diagnosis     Hypertension goal BP (blood pressure) < 150/90     CARDIOVASCULAR SCREENING; LDL GOAL LESS THAN 100     Prostate cancer (H)     Type 2 diabetes mellitus with kidney complication, without long-term current use of insulin (H)     Advanced directives, counseling/discussion     White coat hypertension     Hypothyroidism due to acquired atrophy of thyroid     DEMOND on CPAP     Diabetic polyneuropathy associated with type 2 diabetes mellitus (H)     CKD (chronic kidney disease) stage 3, GFR 30-59 ml/min (H)     BPH with obstruction/lower urinary tract symptoms     Class 1 obesity due to excess calories with body mass index (BMI) of 31.0 to 31.9 in adult     Normocytic anemia     Thrombocytopenia (H)     Poor balance     Essential tremor     Low HDL (under 40)     Past Medical History:   Diagnosis Date     BPH (benign prostatic hypertrophy)      BPH with obstruction/lower urinary tract symptoms 9/17/2020     BPV (benign positional vertigo), unspecified  laterality 10/21/2021     CARDIOVASCULAR SCREENING; LDL GOAL LESS THAN 100 12/29/2011     CARDIOVASCULAR SCREENING; LDL GOAL LESS THAN 130 12/29/2011     Class 1 obesity due to excess calories with body mass index (BMI) of 31.0 to 31.9 in adult 9/17/2020     Essential tremor 10/21/2019     Hypertension goal BP (blood pressure) < 140/90 12/29/2011     Hypothyroidism due to acquired atrophy of thyroid 12/5/2016     Low HDL (under 40) 11/29/2022     Normocytic anemia 8/15/2017     Poor balance 10/21/2021     Prostate cancer (H) 1/2007    Had Radiation     Thrombocytopenia (H) 9/18/2019     Type 2 diabetes mellitus with hyperglycemia (H) 10/21/2010     Type 2 diabetes, HbA1c goal < 7% (H)         CC Referred Self, MD  No address on file on close of this encounter.    Again, thank you for allowing me to participate in the care of your patient.        Sincerely,        Trever Acevedo MD

## 2023-04-12 NOTE — PROGRESS NOTES
[facility] Dermatology Note  Encounter Date: Apr 12, 2023  Office Visit     Dermatology Problem List:  0. Lesion on anterior crown and right crown, s/p bx 4/12/23   1. AKs, s/p cryo    ____________________________________________    Assessment & Plan:    1. Lesion on the anterior crown. The differential diagnosis includes AK vs SCC.  - Photograph obtained.  - See procedure note.    2. Lesion on the right crown. The differential diagnosis includes AK vs SCC.  - Photograph obtained.  - See procedure note.     3. Actinic keratosis - right medial canthus x 1, right cheek x 2, scalp x 12, left temple x 1. Based on the location and chronic irritation to this lesion, treatment with cryotherapy is warranted. Discussed Efudex cream for diffuse actinic damage.  - See cryo procedure note.     Procedures Performed:   - Shave biopsy procedure note, location(s): anterior crown and right crown. After discussion of benefits and risks including but not limited to bleeding, infection, scar, incomplete removal, recurrence, and non-diagnostic biopsy, written consent and photographs were obtained. The area was cleaned with isopropyl alcohol. 0.5mL of 1% lidocaine with epinephrine was injected to obtain adequate anesthesia of lesion(s). Shave biopsy at site(s) performed. Hemostasis was achieved with aluminium chloride and electodesiccation. Petrolatum ointment and a sterile dressing were applied. The patient tolerated the procedure and no complications were noted. The patient was provided with verbal and written post care instructions.      - Cryotherapy procedure note, location(s): scalp, right medial canthus, right cheek, left temple. After verbal consent and discussion of risks and benefits including, but not limited to, dyspigmentation/scar, blister, and pain, 15 AK(s) was(were) treated with 1-2 mm freeze border for 1-2 cycles with liquid nitrogen. Post cryotherapy instructions were provided.      Follow-up: 6  "month(s) in-person for follow up, or earlier for new or changing lesions    Staff and Scribe:     Scribe Disclosure:   I, Deepak Amaya, am serving as a scribe to document services personally performed by this physician, Dr. Trever Acevedo, based on data collection and the provider's statements to me.       Provider Disclosure:   The documentation recorded by the scribe accurately reflects the services I personally performed and the decisions made by me.    Trever Acevedo MD   of Dermatology  Department of Dermatology  [facility] [employer]      ____________________________________________    CC: Skin Check (Spot check top of scalp. No personal or family hx of skin cancers.)    HPI:  Mr. Milo Lozano is a(n) 88 year old male who presents today as a return patient for a spot check.    Last seen 1/4/21 by Dr. Peralta for a spot check. At that time, 8 AKs on the scalp were treated with cryotherapy.     Today, he reports a \"bump\" on the top of the head that has been present for about 1 year and has been growing recently. Denies any symptoms or bleeding.     Patient is otherwise feeling well, without additional skin concerns.    Labs Reviewed:  N/A    Physical Exam:  Vitals: There were no vitals taken for this visit.  SKIN: Focused examination of scalp was performed.  - Hyperkeratotic papule on the anterior crown.    - Hyperkeratotic papule on the right crown.  - There are erythematous macules with overyling adherent scale on the right medial canthus x 1, right cheek x 2, scalp x 12, left temple x 1.     - No other lesions of concern on areas examined.     Medications:  Current Outpatient Medications   Medication     amLODIPine (NORVASC) 10 MG tablet     blood glucose (ACCU-CHEK SMARTVIEW) test strip     blood glucose (NO BRAND SPECIFIED) lancets standard     blood glucose (NO BRAND SPECIFIED) test strip     blood glucose calibration (NO BRAND SPECIFIED) solution     " blood glucose monitoring (ACCU-CHEK MILADYS SMARTVIEW) meter device kit     blood glucose monitoring (NO BRAND SPECIFIED) meter device kit     dapagliflozin (FARXIGA) 5 MG TABS tablet     folic acid (FOLVITE) 1 MG tablet     glimepiride (AMARYL) 4 MG tablet     levothyroxine (SYNTHROID/LEVOTHROID) 88 MCG tablet     losartan-hydrochlorothiazide (HYZAAR) 100-25 MG tablet     metFORMIN (GLUCOPHAGE XR) 500 MG 24 hr tablet     pregabalin (LYRICA) 75 MG capsule     propranolol ER (INDERAL LA) 80 MG 24 hr capsule     tamsulosin (FLOMAX) 0.4 MG capsule     thin (NO BRAND SPECIFIED) lancets     No current facility-administered medications for this visit.      Past Medical History:   Patient Active Problem List   Diagnosis     Hypertension goal BP (blood pressure) < 150/90     CARDIOVASCULAR SCREENING; LDL GOAL LESS THAN 100     Prostate cancer (H)     Type 2 diabetes mellitus with kidney complication, without long-term current use of insulin (H)     Advanced directives, counseling/discussion     White coat hypertension     Hypothyroidism due to acquired atrophy of thyroid     DEMOND on CPAP     Diabetic polyneuropathy associated with type 2 diabetes mellitus (H)     CKD (chronic kidney disease) stage 3, GFR 30-59 ml/min (H)     BPH with obstruction/lower urinary tract symptoms     Class 1 obesity due to excess calories with body mass index (BMI) of 31.0 to 31.9 in adult     Normocytic anemia     Thrombocytopenia (H)     Poor balance     Essential tremor     Low HDL (under 40)     Past Medical History:   Diagnosis Date     BPH (benign prostatic hypertrophy)      BPH with obstruction/lower urinary tract symptoms 9/17/2020     BPV (benign positional vertigo), unspecified laterality 10/21/2021     CARDIOVASCULAR SCREENING; LDL GOAL LESS THAN 100 12/29/2011     CARDIOVASCULAR SCREENING; LDL GOAL LESS THAN 130 12/29/2011     Class 1 obesity due to excess calories with body mass index (BMI) of 31.0 to 31.9 in adult 9/17/2020      Essential tremor 10/21/2019     Hypertension goal BP (blood pressure) < 140/90 12/29/2011     Hypothyroidism due to acquired atrophy of thyroid 12/5/2016     Low HDL (under 40) 11/29/2022     Normocytic anemia 8/15/2017     Poor balance 10/21/2021     Prostate cancer (H) 1/2007    Had Radiation     Thrombocytopenia (H) 9/18/2019     Type 2 diabetes mellitus with hyperglycemia (H) 10/21/2010     Type 2 diabetes, HbA1c goal < 7% (H)         CC Referred Self, MD  No address on file on close of this encounter.

## 2023-04-14 LAB
PATH REPORT.COMMENTS IMP SPEC: NORMAL
PATH REPORT.FINAL DX SPEC: NORMAL
PATH REPORT.GROSS SPEC: NORMAL
PATH REPORT.MICROSCOPIC SPEC OTHER STN: NORMAL
PATH REPORT.RELEVANT HX SPEC: NORMAL

## 2023-04-15 DIAGNOSIS — D09.9 SQUAMOUS CELL CARCINOMA IN SITU (SCCIS): Primary | ICD-10-CM

## 2023-04-18 ENCOUNTER — TELEPHONE (OUTPATIENT)
Dept: DERMATOLOGY | Facility: CLINIC | Age: 88
End: 2023-04-18
Payer: COMMERCIAL

## 2023-04-18 NOTE — TELEPHONE ENCOUNTER
Called patient to schedule surgery with Dr. Swain    Date of Surgery: tbd    Surgery type: mohs    Consult scheduled: Yes    Has patient had mohs with us before? No    Additional comments: scott Jessica on 4/18/2023 at 8:57 AM

## 2023-06-09 DIAGNOSIS — E03.4 HYPOTHYROIDISM DUE TO ACQUIRED ATROPHY OF THYROID: ICD-10-CM

## 2023-06-10 RX ORDER — LEVOTHYROXINE SODIUM 88 UG/1
TABLET ORAL
Qty: 90 TABLET | Refills: 3 | Status: SHIPPED | OUTPATIENT
Start: 2023-06-10 | End: 2024-05-13

## 2023-06-21 ENCOUNTER — TELEPHONE (OUTPATIENT)
Dept: FAMILY MEDICINE | Facility: CLINIC | Age: 88
End: 2023-06-21
Payer: COMMERCIAL

## 2023-06-21 NOTE — TELEPHONE ENCOUNTER
Patient Quality Outreach    Patient is due for the following:   Diabetes -  Eye Exam and Foot Exam  Hypertension -  BP check  Physical Annual Wellness Visit      Topic Date Due     Zoster (Shingles) Vaccine (2 of 2) 12/11/2019     COVID-19 Vaccine (6 - Pfizer series) 03/01/2023       Next Steps:   Patient has upcoming appointment, these items will be addressed at that time. on 07/13/2023 with .    Type of outreach:    Chart review performed, no outreach needed.      Questions for provider review:    None           Bharti Ronquillo MA

## 2023-06-26 ENCOUNTER — OFFICE VISIT (OUTPATIENT)
Dept: DERMATOLOGY | Facility: CLINIC | Age: 88
End: 2023-06-26
Payer: COMMERCIAL

## 2023-06-26 DIAGNOSIS — D04.4 SQUAMOUS CELL CARCINOMA IN SITU (SCCIS) OF SCALP: Primary | ICD-10-CM

## 2023-06-26 PROCEDURE — 99213 OFFICE O/P EST LOW 20 MIN: CPT | Performed by: DERMATOLOGY

## 2023-06-26 NOTE — NURSING NOTE
Excision/Mohs previsit information                                                    Diagnosis: squamous cell carcinoma in situ  Site(s): anterior crown    Over the counter Chlorhexidine surgical soap to wash all skin below the belly button twice before surgery should be recommended for the following:  - Surgical sites below the waist  - Immunosuppressed  - Previous surgical site infection  - Anticipated wound care challenges    Medication & Allergy Information                                                      Review and update allergy and medication list.    Do you take the following medications:    Coumadin, Eliquis, Pradaxa, Xarelto:  NO   -If on Coumadin, INR should be checked within 7 days of surgery.  Range should be 3.5 or less or within therapeutic range.    Past Medical History                                                    Do you currently or have you previously had any of the following conditions:      Hepatitis:  NO    HIV/AIDS:  NO    Prolonged bleeding or bleeding disorder:  NO    Pacemaker or Defibrillator:  NO.      History of artificial or heart valve replacement:  NO    Endocarditis (inflammation of the inner lining of the heart's chambers and valves):  NO    Have you ever had a prosthetic joint infection:  NO    Pregnant or Breastfeeding:  N/A    Mobility device (wheelchair, transfer difficulty): NO    Important Reminders:                                                        Ok to take all of their medications as prescribed    Patients can eat, no need to be fasting    Patient will not be able to get the site wet for 48 hrs    No submerging wound in standing water (lake, pool, bathtub, hot tub) for 2 weeks    No physical activity for 48 hrs (further restrictions will be discussed by MD at time of visit)    If any positives, send to RN for further review  Brooklyn Aguilar RN

## 2023-06-26 NOTE — LETTER
"    2023         RE: Milo Lozano  [address]  [facility] [address]        Dear Colleague,    Thank you for referring your patient, Milo Lozano, to the [facility]. Please see a copy of my visit note below.    [facility] Dermatology Note  Encounter Date: 2023  Office Visit     Dermatology Problem List:    1. 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   - The nature, risks, benefits, and alternatives to Mohs surgery were discussed. The patient reluctantly decided to proceed with Mohs surgery. We clearly discussed choosing not to have this treated may lead to life threatening skin cancer.   - He does not take anticoagulants  - No indication for preop antibiotics.   - Likely repair linear vs second intention.     # Hypertrophic AK, R crown of scalp  - Likely cryo at Mohs appointment.     Staff:     Danny Swain DO    Department of Dermatology  [facility]: Phone: [phone], Fax:[phone]  [facility]: Phone: [phone], Fax: [phone]      ____________________________________________    CC: Consult, Mohs for SCCIS on scalp    HPI:  Mr. Milo Lozano is a(n) 88 year old male who presents today as a return patient for Mohs surgery consultation for SCCIS on the scalp. He's not had skin cancer before. He doesn't know much about SCCIS as a diagnosis.     He is dissatisfied with the amount of time it took to get an appointment for his biopsy. He states, \"It took so long I could have .\"  He's also dissatisfied with the amount of time it took to get a consult.     He also states, " "\"I'm 88, I don't really want you butchering me.\" He expresses disinterest in having the SCCIS treated and asked, \"I'm 88, my lifer expectancy is already so low. Is there a payment on your Maryan due?\"    Patient is otherwise feeling well, without additional skin concerns.     Labs Reviewed:  Dermpath report 4/12/23 reviewed,   A(1). Skin, Anterior crown, shave:  - Squamous cell carcinoma in situ     B(2). Skin, Right crown of scalp, shave:  - Hypertrophic actinic keratosis      Physical Exam:  Vitals: There were no vitals taken for this visit.  SKIN: Total skin excluding the undergarment areas was performed. The exam included the head/face, neck, both arms, chest, back, abdomen, both legs, digits and/or nails.   - faint pink papule 0.9x0.6cm anterior crown  - similar atrophic pink papule right crown.   - No other lesions of concern on areas examined.     Medications:  Current Outpatient Medications   Medication     amLODIPine (NORVASC) 10 MG tablet     blood glucose (ACCU-CHEK SMARTVIEW) test strip     blood glucose (NO BRAND SPECIFIED) lancets standard     blood glucose (NO BRAND SPECIFIED) test strip     blood glucose calibration (NO BRAND SPECIFIED) solution     blood glucose monitoring (ACCU-CHEK MILADYS SMARTVIEW) meter device kit     blood glucose monitoring (NO BRAND SPECIFIED) meter device kit     dapagliflozin (FARXIGA) 5 MG TABS tablet     folic acid (FOLVITE) 1 MG tablet     glimepiride (AMARYL) 4 MG tablet     levothyroxine (SYNTHROID/LEVOTHROID) 88 MCG tablet     losartan-hydrochlorothiazide (HYZAAR) 100-25 MG tablet     metFORMIN (GLUCOPHAGE XR) 500 MG 24 hr tablet     pregabalin (LYRICA) 75 MG capsule     propranolol ER (INDERAL LA) 80 MG 24 hr capsule     tamsulosin (FLOMAX) 0.4 MG capsule     thin (NO BRAND SPECIFIED) lancets     No current facility-administered medications for this visit.      Past Medical History:   Patient Active Problem List   Diagnosis     Hypertension goal BP (blood pressure) < " 150/90     CARDIOVASCULAR SCREENING; LDL GOAL LESS THAN 100     Prostate cancer (H)     Type 2 diabetes mellitus with kidney complication, without long-term current use of insulin (H)     Advanced directives, counseling/discussion     White coat hypertension     Hypothyroidism due to acquired atrophy of thyroid     DEMOND on CPAP     Diabetic polyneuropathy associated with type 2 diabetes mellitus (H)     CKD (chronic kidney disease) stage 3, GFR 30-59 ml/min (H)     BPH with obstruction/lower urinary tract symptoms     Class 1 obesity due to excess calories with body mass index (BMI) of 31.0 to 31.9 in adult     Normocytic anemia     Thrombocytopenia (H)     Poor balance     Essential tremor     Low HDL (under 40)     Past Medical History:   Diagnosis Date     BPH (benign prostatic hypertrophy)      BPH with obstruction/lower urinary tract symptoms 9/17/2020     BPV (benign positional vertigo), unspecified laterality 10/21/2021     CARDIOVASCULAR SCREENING; LDL GOAL LESS THAN 100 12/29/2011     CARDIOVASCULAR SCREENING; LDL GOAL LESS THAN 130 12/29/2011     Class 1 obesity due to excess calories with body mass index (BMI) of 31.0 to 31.9 in adult 9/17/2020     Essential tremor 10/21/2019     Hypertension goal BP (blood pressure) < 140/90 12/29/2011     Hypothyroidism due to acquired atrophy of thyroid 12/5/2016     Low HDL (under 40) 11/29/2022     Normocytic anemia 8/15/2017     Poor balance 10/21/2021     Prostate cancer (H) 1/2007    Had Radiation     Thrombocytopenia (H) 9/18/2019     Type 2 diabetes mellitus with hyperglycemia (H) 10/21/2010     Type 2 diabetes, HbA1c goal < 7% (H)          Again, thank you for allowing me to participate in the care of your patient.        Sincerely,        Danny Swain MD

## 2023-06-26 NOTE — PROGRESS NOTES
"[facility] Dermatology Note  Encounter Date: 2023  Office Visit     Dermatology Problem List:    1. 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   - The nature, risks, benefits, and alternatives to Mohs surgery were discussed. The patient reluctantly decided to proceed with Mohs surgery. We clearly discussed choosing not to have this treated may lead to life threatening skin cancer.   - He does not take anticoagulants  - No indication for preop antibiotics.   - Likely repair linear vs second intention.     # Hypertrophic AK, R crown of scalp  - Likely cryo at Mohs appointment.     Staff:     Danny Swain DO    Department of Dermatology  [facility]: Phone: [phone], Fax:[phone]  [facility]: Phone: [phone], Fax: [phone]      ____________________________________________    CC: Consult, Mohs for SCCIS on scalp    HPI:  Mr. Milo Lozano is a(n) 88 year old male who presents today as a return patient for Mohs surgery consultation for SCCIS on the scalp. He's not had skin cancer before. He doesn't know much about SCCIS as a diagnosis.     He is dissatisfied with the amount of time it took to get an appointment for his biopsy. He states, \"It took so long I could have .\"  He's also dissatisfied with the amount of time it took to get a consult.     He also states, \"I'm 88, I don't really want you butchering me.\" He expresses disinterest in having the SCCIS treated and asked, \"I'm 88, my lifer expectancy is already so low. Is there a payment on your Maryan due?\"    Patient is otherwise feeling well, without additional skin " concerns.     Labs Reviewed:  Dermpath report 4/12/23 reviewed,   A(1). Skin, Anterior crown, shave:  - Squamous cell carcinoma in situ     B(2). Skin, Right crown of scalp, shave:  - Hypertrophic actinic keratosis      Physical Exam:  Vitals: There were no vitals taken for this visit.  SKIN: Total skin excluding the undergarment areas was performed. The exam included the head/face, neck, both arms, chest, back, abdomen, both legs, digits and/or nails.   - faint pink papule 0.9x0.6cm anterior crown  - similar atrophic pink papule right crown.   - No other lesions of concern on areas examined.     Medications:  Current Outpatient Medications   Medication     amLODIPine (NORVASC) 10 MG tablet     blood glucose (ACCU-CHEK SMARTVIEW) test strip     blood glucose (NO BRAND SPECIFIED) lancets standard     blood glucose (NO BRAND SPECIFIED) test strip     blood glucose calibration (NO BRAND SPECIFIED) solution     blood glucose monitoring (ACCU-CHEK MILADYS SMARTVIEW) meter device kit     blood glucose monitoring (NO BRAND SPECIFIED) meter device kit     dapagliflozin (FARXIGA) 5 MG TABS tablet     folic acid (FOLVITE) 1 MG tablet     glimepiride (AMARYL) 4 MG tablet     levothyroxine (SYNTHROID/LEVOTHROID) 88 MCG tablet     losartan-hydrochlorothiazide (HYZAAR) 100-25 MG tablet     metFORMIN (GLUCOPHAGE XR) 500 MG 24 hr tablet     pregabalin (LYRICA) 75 MG capsule     propranolol ER (INDERAL LA) 80 MG 24 hr capsule     tamsulosin (FLOMAX) 0.4 MG capsule     thin (NO BRAND SPECIFIED) lancets     No current facility-administered medications for this visit.      Past Medical History:   Patient Active Problem List   Diagnosis     Hypertension goal BP (blood pressure) < 150/90     CARDIOVASCULAR SCREENING; LDL GOAL LESS THAN 100     Prostate cancer (H)     Type 2 diabetes mellitus with kidney complication, without long-term current use of insulin (H)     Advanced directives, counseling/discussion     White coat hypertension      Hypothyroidism due to acquired atrophy of thyroid     DEMOND on CPAP     Diabetic polyneuropathy associated with type 2 diabetes mellitus (H)     CKD (chronic kidney disease) stage 3, GFR 30-59 ml/min (H)     BPH with obstruction/lower urinary tract symptoms     Class 1 obesity due to excess calories with body mass index (BMI) of 31.0 to 31.9 in adult     Normocytic anemia     Thrombocytopenia (H)     Poor balance     Essential tremor     Low HDL (under 40)     Past Medical History:   Diagnosis Date     BPH (benign prostatic hypertrophy)      BPH with obstruction/lower urinary tract symptoms 9/17/2020     BPV (benign positional vertigo), unspecified laterality 10/21/2021     CARDIOVASCULAR SCREENING; LDL GOAL LESS THAN 100 12/29/2011     CARDIOVASCULAR SCREENING; LDL GOAL LESS THAN 130 12/29/2011     Class 1 obesity due to excess calories with body mass index (BMI) of 31.0 to 31.9 in adult 9/17/2020     Essential tremor 10/21/2019     Hypertension goal BP (blood pressure) < 140/90 12/29/2011     Hypothyroidism due to acquired atrophy of thyroid 12/5/2016     Low HDL (under 40) 11/29/2022     Normocytic anemia 8/15/2017     Poor balance 10/21/2021     Prostate cancer (H) 1/2007    Had Radiation     Thrombocytopenia (H) 9/18/2019     Type 2 diabetes mellitus with hyperglycemia (H) 10/21/2010     Type 2 diabetes, HbA1c goal < 7% (H)

## 2023-07-10 ENCOUNTER — LAB (OUTPATIENT)
Dept: LAB | Facility: CLINIC | Age: 88
End: 2023-07-10
Payer: COMMERCIAL

## 2023-07-10 DIAGNOSIS — N18.31 STAGE 3A CHRONIC KIDNEY DISEASE (H): ICD-10-CM

## 2023-07-10 DIAGNOSIS — E11.21 TYPE 2 DIABETES MELLITUS WITH DIABETIC NEPHROPATHY, WITHOUT LONG-TERM CURRENT USE OF INSULIN (H): ICD-10-CM

## 2023-07-10 DIAGNOSIS — D64.9 NORMOCYTIC ANEMIA: ICD-10-CM

## 2023-07-10 DIAGNOSIS — E78.6 LOW HDL (UNDER 40): ICD-10-CM

## 2023-07-10 DIAGNOSIS — E03.4 HYPOTHYROIDISM DUE TO ACQUIRED ATROPHY OF THYROID: ICD-10-CM

## 2023-07-10 LAB
ALBUMIN SERPL BCG-MCNC: 4.6 G/DL (ref 3.5–5.2)
ANION GAP SERPL CALCULATED.3IONS-SCNC: 12 MMOL/L (ref 7–15)
BASOPHILS # BLD MANUAL: 0 10E3/UL (ref 0–0.2)
BASOPHILS NFR BLD MANUAL: 0 %
BUN SERPL-MCNC: 32.2 MG/DL (ref 8–23)
CALCIUM SERPL-MCNC: 9.1 MG/DL (ref 8.8–10.2)
CHLORIDE SERPL-SCNC: 100 MMOL/L (ref 98–107)
CHOLEST SERPL-MCNC: 100 MG/DL
CREAT SERPL-MCNC: 1.67 MG/DL (ref 0.67–1.17)
DEPRECATED HCO3 PLAS-SCNC: 25 MMOL/L (ref 22–29)
EOSINOPHIL # BLD MANUAL: 0 10E3/UL (ref 0–0.7)
EOSINOPHIL NFR BLD MANUAL: 0 %
ERYTHROCYTE [DISTWIDTH] IN BLOOD BY AUTOMATED COUNT: 16.9 % (ref 10–15)
GFR SERPL CREATININE-BSD FRML MDRD: 40 ML/MIN/1.73M2
GLUCOSE SERPL-MCNC: 224 MG/DL (ref 70–99)
HBA1C MFR BLD: 8.7 % (ref 0–5.6)
HCT VFR BLD AUTO: 35.2 % (ref 40–53)
HDLC SERPL-MCNC: 24 MG/DL
HGB BLD-MCNC: 11.5 G/DL (ref 13.3–17.7)
LDLC SERPL CALC-MCNC: 22 MG/DL
LYMPHOCYTES # BLD MANUAL: 1.2 10E3/UL (ref 0.8–5.3)
LYMPHOCYTES NFR BLD MANUAL: 12 %
MCH RBC QN AUTO: 28.8 PG (ref 26.5–33)
MCHC RBC AUTO-ENTMCNC: 32.7 G/DL (ref 31.5–36.5)
MCV RBC AUTO: 88 FL (ref 78–100)
METAMYELOCYTES # BLD MANUAL: 1.4 10E3/UL
METAMYELOCYTES NFR BLD MANUAL: 14 %
MONOCYTES # BLD MANUAL: 2.1 10E3/UL (ref 0–1.3)
MONOCYTES NFR BLD MANUAL: 20 %
MYELOCYTES # BLD MANUAL: 0.3 10E3/UL
MYELOCYTES NFR BLD MANUAL: 3 %
NEUTROPHILS # BLD MANUAL: 5.3 10E3/UL (ref 1.6–8.3)
NEUTROPHILS NFR BLD MANUAL: 51 %
NONHDLC SERPL-MCNC: 76 MG/DL
PHOSPHATE SERPL-MCNC: 3.4 MG/DL (ref 2.5–4.5)
PLAT MORPH BLD: ABNORMAL
PLATELET # BLD AUTO: 56 10E3/UL (ref 150–450)
POTASSIUM SERPL-SCNC: 4.3 MMOL/L (ref 3.4–5.3)
RBC # BLD AUTO: 4 10E6/UL (ref 4.4–5.9)
RBC MORPH BLD: ABNORMAL
SODIUM SERPL-SCNC: 137 MMOL/L (ref 136–145)
T4 FREE SERPL-MCNC: 1.79 NG/DL (ref 0.9–1.7)
TRIGL SERPL-MCNC: 269 MG/DL
TSH SERPL DL<=0.005 MIU/L-ACNC: 5.05 UIU/ML (ref 0.3–4.2)
WBC # BLD AUTO: 10.3 10E3/UL (ref 4–11)

## 2023-07-10 PROCEDURE — 80061 LIPID PANEL: CPT

## 2023-07-10 PROCEDURE — 83036 HEMOGLOBIN GLYCOSYLATED A1C: CPT

## 2023-07-10 PROCEDURE — 36415 COLL VENOUS BLD VENIPUNCTURE: CPT

## 2023-07-10 PROCEDURE — 80069 RENAL FUNCTION PANEL: CPT

## 2023-07-10 PROCEDURE — 85027 COMPLETE CBC AUTOMATED: CPT

## 2023-07-10 PROCEDURE — 85007 BL SMEAR W/DIFF WBC COUNT: CPT

## 2023-07-10 PROCEDURE — 84439 ASSAY OF FREE THYROXINE: CPT

## 2023-07-10 PROCEDURE — 84443 ASSAY THYROID STIM HORMONE: CPT

## 2023-07-13 ENCOUNTER — ANCILLARY PROCEDURE (OUTPATIENT)
Dept: GENERAL RADIOLOGY | Facility: CLINIC | Age: 88
End: 2023-07-13
Attending: INTERNAL MEDICINE
Payer: COMMERCIAL

## 2023-07-13 ENCOUNTER — OFFICE VISIT (OUTPATIENT)
Dept: FAMILY MEDICINE | Facility: CLINIC | Age: 88
End: 2023-07-13
Payer: COMMERCIAL

## 2023-07-13 VITALS
OXYGEN SATURATION: 98 % | WEIGHT: 186.9 LBS | HEIGHT: 67 IN | SYSTOLIC BLOOD PRESSURE: 149 MMHG | BODY MASS INDEX: 29.34 KG/M2 | HEART RATE: 58 BPM | DIASTOLIC BLOOD PRESSURE: 67 MMHG | TEMPERATURE: 98 F | RESPIRATION RATE: 12 BRPM

## 2023-07-13 DIAGNOSIS — N40.0 BENIGN PROSTATIC HYPERPLASIA WITHOUT LOWER URINARY TRACT SYMPTOMS: ICD-10-CM

## 2023-07-13 DIAGNOSIS — I10 HYPERTENSION GOAL BP (BLOOD PRESSURE) < 150/90: ICD-10-CM

## 2023-07-13 DIAGNOSIS — E03.4 HYPOTHYROIDISM DUE TO ACQUIRED ATROPHY OF THYROID: ICD-10-CM

## 2023-07-13 DIAGNOSIS — N18.32 STAGE 3B CHRONIC KIDNEY DISEASE (H): ICD-10-CM

## 2023-07-13 DIAGNOSIS — N39.41 URGENCY INCONTINENCE: ICD-10-CM

## 2023-07-13 DIAGNOSIS — G47.33 OSA ON CPAP: ICD-10-CM

## 2023-07-13 DIAGNOSIS — D69.6 THROMBOCYTOPENIA (H): ICD-10-CM

## 2023-07-13 DIAGNOSIS — E11.21 TYPE 2 DIABETES MELLITUS WITH DIABETIC NEPHROPATHY, WITHOUT LONG-TERM CURRENT USE OF INSULIN (H): Primary | ICD-10-CM

## 2023-07-13 DIAGNOSIS — G89.29 CHRONIC PAIN OF LEFT KNEE: ICD-10-CM

## 2023-07-13 DIAGNOSIS — M25.562 CHRONIC PAIN OF LEFT KNEE: ICD-10-CM

## 2023-07-13 DIAGNOSIS — E11.42 DIABETIC POLYNEUROPATHY ASSOCIATED WITH TYPE 2 DIABETES MELLITUS (H): ICD-10-CM

## 2023-07-13 DIAGNOSIS — D64.9 NORMOCYTIC ANEMIA: ICD-10-CM

## 2023-07-13 PROCEDURE — 73562 X-RAY EXAM OF KNEE 3: CPT | Mod: TC | Performed by: RADIOLOGY

## 2023-07-13 PROCEDURE — 99214 OFFICE O/P EST MOD 30 MIN: CPT | Performed by: INTERNAL MEDICINE

## 2023-07-13 RX ORDER — DAPAGLIFLOZIN 10 MG/1
10 TABLET, FILM COATED ORAL DAILY
Qty: 90 TABLET | Refills: 1 | Status: SHIPPED | OUTPATIENT
Start: 2023-07-13 | End: 2024-03-25

## 2023-07-13 RX ORDER — TAMSULOSIN HYDROCHLORIDE 0.4 MG/1
CAPSULE ORAL
Qty: 180 CAPSULE | Refills: 3 | Status: SHIPPED | OUTPATIENT
Start: 2023-07-13 | End: 2024-08-15

## 2023-07-13 ASSESSMENT — PAIN SCALES - GENERAL: PAINLEVEL: NO PAIN (0)

## 2023-07-13 NOTE — PROGRESS NOTES
Assessment & Plan     1.  Type 2 diabetes mellitus with diabetic nephropathy.  Hemoglobin A1c after starting dapagliflozin has come down some to 8.7.  Advised to increase dapagliflozin to 10 mg a day, continue with metformin 2 g a day and glimepiride 8 mg.  Return for follow-up in 3 months with hemoglobin A1c.  2.  Chronic pain of the left knee with some gait difficulty.  Now using a cane.  Clinical exam reveals no redness or swelling or joint tenderness.  I will x-ray of the knee and also refer him to orthopedics/sports medicine for further evaluation.  I do not see any instability but he feels that the knee is going to give out and is going to fall.  3.  Hypertension with blood pressure borderline elevated today.  He appears to have whitecoat syndrome.  I am not planning to make any changes antihypertensive medication at this time.  Continue with losartan /25 daily along with amlodipine 10 mg a day  4.  Diabetic polyneuropathy.  Currently on Lyrica with some symptomatic help.  5.  Urge incontinence most likely secondary to #6.  6.  Benign prostate hypertrophy with lower urinary tract symptoms.  Currently taking tamsulosin 0.4 mg a day I will increase it to 0.8 mg a day and reassess in 3 months.  May have to consider anticholinergic medication such as Detrol XL or Myrbetriq.  7.  Stage IIIb chronic kidney disease with a recent creatinine at 1.67 GFR 40.  Electrolytes are fine.  I will reassess in 3 months.  8.  Hypothyroidism currently on levothyroxine 88 mcg a day.  His TSH is borderline high at 5.05 but his T4 is also high little bit at 1.79.  This does not make sense.  I will repeat the TSH in 3 months.  Continue with present dose of levothyroxine.  9.  Obstructive sleep apnea on CPAP.  10.  Normocytic anemia with recent hemoglobin 11.5.  11.  Thrombocytopenia chronic.  Recent platelet count 56,000.  We will reassess in 3 months.       BMI:   Estimated body mass index is 29.27 kg/m  as calculated from  "the following:    Height as of this encounter: 1.702 m (5' 7\").    Weight as of this encounter: 84.8 kg (186 lb 14.4 oz).           Aj Garces MD  [facility] ELSI Johnson is a 88 year old, presenting for the following health issues:  No chief complaint on file.        4/11/2023     2:00 PM   Additional Questions   Roomed by Kip     History of Present Illness       Diabetes:   He presents for follow up of diabetes.  He is not checking blood glucose. He has no concerns regarding his diabetes at this time.  He is having numbness in feet and burning in feet. The patient has not had a diabetic eye exam in the last 12 months.         He eats 0-1 servings of fruits and vegetables daily.He consumes 0 sweetened beverage(s) daily.He exercises with enough effort to increase his heart rate 9 or less minutes per day.  He exercises with enough effort to increase his heart rate 3 or less days per week.   He is taking medications regularly.       88-year-old gentleman with multiple chronic health problems which include diabetes, diabetic neuropathy and chronic kidney disease has come in for follow-up.  He also has hypothyroidism obstructive, obstructive sleep apnea, chronic thrombocytopenia and anemia as well as hypertension.  He has known benign prostatic hypertrophy with lower urinary tract symptoms being treated with tamsulosin 0.4 mg a day.  Today he complains of more more urgent C and urge incontinence.  No dysuria or hematuria.  He also is complaining today of the left knee being chronically painful and at times he feels like it is going to give out.  He is starting to use a cane.  He has not fallen.  Dapagliflozin is well-tolerated.      Review of Systems   Constitutional, HEENT, cardiovascular, pulmonary, GI, , musculoskeletal, neuro, skin, endocrine and psych systems are negative, except as otherwise noted.      Objective    There were no vitals taken for this visit.  There is no " height or weight on file to calculate BMI.  Physical Exam   GENERAL: healthy, alert and no distress  NECK: no adenopathy, no asymmetry, masses, or scars and thyroid normal to palpation  RESP: lungs clear to auscultation - no rales, rhonchi or wheezes  CV: regular rate and rhythm, normal S1 S2, no S3 or S4, no murmur, click or rub, no peripheral edema and peripheral pulses strong  ABDOMEN: soft, nontender, no hepatosplenomegaly, no masses and bowel sounds normal  MS: no gross musculoskeletal defects noted, no edema

## 2023-07-14 NOTE — TELEPHONE ENCOUNTER
DIAGNOSIS: Chronic pain of left knee   APPOINTMENT DATE: 08/04/2023   NOTES STATUS DETAILS   OFFICE NOTE from referring provider Internal 07/13/2023 Dr Garces [facility]    OFFICE NOTE from other specialist Internal 01/04/2021 Dr Schmidt [facility]   DISCHARGE SUMMARY from hospital N/A    DISCHARGE REPORT from the ER N/A    OPERATIVE REPORT N/A    EMG report N/A    MEDICATION LIST N/A    MRI N/A    DEXA (osteoporosis/bone health) N/A    CT SCAN N/A    XRAYS (IMAGES & REPORTS) Internal 07/13/2023 LFT knee  01/04/2021 LFT knee

## 2023-07-16 DIAGNOSIS — G89.29 CHRONIC PAIN OF LEFT KNEE: Primary | ICD-10-CM

## 2023-07-16 DIAGNOSIS — M25.562 CHRONIC PAIN OF LEFT KNEE: Primary | ICD-10-CM

## 2023-07-17 ENCOUNTER — TELEPHONE (OUTPATIENT)
Dept: DERMATOLOGY | Facility: CLINIC | Age: 88
End: 2023-07-17

## 2023-07-17 ENCOUNTER — OFFICE VISIT (OUTPATIENT)
Dept: DERMATOLOGY | Facility: CLINIC | Age: 88
End: 2023-07-17
Payer: COMMERCIAL

## 2023-07-17 VITALS — HEART RATE: 62 BPM | DIASTOLIC BLOOD PRESSURE: 69 MMHG | OXYGEN SATURATION: 96 % | SYSTOLIC BLOOD PRESSURE: 139 MMHG

## 2023-07-17 DIAGNOSIS — L57.0 AK (ACTINIC KERATOSIS): Primary | ICD-10-CM

## 2023-07-17 DIAGNOSIS — D04.4 SQUAMOUS CELL CARCINOMA IN SITU (SCCIS) OF SCALP: ICD-10-CM

## 2023-07-17 PROCEDURE — 17311 MOHS 1 STAGE H/N/HF/G: CPT | Performed by: DERMATOLOGY

## 2023-07-17 PROCEDURE — 14021 TIS TRNFR S/A/L 10.1-30 SQCM: CPT | Performed by: DERMATOLOGY

## 2023-07-17 PROCEDURE — 17000 DESTRUCT PREMALG LESION: CPT | Mod: 59 | Performed by: DERMATOLOGY

## 2023-07-17 NOTE — TELEPHONE ENCOUNTER
"Patient and his wife are calling the clinic to discuss pressure bandage.  The head wrap is \"popping\" off the top of his head.  Patient has hearing aids and glasses, which makes it difficult to have the wrap in place.    Patient had Mohs on his scalp today.    Discussed with patient and his wife that they can carefully remove the wrap and tightly tape the bandage to the scalp. She is going to try to tape the bandage to patients scalp.      Advised patient that I would call her before I leave for the day to see if they were successful in bandaging his wound.  "

## 2023-07-17 NOTE — LETTER
7/17/2023         RE: Milo Lozano  [address]  [facility] [address]        Dear Colleague,    Thank you for referring your patient, Milo Lozano, to the [facility]. Please see a copy of my visit note below.    [facility] Procedure Note    Dermatology Problem List:     1. 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 HAK, right crown of scalp, plan for Cryo at  [facility], s/p cryo     ____________________________________________    Date of Service:  Jul 17, 2023  Surgery: Mohs micrographic surgery    Case 1  Repair Type: local flap (rotation)  Repair Size: 5.5X4.0 cm  Suture Material: Fast Absorbing Gut 5-0  Tumor Type: SCCIS - Squamous cell carcinoma in situ   Location: anterior crown  Derm-Path Accession #: [id number]  PreOp Size: 1.4 X 1.1 cm  PostOp Size: 1.7X1.9 cm  Mohs Accession #: [id number]  Level of Defect: fat      Procedure:  We discussed the principles of treatment and most likely complications including scarring, bleeding, infection, swelling, pain, crusting, nerve damage, large wound,  incomplete excision, wound dehiscence,  nerve damage, recurrence, and a second procedure may be recommended to obtain the best cosmetic or functional result.    Informed consent was obtained and the patient underwent the procedure as follows:  The patient was placed supine on the operating table.  The cancer was identified, outlined with a marker, and verified by the patient.  The entire surgical field was prepped with Hibiclens;Sterile saline.  The surgical site was anesthetized using Lidocaine 1% with epi 1:100,000.      The area of clinically apparent tumor was debulked. The layer of tissue was then surgically excised using a #15 blade and was then transferred onto a specimen sheet maintaining the orientation of the specimen. Hemostasis was obtained using bipolar electrocoagulation.  The wound site was then covered with a dressing while the tissue samples were processed for examination.    The excised tissue was transported to the [facility] [place] laboratory maintaining the tissue orientation.  The tissue specimen was relaxed so that the entire surgical margin was in a a single horizontal plane for sectioning and inked for precise mapping.  A precise reference map was drawn to reflect the sectioning of the specimen, colored inking of the margins, and orientation on the patient. The tissue was processed using horizontal sectioning of the base and continuous peripheral margins.  The histopathologic sections were reviewed in conjunction with the reference map.    Total blocks: 1    Total slides:  2    There were no cancer cells visualized on examination, therefore Mohs surgery was complete.    RECONSTRUCTION:  ROTATION FLAP    The wound was identified.  Using a marker, the rotation flap was planned.  The defect was infiltrated with 12 mL of Lido with epi The defect was then cleansed and prepped with betadine and draped with sterile drapes.   The wound edges were then debeveled and the wound was undermined bluntly in all directions. The rotation flaps were incised sharply to the level of fat.  The flaps were undermined from all surrounding tissue.  Hemostasis was obtained with electrocoagulation. The flaps were then rotated into (carried over) the primary defect.  The deep tissue was secured with dermal sutures.  The epidermis was then carefully approximated using 4-0 Monocryl and 5-0 fast absorbing gut epidermal sutures throughout the length of both flaps. The wound was cleansed with saline and an ointment was applied.  A sterile non-adherent pressure dressing was placed.  The patient left the operating suite in stable condition.  The patient will return in 2 weeks for suture removal. Wound care was reviewed verbally and in writing.      Post-Operative Size:  5.5 x 4.0 cm  Sutures Used: 4-0 Monocryl,  5-0 fast absorbing gut    The Attending surgeon was present for the entire procedure and always immediately available.    Additional procedure  Hypertrophic AK, R crown of scalp s/p biopsy 4/12/23    Cryotherapy procedure note: After verbal consent and discussion of risks and benefits including but no limited to dyspigmentation/scar, blister, and pain, AK was treated with 1-2mm freeze border for 2 cycles with liquid nitrogen. Post cryotherapy instructions were provided.       Scribe Disclosure:   I, Mattie Oseguera, am serving as a scribe to document services personally performed by this physician, Dr. Danny Swain, based on data collection and the provider's statements to me.      Provider Disclosure:   The documentation recorded by the scribe accurately reflects the services I personally performed and the decisions made by me.  Danny Swain DO    Department of Dermatology  [facility]: Phone: [phone], Fax:[phone]  [facility]: Phone: [phone], Fax: [phone]    Care and Laboratory Testing Performed at:  [facility]   Dermatology Clinic  [address] [place] [address]      Again, thank you for allowing me to participate in the care of your patient.        Sincerely,        Danny Swain MD

## 2023-07-17 NOTE — NURSING NOTE
The following medication was given:     MEDICATION:  Lidocaine with epinephrine 1% [id number]  ROUTE: SQ  SITE: see procedure note  DOSE: 15ml  LOT #: [id number]  : [employer]  EXPIRATION DATE: 1/31/25  NDC#: 85522-794-53  Was there drug waste? no  Multi-dose vial: Yes    Maci Moreland  July 17, 2023    Mastisol, Steri-Strips, Tegaderm and pressure dressing applied to Mohs site on scalp.  Wound care instructions reviewed with patient and AVS provided.  Patient verbalized understanding.  Patient will follow up for suture removal: N/A.  No further questions or concerns at this time.

## 2023-07-17 NOTE — TELEPHONE ENCOUNTER
Called patient.  His wife was successful at re-bandaging his wound.  He has no bleeding and no questions or concerns.

## 2023-07-17 NOTE — PATIENT INSTRUCTIONS
Mohs Wound Care Instructions  I will experience scar, altered skin color, bleeding, swelling, pain, crusting and redness. I may experience altered sensation. Risks are excessive bleeding, infection, muscle weakness, thick (hypertrophic or keloidal) scar, and recurrence. A second procedure may be recommended to obtain the best cosmetic or functional result.  Possible complications of any surgical procedure are bleeding, infection, scarring, alteration in skin color and sensation, muscle weakness in the area, wound dehiscence or seperation, or recurrence of the lesion or disease. On occasion, after healing, a secondary procedure or revision may be recommended in order to obtain the best cosmetic or functional result.   After your surgery, a pressure bandage will be placed over the area that has sutures. This will help prevent bleeding. Please follow these instructions as they will help you to prevent complications as your wound heals.  For the First 48 hours After Surgery:  Leave the pressure bandage on and keep it dry. If it should come loose, you may retape it, but do not take it off.  Relax and take it easy. Do not do any vigorous exercise, heavy lifting, or bending forward. This could cause the wound to bleed.  Post-operative pain is usually mild. You may alternate between 1000 mg of Tylenol (acetaminophen) and 400 mg of Ibuprofen every 4 hours.  Do not take more than 4,000mg of acetaminophen in a 24 hour period or 3200 mg of Ibuprofen in a 24 hr period.  Avoid alcohol and vitamin E as these may increase your tendency to bleed.  You may put an ice pack around the bandaged area for 20 minutes every 2-3 hours. This may help reduce swelling, bruising, and pain. Make sure the ice pack is waterproof so that the pressure bandage does not get wet.   You may see a small amount of drainage or blood on your pressure bandage. This is normal. However, if drainage or bleeding continues or saturates the bandage, you will need  to apply firm pressure over the bandage with a washcloth for 15 minutes. If bleeding continues after applying pressure for 15 minutes then go to the nearest emergency room.  48 Hours After Surgery  Carefully remove the bandage and start daily wound care and dressing changes. You may also now shower and get the wound wet.  Daily Wound Care:  Wash wound with a mild soap and water.  Use caution when washing the wound, be gentle and do not let the forceful shower stream hit the wound directly.  Pat dry.  Apply Vaseline (from a new container or tube) over the suture line with a Q-tip. It is very important to keep the wound continuously moist, as wounds heal best in a moist environment.  Keep the site covered until sutures have dissolved.  You can cover it with a Telfa (non-stick) dressing and tape or a band-aid.    If you are unable to keep wound covered, you must apply Vaseline every 2-3 hours (while awake) to ensure it is being kept moist for optimal healing. A dressing overnight is recommended to keep the area moist.  Call Us If:  You have pain that is not controlled with Tylenol/Ibuprofen  You have signs or symptoms of an infection, such as: fever over 100 degrees F, redness, warmth, or foul-smelling or yellow drainage from the wound.  Who should I call with questions?  [facility]: [phone]   [facility]: [phone]  For urgent needs outside of business hours call the [facility] at [phone] and ask to speak with the dermatology resident on call

## 2023-07-17 NOTE — NURSING NOTE
Milo Lozano's goals for this visit include:   Chief Complaint   Patient presents with     Procedure     Patient here for MOHS on the scalp SCC       He requests these members of his care team be copied on today's visit information:     PCP: Aj Garces    Referring Provider:  Trever Acevedo MD  [address] [place] [phone]    /69   Pulse 62   SpO2 96%     Do you need any medication refills at today's visit?         Maci Moreland EMT

## 2023-07-18 NOTE — PROGRESS NOTES
[facility] Dermatologic Surgery Clinic [place] Procedure Note    Dermatology Problem List:     1. 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    Informed consent was obtained and the patient underwent the procedure as follows:  The patient was placed supine on the operating table.  The cancer was identified, outlined with a marker, and verified by the patient.  The entire surgical field was prepped with Hibiclens;Sterile saline.  The surgical site was anesthetized using Lidocaine 1% with epi 1:100,000.      The area of clinically apparent tumor was debulked. The layer of tissue was then surgically excised using a #15 blade and was then transferred onto a specimen sheet maintaining the orientation of the specimen. Hemostasis was obtained using bipolar electrocoagulation. The wound site was then covered with a dressing while the tissue samples were processed for examination.    The excised tissue was transported to the Mohs histology laboratory maintaining the tissue orientation.  The tissue specimen was relaxed so that the entire  surgical margin was in a a single horizontal plane for sectioning and inked for precise mapping.  A precise reference map was drawn to reflect the sectioning of the specimen, colored inking of the margins, and orientation on the patient. The tissue was processed using horizontal sectioning of the base and continuous peripheral margins.  The histopathologic sections were reviewed in conjunction with the reference map.    Total blocks: 1    Total slides:  2    There were no cancer cells visualized on examination, therefore Mohs surgery was complete.    RECONSTRUCTION:  ROTATION FLAP    The wound was identified.  Using a marker, the rotation flap was planned.  The defect was infiltrated with 12 mL of Lido with epi The defect was then cleansed and prepped with betadine and draped with sterile drapes.   The wound edges were then debeveled and the wound was undermined bluntly in all directions. The rotation flaps were incised sharply to the level of fat.  The flaps were undermined from all surrounding tissue.  Hemostasis was obtained with electrocoagulation. The flaps were then rotated into (carried over) the primary defect.  The deep tissue was secured with dermal sutures.  The epidermis was then carefully approximated using 4-0 Monocryl and 5-0 fast absorbing gut epidermal sutures throughout the length of both flaps. The wound was cleansed with saline and an ointment was applied.  A sterile non-adherent pressure dressing was placed.  The patient left the operating suite in stable condition.  The patient will return in 2 weeks for suture removal. Wound care was reviewed verbally and in writing.      Post-Operative Size:  5.5 x 4.0 cm  Sutures Used: 4-0 Monocryl, 5-0 fast absorbing gut    The Attending surgeon was present for the entire procedure and always immediately available.    Additional procedure  Hypertrophic AK, R crown of scalp s/p biopsy 4/12/23    Cryotherapy procedure note: After verbal consent and discussion  of risks and benefits including but no limited to dyspigmentation/scar, blister, and pain, AK was treated with 1-2mm freeze border for 2 cycles with liquid nitrogen. Post cryotherapy instructions were provided.       Scribe Disclosure:   I, Mattie Oseguera, am serving as a scribe to document services personally performed by this physician, Dr. Danny Swain, based on data collection and the provider's statements to me.      Provider Disclosure:   The documentation recorded by the scribe accurately reflects the services I personally performed and the decisions made by me.  Danny Swain DO    Department of Dermatology  [facility]: Phone: [phone], Fax:[phone]  [facility]: Phone: [phone], Fax: [phone]    Care and Laboratory Testing Performed at:  [facility]   Dermatology Clinic  [address] [place] [address]

## 2023-07-31 ENCOUNTER — OFFICE VISIT (OUTPATIENT)
Dept: DERMATOLOGY | Facility: CLINIC | Age: 88
End: 2023-07-31
Payer: COMMERCIAL

## 2023-07-31 DIAGNOSIS — Z51.89 VISIT FOR WOUND CHECK: ICD-10-CM

## 2023-07-31 DIAGNOSIS — Z86.007 HISTORY OF SQUAMOUS CELL CARCINOMA IN SITU OF SKIN: Primary | ICD-10-CM

## 2023-07-31 DIAGNOSIS — L57.0 AK (ACTINIC KERATOSIS): ICD-10-CM

## 2023-07-31 PROCEDURE — 99024 POSTOP FOLLOW-UP VISIT: CPT | Performed by: DERMATOLOGY

## 2023-07-31 PROCEDURE — 17003 DESTRUCT PREMALG LES 2-14: CPT | Mod: 79 | Performed by: DERMATOLOGY

## 2023-07-31 PROCEDURE — 17000 DESTRUCT PREMALG LESION: CPT | Mod: 79 | Performed by: DERMATOLOGY

## 2023-07-31 ASSESSMENT — PAIN SCALES - GENERAL: PAINLEVEL: NO PAIN (0)

## 2023-07-31 NOTE — LETTER
"    7/31/2023         RE: Milo Lozano  [address]  [facility] [address]        Dear Colleague,    Thank you for referring your patient, Milo Lozano, to the [facility]. Please see a copy of my visit note below.    [facility] Dermatology Note  Encounter Date: Jul 31, 2023  Office Visit     Dermatology Problem List:     1. 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 Continue wound care until healed over completely.  - Continue sun avoidance and sun protection.   - I recommended skin cancer screening follow up in the next 6 months. At my consult visit a few weeks ago, he was displeased about having to wait many weeks for an appointment to get this lesions biopsied. Even so,he minimized the importance of my recommendation as \"I'm 88 years old, something is going to get me.\" He did not schedule before leaving the clinic.     # Actinic Keratosis, Scalp  - Several more (apprx. 10-15) clinically apparent lesion are on his scalp. I treated about 5 with liquid nitrogen before he didn't want to continue. I offered him topical treatment with 5-fluorouracil or field treatment with photodynamic therapy to treat the remaining lesions. He declined again on account of age and death looming. I reiterated if any spots are getting worse or symptomatic, please contact the clinic for evaluation.     Procedures Performed:     - Cryotherapy procedure note, location(s): Scalp x5. After verbal consent and discussion of risks and benefits including, but not limited to, dyspigmentation/scar, blister, and pain, 5 AK lesions were treated with 1-2 mm freeze border for 1-2 cycles with liquid nitrogen. Post " cryotherapy instructions were provided.    Follow-up: 6 month skin cancer screening recommended.     Staff:     Danny Swain DO    Department of Dermatology  [facility]: Phone: [phone], Fax:[phone]  [facility]: Phone: [phone], Fax: [phone]    ____________________________________________    CC: Wound Check (Mohs- anterior crown. DOS: 07-. No concerns with infection or the site. )    HPI:  Mr. Milo Lozano is a(n) 88 year old male who presents today for follow-up  after Mohs surgery and rotation flap for SCCIS on the scalp. Denies pain and weeping.     Has notice more rough scaly spots on his scalp.     Patient is otherwise feeling well, without additional skin concerns.     Labs Reviewed:  N/A    Physical Exam:  Vitals: There were no vitals taken for this visit.  SKIN: Focused examination of scalp was performed.  - Geometric scar consistent with rotation flap with moderate crust, minimal adjacent erythema and edema.   - Pink pacule with superficial gritty scale scattered on the scalp.   - No other lesions of concern on areas examined.     Medications:  Current Outpatient Medications   Medication     amLODIPine (NORVASC) 10 MG tablet     blood glucose (ACCU-CHEK SMARTVIEW) test strip     blood glucose (NO BRAND SPECIFIED) lancets standard     blood glucose (NO BRAND SPECIFIED) test strip     blood glucose calibration (NO BRAND SPECIFIED) solution     blood glucose monitoring (ACCU-CHEK MILADYS SMARTVIEW) meter device kit     blood glucose monitoring (NO BRAND SPECIFIED) meter device kit     dapagliflozin (FARXIGA) 10 MG TABS tablet     folic acid (FOLVITE) 1 MG tablet     glimepiride (AMARYL) 4 MG tablet     levothyroxine (SYNTHROID/LEVOTHROID) 88 MCG tablet     losartan-hydrochlorothiazide (HYZAAR) 100-25 MG tablet     metFORMIN (GLUCOPHAGE XR) 500 MG 24 hr  tablet     pregabalin (LYRICA) 75 MG capsule     propranolol ER (INDERAL LA) 80 MG 24 hr capsule     tamsulosin (FLOMAX) 0.4 MG capsule     thin (NO BRAND SPECIFIED) lancets     No current facility-administered medications for this visit.      Past Medical History:   Patient Active Problem List   Diagnosis     Hypertension goal BP (blood pressure) < 150/90     CARDIOVASCULAR SCREENING; LDL GOAL LESS THAN 100     Prostate cancer (H)     Type 2 diabetes mellitus with kidney complication, without long-term current use of insulin (H)     Advanced directives, counseling/discussion     White coat hypertension     Hypothyroidism due to acquired atrophy of thyroid     DEMOND on CPAP     Diabetic polyneuropathy associated with type 2 diabetes mellitus (H)     CKD (chronic kidney disease) stage 3, GFR 30-59 ml/min (H)     BPH with obstruction/lower urinary tract symptoms     Class 1 obesity due to excess calories with body mass index (BMI) of 31.0 to 31.9 in adult     Normocytic anemia     Thrombocytopenia (H)     Poor balance     Essential tremor     Low HDL (under 40)     Past Medical History:   Diagnosis Date     BPH (benign prostatic hypertrophy)      BPH with obstruction/lower urinary tract symptoms 9/17/2020     BPV (benign positional vertigo), unspecified laterality 10/21/2021     CARDIOVASCULAR SCREENING; LDL GOAL LESS THAN 100 12/29/2011     CARDIOVASCULAR SCREENING; LDL GOAL LESS THAN 130 12/29/2011     Class 1 obesity due to excess calories with body mass index (BMI) of 31.0 to 31.9 in adult 9/17/2020     Essential tremor 10/21/2019     Hypertension goal BP (blood pressure) < 140/90 12/29/2011     Hypothyroidism due to acquired atrophy of thyroid 12/5/2016     Low HDL (under 40) 11/29/2022     Normocytic anemia 8/15/2017     Poor balance 10/21/2021     Prostate cancer (H) 1/2007    Had Radiation     Thrombocytopenia (H) 9/18/2019     Type 2 diabetes mellitus with hyperglycemia (H) 10/21/2010     Type 2 diabetes, HbA1c  goal < 7% (H)          Again, thank you for allowing me to participate in the care of your patient.        Sincerely,        Danny Swain MD

## 2023-08-03 NOTE — PROGRESS NOTES
Milo Lozano  :  1935  DOS: 2023  MRN: [id number]  PCP: Aj Garces    Sports Medicine Clinic Visit      HPI  Milo Lozano is a 88 year old male who is seen in consultation at the request of  Aj Garces M.D. presenting with left knee pain.    - Mechanism of Injury:  No inciting injury.   - Prior evaluation:    - Ortho for primary OA on 10/26/2021 with the most recent corticosteroid injection with Dr. Trever Schmidt at that visit.  - Seen on 2023 by Aj Garces MD.    - Pain Character:  Pain has been present for  years with worsening pain .  Pain is well localized to the anterior left knee without significant radiation.   - Endorses:  stiffness, pain, instability, weakness, occasional popping and grinding in the anteromedial knee under/adjacent to the patella.  Often feels like there is a popping cord near the medial patella  - Denies:  mechanical locking symptoms, numbness, tingling, radicular shooting pain.  - Alleviating factors:   no treatments tried to date  - Aggravating factors:   walking  - Treatments tried:   no treatments tried to date    - Patient Goals:  discuss treatment options  - Social History: retired      Review of Systems  Musculoskeletal: as above  Remainder of review of systems is negative including constitutional, CV, pulmonary, GI, Skin and Neurologic except as noted in HPI or medical history.    Past Medical History:   Diagnosis Date    BPH (benign prostatic hypertrophy)     BPH with obstruction/lower urinary tract symptoms 2020    BPV (benign positional vertigo), unspecified laterality 10/21/2021    CARDIOVASCULAR SCREENING; LDL GOAL LESS THAN 100 2011    CARDIOVASCULAR SCREENING; LDL GOAL LESS THAN 130 2011    Class 1 obesity due to excess calories with body mass index (BMI) of 31.0 to 31.9 in adult 2020    Essential tremor 10/21/2019    Hypertension goal BP (blood pressure) < 140/90 2011    Hypothyroidism due to acquired atrophy  of thyroid 12/5/2016    Low HDL (under 40) 11/29/2022    Normocytic anemia 8/15/2017    Poor balance 10/21/2021    Prostate cancer (H) 1/2007    Had Radiation    Thrombocytopenia (H) 9/18/2019    Type 2 diabetes mellitus with hyperglycemia (H) 10/21/2010    Type 2 diabetes, HbA1c goal < 7% (H)      Past Surgical History:   Procedure Laterality Date    COLONOSCOPY       Family History   Problem Relation Age of Onset    Hypertension Mother     Alzheimer Disease Mother     Prostate Cancer Paternal Grandfather     Cancer No family hx of     Diabetes No family hx of     Cerebrovascular Disease No family hx of     Thyroid Disease No family hx of     Glaucoma No family hx of     Macular Degeneration No family hx of          Objective  /70   Wt 84.8 kg (186 lb 14.4 oz)   BMI 29.27 kg/m      General: healthy, alert and in no acute distress.    HEENT: no scleral icterus or conjunctival erythema.   Skin: no suspicious lesions or rash. No jaundice.   CV: regular rhythm by palpation, 2+ distal pulses.  Resp: normal respiratory effort without conversational dyspnea.   Psych: normal mood and affect.    Gait: nonantalgic, appropriate coordination and balance.     Neuro:        - Sensation to light touch:    - Intact throughout the BLE including all peripheral nerve distributions.        - MSR:      RLE  LLE  - Patella 2+ 2+  - Achilles 2+ 2+       - Special tests:   - Slump/SLR:  Neg bilaterally    MSK - Knee:       - Inspection:    - No significant swelling, erythema, warmth, ecchymosis, lesion.        - ROM:    - Full AROM/PROM with mild pain during terminal knee flexion, extension.       - Palpation:    - TTP at the medial patellar border.   - NTTP elsewhere.        - Strength:  (*antalgic)  RLE LLE  - Hip Flexion  5 5    - Hip Abduction 5 5   - Hip Adduction 5 5  - Knee Flexion  5 5  - Knee Extension 5 5  - Dorsiflexion  5 5  - Plantarflexion 5 5  - Ext. Naren. Longus 5 5  - Inversion  5 5  - Eversion  5 5       - Special  tests:        - Lachman:  Neg    - Roscoe:  Neg      - Varus stress:  Neg for laxity or pain     - Valgus stress:  Neg for laxity or pain    - Patellar grind: Positive      Radiology  I independently reviewed the available relevant imaging, with the following interpretation:   -XR L knee 7/13/2023 shows some enthesopathy at the distal insertion of the quadriceps tendon, no significant degenerative changes, no acute fractures or dislocations.    XR KNEE LEFT 3 VIEWS 7/13/2023 1:52 PM      HISTORY: Chronic pain of left knee; Chronic pain of left knee     COMPARISON: None.                                                                          IMPRESSION:The left knee is negative for fracture or compartmental  narrowing. No effusion. Chronic enthesitis along the superior pole of  the patella at the quadriceps attachment. .     DELFIN VALDEZ MD       Assessment  1. Osteoarthritis of left patellofemoral joint    2. Patellofemoral pain syndrome of left knee        Plan  Milo Lozano is a 88 year old male that presents with chronic left knee pain that has been present for years.  Feels pain on the medial side of the patella that feels like a cord gets pinched sporadically.  Feels clicking/grinding underneath the kneecap that is most often painless, sometimes with pain.  Today seems to be a good day without significant pain.  Exam is consistent for slight pain with patellar grind, no palpable cord concerning for plica syndrome.  Radiographs show remarkably preserved joint space for his age and no acute fracture or dislocation.  Most concerning symptoms are the pain and occasional instability that feels like he is going to buckle. History and physical exam appear most consistent with patellofemoral osteoarthritis/patellofemoral pain syndrome.     Discussed the nature of the condition and treatment options and mutually agreed upon the following plan:    - Imaging:          - Reviewed relevant imaging in the  chart.  - Reviewed results and images with patient.   - Medications:          - Discussed pharmacologic options for pain relief.   - May use NSAIDs (Ibuprofen, Naproxen) or Acetaminophen (Tylenol) as needed for pain control.   - May also use topical medications such as lidocaine, IcyHot, BioFreeze, or Voltaren gel as needed for pain control.  Recommend using Voltaren gel up to 4 times per day as needed over the painful knee  - Injections:          - Discussed possible injection options and alternatives.    - Options include intra-articular knee joint injections with corticosteroid, viscosupplementation, or PRP.  Previous corticosteroid injection did not help significantly.   - Deferred injections today and will consider them in the future as needed.   - Therapy:          - Discussed the benefits of physical therapy vs home exercise program for optimization of range of motion, flexibility, strength, stability and function.   - Previously tried physical therapy for the knee, which was not helpful  - Preference is for a home exercise program.   - Home Exercise Program given today in clinic and recommendation given to perform HEP daily and after exacerbations.  - Modalities:          - May use ice, heat, massage or other modalities as needed.   - Bracing:          - Discussed bracing options and recommend using a knee stability brace with patellar support such as a lateral J brace or Max pull brace.  - Options presented in clinic and choice given to take our brace from clinic or purchase an equivalent brace from an outside source.  - Activity:          - Encouraged to remain active and participate in regular activities as symptoms allow.  Avoid or modify exacerbating activities is much as possible.  - Follow up:          - As needed in the future for re-evaluation and update to treatment plan, or sooner for new/worsening symptoms.  - Patient has clinic contact information for questions or concerns.       Daniel Álvarez  LACY GOMEZ  [facility] - Department of Orthopedic Surgery       Disclaimer:  This note was prepared and written using Dragon Medical dictation software. As a result, there may be errors in the script that have gone undetected. Please consider this when interpreting the information in this note.

## 2023-08-04 ENCOUNTER — PRE VISIT (OUTPATIENT)
Dept: ORTHOPEDICS | Facility: CLINIC | Age: 88
End: 2023-08-04

## 2023-08-04 ENCOUNTER — OFFICE VISIT (OUTPATIENT)
Dept: ORTHOPEDICS | Facility: CLINIC | Age: 88
End: 2023-08-04
Attending: INTERNAL MEDICINE
Payer: COMMERCIAL

## 2023-08-04 VITALS — SYSTOLIC BLOOD PRESSURE: 132 MMHG | BODY MASS INDEX: 29.27 KG/M2 | WEIGHT: 186.9 LBS | DIASTOLIC BLOOD PRESSURE: 70 MMHG

## 2023-08-04 DIAGNOSIS — M17.12 OSTEOARTHRITIS OF LEFT PATELLOFEMORAL JOINT: Primary | ICD-10-CM

## 2023-08-04 DIAGNOSIS — M22.2X2 PATELLOFEMORAL PAIN SYNDROME OF LEFT KNEE: ICD-10-CM

## 2023-08-04 PROCEDURE — 99214 OFFICE O/P EST MOD 30 MIN: CPT | Performed by: STUDENT IN AN ORGANIZED HEALTH CARE EDUCATION/TRAINING PROGRAM

## 2023-08-04 ASSESSMENT — PAIN SCALES - GENERAL: PAINLEVEL: NO PAIN (0)

## 2023-08-04 NOTE — LETTER
2023         RE: Milo Lozano  [address] [id number]        Dear Colleague,    Thank you for referring your patient, Milo Lozano, to the [facility] [place]. Please see a copy of my visit note below.    Milo Lozano  :  1935  DOS: 2023  MRN: [id number]  PCP: Aj Garces    Sports Medicine Clinic Visit      HPI  Milo Lozano is a 88 year old male who is seen in consultation at the request of  Aj Garces M.D. presenting with left knee pain.    - Mechanism of Injury:  No inciting injury.   - Prior evaluation:    - Ortho for primary OA on 10/26/2021 with the most recent corticosteroid injection with Dr. Trever Schmidt at that visit.  - Seen on 2023 by Aj Garces MD.    - Pain Character:  Pain has been present for  years with worsening pain .  Pain is well localized to the anterior left knee without significant radiation.   - Endorses:  stiffness, pain, instability, weakness, occasional popping and grinding in the anteromedial knee under/adjacent to the patella.  Often feels like there is a popping cord near the medial patella  - Denies:  mechanical locking symptoms, numbness, tingling, radicular shooting pain.  - Alleviating factors:   no treatments tried to date  - Aggravating factors:   walking  - Treatments tried:   no treatments tried to date    - Patient Goals:  discuss treatment options  - Social History: retired      Review of Systems  Musculoskeletal: as above  Remainder of review of systems is negative including constitutional, CV, pulmonary, GI, Skin and Neurologic except as noted in HPI or medical history.    Past Medical History:   Diagnosis Date     BPH (benign prostatic hypertrophy)      BPH with obstruction/lower urinary tract symptoms 2020     BPV (benign positional vertigo), unspecified laterality 10/21/2021     CARDIOVASCULAR SCREENING; LDL GOAL LESS THAN 100 2011      CARDIOVASCULAR SCREENING; LDL GOAL LESS THAN 130 12/29/2011     Class 1 obesity due to excess calories with body mass index (BMI) of 31.0 to 31.9 in adult 9/17/2020     Essential tremor 10/21/2019     Hypertension goal BP (blood pressure) < 140/90 12/29/2011     Hypothyroidism due to acquired atrophy of thyroid 12/5/2016     Low HDL (under 40) 11/29/2022     Normocytic anemia 8/15/2017     Poor balance 10/21/2021     Prostate cancer (H) 1/2007    Had Radiation     Thrombocytopenia (H) 9/18/2019     Type 2 diabetes mellitus with hyperglycemia (H) 10/21/2010     Type 2 diabetes, HbA1c goal < 7% (H)      Past Surgical History:   Procedure Laterality Date     COLONOSCOPY       Family History   Problem Relation Age of Onset     Hypertension Mother      Alzheimer Disease Mother      Prostate Cancer Paternal Grandfather      Cancer No family hx of      Diabetes No family hx of      Cerebrovascular Disease No family hx of      Thyroid Disease No family hx of      Glaucoma No family hx of      Macular Degeneration No family hx of          Objective  /70   Wt 84.8 kg (186 lb 14.4 oz)   BMI 29.27 kg/m      General: healthy, alert and in no acute distress.    HEENT: no scleral icterus or conjunctival erythema.   Skin: no suspicious lesions or rash. No jaundice.   CV: regular rhythm by palpation, 2+ distal pulses.  Resp: normal respiratory effort without conversational dyspnea.   Psych: normal mood and affect.    Gait: nonantalgic, appropriate coordination and balance.     Neuro:        - Sensation to light touch:    - Intact throughout the BLE including all peripheral nerve distributions.        - MSR:      RLE  LLE  - Patella 2+ 2+  - Achilles 2+ 2+       - Special tests:   - Slump/SLR:  Neg bilaterally    MSK - Knee:       - Inspection:    - No significant swelling, erythema, warmth, ecchymosis, lesion.        - ROM:    - Full AROM/PROM with mild pain during terminal knee flexion, extension.       - Palpation:    -  TTP at the medial patellar border.   - NTTP elsewhere.        - Strength:  (*antalgic)  RLE LLE  - Hip Flexion  5 5    - Hip Abduction 5 5   - Hip Adduction 5 5  - Knee Flexion  5 5  - Knee Extension 5 5  - Dorsiflexion  5 5  - Plantarflexion 5 5  - Ext. Naren. Longus 5 5  - Inversion  5 5  - Eversion  5 5       - Special tests:        - Lachman:  Neg    - Roscoe:  Neg      - Varus stress:  Neg for laxity or pain     - Valgus stress:  Neg for laxity or pain    - Patellar grind: Positive      Radiology  I independently reviewed the available relevant imaging, with the following interpretation:   -XR L knee 7/13/2023 shows some enthesopathy at the distal insertion of the quadriceps tendon, no significant degenerative changes, no acute fractures or dislocations.    XR KNEE LEFT 3 VIEWS 7/13/2023 1:52 PM      HISTORY: Chronic pain of left knee; Chronic pain of left knee     COMPARISON: None.                                                                          IMPRESSION:The left knee is negative for fracture or compartmental  narrowing. No effusion. Chronic enthesitis along the superior pole of  the patella at the quadriceps attachment. .     DELFIN VALDEZ MD       Assessment  1. Osteoarthritis of left patellofemoral joint    2. Patellofemoral pain syndrome of left knee        Plan  Milo Lozano is a 88 year old male that presents with chronic left knee pain that has been present for years.  Feels pain on the medial side of the patella that feels like a cord gets pinched sporadically.  Feels clicking/grinding underneath the kneecap that is most often painless, sometimes with pain.  Today seems to be a good day without significant pain.  Exam is consistent for slight pain with patellar grind, no palpable cord concerning for plica syndrome.  Radiographs show remarkably preserved joint space for his age and no acute fracture or dislocation.  Most concerning symptoms are the pain and occasional instability that  feels like he is going to buckle. History and physical exam appear most consistent with patellofemoral osteoarthritis/patellofemoral pain syndrome.     Discussed the nature of the condition and treatment options and mutually agreed upon the following plan:    - Imaging:          - Reviewed relevant imaging in the chart.  - Reviewed results and images with patient.   - Medications:          - Discussed pharmacologic options for pain relief.   - May use NSAIDs (Ibuprofen, Naproxen) or Acetaminophen (Tylenol) as needed for pain control.   - May also use topical medications such as lidocaine, IcyHot, BioFreeze, or Voltaren gel as needed for pain control.  Recommend using Voltaren gel up to 4 times per day as needed over the painful knee  - Injections:          - Discussed possible injection options and alternatives.    - Options include intra-articular knee joint injections with corticosteroid, viscosupplementation, or PRP.  Previous corticosteroid injection did not help significantly.   - Deferred injections today and will consider them in the future as needed.   - Therapy:          - Discussed the benefits of physical therapy vs home exercise program for optimization of range of motion, flexibility, strength, stability and function.   - Previously tried physical therapy for the knee, which was not helpful  - Preference is for a home exercise program.   - Home Exercise Program given today in clinic and recommendation given to perform HEP daily and after exacerbations.  - Modalities:          - May use ice, heat, massage or other modalities as needed.   - Bracing:          - Discussed bracing options and recommend using a knee stability brace with patellar support such as a lateral J brace or Max pull brace.  - Options presented in clinic and choice given to take our brace from clinic or purchase an equivalent brace from an outside source.  - Activity:          - Encouraged to remain active and participate in regular  activities as symptoms allow.  Avoid or modify exacerbating activities is much as possible.  - Follow up:          - As needed in the future for re-evaluation and update to treatment plan, or sooner for new/worsening symptoms.  - Patient has clinic contact information for questions or concerns.       Daniel Álvarez DO, CAQSM  [facility] - Sports Medicine  [facility] - Department of Orthopedic Surgery       Disclaimer:  This note was prepared and written using Dragon Medical dictation software. As a result, there may be errors in the script that have gone undetected. Please consider this when interpreting the information in this note.       Again, thank you for allowing me to participate in the care of your patient.        Sincerely,        Daniel Álvarez DO

## 2023-08-04 NOTE — PATIENT INSTRUCTIONS
Lee Milo Lozano ,     A copy of your assessment and our treatment plan that we discussed together is included below, as written in your medical chart.   If you have any questions, please feel free to call the clinic.     --------------------------------------------------  Milo Lozano is a 88 year old male that presents with chronic left knee pain that has been present for years.  Feels pain on the medial side of the patella that feels like a cord gets pinched sporadically.  Feels clicking/grinding underneath the kneecap that is most often painless, sometimes with pain.  Today seems to be a good day without significant pain.  Exam is consistent for slight pain with patellar grind, no palpable cord concerning for plica syndrome.  Radiographs show remarkably preserved joint space for his age and no acute fracture or dislocation.  Most concerning symptoms are the pain and occasional instability that feels like he is going to buckle. History and physical exam appear most consistent with patellofemoral osteoarthritis/patellofemoral pain syndrome.     Discussed the nature of the condition and treatment options and mutually agreed upon the following plan:    - Imaging:          - Reviewed relevant imaging in the chart.  - Reviewed results and images with patient.   - Medications:          - Discussed pharmacologic options for pain relief.   - May use NSAIDs (Ibuprofen, Naproxen) or Acetaminophen (Tylenol) as needed for pain control.   - May also use topical medications such as lidocaine, IcyHot, BioFreeze, or Voltaren gel as needed for pain control.  Recommend using Voltaren gel up to 4 times per day as needed over the painful knee  - Injections:          - Discussed possible injection options and alternatives.    - Options include intra-articular knee joint injections with corticosteroid, viscosupplementation, or PRP.  Previous corticosteroid injection did not help significantly.   - Deferred injections  today and will consider them in the future as needed.   - Therapy:          - Discussed the benefits of physical therapy vs home exercise program for optimization of range of motion, flexibility, strength, stability and function.   - Previously tried physical therapy for the knee, which was not helpful  - Preference is for a home exercise program.   - Home Exercise Program given today in clinic and recommendation given to perform HEP daily and after exacerbations.  - Modalities:          - May use ice, heat, massage or other modalities as needed.   - Bracing:          - Discussed bracing options and recommend using a knee stability brace with patellar support such as a lateral J brace or Max pull brace.  - Options presented in clinic and choice given to take our brace from clinic or purchase an equivalent brace from an outside source.  - Activity:          - Encouraged to remain active and participate in regular activities as symptoms allow.  Avoid or modify exacerbating activities is much as possible.  - Follow up:          - As needed in the future for re-evaluation and update to treatment plan, or sooner for new/worsening symptoms.  - Patient has clinic contact information for questions or concerns.   --------------------------------------------------    It was a pleasure seeing you today. Thank you for choosing [facility] for your care.       Daniel Álvarez DO, LACY  [facility] - Sports Medicine  [facility] - Department of Orthopedic Surgery     Disclaimer:  This note was prepared and written using Dragon Medical dictation software. As a result, there may be errors in the script that have gone undetected. Please consider this when interpreting the information in this note.

## 2023-08-17 NOTE — PROGRESS NOTES
"[facility] Dermatology Note  Encounter Date: Jul 31, 2023  Office Visit     Dermatology Problem List:     1. 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 Continue wound care until healed over completely.  - Continue sun avoidance and sun protection.   - I recommended skin cancer screening follow up in the next 6 months. At my consult visit a few weeks ago, he was displeased about having to wait many weeks for an appointment to get this lesions biopsied. Even so,he minimized the importance of my recommendation as \"I'm 88 years old, something is going to get me.\" He did not schedule before leaving the clinic.     # Actinic Keratosis, Scalp  - Several more (apprx. 10-15) clinically apparent lesion are on his scalp. I treated about 5 with liquid nitrogen before he didn't want to continue. I offered him topical treatment with 5-fluorouracil or field treatment with photodynamic therapy to treat the remaining lesions. He declined again on account of age and death looming. I reiterated if any spots are getting worse or symptomatic, please contact the clinic for evaluation.     Procedures Performed:     - Cryotherapy procedure note, location(s): Scalp x5. After verbal consent and discussion of risks and benefits including, but not limited to, dyspigmentation/scar, blister, and pain, 5 AK lesions were treated with 1-2 mm freeze border for 1-2 cycles with liquid nitrogen. Post cryotherapy instructions were provided.    Follow-up: 6 month skin cancer screening recommended.     Staff:     Danny Swain DO    Department of Dermatology  [facility]: Phone: [phone], " Fax:[phone]  [facility]: Phone: [phone], Fax: [phone]    ____________________________________________    CC: Wound Check (Mohs- anterior crown. DOS: 07-. No concerns with infection or the site. )    HPI:  Mr. Milo Lozano is a(n) 88 year old male who presents today for follow-up  after Mohs surgery and rotation flap for SCCIS on the scalp. Denies pain and weeping.     Has notice more rough scaly spots on his scalp.     Patient is otherwise feeling well, without additional skin concerns.     Labs Reviewed:  N/A    Physical Exam:  Vitals: There were no vitals taken for this visit.  SKIN: Focused examination of scalp was performed.  - Geometric scar consistent with rotation flap with moderate crust, minimal adjacent erythema and edema.   - Pink pacule with superficial gritty scale scattered on the scalp.   - No other lesions of concern on areas examined.     Medications:  Current Outpatient Medications   Medication    amLODIPine (NORVASC) 10 MG tablet    blood glucose (ACCU-CHEK SMARTVIEW) test strip    blood glucose (NO BRAND SPECIFIED) lancets standard    blood glucose (NO BRAND SPECIFIED) test strip    blood glucose calibration (NO BRAND SPECIFIED) solution    blood glucose monitoring (ACCU-CHEK MILADYS SMARTVIEW) meter device kit    blood glucose monitoring (NO BRAND SPECIFIED) meter device kit    dapagliflozin (FARXIGA) 10 MG TABS tablet    folic acid (FOLVITE) 1 MG tablet    glimepiride (AMARYL) 4 MG tablet    levothyroxine (SYNTHROID/LEVOTHROID) 88 MCG tablet    losartan-hydrochlorothiazide (HYZAAR) 100-25 MG tablet    metFORMIN (GLUCOPHAGE XR) 500 MG 24 hr tablet    pregabalin (LYRICA) 75 MG capsule    propranolol ER (INDERAL LA) 80 MG 24 hr capsule    tamsulosin (FLOMAX) 0.4 MG capsule    thin (NO BRAND SPECIFIED) lancets     No current facility-administered medications for this visit.      Past Medical History:   Patient Active Problem  List   Diagnosis    Hypertension goal BP (blood pressure) < 150/90    CARDIOVASCULAR SCREENING; LDL GOAL LESS THAN 100    Prostate cancer (H)    Type 2 diabetes mellitus with kidney complication, without long-term current use of insulin (H)    Advanced directives, counseling/discussion    White coat hypertension    Hypothyroidism due to acquired atrophy of thyroid    DEMOND on CPAP    Diabetic polyneuropathy associated with type 2 diabetes mellitus (H)    CKD (chronic kidney disease) stage 3, GFR 30-59 ml/min (H)    BPH with obstruction/lower urinary tract symptoms    Class 1 obesity due to excess calories with body mass index (BMI) of 31.0 to 31.9 in adult    Normocytic anemia    Thrombocytopenia (H)    Poor balance    Essential tremor    Low HDL (under 40)     Past Medical History:   Diagnosis Date    BPH (benign prostatic hypertrophy)     BPH with obstruction/lower urinary tract symptoms 9/17/2020    BPV (benign positional vertigo), unspecified laterality 10/21/2021    CARDIOVASCULAR SCREENING; LDL GOAL LESS THAN 100 12/29/2011    CARDIOVASCULAR SCREENING; LDL GOAL LESS THAN 130 12/29/2011    Class 1 obesity due to excess calories with body mass index (BMI) of 31.0 to 31.9 in adult 9/17/2020    Essential tremor 10/21/2019    Hypertension goal BP (blood pressure) < 140/90 12/29/2011    Hypothyroidism due to acquired atrophy of thyroid 12/5/2016    Low HDL (under 40) 11/29/2022    Normocytic anemia 8/15/2017    Poor balance 10/21/2021    Prostate cancer (H) 1/2007    Had Radiation    Thrombocytopenia (H) 9/18/2019    Type 2 diabetes mellitus with hyperglycemia (H) 10/21/2010    Type 2 diabetes, HbA1c goal < 7% (H)

## 2023-08-23 DIAGNOSIS — D59.9 ACQUIRED HEMOLYTIC ANEMIA (H): ICD-10-CM

## 2023-08-23 RX ORDER — FOLIC ACID 1 MG/1
1 TABLET ORAL DAILY
Qty: 90 TABLET | Refills: 3 | Status: SHIPPED | OUTPATIENT
Start: 2023-08-23 | End: 2024-06-01

## 2023-08-23 NOTE — TELEPHONE ENCOUNTER
SUBJECTIVE/OBJECTIVE:                                                    Refill request receive for:  Folic Acid     Last refill per fax: not listed  Date of LOV r/t Medication: 5/9/22  Future appt scheduled? No   Date faxed to clinic: 8/22/23    PLAN:                                                      Sent to provider to advise.

## 2023-08-29 DIAGNOSIS — E11.21 TYPE 2 DIABETES MELLITUS WITH DIABETIC NEPHROPATHY, WITHOUT LONG-TERM CURRENT USE OF INSULIN (H): ICD-10-CM

## 2023-08-29 RX ORDER — DAPAGLIFLOZIN 10 MG/1
10 TABLET, FILM COATED ORAL DAILY
Qty: 90 TABLET | Refills: 1 | OUTPATIENT
Start: 2023-08-29

## 2023-08-29 RX ORDER — PROPRANOLOL HYDROCHLORIDE 80 MG/1
CAPSULE, EXTENDED RELEASE ORAL
Qty: 90 CAPSULE | Refills: 3 | OUTPATIENT
Start: 2023-08-29

## 2023-09-08 ENCOUNTER — TELEPHONE (OUTPATIENT)
Dept: FAMILY MEDICINE | Facility: CLINIC | Age: 88
End: 2023-09-08
Payer: COMMERCIAL

## 2023-09-08 NOTE — TELEPHONE ENCOUNTER
Forms/Letter Request    Type of form/letter:  [facility] Renewal Form    Have you been seen for this request: N/A    Do we have the form/letter: Yes: Will place form on providers desk for review/signature    When is form/letter needed by: asap    How would you like the form/letter returned: Fax : [phone]

## 2023-09-20 NOTE — TELEPHONE ENCOUNTER
3rd attempt:  Letter sent.    Left message for patient to return call to clinic and ask to speak with RN.      Routed to  to order the glimiperide RX.    Bella Braxton RN,   [facility]    
Called patient, this message is from January, 2018.   He states he was in [place] until recently.    The patient did not get the letter noted, but he did stop the Metformin.   He has not taking the glimiperide.     He complains that he is dizzy most of the time, this is ongoing, his feet have increased neuropathy pain.  His blood sugars have been in the upper 200s and low 300s.  He is unhappy with the follow through, and would like to see Dr. Hebert for follow up. Scheduled patient with Dr. Hebert tomorrow.    Miesha Bazan RN, [facility]        
Called the patient and left a message to call back.     Mily Rosario RN   [facility]    
Left message for patient to return call to clinic and ask to speak with RN.    Bella Braxton RN,   [facility]    
Patient returned call to clinic  
Please notify the patient of the results below.    Notes Recorded by Laurence Ibarra MD PhD on 1/2/2018 at 1:37 PM  Please let pt know:  1. A1c is excellent. His Creatinine is getting higher. So best not to continue with metformin. I sent him glimepiride 2 mg once a day to replace Metformin.   2. His thyroid test indicated that he may be developing low thyroid state. I'd like to recheck this in 2-4 weeks with additional blood test to confirm low thyroid state. We may consider treating this if his TSH keeps going higher.   3. Creatinine is slightly higher than before but still within the range of stage 3 kidney disease. We'll monitor this every 6 months, instead of once a year.    Mily Rosario RN   .[facility], Primary Care    
TELEMETRY

## 2023-10-06 ENCOUNTER — LAB (OUTPATIENT)
Dept: LAB | Facility: CLINIC | Age: 88
End: 2023-10-06
Payer: COMMERCIAL

## 2023-10-06 DIAGNOSIS — D69.6 THROMBOCYTOPENIA (H): ICD-10-CM

## 2023-10-06 DIAGNOSIS — D64.9 NORMOCYTIC ANEMIA: ICD-10-CM

## 2023-10-06 DIAGNOSIS — N18.32 STAGE 3B CHRONIC KIDNEY DISEASE (H): ICD-10-CM

## 2023-10-06 DIAGNOSIS — E11.21 TYPE 2 DIABETES MELLITUS WITH DIABETIC NEPHROPATHY, WITHOUT LONG-TERM CURRENT USE OF INSULIN (H): ICD-10-CM

## 2023-10-06 DIAGNOSIS — E03.4 HYPOTHYROIDISM DUE TO ACQUIRED ATROPHY OF THYROID: ICD-10-CM

## 2023-10-06 DIAGNOSIS — D64.9 ANEMIA, UNSPECIFIED TYPE: ICD-10-CM

## 2023-10-06 LAB
ALBUMIN SERPL BCG-MCNC: 4.4 G/DL (ref 3.5–5.2)
ANION GAP SERPL CALCULATED.3IONS-SCNC: 12 MMOL/L (ref 7–15)
BASO+EOS+MONOS # BLD AUTO: ABNORMAL 10*3/UL
BASO+EOS+MONOS NFR BLD AUTO: ABNORMAL %
BASOPHILS # BLD AUTO: ABNORMAL 10*3/UL
BASOPHILS # BLD MANUAL: 0 10E3/UL (ref 0–0.2)
BASOPHILS NFR BLD AUTO: ABNORMAL %
BASOPHILS NFR BLD MANUAL: 0 %
BUN SERPL-MCNC: 36.3 MG/DL (ref 8–23)
CALCIUM SERPL-MCNC: 9.3 MG/DL (ref 8.8–10.2)
CHLORIDE SERPL-SCNC: 104 MMOL/L (ref 98–107)
CREAT SERPL-MCNC: 1.85 MG/DL (ref 0.67–1.17)
DEPRECATED HCO3 PLAS-SCNC: 24 MMOL/L (ref 22–29)
EGFRCR SERPLBLD CKD-EPI 2021: 35 ML/MIN/1.73M2
EOSINOPHIL # BLD AUTO: ABNORMAL 10*3/UL
EOSINOPHIL # BLD MANUAL: 0 10E3/UL (ref 0–0.7)
EOSINOPHIL NFR BLD AUTO: ABNORMAL %
EOSINOPHIL NFR BLD MANUAL: 0 %
ERYTHROCYTE [DISTWIDTH] IN BLOOD BY AUTOMATED COUNT: 17.1 % (ref 10–15)
GLUCOSE SERPL-MCNC: 209 MG/DL (ref 70–99)
HBA1C MFR BLD: 7.7 % (ref 0–5.6)
HCT VFR BLD AUTO: 28 % (ref 40–53)
HGB BLD-MCNC: 9 G/DL (ref 13.3–17.7)
IMM GRANULOCYTES # BLD: ABNORMAL 10*3/UL
IMM GRANULOCYTES NFR BLD: ABNORMAL %
LYMPHOCYTES # BLD AUTO: ABNORMAL 10*3/UL
LYMPHOCYTES # BLD MANUAL: 1.4 10E3/UL (ref 0.8–5.3)
LYMPHOCYTES NFR BLD AUTO: ABNORMAL %
LYMPHOCYTES NFR BLD MANUAL: 14 %
MCH RBC QN AUTO: 27.4 PG (ref 26.5–33)
MCHC RBC AUTO-ENTMCNC: 32.1 G/DL (ref 31.5–36.5)
MCV RBC AUTO: 85 FL (ref 78–100)
MONOCYTES # BLD AUTO: ABNORMAL 10*3/UL
MONOCYTES # BLD MANUAL: 2.7 10E3/UL (ref 0–1.3)
MONOCYTES NFR BLD AUTO: ABNORMAL %
MONOCYTES NFR BLD MANUAL: 27 %
NEUTROPHILS # BLD AUTO: ABNORMAL 10*3/UL
NEUTROPHILS # BLD MANUAL: 5.8 10E3/UL (ref 1.6–8.3)
NEUTROPHILS NFR BLD AUTO: ABNORMAL %
NEUTROPHILS NFR BLD MANUAL: 59 %
NRBC # BLD AUTO: 0 10E3/UL
NRBC BLD AUTO-RTO: 0 /100
PHOSPHATE SERPL-MCNC: 3.5 MG/DL (ref 2.5–4.5)
PLAT MORPH BLD: ABNORMAL
PLATELET # BLD AUTO: 74 10E3/UL (ref 150–450)
POTASSIUM SERPL-SCNC: 4.4 MMOL/L (ref 3.4–5.3)
RBC # BLD AUTO: 3.29 10E6/UL (ref 4.4–5.9)
RBC MORPH BLD: ABNORMAL
SODIUM SERPL-SCNC: 140 MMOL/L (ref 135–145)
T4 FREE SERPL-MCNC: 1.7 NG/DL (ref 0.9–1.7)
TSH SERPL DL<=0.005 MIU/L-ACNC: 4.24 UIU/ML (ref 0.3–4.2)
WBC # BLD AUTO: 9.9 10E3/UL (ref 4–11)

## 2023-10-06 PROCEDURE — 83036 HEMOGLOBIN GLYCOSYLATED A1C: CPT

## 2023-10-06 PROCEDURE — 83540 ASSAY OF IRON: CPT

## 2023-10-06 PROCEDURE — 84443 ASSAY THYROID STIM HORMONE: CPT

## 2023-10-06 PROCEDURE — 85027 COMPLETE CBC AUTOMATED: CPT

## 2023-10-06 PROCEDURE — 82728 ASSAY OF FERRITIN: CPT

## 2023-10-06 PROCEDURE — 84439 ASSAY OF FREE THYROXINE: CPT

## 2023-10-06 PROCEDURE — 83550 IRON BINDING TEST: CPT

## 2023-10-06 PROCEDURE — 85007 BL SMEAR W/DIFF WBC COUNT: CPT

## 2023-10-06 PROCEDURE — 36415 COLL VENOUS BLD VENIPUNCTURE: CPT

## 2023-10-06 PROCEDURE — 80069 RENAL FUNCTION PANEL: CPT

## 2023-10-08 DIAGNOSIS — D64.9 ANEMIA, UNSPECIFIED TYPE: Primary | ICD-10-CM

## 2023-10-08 LAB
FERRITIN SERPL-MCNC: 467 NG/ML (ref 31–409)
IRON BINDING CAPACITY (ROCHE): 222 UG/DL (ref 240–430)
IRON SATN MFR SERPL: 14 % (ref 15–46)
IRON SERPL-MCNC: 30 UG/DL (ref 61–157)

## 2023-10-10 ENCOUNTER — OFFICE VISIT (OUTPATIENT)
Dept: FAMILY MEDICINE | Facility: CLINIC | Age: 88
End: 2023-10-10
Payer: COMMERCIAL

## 2023-10-10 VITALS
TEMPERATURE: 97.4 F | SYSTOLIC BLOOD PRESSURE: 128 MMHG | WEIGHT: 188.3 LBS | RESPIRATION RATE: 19 BRPM | HEIGHT: 68 IN | HEART RATE: 68 BPM | OXYGEN SATURATION: 96 % | BODY MASS INDEX: 28.54 KG/M2 | DIASTOLIC BLOOD PRESSURE: 58 MMHG

## 2023-10-10 DIAGNOSIS — E61.1 IRON DEFICIENCY: ICD-10-CM

## 2023-10-10 DIAGNOSIS — N13.8 BPH WITH OBSTRUCTION/LOWER URINARY TRACT SYMPTOMS: ICD-10-CM

## 2023-10-10 DIAGNOSIS — C61 PROSTATE CANCER (H): ICD-10-CM

## 2023-10-10 DIAGNOSIS — E11.42 DIABETIC POLYNEUROPATHY ASSOCIATED WITH TYPE 2 DIABETES MELLITUS (H): ICD-10-CM

## 2023-10-10 DIAGNOSIS — Z00.00 ENCOUNTER FOR MEDICARE ANNUAL WELLNESS EXAM: ICD-10-CM

## 2023-10-10 DIAGNOSIS — R42 DIZZINESS: ICD-10-CM

## 2023-10-10 DIAGNOSIS — E11.21 TYPE 2 DIABETES MELLITUS WITH DIABETIC NEPHROPATHY, WITHOUT LONG-TERM CURRENT USE OF INSULIN (H): Primary | ICD-10-CM

## 2023-10-10 DIAGNOSIS — R26.89 POOR BALANCE: ICD-10-CM

## 2023-10-10 DIAGNOSIS — I10 HYPERTENSION GOAL BP (BLOOD PRESSURE) < 150/90: ICD-10-CM

## 2023-10-10 DIAGNOSIS — E03.4 HYPOTHYROIDISM DUE TO ACQUIRED ATROPHY OF THYROID: ICD-10-CM

## 2023-10-10 DIAGNOSIS — N18.32 STAGE 3B CHRONIC KIDNEY DISEASE (H): ICD-10-CM

## 2023-10-10 DIAGNOSIS — R60.0 BILATERAL LEG EDEMA: ICD-10-CM

## 2023-10-10 DIAGNOSIS — Z23 NEED FOR COVID-19 VACCINE: ICD-10-CM

## 2023-10-10 DIAGNOSIS — N40.1 BPH WITH OBSTRUCTION/LOWER URINARY TRACT SYMPTOMS: ICD-10-CM

## 2023-10-10 DIAGNOSIS — G47.33 OSA ON CPAP: ICD-10-CM

## 2023-10-10 DIAGNOSIS — D69.6 THROMBOCYTOPENIA (H): ICD-10-CM

## 2023-10-10 DIAGNOSIS — G25.0 ESSENTIAL TREMOR: ICD-10-CM

## 2023-10-10 DIAGNOSIS — N18.32 ANEMIA DUE TO STAGE 3B CHRONIC KIDNEY DISEASE (H): ICD-10-CM

## 2023-10-10 DIAGNOSIS — Z23 NEED FOR INFLUENZA VACCINATION: ICD-10-CM

## 2023-10-10 DIAGNOSIS — D63.1 ANEMIA DUE TO STAGE 3B CHRONIC KIDNEY DISEASE (H): ICD-10-CM

## 2023-10-10 PROBLEM — N18.9 ANEMIA DUE TO CHRONIC KIDNEY DISEASE: Status: ACTIVE | Noted: 2023-10-10

## 2023-10-10 LAB
ALBUMIN UR-MCNC: 30 MG/DL
APPEARANCE UR: CLEAR
BACTERIA #/AREA URNS HPF: ABNORMAL /HPF
BILIRUB UR QL STRIP: NEGATIVE
COLOR UR AUTO: YELLOW
GLUCOSE UR STRIP-MCNC: 500 MG/DL
HGB UR QL STRIP: ABNORMAL
KETONES UR STRIP-MCNC: NEGATIVE MG/DL
LEUKOCYTE ESTERASE UR QL STRIP: NEGATIVE
NITRATE UR QL: NEGATIVE
PH UR STRIP: 5.5 [PH] (ref 5–7)
RBC #/AREA URNS AUTO: ABNORMAL /HPF
SP GR UR STRIP: 1.01 (ref 1–1.03)
SQUAMOUS #/AREA URNS AUTO: ABNORMAL /LPF
UROBILINOGEN UR STRIP-ACNC: 0.2 E.U./DL
WBC #/AREA URNS AUTO: ABNORMAL /HPF

## 2023-10-10 PROCEDURE — 99214 OFFICE O/P EST MOD 30 MIN: CPT | Mod: 24 | Performed by: INTERNAL MEDICINE

## 2023-10-10 PROCEDURE — 91320 SARSCV2 VAC 30MCG TRS-SUC IM: CPT | Performed by: INTERNAL MEDICINE

## 2023-10-10 PROCEDURE — 90662 IIV NO PRSV INCREASED AG IM: CPT | Performed by: INTERNAL MEDICINE

## 2023-10-10 PROCEDURE — 90480 ADMN SARSCOV2 VAC 1/ONLY CMP: CPT | Performed by: INTERNAL MEDICINE

## 2023-10-10 PROCEDURE — 81001 URINALYSIS AUTO W/SCOPE: CPT | Performed by: INTERNAL MEDICINE

## 2023-10-10 PROCEDURE — G0008 ADMIN INFLUENZA VIRUS VAC: HCPCS | Performed by: INTERNAL MEDICINE

## 2023-10-10 RX ORDER — AMLODIPINE BESYLATE 5 MG/1
TABLET ORAL
Qty: 90 TABLET | Refills: 3 | Status: SHIPPED | OUTPATIENT
Start: 2023-10-10 | End: 2023-11-09

## 2023-10-10 RX ORDER — FERROUS SULFATE 325(65) MG
325 TABLET ORAL
Qty: 90 TABLET | Refills: 3 | Status: SHIPPED | OUTPATIENT
Start: 2023-10-10 | End: 2024-01-10

## 2023-10-10 ASSESSMENT — PAIN SCALES - GENERAL: PAINLEVEL: MILD PAIN (2)

## 2023-10-10 NOTE — PROGRESS NOTES
Assessment & Plan     1.  Type 2 diabetes mellitus with diabetic nephropathy.  Hemoglobin A1c has dropped down to 7.7 from previously measured 8.7 after increasing dose of dapagliflozin.  Continue dapagliflozin 10 mg, metformin 1 g a day and glimepiride 8 mg daily.  If his renal function continues to get worse we will have to assess metformin use.  2.  Stage IIIb chronic kidney disease.  Renal function is progressively getting worse and now the creatinine is 1.85 with estimated GFR 35.  I decided obtain ultrasound of the kidneys to make sure he does not have obstructive disease.  He does have issues with BPH, urinary frequency with some incontinence.  3.  Essential hypertension with blood pressure under control with echocardiogram.  Currently on amlodipine 10 mg a day, propanolol LA 80 mg a day which she will continue.  4.  Leg edema right worse than left.  Already could be related to amlodipine some mild to cut back to 5 mg a day particularly since his blood pressure is good.  Advised to start monitoring blood pressure at home and recheck in 3 months.  5.  Anemia probably combination of chronic kidney disease and also probably iron deficiency since his serum iron and saturation are low although ferritin is high.  Hemoglobin is dropped down to 9.0.  We will start ferrous sulfate 325 mg daily and reassess in 3 months.  6.  Iron deficiency based on low serum iron saturation.  7.  Hypothyroidism.  TSH is 4.24 and free T4 is 1.70 so I will continue with present dose of levothyroxine at 88 mcg daily.  Recheck in 6 months.  8.  Chronic thrombocytopenia with recent platelet count at 74,000.  9.  Chronic dizziness of at least 5 to 7 years.  Extensive evaluation in the past.  Was also had the balance clinic in [facility] which did not help.  It sounds more vertiginous type dizziness and not central.   10.  Poor balance.Last follow-up was in March 2020.  A friend's house.  Uses cane for ambulation.  11.  Obstructive sleep apnea  "uses CPAP consistently.  12.  Benign prostatic hypertrophy with lower urinary obstructive symptoms.  Is only on tamsulosin 0.4 mg twice a day.  We will continue same.  Continue.    13.  Diabetic polyneuropathy.  Currently on Lyrica 75 mg twice a day we will continue same.  Symptoms unchanged.  14.  Essential tremors.  Unchanged.  He is on propranolol.  15.  COVID-19 and flu vaccine provided in the clinic today.  16.  Discussed getting shingles vaccine which she declined.    Will return to see me for follow-up in 3 months with A1c CBC renal panel in the meantime I will obtain urine analysis today and also obtain ultrasound of the kidneys for reasons stated above.      BMI:   Estimated body mass index is 28.46 kg/m  as calculated from the following:    Height as of this encounter: 1.732 m (5' 8.2\").    Weight as of this encounter: 85.4 kg (188 lb 4.8 oz).       Aj Garces MD  [facility]        Hypertension Follow-up    Do you check your blood pressure regularly outside of the clinic? No   Are you following a low salt diet? Yes  Are your blood pressures ever more than 140 on the top number (systolic) OR more   than 90 on the bottom number (diastolic), for example 140/90? No  How many servings of fruits and vegetables do you eat daily?  2-3  On average, how many sweetened beverages do you drink each day (Examples: soda, juice, sweet tea, etc.  Do NOT count diet or artificially sweetened beverages)?   0  How many days per week do you exercise enough to make your heart beat faster? 3 or less  How many minutes a day do you exercise enough to make your heart beat faster? 9 or less  How many days per week do you miss taking your medication? 0    88-year-old gentleman comes in for follow-up on multiple health issues as stated in the problem list.  He states is about the same.  Balance is not any better.  He does use cane to walk.  Continues to have vertiginous type chronic dizziness for the past 5 " "to 7 years.  Extensive evaluation in the past has been negative.  He has noticed some increased leg edema.  He has not been checking his blood pressure at home.  Follow-up urine has been noted.      Review of Systems   Constitutional, HEENT, cardiovascular, pulmonary, GI, , musculoskeletal, neuro, skin, endocrine and psych systems are negative, except as otherwise noted.      Objective    /58 (BP Location: Right arm, Patient Position: Sitting, Cuff Size: Adult Large)   Pulse 68   Temp 97.4  F (36.3  C) (Temporal)   Resp 19   Ht 1.732 m (5' 8.2\")   Wt 85.4 kg (188 lb 4.8 oz)   SpO2 96%   BMI 28.46 kg/m    Body mass index is 28.46 kg/m .  Physical Exam   GENERAL: healthy, alert and no distress  NECK: no adenopathy, no asymmetry, masses, or scars and thyroid normal to palpation  RESP: lungs clear to auscultation - no rales, rhonchi or wheezes  CV: regular rates and rhythm, normal S1 S2, no S3 or S4, no murmur, click or rub.  2+ right leg edema trace to 1+ left leg edema.  ABDOMEN: soft, nontender, no hepatosplenomegaly, no masses and bowel sounds normal  MS: no gross musculoskeletal defects noted       Latest Reference Range & Units 10/06/23 08:07   Sodium 135 - 145 mmol/L 140   Potassium 3.4 - 5.3 mmol/L 4.4   Chloride 98 - 107 mmol/L 104   Carbon Dioxide (CO2) 22 - 29 mmol/L 24   Urea Nitrogen 8.0 - 23.0 mg/dL 36.3 (H)   Creatinine 0.67 - 1.17 mg/dL 1.85 (H)   GFR Estimate >60 mL/min/1.73m2 35 (L)   Calcium 8.8 - 10.2 mg/dL 9.3   Anion Gap 7 - 15 mmol/L 12   Phosphorus 2.5 - 4.5 mg/dL 3.5   Albumin 3.5 - 5.2 g/dL 4.4   Ferritin 31 - 409 ng/mL 467 (H)   Glucose 70 - 99 mg/dL 209 (H)   Hemoglobin A1C 0.0 - 5.6 % 7.7 (H)   Iron 61 - 157 ug/dL 30 (L)   Iron Binding Capacity 240 - 430 ug/dL 222 (L)   Iron Sat Index 15 - 46 % 14 (L)   (H): Data is abnormally high  (L): Data is abnormally low     Latest Reference Range & Units 10/06/23 08:07   T4 Free 0.90 - 1.70 ng/dL 1.70   TSH 0.30 - 4.20 uIU/mL 4.24 (H) "   WBC 4.0 - 11.0 10e3/uL 9.9   Hemoglobin 13.3 - 17.7 g/dL 9.0 (L)   Hematocrit 40.0 - 53.0 % 28.0 (L)   Platelet Count 150 - 450 10e3/uL 74 (L)   RBC Count 4.40 - 5.90 10e6/uL 3.29 (L)   MCV 78 - 100 fL 85   MCH 26.5 - 33.0 pg 27.4   MCHC 31.5 - 36.5 g/dL 32.1   RDW 10.0 - 15.0 % 17.1 (H)   % Neutrophils % 59   % Lymphocytes % 14   % Monocytes % 27   % Eosinophils % 0   % Basophils % 0   Absolute Basophils 0.0 - 0.2 10e3/uL 0.0   Absolute Neutrophil 1.6 - 8.3 10e3/uL 5.8   Absolute Lymphocytes 0.8 - 5.3 10e3/uL 1.4   Absolute Monocytes 0.0 - 1.3 10e3/uL 2.7 (H)   Absolute Eosinophils 0.0 - 0.7 10e3/uL 0.0   (H): Data is abnormally high  (L): Data is abnormally low              He is at risk for falling and has been provided with information to reduce the risk of falling at home.

## 2023-10-10 NOTE — PATIENT INSTRUCTIONS
Patient Education   Personalized Prevention Plan  You are due for the preventive services outlined below.  Your care team is available to assist you in scheduling these services.  If you have already completed any of these items, please share that information with your care team to update in your medical record.  Health Maintenance Due   Topic Date Due     Discuss Advance Care Planning  10/17/2019     Zoster (Shingles) Vaccine (2 of 2) 12/11/2019     Annual Wellness Visit  10/16/2020     Diabetic Foot Exam  10/21/2022     Prostate Test  05/09/2023     Eye Exam  06/14/2023     Preventing Falls: Care Instructions    Talk to your doctor about the medicines you take. Ask if any of them increase the risk of falls and whether they can be changed or stopped.   Try to exercise regularly. It can help improve your strength and balance. This can help lower your risk of falling.     Practice fall safety and prevention.    Wear low-heeled shoes that fit well and give your feet good support. Talk to your doctor if you have foot problems that make this hard.  Carry a cellphone or wear a medical alert device that you can use to call for help.  Use stepladders instead of chairs to reach high objects. Don't climb if you're at risk for falls. Ask for help, if needed.  Wear the correct eyeglasses, if you need them.    Make your home safer.    Remove rugs, cords, clutter, and furniture from walkways.  Keep your house well lit. Use night-lights in hallways and bathrooms.  Install and use sturdy handrails on stairways.  Wear nonskid footwear, even inside. Don't walk barefoot or in socks without shoes.    Be safe outside.    Use handrails, curb cuts, and ramps whenever possible.  Keep your hands free by using a shoulder bag or backpack.  Try to walk in well-lit areas. Watch out for uneven ground, changes in pavement, and debris.  Be careful in the winter. Walk on the grass or gravel when sidewalks are slippery. Use de-icer on steps and  "walkways. Add non-slip devices to shoes.    Put grab bars and nonskid mats in your shower or tub and near the toilet. Try to use a shower chair or bath bench when bathing.   Get into a tub or shower by putting in your weaker leg first. Get out with your strong side first. Have a phone or medical alert device in the bathroom with you.   Where can you learn more?  Go to https://www.[employer].net/patiented  Enter G117 in the search box to learn more about \"Preventing Falls: Care Instructions.\"  Current as of: November 9, 2022               Content Version: 13.7    1147-9099 [employer].   Care instructions adapted under license by your healthcare professional. If you have questions about a medical condition or this instruction, always ask your healthcare professional. [employer] disclaims any warranty or liability for your use of this information.      How to Get Up Safely After a Fall: Care Instructions  Overview     If you have injuries, health problems, or other reasons that may make it easy for you to fall at home, it is a good idea to learn how to get up safely after a fall. Learning how to get up correctly can help you avoid making an injury worse.  Also, knowing what to do if you cannot get up can help you stay safe until help arrives.  Follow-up care is a key part of your treatment and safety. Be sure to make and go to all appointments, and call your doctor if you are having problems. It's also a good idea to know your test results and keep a list of the medicines you take.  How can you care for yourself after a fall?  If you think you can get up  First lie still for a few minutes and think about how you feel. If your body feels okay and you think you can get up safely, follow the rest of the steps below:  Look for a chair or other piece of furniture that is close to you.  Roll onto your side and rest. Roll by turning your head in the direction you want to roll, move your shoulder " "and arm, then hip and leg in the same direction.  Lie still for a moment to let your blood pressure adjust.  Slowly push your upper body up, lift your head, and take a moment to rest.  Slowly get up on your hands and knees, and crawl to the chair or other stable piece of furniture.  Put your hands on the chair.  Move one foot forward, and place it flat on the floor. Your other leg should be bent with the knee on the floor.  Rise slowly, turn your body, and sit in the chair. Stay seated for a bit and think about how you feel. Call for help. Even if you feel okay, let someone know what happened to you. You might not know that you have a serious injury.  If you cannot get up  If you think you are injured after a fall or you cannot get up, try not to panic.  Call out for help.  If you have a phone within reach or you have an emergency call device, use it to call for help.  If you do not have a phone within reach, try to slide yourself toward it. If you cannot get to the phone, try to slide toward a door or window or a place where you think you can be heard.  [place] or use an object to make noise so someone might hear you.  If you can reach something that you can use for a pillow, place it under your head. Try to stay warm by covering yourself with a blanket or clothing while you wait for help.  When should you call for help?   Call 911 anytime you think you may need emergency care. For example, call if:    You passed out (lost consciousness).     You cannot get up after a fall.     You have severe pain.   Call your doctor now or seek immediate medical care if:    You have new or worse pain.     You are dizzy or lightheaded.     You hit your head.   Watch closely for changes in your health, and be sure to contact your doctor if:    You do not get better as expected.   Where can you learn more?  Go to https://www.healthwise.net/patiented  Enter G513 in the search box to learn more about \"How to Get Up Safely After a Fall: " "Care Instructions.\"  Current as of: November 14, 2022               Content Version: 13.7    8824-0642 [employer].   Care instructions adapted under license by your healthcare professional. If you have questions about a medical condition or this instruction, always ask your healthcare professional. [employer] disclaims any warranty or liability for your use of this information.         "

## 2023-10-10 NOTE — NURSING NOTE
Prior to immunization administration, verified patients identity using patient s name and date of birth. Please see Immunization Activity for additional information.     Screening Questionnaire for Adult Immunization    Are you sick today?   No   Do you have allergies to medications, food, a vaccine component or latex?   No   Have you ever had a serious reaction after receiving a vaccination?   No   Do you have a long-term health problem with heart, lung, kidney, or metabolic disease (e.g., diabetes), asthma, a blood disorder, no spleen, complement component deficiency, a cochlear implant, or a spinal fluid leak?  Are you on long-term aspirin therapy?   No   Do you have cancer, leukemia, HIV/AIDS, or any other immune system problem?   No   Do you have a parent, brother, or sister with an immune system problem?   No   In the past 3 months, have you taken medications that affect  your immune system, such as prednisone, other steroids, or anticancer drugs; drugs for the treatment of rheumatoid arthritis, Crohn s disease, or psoriasis; or have you had radiation treatments?   No   Have you had a seizure, or a brain or other nervous system problem?   No   During the past year, have you received a transfusion of blood or blood    products, or been given immune (gamma) globulin or antiviral drug?   No   For women: Are you pregnant or is there a chance you could become       pregnant during the next month?   No   Have you received any vaccinations in the past 4 weeks?   No     Immunization questionnaire answers were all negative.      Patient instructed to remain in clinic for 15 minutes afterwards, and to report any adverse reactions.     Screening performed by Angie Campuzano MA on 10/10/2023 at 2:57 PM.

## 2023-10-12 ENCOUNTER — ANCILLARY PROCEDURE (OUTPATIENT)
Dept: ULTRASOUND IMAGING | Facility: CLINIC | Age: 88
End: 2023-10-12
Attending: INTERNAL MEDICINE
Payer: COMMERCIAL

## 2023-10-12 DIAGNOSIS — N18.32 STAGE 3B CHRONIC KIDNEY DISEASE (H): ICD-10-CM

## 2023-10-12 PROCEDURE — 76770 US EXAM ABDO BACK WALL COMP: CPT

## 2023-11-02 DIAGNOSIS — G25.0 ESSENTIAL TREMOR: Primary | ICD-10-CM

## 2023-11-02 DIAGNOSIS — E11.42 DIABETIC POLYNEUROPATHY ASSOCIATED WITH TYPE 2 DIABETES MELLITUS (H): ICD-10-CM

## 2023-11-03 RX ORDER — PROPRANOLOL HYDROCHLORIDE 80 MG/1
CAPSULE, EXTENDED RELEASE ORAL
Qty: 90 CAPSULE | Refills: 3 | Status: SHIPPED | OUTPATIENT
Start: 2023-11-03

## 2023-11-03 RX ORDER — PREGABALIN 75 MG/1
CAPSULE ORAL
Qty: 180 CAPSULE | Refills: 3 | Status: SHIPPED | OUTPATIENT
Start: 2023-11-03 | End: 2024-08-14

## 2023-11-08 DIAGNOSIS — I10 HYPERTENSION GOAL BP (BLOOD PRESSURE) < 140/90: ICD-10-CM

## 2023-11-08 RX ORDER — AMLODIPINE BESYLATE 10 MG/1
TABLET ORAL
Qty: 90 TABLET | Refills: 3 | Status: SHIPPED | OUTPATIENT
Start: 2023-11-08 | End: 2023-11-09

## 2023-11-08 NOTE — TELEPHONE ENCOUNTER
Upon chart review, a new script was sent to the pharmacy for Amlodipine 5 mg tablets on 10/10/23 for 90 tablets with 3 additional refills on file.     Refill request from the pharmacy is for Amlodipine 10 mg tablets. Will route to PCP to please refuse refill request due to change in dosing.     Thank you,  Stephanie Lloyd RN

## 2023-11-09 ENCOUNTER — TELEPHONE (OUTPATIENT)
Dept: FAMILY MEDICINE | Facility: CLINIC | Age: 88
End: 2023-11-09
Payer: COMMERCIAL

## 2023-11-09 DIAGNOSIS — I10 HYPERTENSION GOAL BP (BLOOD PRESSURE) < 140/90: ICD-10-CM

## 2023-11-09 RX ORDER — AMLODIPINE BESYLATE 5 MG/1
TABLET ORAL
Qty: 90 TABLET | Refills: 3 | Status: SHIPPED | OUTPATIENT
Start: 2023-11-09 | End: 2024-08-28

## 2023-11-09 NOTE — TELEPHONE ENCOUNTER
Patient calling and states he is confused about medications. He is currently taking amLODIPine (NORVASC) 5 MG tablet. Per office visit notes, dose clarification needed. Amlodipine 10 mg tablet sent to pharmacy and patient has been cutting them in half.     Provider: do you want patient to take 5 or 10 mg amlodipine? Patient is currently on 5 mg. Patient has been taking blood pressure weekly and reports right leg is still swollen.     10/17: 139/67  10/23: 124/58  10/31: 113/51    Per office visit notes from 10/10/23:  3.  Essential hypertension with blood pressure under control with echocardiogram.  Currently on amlodipine 10 mg a day, propanolol LA 80 mg a day which she will continue.  4.  Leg edema right worse than left.  Already could be related to amlodipine some mild to cut back to 5 mg a day particularly since his blood pressure is good.  Advised to start monitoring blood pressure at home and recheck in 3 months.    Libra Jaramillo RN

## 2023-11-09 NOTE — TELEPHONE ENCOUNTER
Patient to take amlodipine 5 mg daily.  A prescription of 5 mg tablet has been sent to the mail-in pharmacy.

## 2023-11-09 NOTE — TELEPHONE ENCOUNTER
Patient called back. Notified him of provider's response below. He verbalized understanding & had no further questions/concerns at this time.     Katie Salinas, TILAN, RN   [facility]

## 2023-11-09 NOTE — TELEPHONE ENCOUNTER
RN called patient and LVM to call clinic at [phone].      If patient calls back, please relay provider message below.     Katie Salinas RN

## 2023-11-14 ENCOUNTER — TELEPHONE (OUTPATIENT)
Dept: FAMILY MEDICINE | Facility: CLINIC | Age: 88
End: 2023-11-14
Payer: COMMERCIAL

## 2023-11-14 NOTE — TELEPHONE ENCOUNTER
[facility] pharmacist calling to clarify pt's amlodipine prescription.     Pharmacy received prescription for amlodipine 10 mg daily on 11/8 and day later received prescription for amlodipine 5 mg daily. Pharmacy had already mailed 10 mg tablets, pharmacist asking which prescription is correct. Per review of telephone encounter on 11/9 provider said pt should be taking amlodipine 5 mg daily, RN relayed this information.     Pharmacist asking if 5 mg dose should also be sent out or pt should continue cutting 10 mg tablets in half or he should mail back 10 mg tablets and the pharmacy will replace with 5 mg tablets. RN advised this would be pt preference.     RN spoke with pt who said he will keep cutting tablets in half. RN advised that next shipment will likely be 5 mg tablets because that is the current prescription on file with pharmacy. Pt verbalized understanding and said he will confirm dosage. RN spoke with senthil Pitts, and reported pt will continue cutting 10 mg in half.     Samina Mae RN

## 2023-11-22 ENCOUNTER — TELEPHONE (OUTPATIENT)
Dept: FAMILY MEDICINE | Facility: CLINIC | Age: 88
End: 2023-11-22
Payer: COMMERCIAL

## 2023-11-22 DIAGNOSIS — G47.33 OSA ON CPAP: Primary | ICD-10-CM

## 2023-11-22 NOTE — TELEPHONE ENCOUNTER
CPAP prescription is handled by sleep medicine.  I do not know anything about it.  Please find out where he had his sleep study done and the prescribing department.  He will need to contact them.  I could put in a new sleep medicine consultation request if he so desires.

## 2023-11-22 NOTE — TELEPHONE ENCOUNTER
"Patient calling to report that his Cpap stopped working last night and needs direction on how to get a new one. He reached over this morning to turn it off and had an error message that stated \"Motor has exceeded it's life\".    Routing to provider to review and advise on steps to get a new cpap machine.    Adolfo Bartholomew RN  [facility]    "

## 2023-11-24 NOTE — TELEPHONE ENCOUNTER
"Patient returned call to clinic.     His last sleep study was done August 22, 1999 by Dr. Kylah Lehman at the [facility] in [place].  Patient has not had a sleep study done since.  Has gotten about 3-4 different machines since 1999.  Last machine was given in April 2017 by a provider down in [place], [place] at [facility].  Patient used to go to [place] during winter time but has not returned since 2017.    Patient is agreeable to getting a sleep medicine referral from PCP. If this is only way he can get a new machine and supplies, then he would appreciate referral.  His current machine is saying \"motor has exceeded life cycle, contact your provider\".    Routing to PCP to update and please place referral for sleep medicine.          Sonia Joshua RN  Clinical Triage/Primary Care  [facility]      "

## 2023-11-24 NOTE — TELEPHONE ENCOUNTER
Called and spoke with patient. Notified him of provider's response below. Patient verbalized understanding and reports that he will try locating this information and will call clinic back with updates. Once he does, is requesting a new sleep medicine consultation request from Dr. Garces. Patient had no further questions/concerns at this time.     Katie Salinas, TILAN, RN   [facility]

## 2023-11-27 ENCOUNTER — TELEPHONE (OUTPATIENT)
Dept: FAMILY MEDICINE | Facility: CLINIC | Age: 88
End: 2023-11-27
Payer: COMMERCIAL

## 2023-11-27 NOTE — TELEPHONE ENCOUNTER
Patient Quality Outreach    Patient is due for the following:   Physical Annual Wellness Visit    Next Steps:   Schedule a Annual Wellness Visit    Type of outreach:    Sent [employer] message.    Next Steps:  Reach out within 90 days via [employer].    Max number of attempts reached: No. Will try again in 90 days if patient still on fail list.    Questions for provider review:    None           Diane L. Schoenherr, RN

## 2023-12-15 ENCOUNTER — OFFICE VISIT (OUTPATIENT)
Dept: SLEEP MEDICINE | Facility: CLINIC | Age: 88
End: 2023-12-15
Payer: COMMERCIAL

## 2023-12-15 VITALS
SYSTOLIC BLOOD PRESSURE: 134 MMHG | OXYGEN SATURATION: 98 % | DIASTOLIC BLOOD PRESSURE: 78 MMHG | WEIGHT: 188 LBS | HEART RATE: 58 BPM | BODY MASS INDEX: 28.49 KG/M2 | RESPIRATION RATE: 16 BRPM | HEIGHT: 68 IN

## 2023-12-15 DIAGNOSIS — G47.33 OSA ON CPAP: Primary | ICD-10-CM

## 2023-12-15 PROCEDURE — 99203 OFFICE O/P NEW LOW 30 MIN: CPT | Performed by: PHYSICIAN ASSISTANT

## 2023-12-15 ASSESSMENT — SLEEP AND FATIGUE QUESTIONNAIRES
HOW LIKELY ARE YOU TO NOD OFF OR FALL ASLEEP WHILE LYING DOWN TO REST IN THE AFTERNOON WHEN CIRCUMSTANCES PERMIT: HIGH CHANCE OF DOZING
HOW LIKELY ARE YOU TO NOD OFF OR FALL ASLEEP WHILE SITTING AND TALKING TO SOMEONE: WOULD NEVER DOZE
HOW LIKELY ARE YOU TO NOD OFF OR FALL ASLEEP IN A CAR, WHILE STOPPED FOR A FEW MINUTES IN TRAFFIC: WOULD NEVER DOZE
HOW LIKELY ARE YOU TO NOD OFF OR FALL ASLEEP WHEN YOU ARE A PASSENGER IN A CAR FOR AN HOUR WITHOUT A BREAK: HIGH CHANCE OF DOZING
HOW LIKELY ARE YOU TO NOD OFF OR FALL ASLEEP WHILE WATCHING TV: HIGH CHANCE OF DOZING
HOW LIKELY ARE YOU TO NOD OFF OR FALL ASLEEP WHILE SITTING AND READING: HIGH CHANCE OF DOZING
HOW LIKELY ARE YOU TO NOD OFF OR FALL ASLEEP WHILE SITTING INACTIVE IN A PUBLIC PLACE: MODERATE CHANCE OF DOZING
HOW LIKELY ARE YOU TO NOD OFF OR FALL ASLEEP WHILE SITTING QUIETLY AFTER LUNCH WITHOUT ALCOHOL: MODERATE CHANCE OF DOZING
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
## 2023-12-15 NOTE — PROGRESS NOTES
Does Milo have a CPAP/Bipap?  Yes     (If yes please fill out below.  If no please delete links)        Type of mask: cannula    Provider in [place], Dr Ehsan jimenes  [facility], spoke to Poly [place] Sleep Scale:   Stop Bang:  BMI:  Neck Circumference:

## 2023-12-18 ENCOUNTER — TELEPHONE (OUTPATIENT)
Dept: FAMILY MEDICINE | Facility: CLINIC | Age: 88
End: 2023-12-18
Payer: COMMERCIAL

## 2023-12-18 DIAGNOSIS — R26.89 POOR BALANCE: ICD-10-CM

## 2023-12-18 DIAGNOSIS — E11.21 TYPE 2 DIABETES MELLITUS WITH DIABETIC NEPHROPATHY, WITHOUT LONG-TERM CURRENT USE OF INSULIN (H): ICD-10-CM

## 2023-12-18 DIAGNOSIS — E11.42 DIABETIC POLYNEUROPATHY ASSOCIATED WITH TYPE 2 DIABETES MELLITUS (H): Primary | ICD-10-CM

## 2023-12-18 DIAGNOSIS — R54 SENESCENCE: ICD-10-CM

## 2023-12-18 NOTE — TELEPHONE ENCOUNTER
Provider:  Please sign the prompted DME if appropriate and give to the TCs to fax. Thank you. Lea Horne R.N.    TC:  Please fax order to the number  below if signed.    Patient reports that his mobility is declining and he would like to request a walker to help with ambulation, gait and steadiness.  He is requesting an order for a 4 wheeled walker with a seat and brakes. He would like it faxed to the number below. Patient is aware that this will hold until Wednesday when Dr. Garces returns.    [facility]   Fax: [phone]    He would like a call back when this is done

## 2023-12-19 NOTE — TELEPHONE ENCOUNTER
Form faxed to [facility] at requested fax number on 12/19/2023 @ 11.24 AM

## 2023-12-21 ENCOUNTER — TELEPHONE (OUTPATIENT)
Dept: FAMILY MEDICINE | Facility: CLINIC | Age: 88
End: 2023-12-21
Payer: COMMERCIAL

## 2023-12-21 NOTE — TELEPHONE ENCOUNTER
Forms/Letter Request    Type of form/letter: [facility] Walker Verification Form    Do we have the form/letter: Yes: placed form in providers in box for review/completion.    How would you like the form/letter returned: Fax : [phone]

## 2023-12-24 ENCOUNTER — MEDICAL CORRESPONDENCE (OUTPATIENT)
Dept: HEALTH INFORMATION MANAGEMENT | Facility: CLINIC | Age: 88
End: 2023-12-24
Payer: COMMERCIAL

## 2023-12-26 NOTE — TELEPHONE ENCOUNTER
[facility] Medical forms completed and signed will be faxed to [phone]          Denise CM Visit Facilitator

## 2024-01-09 ENCOUNTER — DOCUMENTATION ONLY (OUTPATIENT)
Dept: SLEEP MEDICINE | Facility: CLINIC | Age: 89
End: 2024-01-09
Payer: COMMERCIAL

## 2024-01-09 DIAGNOSIS — G47.33 OSA (OBSTRUCTIVE SLEEP APNEA): Primary | ICD-10-CM

## 2024-01-09 NOTE — PROGRESS NOTES
Patient was offered choice of vendor and chose [facility].  Patient Milo Lozano was set up at [facility] on January 9, 2024. Patient received a Resmed Airsense 10 Pressures were set at  8-15 cm H2O.   Patient s ramp is 4 cm H2O for Auto and FLEX/EPR is 2.  Patient received a Resmed Mask name: MIRAGE FX  Nasal mask size STANDARD, heated tubing and heated humidifier.  Patient has the following compliance requirements: using and visit requirements  Patient has a follow up on 2/28/24 with Dr Silva.    Shira Taylor

## 2024-01-10 ENCOUNTER — OFFICE VISIT (OUTPATIENT)
Dept: FAMILY MEDICINE | Facility: CLINIC | Age: 89
End: 2024-01-10
Payer: COMMERCIAL

## 2024-01-10 VITALS
HEART RATE: 58 BPM | HEIGHT: 68 IN | BODY MASS INDEX: 28.11 KG/M2 | SYSTOLIC BLOOD PRESSURE: 132 MMHG | OXYGEN SATURATION: 97 % | DIASTOLIC BLOOD PRESSURE: 50 MMHG | TEMPERATURE: 97.6 F | WEIGHT: 185.5 LBS

## 2024-01-10 DIAGNOSIS — N18.32 ANEMIA DUE TO STAGE 3B CHRONIC KIDNEY DISEASE (H): ICD-10-CM

## 2024-01-10 DIAGNOSIS — D63.1 ANEMIA DUE TO STAGE 3B CHRONIC KIDNEY DISEASE (H): ICD-10-CM

## 2024-01-10 DIAGNOSIS — R60.0 LEG EDEMA: ICD-10-CM

## 2024-01-10 DIAGNOSIS — D69.6 THROMBOCYTOPENIA (H): ICD-10-CM

## 2024-01-10 DIAGNOSIS — N18.32 STAGE 3B CHRONIC KIDNEY DISEASE (H): ICD-10-CM

## 2024-01-10 DIAGNOSIS — E11.21 TYPE 2 DIABETES MELLITUS WITH DIABETIC NEPHROPATHY, WITHOUT LONG-TERM CURRENT USE OF INSULIN (H): ICD-10-CM

## 2024-01-10 DIAGNOSIS — I10 HYPERTENSION GOAL BP (BLOOD PRESSURE) < 150/90: Primary | ICD-10-CM

## 2024-01-10 DIAGNOSIS — E03.4 HYPOTHYROIDISM DUE TO ACQUIRED ATROPHY OF THYROID: ICD-10-CM

## 2024-01-10 DIAGNOSIS — G47.33 OSA ON CPAP: ICD-10-CM

## 2024-01-10 DIAGNOSIS — E61.1 IRON DEFICIENCY: ICD-10-CM

## 2024-01-10 DIAGNOSIS — E11.42 DIABETIC POLYNEUROPATHY ASSOCIATED WITH TYPE 2 DIABETES MELLITUS (H): ICD-10-CM

## 2024-01-10 LAB
ERYTHROCYTE [DISTWIDTH] IN BLOOD BY AUTOMATED COUNT: 16.5 % (ref 10–15)
HBA1C MFR BLD: 9.1 % (ref 0–5.6)
HCT VFR BLD AUTO: 35.8 % (ref 40–53)
HGB BLD-MCNC: 11.3 G/DL (ref 13.3–17.7)
MCH RBC QN AUTO: 27 PG (ref 26.5–33)
MCHC RBC AUTO-ENTMCNC: 31.6 G/DL (ref 31.5–36.5)
MCV RBC AUTO: 86 FL (ref 78–100)
PLATELET # BLD AUTO: 60 10E3/UL (ref 150–450)
RBC # BLD AUTO: 4.18 10E6/UL (ref 4.4–5.9)
WBC # BLD AUTO: 13.4 10E3/UL (ref 4–11)

## 2024-01-10 PROCEDURE — 36415 COLL VENOUS BLD VENIPUNCTURE: CPT | Performed by: INTERNAL MEDICINE

## 2024-01-10 PROCEDURE — 99214 OFFICE O/P EST MOD 30 MIN: CPT | Performed by: INTERNAL MEDICINE

## 2024-01-10 PROCEDURE — 85027 COMPLETE CBC AUTOMATED: CPT | Performed by: INTERNAL MEDICINE

## 2024-01-10 PROCEDURE — 80069 RENAL FUNCTION PANEL: CPT | Performed by: INTERNAL MEDICINE

## 2024-01-10 PROCEDURE — 83036 HEMOGLOBIN GLYCOSYLATED A1C: CPT | Performed by: INTERNAL MEDICINE

## 2024-01-10 RX ORDER — RESPIRATORY SYNCYTIAL VIRUS VACCINE 120MCG/0.5
0.5 KIT INTRAMUSCULAR ONCE
Qty: 1 EACH | Refills: 0 | Status: CANCELLED | OUTPATIENT
Start: 2024-01-10 | End: 2024-01-10

## 2024-01-10 RX ORDER — FERROUS SULFATE 325(65) MG
325 TABLET ORAL
Qty: 90 TABLET | Refills: 3 | Status: SHIPPED | OUTPATIENT
Start: 2024-01-10

## 2024-01-10 ASSESSMENT — PAIN SCALES - GENERAL: PAINLEVEL: NO PAIN (0)

## 2024-01-10 NOTE — PROGRESS NOTES
"  Assessment & Plan     1.  Essential hypertension currently on amlodipine 5 mg a day, losartan /25 daily and propranolol LA 80 mg a day.  Blood pressure is good today when I checked.  Advised to stay on current treatment.  2.  Leg edema markedly improved since cutting back amlodipine to 5 mg a day.  3.  Chronic kidney disease stage IIIb.  Recent ultrasound of the kidney shows normal kidneys.  No evidence of obstruction.  Will be rechecking renal panel today.  4.  Iron deficiency.  3 months ago advised to start ferrous sulfate which he has not done.  I resent prescription to his pharmacy advised him to get it and start taking iron tablets daily.  Will recheck hemoglobin today but check iron studies when he returns in 3 months.  5.  Anemia of chronic kidney disease.  6.  Obstructive sleep apnea on CPAP.  He just started new CPAP yesterday so that is going to help with hypertension.  7.  Type 2 diabetes mellitus currently on dapagliflozin 10 mg, glimepiride 8 mg and metformin 1 g daily.  A1c was 7.7 on 10/6/2023.  Will recheck in 3 months.  8.  Hypothyroidism adequately controlled with levothyroxine 88 mcg daily TSH 4.24 with free T41.7 Measured on 10/6/2023.  9.  Thrombocytopenia chronic.  Platelet count was 74,000 on 10/6/2023.  10.  Diabetic polyneuropathy being treated with pregabalin       BMI:   Estimated body mass index is 28.12 kg/m  as calculated from the following:    Height as of this encounter: 1.73 m (5' 8.1\").    Weight as of this encounter: 84.1 kg (185 lb 8 oz).           Aj Garces MD  [facility]    Florin Johnson is a 88 year old, presenting for the following health issues:  Follow Up (Per PCP)      1/10/2024    10:58 AM   Additional Questions   Roomed by Melvi   Accompanied by self         1/10/2024    10:58 AM   Patient Reported Additional Medications   Patient reports taking the following new medications no       History of Present Illness       Diabetes:   He " "presents for follow up of diabetes.  He is checking home blood glucose a few times a month.   He checks blood glucose after meals.  Blood glucose is sometimes over 200 and never under 70. He is aware of hypoglycemia symptoms including none.   He is concerned about blood sugar frequently over 200.   He is having numbness in feet and burning in feet.  The patient has not had a diabetic eye exam in the last 12 months.          He eats 2-3 servings of fruits and vegetables daily.He consumes 0 sweetened beverage(s) daily.He exercises with enough effort to increase his heart rate 9 or less minutes per day.  He exercises with enough effort to increase his heart rate 3 or less days per week.   He is taking medications regularly.         Follow up    The patient has come in for follow-up regarding hypertension, leg edema, chronic kidney disease and other health problems as mentioned the problem list.  After cutting back amlodipine dose to 5 mg he has noticed marked improvement in his leg edema.  Just yesterday he got his new CPAP and he started using it.  Feels better today.  Otherwise he has no new concerns.    Review of Systems   Constitutional, HEENT, cardiovascular, pulmonary, GI, , musculoskeletal, neuro, skin, endocrine and psych systems are negative, except as otherwise noted.      Objective    BP (!) 145/67 (BP Location: Right arm, Patient Position: Sitting, Cuff Size: Adult Regular)   Pulse 58   Temp 97.6  F (36.4  C) (Oral)   Ht 1.73 m (5' 8.1\")   Wt 84.1 kg (185 lb 8 oz)   SpO2 97%   BMI 28.12 kg/m    Body mass index is 28.12 kg/m .Second BP: 143/65  Physical Exam   GENERAL: healthy, alert and no distress  RESP: lungs clear to auscultation - no rales, rhonchi or wheezes  CV: regular rates and rhythm, normal S1 S2, no S3 or S4, no murmur, click or rub, and trace+ bilateral lower extremity pitting edema to ankle    NEURO: Normal strength and tone, mentation intact and speech normal  PSYCH: mentation appears " normal, affect normal/bright

## 2024-01-10 NOTE — COMMUNITY RESOURCES LIST (ENGLISH)
01/10/2024   [facility]  N/A  For questions about this resource list or additional care needs, please contact your primary care clinic or care manager.  Phone: [phone]   Email: N/A   Address: [address] [place] [id number]   Hours: N/A        Food and Nutrition       Food pantry  1  [facility] Distance: 2.14 miles      [place] [address] [facility] [address] [place] [address]  Language: English, Hong Konger, Latvian, Belizean  Hours: Mon 3:00 PM - 6:00 PM , Wed 9:00 AM - 12:00 PM , Fri 9:00 AM - 12:00 PM  Fees: Free   Phone: (128) 447-7714 Email: info@iocp.org Website: http://www.Ret[employer] Education.org     2  [facility] Food Shelf Distance: 5.51 miles      [facility]   [address] [place] [address]  Language: English  Hours: Thu 2:00 PM - 6:00 PM , Thu 3:00 PM - 6:00 PM  Fees: Free   Phone: (239) 145-1942 Email: foodshelf@[facility].org Website: https://[facility] Island[facility]irvanix.org/serve/food-shelf/     SNAP application assistance  3  [facility] Distance: 7.02 miles      Phone/Virtual   [address] [place] [address]  Language: English, Belizean  Hours: Mon 9:00 AM - 1:00 PM , Tue - Thu 9:00 AM - 4:00 PM , Fri 9:00 AM - 1:00 PM  Fees: Free   Phone: (418) 319-1774 Email: info@[employer].org Website: http://www.[employer]ODWIN.org/     4  [facility] Distance: 8.8 miles      In-Person, Phone/Virtual   [address] [place] [address]  Language: American Sign Language, English  Hours: Mon - Fri 8:00 AM - 4:00 PM  Fees: Free   Phone: (748) 175-3587 Email: yana@[place]. Website: https://www.[place]./residents#human-services     Soup kitchen or free meals  5  [facility] Distance: 1.17 miles      [facility]   [address] [place] [address]  Language: English, Belizean  Hours: Sat 5:30 PM - 6:30 PM  Fees: Free   Phone:  (634) 563-8449 Email: info@[facility].org Website: https://www.[facility].org/     6  [facility] - Community Meal Distance: 3.32 miles      [place] [id number] [place] [address]  Language: English  Hours: Mon 5:30 PM - 6:30 PM  Fees: Free   Phone: (901) 987-1276 Email: contact@[employer]diem Website: https://www.[employer]See.org/          Important Numbers & Websites       Emergency Services   [address]  [place] [address]  Poison Control   (513) 337-6734  Suicide Prevention Lifeline   (534) 161-9158 (TALK)  Child Abuse Hotline   (604) 404-4455 (4-A-Child)  Sexual Assault Hotline   (411) 632-7326 (HOPE)  National Runaway Safeline   (869) 522-5476 (RUNAWAY)  All-Options Talkline   (995) 279-8514  Substance Abuse Referral   (796) 886-7892 (HELP)

## 2024-01-11 DIAGNOSIS — E03.4 HYPOTHYROIDISM DUE TO ACQUIRED ATROPHY OF THYROID: ICD-10-CM

## 2024-01-11 DIAGNOSIS — N18.32 TYPE 2 DIABETES MELLITUS WITH STAGE 3B CHRONIC KIDNEY DISEASE, WITHOUT LONG-TERM CURRENT USE OF INSULIN (H): ICD-10-CM

## 2024-01-11 DIAGNOSIS — D69.6 THROMBOCYTOPENIA (H): Primary | ICD-10-CM

## 2024-01-11 DIAGNOSIS — N18.32 STAGE 3B CHRONIC KIDNEY DISEASE (H): ICD-10-CM

## 2024-01-11 DIAGNOSIS — E61.1 IRON DEFICIENCY: ICD-10-CM

## 2024-01-11 DIAGNOSIS — E11.22 TYPE 2 DIABETES MELLITUS WITH STAGE 3B CHRONIC KIDNEY DISEASE, WITHOUT LONG-TERM CURRENT USE OF INSULIN (H): ICD-10-CM

## 2024-01-11 DIAGNOSIS — D64.9 NORMOCYTIC ANEMIA: ICD-10-CM

## 2024-01-11 DIAGNOSIS — I10 HYPERTENSION GOAL BP (BLOOD PRESSURE) < 150/90: ICD-10-CM

## 2024-01-11 LAB
ALBUMIN SERPL BCG-MCNC: 4.6 G/DL (ref 3.5–5.2)
ANION GAP SERPL CALCULATED.3IONS-SCNC: 13 MMOL/L (ref 7–15)
BUN SERPL-MCNC: 37 MG/DL (ref 8–23)
CALCIUM SERPL-MCNC: 9.4 MG/DL (ref 8.8–10.2)
CHLORIDE SERPL-SCNC: 98 MMOL/L (ref 98–107)
CREAT SERPL-MCNC: 1.94 MG/DL (ref 0.67–1.17)
DEPRECATED HCO3 PLAS-SCNC: 27 MMOL/L (ref 22–29)
EGFRCR SERPLBLD CKD-EPI 2021: 33 ML/MIN/1.73M2
GLUCOSE SERPL-MCNC: 332 MG/DL (ref 70–99)
PHOSPHATE SERPL-MCNC: 3.7 MG/DL (ref 2.5–4.5)
POTASSIUM SERPL-SCNC: 4.2 MMOL/L (ref 3.4–5.3)
SODIUM SERPL-SCNC: 138 MMOL/L (ref 135–145)

## 2024-01-18 DIAGNOSIS — E11.21 TYPE 2 DIABETES MELLITUS WITH DIABETIC NEPHROPATHY, WITHOUT LONG-TERM CURRENT USE OF INSULIN (H): ICD-10-CM

## 2024-01-20 RX ORDER — METFORMIN HCL 500 MG
1000 TABLET, EXTENDED RELEASE 24 HR ORAL
Qty: 180 TABLET | Refills: 1 | Status: SHIPPED | OUTPATIENT
Start: 2024-01-20 | End: 2024-08-02

## 2024-02-13 ENCOUNTER — TRANSFERRED RECORDS (OUTPATIENT)
Dept: MULTI SPECIALTY CLINIC | Facility: CLINIC | Age: 89
End: 2024-02-13

## 2024-02-13 LAB — RETINOPATHY: NORMAL

## 2024-02-27 ASSESSMENT — SLEEP AND FATIGUE QUESTIONNAIRES
HOW LIKELY ARE YOU TO NOD OFF OR FALL ASLEEP WHILE SITTING AND READING: HIGH CHANCE OF DOZING
HOW LIKELY ARE YOU TO NOD OFF OR FALL ASLEEP IN A CAR, WHILE STOPPED FOR A FEW MINUTES IN TRAFFIC: WOULD NEVER DOZE
HOW LIKELY ARE YOU TO NOD OFF OR FALL ASLEEP WHILE LYING DOWN TO REST IN THE AFTERNOON WHEN CIRCUMSTANCES PERMIT: HIGH CHANCE OF DOZING
HOW LIKELY ARE YOU TO NOD OFF OR FALL ASLEEP WHEN YOU ARE A PASSENGER IN A CAR FOR AN HOUR WITHOUT A BREAK: HIGH CHANCE OF DOZING
HOW LIKELY ARE YOU TO NOD OFF OR FALL ASLEEP WHILE SITTING QUIETLY AFTER LUNCH WITHOUT ALCOHOL: HIGH CHANCE OF DOZING
HOW LIKELY ARE YOU TO NOD OFF OR FALL ASLEEP WHILE WATCHING TV: HIGH CHANCE OF DOZING
HOW LIKELY ARE YOU TO NOD OFF OR FALL ASLEEP WHILE SITTING INACTIVE IN A PUBLIC PLACE: HIGH CHANCE OF DOZING
HOW LIKELY ARE YOU TO NOD OFF OR FALL ASLEEP WHILE SITTING AND TALKING TO SOMEONE: WOULD NEVER DOZE

## 2024-02-28 ENCOUNTER — OFFICE VISIT (OUTPATIENT)
Dept: SLEEP MEDICINE | Facility: CLINIC | Age: 89
End: 2024-02-28
Payer: COMMERCIAL

## 2024-02-28 VITALS
HEIGHT: 68 IN | DIASTOLIC BLOOD PRESSURE: 76 MMHG | BODY MASS INDEX: 28.19 KG/M2 | OXYGEN SATURATION: 99 % | RESPIRATION RATE: 16 BRPM | HEART RATE: 68 BPM | WEIGHT: 186 LBS | SYSTOLIC BLOOD PRESSURE: 148 MMHG

## 2024-02-28 DIAGNOSIS — G47.33 OSA ON CPAP: Primary | ICD-10-CM

## 2024-02-28 PROCEDURE — 99213 OFFICE O/P EST LOW 20 MIN: CPT | Performed by: STUDENT IN AN ORGANIZED HEALTH CARE EDUCATION/TRAINING PROGRAM

## 2024-02-28 NOTE — PATIENT INSTRUCTIONS
MY INFORMATION ON SLEEP APNEA-  Milo Lozano    DOCTOR : Joyce Sivla MD  SLEEP CENTER :      MY CONTACT NUMBER:    [facility]  [phone]  [facility]      For general sleep health questions:   http://sleepeducation.org    For tips about PAP and COVID-19:  https://www.thoracic.org/patients/patient-resources/resources/covid-19-and-home-pap-therapy.pdf    For general info about COVID-19 including vaccines:  https://[employer].org/covid19      Continue PAP therapy every night, for all hours that you are sleeping (including naps.)  As always, try to get at least 8 hours of sleep or more each day, keep a regular sleep schedule, and avoid sleep deprivation. Avoid alcohol.  Reasons that you might need a change to your pressure therapy would be weight gain or loss, waking having inadvertently removed your PAP overnight, having previously felt refreshed by sleep with CPAP use and now waking un-refreshed, and return of daytime sleepiness. Also, the development of new medical problems  (such as heart failure, stroke, medications such as narcotics) can sometimes affect breathing at night and change your PAP therapy needs.  Please bring PAP with you if you are hospitalized.  If anticipating surgery be sure to discuss with your surgeon that you have sleep apnea and use PAP therapy.    Maintain your equipment as recommended which includes routine cleaning and replacement of supplies.      Call DME for any questions regarding supplies or maintenance.    [facility], [facility] [phone]    Do not drive on engage in potentially dangerous activities if feeling sleepy.  Please follow up in sleep clinic again in 12 months.        Tips for your PAP use-    Mask fitting tips  Mask fitting exercise:    To improve your mask seal and your mobility at night, put mask on and secure in place.  Lie down in bed with full pressure  and roll to one side, adjust headgear while in that position to eliminate any leaks. Repeat process rolling to other side.     The mask seal does not have to be perfect:   CPAP machines are designed to make up for small leaks. However, you will not tolerate leaks blowing in your eyes so you will need to adjust.   Any leak should only be near or at the bottom of the mask.  We expect your mask to leak slightly at night.    Do not over-tighten the headgear straps, tighter IS NOT better, we expect minimal leak.    First try re-positioning the mask or headgear before tightening the headgear straps.  Mask leaks are expected due to changing sleeping positions. Try pulling the mask away from your skin allowing the cushion to re-inflate will minimize the leak.  If you struggle for a good fit, try turning the CPAP off and then readjust the mask by pulling it away from your face and then turning back on the CPAP.        Humidifier tips  Humidifiers can be adjusted to increase or decrease the amount of moisture according to your comfort level. You may need to adjust this frequently at first, but then might only change it with seasonal weather changes.     Try INCREASING the humidity if:  You experience a dry, irritated nasal passage or throat.  You have a runny, drippy nose or sneezing fits after using CPAP.  You experience nasal congestion during or after CPAP use.    Try DECREASING the humidity if:  You have excessive condensation or  rain out  in the tubing or mask.  Otherwise keep the tubing warm during the night by running it underneath the blankets or pillow.      Clinic visit after initial PAP set-up   Bring your equipment with you to your 5-8 week follow up clinic visit.  We will be extracting your data from the machine if not available from the cloud based [employer].        Travel  Always take your equipment with you when you travel.  If you fly with your equipment bring it on with you as a carry on.  Medical equipment  does not count as a carry on.    If you travel international the machines take 110-240v.  The only adapter needed is the adapter that will fit into the receptacle (outlet).    You may also want to bring an extension cord as many hotel rooms have limited outlets at the bedside.  Do not travel with water in your humidifier chamber.     Cleaning and Maintenance Guidelines    Equipment Frequency Cleaning Method   Mask First Day    Daily      Weekly Soak mask in hot soapy water for 30 minutes, rinse and air dry.  Wipe nasal cushion with a hot soapy (Ivory, baby shampoo) cloth and rinse.  Baby wipes may also be used.  Do not use anti-bacterial soaps,Brittnee  liquid soap, rubbing alcohol, bleach or ammonia.  Wash frame in hot soapy water (Ivory, baby shampoo) rinse and let air dry   Headgear Biweekly Wash in hot soapy water, rinse and air dry   Reusable Gray Filter Weekly Wash in hot soapy water, rinse, put in towel squeeze moisture out, let air dry   Disposable White Filter Check Weekly Replace when brown or gray in color; at least every 2 to 3 months   Humidifier Chamber Daily    Weekly Empty distilled water from humidifier and let air dry    Hand wash in hot soapy water, rinse and air dry   Tubing Weekly Wash in hot soapy water, rinse and let air dry   Mask, Tubing and Humidifier Chamber As needed Disinfect: Soak in 1 part distilled white vinegar to 3 parts hot water for 30 minutes, rinse well and air dry  Not the material headgear        MASK AND SUPPLY REORDERING and EQUIPMENT NEEDS through your DME and per your insurance  Reminder: Most insurance companies will allow for a new mask, headgear, tubing, and reusable gray filter every six months.  Disposable white ultra-fine filters are covered monthly.      HOME AND SAFETY INSTRUCTIONS  Do not use frayed or cracked electrical cords, multi plug adaptors, or switched receptacles  Do not immerse electrical equipment into water  Assure that electrical cords do not become a  tripping hazard

## 2024-02-28 NOTE — PROGRESS NOTES
[facility]     Sleep clinic follow up visit note    Date on this visit: 2/28/2024    Primary Physician: Aj Garces     History of present illness:  Milo Lozano is a 88 year old male patient with HTN, T2DM, HLD, BPH, essential tremor, and hypothyroidism who presents for CPAP Follow Up  . He has severe sleep apnea with an AHI of 64, managed with CPAP.     Do you use a CPAP Machine at home: Yes  DME: [facility]  Overall, on a scale of 0-10 how would you rate your CPAP (0 poor, 10 great): 9    What type of mask do you use: Nasal Mask  Is your mask comfortable: Yes  If not, why:      Is your mask leaking: Yes  If yes, where do you feel it: nose  How many night per week does the mask leak (0-7): 2    Do you notice snoring with mask on: No  Do you notice gasping arousals with mask on: No  Are you having significant oral or nasal dryness: Yes  Is the pressure setting comfortable: Yes  If not, why:      What is your typical bedtime: 11PM  How long does it take you to go to sleep on PAP therapy: 2 minutes  What time do you typically get out of bed for the day: 8-10 AM  How many hours on average per night are you using PAP therapy: 9-10  How many hours are you sleeping per night: 9-10  Do you feel well rested in the morning: No    ResMed   Auto-PAP 8.0 - 15.0 cmH2O 30 day usage data:    100% of days with > 4 hours of use. 0/30 days with no use.   Average use 568 minutes per day.   95%ile Leak 45.79 L/min.   CPAP 95% pressure 11.7 cm.   AHI 1.49 events per hour.          Assessment and Plan:  Problem List Items Addressed This Visit          Respiratory    DEMOND on CPAP - Primary     Milo Lozano has Severe Sleep apnea. He is tolerating PAP well and reports adequate compliance with PAP therapy. Daytime symptoms are improved and reports positive benefits with PAP use.   DEMOND is adequately controlled with Auto CPAP at the current settings per compliance DL.   Prescription provided for renewal of PAP  supplies.  Recommended him/her to continue using the CPAP regularly during sleep and instructed  to get  the supplies for the PAP replaced regularly.    Patient instructed to remember to bring PAP with him/her if hospitalized and if anticipating procedure that requires sedation/surgery to be sure to discuss with the provider/surgeon that he/she has sleep apnea and uses PAP therapy.           Relevant Orders    Comprehensive DME (Completed)       SCALES:  EPWORTH SLEEPINESS SCALE       2/27/2024     5:59 PM    [place] Sleepiness Scale ( TJ Frankel  [id number]<br>ESS - [place]/English - Final version - 21 Nov 07 - [facility] Research [place].)   Sitting and reading High chance of dozing   Watching TV High chance of dozing   Sitting, inactive in a public place (e.g. a theatre or a meeting) High chance of dozing   As a passenger in a car for an hour without a break High chance of dozing   Lying down to rest in the afternoon when circumstances permit High chance of dozing   Sitting and talking to someone Would never doze   Sitting quietly after a lunch without alcohol High chance of dozing   In a car, while stopped for a few minutes in traffic Would never doze   [place] Score (MC) 18   [place] Score (Sleep) 18       INSOMNIA SEVERITY INDEX (THANH)        2/27/2024     5:50 PM   Insomnia Severity Index (THANH)   Difficulty falling asleep 0   Difficulty staying asleep 0   Problems waking up too early 0   How SATISFIED/DISSATISFIED are you with your CURRENT sleep pattern? 1   How NOTICEABLE to others do you think your sleep problem is in terms of impairing the quality of your life? 0   How WORRIED/DISTRESSED are you about your current sleep problem? 1   To what extent do you consider your sleep problem to INTERFERE with your daily functioning (e.g. daytime fatigue, mood, ability to function at work/daily chores, concentration, memory, mood, etc.) CURRENTLY? 1   THANH Total Score 3     Guidelines for Scoring/Interpretation:  Total score  categories:  0-7 = No clinically significant insomnia   8-14 = Subthreshold insomnia   15-21 = Clinical insomnia (moderate severity)  22-28 = Clinical insomnia (severe)  Used via courtesy of www.myhealth.[place].gov with permission from David Trotter PhD., [facility]      Allergies:    Allergies   Allergen Reactions    Lisinopril Cough     Developed an ACE cough on the Lisinopril, pt denies this is a current allergy as of 6/19/2020.       Medications:    Current Outpatient Medications   Medication Sig Dispense Refill    amLODIPine (NORVASC) 5 MG tablet TAKE 1 TABLET BY MOUTH DAILY. 90 tablet 3    blood glucose (ACCU-CHEK SMARTVIEW) test strip TEST BLOOD SUGAR TWICE DAILY  OR AS DIRECTED 200 strip 3    blood glucose (NO BRAND SPECIFIED) lancets standard Use to test blood sugar 2 times daily or as directed. 200 each 0    blood glucose (NO BRAND SPECIFIED) test strip Use to test blood sugar 2 times daily or as directed. To accompany: Blood Glucose Monitor Brands: per insurance 200 strip 3    blood glucose calibration (NO BRAND SPECIFIED) solution To accompany: Blood Glucose Monitor Brands: per insurance. 1 Bottle 3    blood glucose monitoring (ACCU-CHEK MILADYS SMARTVIEW) meter device kit Use to test blood sugar 2 times daily or as directed. 1 kit 0    blood glucose monitoring (NO BRAND SPECIFIED) meter device kit Use to test blood sugar 2 times daily or as directed. Preferred blood glucose meter OR supplies to accompany: Blood Glucose Monitor Brands: per insurance 1 kit 0    dapagliflozin (FARXIGA) 10 MG TABS tablet Take 1 tablet (10 mg) by mouth daily 90 tablet 1    ferrous sulfate (FEROSUL) 325 (65 Fe) MG tablet Take 1 tablet (325 mg) by mouth daily (with breakfast) 90 tablet 3    folic acid (FOLVITE) 1 MG tablet Take 1 tablet (1 mg) by mouth daily 90 tablet 3    glimepiride (AMARYL) 4 MG tablet Take 2 tablets (8 mg) by mouth every morning (before breakfast) 180 tablet 3    levothyroxine (SYNTHROID/LEVOTHROID) 88  MCG tablet TAKE 1 TABLET BY MOUTH EVERY DAY. GENERIC EQUIVALENT FOR SYNTHROID 90 tablet 3    losartan-hydrochlorothiazide (HYZAAR) 100-25 MG tablet TAKE 1 TABLET BY MOUTH EVERY MORNING 90 tablet 3    metFORMIN (GLUCOPHAGE XR) 500 MG 24 hr tablet Take 2 tablets (1,000 mg) by mouth daily (with dinner) 180 tablet 1    pregabalin (LYRICA) 75 MG capsule TAKE ONE CAPSULE BY MOUTH TWICE DAILY. GENERIC EQUIVALENT FOR LYRICA 180 capsule 3    propranolol ER (INDERAL LA) 80 MG 24 hr capsule TAKE ONE CAPSULE BY MOUTH DAILY 90 capsule 3    tamsulosin (FLOMAX) 0.4 MG capsule TAKE TWO CAPSULE BY MOUTH EVERY DAY. 180 capsule 3    thin (NO BRAND SPECIFIED) lancets Use with lanceting device to test blood sugar 2 times daily or as directed. To accompany: Blood Glucose Monitor Brands: per insurance. 200 each 3       Problem List:  Patient Active Problem List    Diagnosis Date Noted    Anemia due to chronic kidney disease 10/10/2023     Priority: Medium    Iron deficiency 10/10/2023     Priority: Medium    Dizziness 10/10/2023     Priority: Medium    Low HDL (under 40) 11/29/2022     Priority: Medium    Poor balance 10/21/2021     Priority: Medium    BPH with obstruction/lower urinary tract symptoms 09/17/2020     Priority: Medium    Class 1 obesity due to excess calories with body mass index (BMI) of 31.0 to 31.9 in adult 09/17/2020     Priority: Medium    CKD (chronic kidney disease) stage 3, GFR 30-59 ml/min (H) 06/09/2020     Priority: Medium    Essential tremor 10/21/2019     Priority: Medium    Thrombocytopenia (H24) 09/18/2019     Priority: Medium    Normocytic anemia 08/15/2017     Priority: Medium    Diabetic polyneuropathy associated with type 2 diabetes mellitus (H) 07/15/2017     Priority: Medium    DEMOND on CPAP 03/06/2017     Priority: Medium     CPAP      Hypothyroidism due to acquired atrophy of thyroid 12/05/2016     Priority: Medium    White coat hypertension 10/13/2015     Priority: Medium    Advanced directives,  counseling/discussion 10/17/2014     Priority: Medium     Advance Care Planning:   ACP Review and Resources Provided:  Reviewed chart for advance care plan.  Milo Lozano has no plan or code status on file. Discussed available resources and provided with information. Confirmed code status reflects current choices pending further ACP discussions.  Confirmed/documented designated decision maker(s). See permanent comments section of demographics in clinical tab.   Added by Sarita Santos on 10/17/2014            Type 2 diabetes mellitus with kidney complication, without long-term current use of insulin (H) 09/28/2014     Priority: Medium    Hypertension goal BP (blood pressure) < 150/90 12/29/2011     Priority: Medium    CARDIOVASCULAR SCREENING; LDL GOAL LESS THAN 100 12/29/2011     Priority: Medium    Prostate cancer (H) 01/01/2007     Priority: Medium     Diagnosed in 2007 in [place], Had Radiation          Past Medical/Surgical History:  Past Medical History:   Diagnosis Date    Anemia due to chronic kidney disease 10/10/2023    BPH (benign prostatic hypertrophy)     BPH with obstruction/lower urinary tract symptoms 09/17/2020    BPV (benign positional vertigo), unspecified laterality 10/21/2021    CARDIOVASCULAR SCREENING; LDL GOAL LESS THAN 100 12/29/2011    CARDIOVASCULAR SCREENING; LDL GOAL LESS THAN 130 12/29/2011    Class 1 obesity due to excess calories with body mass index (BMI) of 31.0 to 31.9 in adult 09/17/2020    Dizziness 10/10/2023    Essential tremor 10/21/2019    Hypertension goal BP (blood pressure) < 140/90 12/29/2011    Hypothyroidism due to acquired atrophy of thyroid 12/05/2016    Iron deficiency 10/10/2023    Low HDL (under 40) 11/29/2022    Normocytic anemia 08/15/2017    Poor balance 10/21/2021    Prostate cancer (H) 01/2007    Had Radiation    Thrombocytopenia (H24) 09/18/2019    Type 2 diabetes mellitus with hyperglycemia (H) 10/21/2010    Type 2 diabetes, HbA1c goal < 7% (H)       Past Surgical History:   Procedure Laterality Date    COLONOSCOPY         Social History:  Social History     Socioeconomic History    Marital status:      Spouse name: [place] Scanlon    Number of children: 4    Years of education: Not on file    Highest education level: Not on file   Occupational History    Occupation: Heating and Air Conditioning     Employer: RETIRED   Tobacco Use    Smoking status: Never     Passive exposure: Never    Smokeless tobacco: Never   Vaping Use    Vaping Use: Never used   Substance and Sexual Activity    Alcohol use: No    Drug use: No    Sexual activity: Never     Partners: Female   Other Topics Concern    Parent/sibling w/ CABG, MI or angioplasty before 65F 55M? No   Social History Narrative    Not on file     Social Determinants of Health     Financial Resource Strain: Low Risk  (1/10/2024)    Financial Resource Strain     Within the past 12 months, have you or your family members you live with been unable to get utilities (heat, electricity) when it was really needed?: No   Food Insecurity: High Risk (1/10/2024)    Food Insecurity     Within the past 12 months, did you worry that your food would run out before you got money to buy more?: Yes     Within the past 12 months, did the food you bought just not last and you didn t have money to get more?: No   Transportation Needs: Low Risk  (1/10/2024)    Transportation Needs     Within the past 12 months, has lack of transportation kept you from medical appointments, getting your medicines, non-medical meetings or appointments, work, or from getting things that you need?: No   Physical Activity: Not on file   Stress: Not on file   Social Connections: Not on file   Interpersonal Safety: Low Risk  (1/10/2024)    Interpersonal Safety     Do you feel physically and emotionally safe where you currently live?: Yes     Within the past 12 months, have you been hit, slapped, kicked or otherwise physically hurt by someone?: No      "Within the past 12 months, have you been humiliated or emotionally abused in other ways by your partner or ex-partner?: No   Housing Stability: Low Risk  (1/10/2024)    Housing Stability     Do you have housing? : Yes     Are you worried about losing your housing?: No       Family History:  Family History   Problem Relation Age of Onset    Hypertension Mother     Alzheimer Disease Mother     Prostate Cancer Paternal Grandfather     Cancer No family hx of     Diabetes No family hx of     Cerebrovascular Disease No family hx of     Thyroid Disease No family hx of     Glaucoma No family hx of     Macular Degeneration No family hx of        Review of systems  A complete review of systems reviewed by me is negative with the exeption of what has been mentioned in the history of present illness.    Physical Examination:  Vitals: BP (!) 148/76   Pulse 68   Resp 16   Ht 1.727 m (5' 8\")   Wt 84.4 kg (186 lb)   SpO2 99%   BMI 28.28 kg/m    BMI= Body mass index is 28.28 kg/m .     GENERAL: alert, no distress, and elderly  EYES: Eyes grossly normal to inspection.  No discharge or erythema, or obvious scleral/conjunctival abnormalities.  RESP: No audible wheeze, cough, or visible cyanosis.    SKIN: Visible skin clear. No significant rash, abnormal pigmentation or lesions.  NEURO: Cranial nerves grossly intact.  Mentation and speech appropriate for age.  PSYCH: Appropriate affect, tone, and pace of words          Other tests/labs:   I have reviewed the labs and personally reviewed the imaging below and made my comment in the assessment and plan.        Patient was strongly advised to avoid driving, operating any heavy machinery or other hazardous situations while drowsy or sleepy.  Patient was counseled on the importance of driving while alert, to pull over if drowsy, or nap before getting into the vehicle if sleepy.      Plan is for Milo Lozano to follow up in about 12 month(s).     The above note was dictated using " voice recognition software. Although reviewed after completion, some word and grammatical error may remain . Please contact the author for any clarifications.    Total time spent reviewing medical records, history and physical examination, review of previous testing and interpretation as well as documentation on this date, 02/28/24: 26 minutes    Joyce Silva MD on 10/20/2022   [facility]   Floor 1, [address]   [address] [place] [address]   Appointments: [phone] [facility]   [address]oor 1, [address]   [address] [place] [address]   Appointments: [phone]     CC: No ref. provider found

## 2024-02-28 NOTE — ASSESSMENT & PLAN NOTE
Milo Lozano has Severe Sleep apnea. He is tolerating PAP well and reports adequate compliance with PAP therapy. Daytime symptoms are improved and reports positive benefits with PAP use.   DEMOND is adequately controlled with Auto CPAP at the current settings per compliance DL.   Prescription provided for renewal of PAP supplies.  Recommended him/her to continue using the CPAP regularly during sleep and instructed  to get  the supplies for the PAP replaced regularly.    Patient instructed to remember to bring PAP with him/her if hospitalized and if anticipating procedure that requires sedation/surgery to be sure to discuss with the provider/surgeon that he/she has sleep apnea and uses PAP therapy.

## 2024-03-16 ENCOUNTER — HEALTH MAINTENANCE LETTER (OUTPATIENT)
Age: 89
End: 2024-03-16

## 2024-03-25 DIAGNOSIS — E03.4 HYPOTHYROIDISM DUE TO ACQUIRED ATROPHY OF THYROID: ICD-10-CM

## 2024-03-25 RX ORDER — LEVOTHYROXINE SODIUM 88 UG/1
TABLET ORAL
Qty: 90 TABLET | Refills: 3 | OUTPATIENT
Start: 2024-03-25

## 2024-04-11 ENCOUNTER — OFFICE VISIT (OUTPATIENT)
Dept: FAMILY MEDICINE | Facility: CLINIC | Age: 89
End: 2024-04-11
Payer: COMMERCIAL

## 2024-04-11 VITALS
RESPIRATION RATE: 14 BRPM | BODY MASS INDEX: 28.6 KG/M2 | WEIGHT: 188.7 LBS | OXYGEN SATURATION: 96 % | DIASTOLIC BLOOD PRESSURE: 66 MMHG | SYSTOLIC BLOOD PRESSURE: 140 MMHG | HEIGHT: 68 IN | TEMPERATURE: 98.1 F | HEART RATE: 69 BPM

## 2024-04-11 DIAGNOSIS — R26.89 POOR BALANCE: ICD-10-CM

## 2024-04-11 DIAGNOSIS — N18.32 ANEMIA DUE TO STAGE 3B CHRONIC KIDNEY DISEASE (H): ICD-10-CM

## 2024-04-11 DIAGNOSIS — E11.21 TYPE 2 DIABETES MELLITUS WITH DIABETIC NEPHROPATHY, WITHOUT LONG-TERM CURRENT USE OF INSULIN (H): Primary | ICD-10-CM

## 2024-04-11 DIAGNOSIS — N18.32 STAGE 3B CHRONIC KIDNEY DISEASE (H): ICD-10-CM

## 2024-04-11 DIAGNOSIS — R42 DIZZINESS: ICD-10-CM

## 2024-04-11 DIAGNOSIS — D69.6 THROMBOCYTOPENIA (H): ICD-10-CM

## 2024-04-11 DIAGNOSIS — D63.1 ANEMIA DUE TO STAGE 3B CHRONIC KIDNEY DISEASE (H): ICD-10-CM

## 2024-04-11 DIAGNOSIS — E61.1 IRON DEFICIENCY: ICD-10-CM

## 2024-04-11 DIAGNOSIS — I10 HYPERTENSION GOAL BP (BLOOD PRESSURE) < 150/90: ICD-10-CM

## 2024-04-11 DIAGNOSIS — E03.4 HYPOTHYROIDISM DUE TO ACQUIRED ATROPHY OF THYROID: ICD-10-CM

## 2024-04-11 DIAGNOSIS — G47.33 OSA ON CPAP: ICD-10-CM

## 2024-04-11 DIAGNOSIS — R29.6 FALLS FREQUENTLY: ICD-10-CM

## 2024-04-11 LAB
ALBUMIN SERPL BCG-MCNC: 4.4 G/DL (ref 3.5–5.2)
ANION GAP SERPL CALCULATED.3IONS-SCNC: 13 MMOL/L (ref 7–15)
BUN SERPL-MCNC: 34 MG/DL (ref 8–23)
CALCIUM SERPL-MCNC: 9.4 MG/DL (ref 8.8–10.2)
CHLORIDE SERPL-SCNC: 99 MMOL/L (ref 98–107)
CREAT SERPL-MCNC: 1.81 MG/DL (ref 0.67–1.17)
DEPRECATED HCO3 PLAS-SCNC: 27 MMOL/L (ref 22–29)
EGFRCR SERPLBLD CKD-EPI 2021: 36 ML/MIN/1.73M2
ERYTHROCYTE [DISTWIDTH] IN BLOOD BY AUTOMATED COUNT: 16.2 % (ref 10–15)
FERRITIN SERPL-MCNC: 403 NG/ML (ref 31–409)
GLUCOSE SERPL-MCNC: 261 MG/DL (ref 70–99)
HBA1C MFR BLD: 9 % (ref 0–5.6)
HCT VFR BLD AUTO: 34.5 % (ref 40–53)
HGB BLD-MCNC: 10.7 G/DL (ref 13.3–17.7)
IRON BINDING CAPACITY (ROCHE): 228 UG/DL (ref 240–430)
IRON SATN MFR SERPL: 12 % (ref 15–46)
IRON SERPL-MCNC: 27 UG/DL (ref 61–157)
MCH RBC QN AUTO: 26.4 PG (ref 26.5–33)
MCHC RBC AUTO-ENTMCNC: 31 G/DL (ref 31.5–36.5)
MCV RBC AUTO: 85 FL (ref 78–100)
PHOSPHATE SERPL-MCNC: 3.3 MG/DL (ref 2.5–4.5)
PLATELET # BLD AUTO: 84 10E3/UL (ref 150–450)
POTASSIUM SERPL-SCNC: 4.3 MMOL/L (ref 3.4–5.3)
RBC # BLD AUTO: 4.05 10E6/UL (ref 4.4–5.9)
SODIUM SERPL-SCNC: 139 MMOL/L (ref 135–145)
TSH SERPL DL<=0.005 MIU/L-ACNC: 4.18 UIU/ML (ref 0.3–4.2)
WBC # BLD AUTO: 17 10E3/UL (ref 4–11)

## 2024-04-11 PROCEDURE — 84443 ASSAY THYROID STIM HORMONE: CPT | Performed by: INTERNAL MEDICINE

## 2024-04-11 PROCEDURE — 82728 ASSAY OF FERRITIN: CPT | Performed by: INTERNAL MEDICINE

## 2024-04-11 PROCEDURE — 99214 OFFICE O/P EST MOD 30 MIN: CPT | Performed by: INTERNAL MEDICINE

## 2024-04-11 PROCEDURE — 83036 HEMOGLOBIN GLYCOSYLATED A1C: CPT | Performed by: INTERNAL MEDICINE

## 2024-04-11 PROCEDURE — 36415 COLL VENOUS BLD VENIPUNCTURE: CPT | Performed by: INTERNAL MEDICINE

## 2024-04-11 PROCEDURE — 80069 RENAL FUNCTION PANEL: CPT | Performed by: INTERNAL MEDICINE

## 2024-04-11 PROCEDURE — 83550 IRON BINDING TEST: CPT | Performed by: INTERNAL MEDICINE

## 2024-04-11 PROCEDURE — 85027 COMPLETE CBC AUTOMATED: CPT | Performed by: INTERNAL MEDICINE

## 2024-04-11 PROCEDURE — 83540 ASSAY OF IRON: CPT | Performed by: INTERNAL MEDICINE

## 2024-04-11 RX ORDER — RESPIRATORY SYNCYTIAL VIRUS VACCINE 120MCG/0.5
0.5 KIT INTRAMUSCULAR ONCE
Qty: 1 EACH | Refills: 0 | Status: CANCELLED | OUTPATIENT
Start: 2024-04-11 | End: 2024-04-11

## 2024-04-11 ASSESSMENT — PAIN SCALES - GENERAL: PAINLEVEL: MILD PAIN (2)

## 2024-04-11 NOTE — PROGRESS NOTES
"  Assessment & Plan     1.  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  Will check A1c today weights been running high.  I briefly talked about adding additional medication like insulin or GLP-1 agonist.  Patient does not want to add more medicine.  He feels he is taking too much already.  2.  Poor balance.  Could be combination reason including polyneuropathy, aging but I am questioning the possibility of Parkinson's.  Will obtain neurology consultation.  3.  Fall frequently since I last saw him 4 months ago he feels he is probably fallen couple of times.  Usually falls backwards.  4.  Hypertension with blood pressure at goal today.  5.  Iron deficiency.  I will be checking serum iron iron-binding capacity ferritin and CBC today.  He has been taking ferrous sulfate.  6.  Stage IIIb chronic kidney disease.  Renal panel will be checked today.  7.  Anemia of chronic kidney disease.  CBC to be checked today.  8.  Hypothyroidism.  Will check TSH today  9.  Obstructive sleep apnea on CPAP.  10.  Chronic thrombocytopenia previously evaluated by hematologist.  Platelet count has been around 60,000.  11.  Dizziness chronic    I will get back to with the lab results from today and plans for follow-up.  I think that tight control of blood sugar is not realistic at his age and with comorbidities.    BMI  Estimated body mass index is 28.69 kg/m  as calculated from the following:    Height as of this encounter: 1.727 m (5' 8\").    Weight as of this encounter: 85.6 kg (188 lb 11.2 oz).         Florin Johnson is a 89 year old, presenting for the following health issues:  Follow Up        4/11/2024    12:53 PM   Additional Questions   Roomed by Amada   Accompanied by self     Via the Health Maintenance questionnaire, the patient has reported the following services have been completed -Eye Exam, this information has been sent to the " "abstraction team.  History of Present Illness       CKD: He is not using over the counter pain medicine.     Diabetes:   He presents for follow up of diabetes.    He is not checking blood glucose.        He is concerned about blood sugar frequently over 200.   He is having numbness in feet and excessive thirst.  The patient has had a diabetic eye exam in the last 12 months. Eye exam performed on 2/13/24. Location of last eye exam [facility].        Hypertension: He presents for follow up of hypertension.  He does not check blood pressure  regularly outside of the clinic. Outpatient blood pressures have not been over 140/90. He does not follow a low salt diet.     He eats 2-3 servings of fruits and vegetables daily.He consumes 0 sweetened beverage(s) daily.He exercises with enough effort to increase his heart rate 9 or less minutes per day.  He exercises with enough effort to increase his heart rate 3 or less days per week.   He is taking medications regularly.       Alex is an elderly 89-year-old gentleman who comes in for follow-up of anemia, diabetes and other health problems as mentioned in the problem list.  He continues to complain of poor balance.  He has fallen several times in the past and perhaps once or twice a month since I last saw him.  He usually falls backwards.  He uses a cane and occasionally a walker.  Particularly at night in the dark he has more imbalance.  At times he says he is dizzy and describes a vertiginous type dizziness.  He has been to therapy for it without help.  He has been to therapy for balance as well.    Review of Systems  Constitutional, HEENT, cardiovascular, pulmonary, GI, , musculoskeletal, neuro, skin, endocrine and psych systems are negative, except as otherwise noted.      Objective    Ht 1.727 m (5' 8\")   Wt 85.6 kg (188 lb 11.2 oz)   BMI 28.69 kg/m    Body mass index is 28.69 kg/m .  Physical Exam   GENERAL: alert and no distress  NECK: no adenopathy, no " asymmetry, masses, or scars  RESP: lungs clear to auscultation - no rales, rhonchi or wheezes  CV: regular rate and rhythm, normal S1 S2, no S3 or S4, no murmur, click or rub, no peripheral edema  ABDOMEN: soft, nontender, no hepatosplenomegaly, no masses and bowel sounds normal  MS: no gross musculoskeletal defects noted, no edema  NEURO: Normal strength and tone, mentation intact, and   Facial expressions are poor.  Question of cogwheel rigidity.  He does have some essential type tremors but I did not really see resting tremor.            Signed Electronically by: Aj Garces MD

## 2024-04-18 DIAGNOSIS — E03.4 HYPOTHYROIDISM DUE TO ACQUIRED ATROPHY OF THYROID: ICD-10-CM

## 2024-04-18 DIAGNOSIS — I10 HYPERTENSION GOAL BP (BLOOD PRESSURE) < 140/90: ICD-10-CM

## 2024-04-19 RX ORDER — LEVOTHYROXINE SODIUM 88 UG/1
TABLET ORAL
Qty: 90 TABLET | Refills: 3 | OUTPATIENT
Start: 2024-04-19

## 2024-04-19 RX ORDER — LOSARTAN POTASSIUM AND HYDROCHLOROTHIAZIDE 25; 100 MG/1; MG/1
1 TABLET ORAL EVERY MORNING
Qty: 90 TABLET | Refills: 3 | OUTPATIENT
Start: 2024-04-19

## 2024-04-24 ENCOUNTER — TELEPHONE (OUTPATIENT)
Dept: NEUROLOGY | Facility: CLINIC | Age: 89
End: 2024-04-24
Payer: COMMERCIAL

## 2024-04-24 NOTE — TELEPHONE ENCOUNTER
Left Voicemail (1st Attempt) and Sent Mychart (1st Attempt) for the patient to call back and schedule the following:    Appointment type: New Neuro  Provider: Dr. Workman  Return date: next available  Specialty phone number: [phone]  Additional appointment(s) needed:   Additonal Notes:     Attempted to reschedule the appointment with Dr. Workman on  9/5/2024, provider attending/precepting.    Also sent a [facility] message.    No CM/Faith Procedure    [facility]   Neurology, NeuroSurgery, NeuroPsychology, Pain Management and Cardiology Specialties  Medical/Surgical Adult Specialties

## 2024-05-13 DIAGNOSIS — E11.21 TYPE 2 DIABETES MELLITUS WITH DIABETIC NEPHROPATHY, WITHOUT LONG-TERM CURRENT USE OF INSULIN (H): Primary | ICD-10-CM

## 2024-05-13 RX ORDER — GLIMEPIRIDE 4 MG/1
8 TABLET ORAL
Qty: 180 TABLET | Refills: 3 | Status: SHIPPED | OUTPATIENT
Start: 2024-05-13

## 2024-05-28 ENCOUNTER — OFFICE VISIT (OUTPATIENT)
Dept: NEUROLOGY | Facility: CLINIC | Age: 89
End: 2024-05-28
Attending: INTERNAL MEDICINE
Payer: COMMERCIAL

## 2024-05-28 VITALS
DIASTOLIC BLOOD PRESSURE: 64 MMHG | HEART RATE: 62 BPM | HEIGHT: 68 IN | SYSTOLIC BLOOD PRESSURE: 148 MMHG | BODY MASS INDEX: 27.28 KG/M2 | WEIGHT: 180 LBS

## 2024-05-28 DIAGNOSIS — G62.9 NEUROPATHY: Primary | ICD-10-CM

## 2024-05-28 DIAGNOSIS — R29.6 FALLS FREQUENTLY: ICD-10-CM

## 2024-05-28 DIAGNOSIS — R26.89 POOR BALANCE: ICD-10-CM

## 2024-05-28 DIAGNOSIS — R74.8 ABNORMAL LEVELS OF OTHER SERUM ENZYMES: ICD-10-CM

## 2024-05-28 PROCEDURE — 84165 PROTEIN E-PHORESIS SERUM: CPT | Performed by: INTERNAL MEDICINE

## 2024-05-28 PROCEDURE — 84155 ASSAY OF PROTEIN SERUM: CPT | Mod: 59 | Performed by: INTERNAL MEDICINE

## 2024-05-28 PROCEDURE — 36415 COLL VENOUS BLD VENIPUNCTURE: CPT | Performed by: INTERNAL MEDICINE

## 2024-05-28 PROCEDURE — 84425 ASSAY OF VITAMIN B-1: CPT | Mod: 90 | Performed by: INTERNAL MEDICINE

## 2024-05-28 PROCEDURE — 99000 SPECIMEN HANDLING OFFICE-LAB: CPT | Performed by: INTERNAL MEDICINE

## 2024-05-28 PROCEDURE — 86334 IMMUNOFIX E-PHORESIS SERUM: CPT | Performed by: INTERNAL MEDICINE

## 2024-05-28 PROCEDURE — 86235 NUCLEAR ANTIGEN ANTIBODY: CPT | Performed by: INTERNAL MEDICINE

## 2024-05-28 PROCEDURE — 82607 VITAMIN B-12: CPT | Performed by: INTERNAL MEDICINE

## 2024-05-28 PROCEDURE — 82525 ASSAY OF COPPER: CPT | Mod: 90 | Performed by: INTERNAL MEDICINE

## 2024-05-28 PROCEDURE — 99204 OFFICE O/P NEW MOD 45 MIN: CPT | Performed by: INTERNAL MEDICINE

## 2024-05-28 PROCEDURE — 80076 HEPATIC FUNCTION PANEL: CPT | Performed by: INTERNAL MEDICINE

## 2024-05-28 PROCEDURE — 84207 ASSAY OF VITAMIN B-6: CPT | Mod: 90 | Performed by: INTERNAL MEDICINE

## 2024-05-28 NOTE — NURSING NOTE
"Milo Lozano's goals for this visit include:   Chief Complaint   Patient presents with    New Patient     Frequent falls        He requests these members of his care team be copied on today's visit information: yes    PCP: Aj Garces    Referring Provider:  Aj Garces MD  [address] [address]    BP (!) 150/69   Pulse 61   Ht 1.727 m (5' 8\")   Wt 81.6 kg (180 lb)   BMI 27.37 kg/m      Do you need any medication refills at today's visit? No  ALIA Metzger, CMA (AAMA)      "

## 2024-05-28 NOTE — PROGRESS NOTES
"[facility] Neurology Consultation    Milo Lozano MRN# [id number]   Age: 89 year old YOB: 1935     Requesting physician: Aj Forbes     Reason for Consultation: imbalance        History of Presenting Symptoms:   Milo Lozano is a 89 year old male who presents today for evaluation of imbalance.     Balance problems started primarily in the last year. He has fallen 3-4 times. He now uses a cane. He has neuropathy with his diabetes. It causes numbness/tingling in the feet and hands. It is worse in the feet. The neuropathy has been there about 3-4 years. It hasn't clearly worsening in the last year.     His right knee gives him more issues. He denies any issues with low back pain. He has issues with urinary incontinence.     He has tremors in both hands. They have been there for a \"really long time\". He takes propranolol, but isn't sure if it helps.       Past Medical History:     Patient Active Problem List   Diagnosis    Hypertension goal BP (blood pressure) < 150/90    CARDIOVASCULAR SCREENING; LDL GOAL LESS THAN 100    Prostate cancer (H)    Type 2 diabetes mellitus with kidney complication, without long-term current use of insulin (H)    Advanced directives, counseling/discussion    White coat hypertension    Hypothyroidism due to acquired atrophy of thyroid    DEMOND on CPAP    Diabetic polyneuropathy associated with type 2 diabetes mellitus (H)    CKD (chronic kidney disease) stage 3, GFR 30-59 ml/min (H)    BPH with obstruction/lower urinary tract symptoms    Class 1 obesity due to excess calories with body mass index (BMI) of 31.0 to 31.9 in adult    Normocytic anemia    Thrombocytopenia (H24)    Poor balance    Essential tremor    Low HDL (under 40)    Anemia due to chronic kidney disease    Iron deficiency    Dizziness     Past Medical History:   Diagnosis Date    Anemia due to chronic kidney disease 10/10/2023    BPH (benign prostatic hypertrophy)     BPH with " obstruction/lower urinary tract symptoms 09/17/2020    BPV (benign positional vertigo), unspecified laterality 10/21/2021    CARDIOVASCULAR SCREENING; LDL GOAL LESS THAN 100 12/29/2011    CARDIOVASCULAR SCREENING; LDL GOAL LESS THAN 130 12/29/2011    Class 1 obesity due to excess calories with body mass index (BMI) of 31.0 to 31.9 in adult 09/17/2020    Dizziness 10/10/2023    Essential tremor 10/21/2019    Hypertension goal BP (blood pressure) < 140/90 12/29/2011    Hypothyroidism due to acquired atrophy of thyroid 12/05/2016    Iron deficiency 10/10/2023    Low HDL (under 40) 11/29/2022    Normocytic anemia 08/15/2017    Poor balance 10/21/2021    Prostate cancer (H) 01/2007    Had Radiation    Thrombocytopenia (H24) 09/18/2019    Type 2 diabetes mellitus with hyperglycemia (H) 10/21/2010    Type 2 diabetes, HbA1c goal < 7% (H)         Past Surgical History:     Past Surgical History:   Procedure Laterality Date    COLONOSCOPY          Social History:     Social History     Tobacco Use    Smoking status: Never     Passive exposure: Never    Smokeless tobacco: Never   Vaping Use    Vaping status: Never Used   Substance Use Topics    Alcohol use: No    Drug use: No        Family History:     Family History   Problem Relation Age of Onset    Hypertension Mother     Alzheimer Disease Mother     Prostate Cancer Paternal Grandfather     Cancer No family hx of     Diabetes No family hx of     Cerebrovascular Disease No family hx of     Thyroid Disease No family hx of     Glaucoma No family hx of     Macular Degeneration No family hx of         Medications:     Current Outpatient Medications   Medication Sig Dispense Refill    amLODIPine (NORVASC) 5 MG tablet TAKE 1 TABLET BY MOUTH DAILY. 90 tablet 3    blood glucose (ACCU-CHEK SMARTVIEW) test strip TEST BLOOD SUGAR TWICE DAILY  OR AS DIRECTED 200 strip 3    blood glucose (NO BRAND SPECIFIED) lancets standard Use to test blood sugar 2 times daily or as directed. 200 each  0    blood glucose (NO BRAND SPECIFIED) test strip Use to test blood sugar 2 times daily or as directed. To accompany: Blood Glucose Monitor Brands: per insurance 200 strip 3    blood glucose calibration (NO BRAND SPECIFIED) solution To accompany: Blood Glucose Monitor Brands: per insurance. 1 Bottle 3    blood glucose monitoring (ACCU-CHEK MILADYS SMARTVIEW) meter device kit Use to test blood sugar 2 times daily or as directed. 1 kit 0    blood glucose monitoring (NO BRAND SPECIFIED) meter device kit Use to test blood sugar 2 times daily or as directed. Preferred blood glucose meter OR supplies to accompany: Blood Glucose Monitor Brands: per insurance 1 kit 0    FARXIGA 10 MG TABS tablet TAKE 1 TABLET BY MOUTH DAILY 90 tablet 1    ferrous sulfate (FEROSUL) 325 (65 Fe) MG tablet Take 1 tablet (325 mg) by mouth daily (with breakfast) 90 tablet 3    folic acid (FOLVITE) 1 MG tablet Take 1 tablet (1 mg) by mouth daily 90 tablet 3    glimepiride (AMARYL) 4 MG tablet Take 2 tablets (8 mg) by mouth every morning (before breakfast) 180 tablet 3    levothyroxine (SYNTHROID/LEVOTHROID) 88 MCG tablet TAKE 1 TABLET BY MOUTH EVERY DAY GENERIC EQUIVALENT FOR SYNTHROID 90 tablet 0    losartan-hydrochlorothiazide (HYZAAR) 100-25 MG tablet TAKE 1 TABLET BY MOUTH EVERY MORNING 90 tablet 0    metFORMIN (GLUCOPHAGE XR) 500 MG 24 hr tablet Take 2 tablets (1,000 mg) by mouth daily (with dinner) 180 tablet 1    pregabalin (LYRICA) 75 MG capsule TAKE ONE CAPSULE BY MOUTH TWICE DAILY. GENERIC EQUIVALENT FOR LYRICA 180 capsule 3    propranolol ER (INDERAL LA) 80 MG 24 hr capsule TAKE ONE CAPSULE BY MOUTH DAILY 90 capsule 3    tamsulosin (FLOMAX) 0.4 MG capsule TAKE TWO CAPSULE BY MOUTH EVERY DAY. 180 capsule 3    thin (NO BRAND SPECIFIED) lancets Use with lanceting device to test blood sugar 2 times daily or as directed. To accompany: Blood Glucose Monitor Brands: per insurance. 200 each 3     No current facility-administered medications for  "this visit.        Allergies:     Allergies   Allergen Reactions    Lisinopril Cough     Developed an ACE cough on the Lisinopril, pt denies this is a current allergy as of 6/19/2020.        Review of Systems:   As above     Physical Exam:   Vitals: BP (!) 150/69   Pulse 61   Ht 1.727 m (5' 8\")   Wt 81.6 kg (180 lb)   BMI 27.37 kg/m     General: Seated comfortably in no acute distress.  HEENT: Optic discs sharp on the left, not visualized on the right  Lungs: breathing comfortably  Neurologic:     Mental Status: Fully alert, attentive and oriented. Language normal, speech clear and fluent, no paraphasic errors.      Cranial Nerves: Visual fields intact. PERRL. EOMI with normal smooth pursuit. Facial sensation intact/symmetric. Facial movements symmetric. Hearing not formally tested but intact to conversation. Palate elevation symmetric, uvula midline. No dysarthria. Shoulder shrug strong bilaterally. Tongue protrusion midline.     Motor: No resting tremor, mild action tremor in bilateral upper extremities. Muscle tone normal throughout.  Miold decrement in bilateral rapid finger tapping. Strength as below.       Right Left   Shoulder abduction        5 5   Elbow extension 5 5   Elbow flexion 5 5   Wrist extension         5 5   Finger extension 5 5   ADM 5- 5-   FDI 5 5   APB 5 5   Hip flexion 5 5   Knee flexion 5 5   Knee extension 5 5   Dorsiflexion 5 5   Plantar flexion 5 5        Deep Tendon Reflexes: Trace reflexes with the exception of 2+ patella reflex. No clonus. Toes downgoing bilaterally.     Sensory: Decreased sensation to light touch up to the knees and in the finger tips bilaterally 1-10. Vibration is absent in bilateral great toes and ankles, ~7-8 seconds in fingers. Proprioception is absent in bilateral great toes and intact in ankles and fingers. Positive Romberg.      Coordination: Finger-nose-finger and heel-shin intact without dysmetria.      Gait: Mildly wide based, shuffling un balanced gait. " Not able to do heel, toe, or tandem gait.          Data: Pertinent prior to visit   Imaging:  MRI brain 2019  Impression:    1. Normal vestibular and auditory structures.  2. Moderate parenchymal volume loss and mild chronic small vessel  ischemic disease, within normal limits for age.    Procedures:  None    Laboratory:  A1c 9.0, CBC With elevated WBC and anemia and low platelet, BMP with CKD (4/2024)         Assessment and Plan:   Assessment:  Milo Lozano is a 89 year old male who presents today for evaluation of imbalance. Based on examination, I suspect symptoms are primarily related to polyneuropathy, but possible contributions from orthopedic issues and  cerebrovascular changes. We discussed checking additional neuropathy labs, but I suspect polyneuropathy is primarily related to uncontrolled diabetes. He has previously done physical therapy. He will let us know if he is interested in doing EMG to further evaluate neuropathy subtype. He has evidence of essential tremor on examination, without clear signs of parkinsonism.      Plan:  - B1, B6, SSA, SSB, immunofixation, ELP, B12, copper, hepatic panel  - Consider EMG pending lab work    Follow up in Neurology clinic as needed should new concerns arise.    Daniel Mccain MD   of Neurology  [facility]

## 2024-05-28 NOTE — LETTER
"    5/28/2024         RE: Milo oLzano  [address] [id number]        Dear Colleague,    Thank you for referring your patient, Milo Lozano, to the [facility] [place]. Please see a copy of my visit note below.    [facility] Neurology Consultation    Milo Lozano MRN# [id number]   Age: 89 year old YOB: 1935     Requesting physician: Aj Forbes     Reason for Consultation: imbalance        History of Presenting Symptoms:   Milo Lozano is a 89 year old male who presents today for evaluation of imbalance.     Balance problems started primarily in the last year. He has fallen 3-4 times. He now uses a cane. He has neuropathy with his diabetes. It causes numbness/tingling in the feet and hands. It is worse in the feet. The neuropathy has been there about 3-4 years. It hasn't clearly worsening in the last year.     His right knee gives him more issues. He denies any issues with low back pain. He has issues with urinary incontinence.     He has tremors in both hands. They have been there for a \"really long time\". He takes propranolol, but isn't sure if it helps.       Past Medical History:     Patient Active Problem List   Diagnosis     Hypertension goal BP (blood pressure) < 150/90     CARDIOVASCULAR SCREENING; LDL GOAL LESS THAN 100     Prostate cancer (H)     Type 2 diabetes mellitus with kidney complication, without long-term current use of insulin (H)     Advanced directives, counseling/discussion     White coat hypertension     Hypothyroidism due to acquired atrophy of thyroid     DEMOND on CPAP     Diabetic polyneuropathy associated with type 2 diabetes mellitus (H)     CKD (chronic kidney disease) stage 3, GFR 30-59 ml/min (H)     BPH with obstruction/lower urinary tract symptoms     Class 1 obesity due to excess calories with body mass index (BMI) of 31.0 to 31.9 in adult     Normocytic anemia     Thrombocytopenia " (H24)     Poor balance     Essential tremor     Low HDL (under 40)     Anemia due to chronic kidney disease     Iron deficiency     Dizziness     Past Medical History:   Diagnosis Date     Anemia due to chronic kidney disease 10/10/2023     BPH (benign prostatic hypertrophy)      BPH with obstruction/lower urinary tract symptoms 09/17/2020     BPV (benign positional vertigo), unspecified laterality 10/21/2021     CARDIOVASCULAR SCREENING; LDL GOAL LESS THAN 100 12/29/2011     CARDIOVASCULAR SCREENING; LDL GOAL LESS THAN 130 12/29/2011     Class 1 obesity due to excess calories with body mass index (BMI) of 31.0 to 31.9 in adult 09/17/2020     Dizziness 10/10/2023     Essential tremor 10/21/2019     Hypertension goal BP (blood pressure) < 140/90 12/29/2011     Hypothyroidism due to acquired atrophy of thyroid 12/05/2016     Iron deficiency 10/10/2023     Low HDL (under 40) 11/29/2022     Normocytic anemia 08/15/2017     Poor balance 10/21/2021     Prostate cancer (H) 01/2007    Had Radiation     Thrombocytopenia (H24) 09/18/2019     Type 2 diabetes mellitus with hyperglycemia (H) 10/21/2010     Type 2 diabetes, HbA1c goal < 7% (H)         Past Surgical History:     Past Surgical History:   Procedure Laterality Date     COLONOSCOPY          Social History:     Social History     Tobacco Use     Smoking status: Never     Passive exposure: Never     Smokeless tobacco: Never   Vaping Use     Vaping status: Never Used   Substance Use Topics     Alcohol use: No     Drug use: No        Family History:     Family History   Problem Relation Age of Onset     Hypertension Mother      Alzheimer Disease Mother      Prostate Cancer Paternal Grandfather      Cancer No family hx of      Diabetes No family hx of      Cerebrovascular Disease No family hx of      Thyroid Disease No family hx of      Glaucoma No family hx of      Macular Degeneration No family hx of         Medications:     Current Outpatient Medications   Medication  Sig Dispense Refill     amLODIPine (NORVASC) 5 MG tablet TAKE 1 TABLET BY MOUTH DAILY. 90 tablet 3     blood glucose (ACCU-CHEK SMARTVIEW) test strip TEST BLOOD SUGAR TWICE DAILY  OR AS DIRECTED 200 strip 3     blood glucose (NO BRAND SPECIFIED) lancets standard Use to test blood sugar 2 times daily or as directed. 200 each 0     blood glucose (NO BRAND SPECIFIED) test strip Use to test blood sugar 2 times daily or as directed. To accompany: Blood Glucose Monitor Brands: per insurance 200 strip 3     blood glucose calibration (NO BRAND SPECIFIED) solution To accompany: Blood Glucose Monitor Brands: per insurance. 1 Bottle 3     blood glucose monitoring (ACCU-CHEK MILADYS SMARTVIEW) meter device kit Use to test blood sugar 2 times daily or as directed. 1 kit 0     blood glucose monitoring (NO BRAND SPECIFIED) meter device kit Use to test blood sugar 2 times daily or as directed. Preferred blood glucose meter OR supplies to accompany: Blood Glucose Monitor Brands: per insurance 1 kit 0     FARXIGA 10 MG TABS tablet TAKE 1 TABLET BY MOUTH DAILY 90 tablet 1     ferrous sulfate (FEROSUL) 325 (65 Fe) MG tablet Take 1 tablet (325 mg) by mouth daily (with breakfast) 90 tablet 3     folic acid (FOLVITE) 1 MG tablet Take 1 tablet (1 mg) by mouth daily 90 tablet 3     glimepiride (AMARYL) 4 MG tablet Take 2 tablets (8 mg) by mouth every morning (before breakfast) 180 tablet 3     levothyroxine (SYNTHROID/LEVOTHROID) 88 MCG tablet TAKE 1 TABLET BY MOUTH EVERY DAY GENERIC EQUIVALENT FOR SYNTHROID 90 tablet 0     losartan-hydrochlorothiazide (HYZAAR) 100-25 MG tablet TAKE 1 TABLET BY MOUTH EVERY MORNING 90 tablet 0     metFORMIN (GLUCOPHAGE XR) 500 MG 24 hr tablet Take 2 tablets (1,000 mg) by mouth daily (with dinner) 180 tablet 1     pregabalin (LYRICA) 75 MG capsule TAKE ONE CAPSULE BY MOUTH TWICE DAILY. GENERIC EQUIVALENT FOR LYRICA 180 capsule 3     propranolol ER (INDERAL LA) 80 MG 24 hr capsule TAKE ONE CAPSULE BY MOUTH DAILY  "90 capsule 3     tamsulosin (FLOMAX) 0.4 MG capsule TAKE TWO CAPSULE BY MOUTH EVERY DAY. 180 capsule 3     thin (NO BRAND SPECIFIED) lancets Use with lanceting device to test blood sugar 2 times daily or as directed. To accompany: Blood Glucose Monitor Brands: per insurance. 200 each 3     No current facility-administered medications for this visit.        Allergies:     Allergies   Allergen Reactions     Lisinopril Cough     Developed an ACE cough on the Lisinopril, pt denies this is a current allergy as of 6/19/2020.        Review of Systems:   As above     Physical Exam:   Vitals: BP (!) 150/69   Pulse 61   Ht 1.727 m (5' 8\")   Wt 81.6 kg (180 lb)   BMI 27.37 kg/m     General: Seated comfortably in no acute distress.  HEENT: Optic discs sharp on the left, not visualized on the right  Lungs: breathing comfortably  Neurologic:     Mental Status: Fully alert, attentive and oriented. Language normal, speech clear and fluent, no paraphasic errors.      Cranial Nerves: Visual fields intact. PERRL. EOMI with normal smooth pursuit. Facial sensation intact/symmetric. Facial movements symmetric. Hearing not formally tested but intact to conversation. Palate elevation symmetric, uvula midline. No dysarthria. Shoulder shrug strong bilaterally. Tongue protrusion midline.     Motor: No resting tremor, mild action tremor in bilateral upper extremities. Muscle tone normal throughout.  Miold decrement in bilateral rapid finger tapping. Strength as below.       Right Left   Shoulder abduction        5 5   Elbow extension 5 5   Elbow flexion 5 5   Wrist extension         5 5   Finger extension 5 5   ADM 5- 5-   FDI 5 5   APB 5 5   Hip flexion 5 5   Knee flexion 5 5   Knee extension 5 5   Dorsiflexion 5 5   Plantar flexion 5 5        Deep Tendon Reflexes: Trace reflexes with the exception of 2+ patella reflex. No clonus. Toes downgoing bilaterally.     Sensory: Decreased sensation to light touch up to the knees and in the finger " tips bilaterally 1-10. Vibration is absent in bilateral great toes and ankles, ~7-8 seconds in fingers. Proprioception is absent in bilateral great toes and intact in ankles and fingers. Positive Romberg.      Coordination: Finger-nose-finger and heel-shin intact without dysmetria.      Gait: Mildly wide based, shuffling un balanced gait. Not able to do heel, toe, or tandem gait.          Data: Pertinent prior to visit   Imaging:  MRI brain 2019  Impression:    1. Normal vestibular and auditory structures.  2. Moderate parenchymal volume loss and mild chronic small vessel  ischemic disease, within normal limits for age.    Procedures:  None    Laboratory:  A1c 9.0, CBC With elevated WBC and anemia and low platelet, BMP with CKD (4/2024)         Assessment and Plan:   Assessment:  Milo Lozano is a 89 year old male who presents today for evaluation of imbalance. Based on examination, I suspect symptoms are primarily related to polyneuropathy, but possible contributions from orthopedic issues and  cerebrovascular changes. We discussed checking additional neuropathy labs, but I suspect polyneuropathy is primarily related to uncontrolled diabetes. He has previously done physical therapy. He will let us know if he is interested in doing EMG to further evaluate neuropathy subtype. He has evidence of essential tremor on examination, without clear signs of parkinsonism.      Plan:  - B1, B6, SSA, SSB, immunofixation, ELP, B12, copper, hepatic panel  - Consider EMG pending lab work    Follow up in Neurology clinic as needed should new concerns arise.    Daniel Mccain MD   of Neurology  [facility]      Again, thank you for allowing me to participate in the care of your patient.        Sincerely,        Daniel Mccain MD

## 2024-05-29 LAB
ALBUMIN SERPL BCG-MCNC: 4.8 G/DL (ref 3.5–5.2)
ALBUMIN SERPL ELPH-MCNC: 4.9 G/DL (ref 3.7–5.1)
ALP SERPL-CCNC: 103 U/L (ref 40–150)
ALPHA1 GLOB SERPL ELPH-MCNC: 0.3 G/DL (ref 0.2–0.4)
ALPHA2 GLOB SERPL ELPH-MCNC: 0.6 G/DL (ref 0.5–0.9)
ALT SERPL W P-5'-P-CCNC: 9 U/L (ref 0–70)
AST SERPL W P-5'-P-CCNC: 15 U/L (ref 0–45)
B-GLOBULIN SERPL ELPH-MCNC: 0.8 G/DL (ref 0.6–1)
BILIRUB DIRECT SERPL-MCNC: 0.21 MG/DL (ref 0–0.3)
BILIRUB SERPL-MCNC: 1 MG/DL
ENA SS-A AB SER IA-ACNC: <0.5 U/ML
ENA SS-A AB SER IA-ACNC: NEGATIVE
ENA SS-B IGG SER IA-ACNC: <0.6 U/ML
ENA SS-B IGG SER IA-ACNC: NEGATIVE
GAMMA GLOB SERPL ELPH-MCNC: 1.5 G/DL (ref 0.7–1.6)
M PROTEIN SERPL ELPH-MCNC: 0 G/DL
PATH REPORT.COMMENTS IMP SPEC: NORMAL
PATH REPORT.COMMENTS IMP SPEC: NORMAL
PROT PATTERN SERPL ELPH-IMP: NORMAL
PROT PATTERN SERPL IFE-IMP: NORMAL
PROT SERPL-MCNC: 8.1 G/DL (ref 6.4–8.3)
TOTAL PROTEIN SERUM FOR ELP: 8.2 G/DL (ref 6.4–8.3)
VIT B12 SERPL-MCNC: 848 PG/ML (ref 232–1245)

## 2024-05-31 LAB — COPPER SERPL-MCNC: 98.1 UG/DL

## 2024-06-01 ENCOUNTER — MYC REFILL (OUTPATIENT)
Dept: ONCOLOGY | Facility: CLINIC | Age: 89
End: 2024-06-01
Payer: COMMERCIAL

## 2024-06-01 ENCOUNTER — MYC REFILL (OUTPATIENT)
Dept: FAMILY MEDICINE | Facility: CLINIC | Age: 89
End: 2024-06-01
Payer: COMMERCIAL

## 2024-06-01 DIAGNOSIS — E61.1 IRON DEFICIENCY: ICD-10-CM

## 2024-06-01 DIAGNOSIS — D59.9 ACQUIRED HEMOLYTIC ANEMIA (H): ICD-10-CM

## 2024-06-01 LAB — PYRIDOXAL PHOS SERPL-SCNC: 42.1 NMOL/L

## 2024-06-02 LAB — VIT B1 PYROPHOSHATE BLD-SCNC: 161 NMOL/L

## 2024-06-03 RX ORDER — FOLIC ACID 1 MG/1
1 TABLET ORAL DAILY
Qty: 90 TABLET | Refills: 3 | Status: SHIPPED | OUTPATIENT
Start: 2024-06-03

## 2024-06-03 RX ORDER — FERROUS SULFATE 325(65) MG
325 TABLET ORAL
Qty: 90 TABLET | Refills: 3 | OUTPATIENT
Start: 2024-06-03

## 2024-06-03 NOTE — TELEPHONE ENCOUNTER
SUBJECTIVE/OBJECTIVE:                                                    Refill request received for:  folic acid (FOLVITE) 1 MG tablet     Last refill per fax: Unknown  Date of LOV r/t Medication: 5/9/22  Future appt scheduled? No   Date faxed to clinic: 6/1/24    RECENT LABS/VITALS                                                      CBC RESULTS:   Recent Labs   Lab Test 04/11/24  1333   WBC 17.0*   RBC 4.05*   HGB 10.7*   HCT 34.5*   MCV 85   MCH 26.4*   MCHC 31.0*   RDW 16.2*   PLT 84*     Ferritin   Date Value Ref Range Status   04/11/2024 403 31 - 409 ng/mL Final   09/05/2017 463 (H) 26 - 388 ng/mL Final     Iron   Date Value Ref Range Status   04/11/2024 27 (L) 61 - 157 ug/dL Final   09/05/2017 72 35 - 180 ug/dL Final     Iron Binding Cap   Date Value Ref Range Status   09/05/2017 277 240 - 430 ug/dL Final     Iron Binding Capacity   Date Value Ref Range Status   04/11/2024 228 (L) 240 - 430 ug/dL Final   05/09/2022 265 240 - 430 ug/dL Final     BP Readings from Last 4 Encounters:   05/28/24 (!) 148/64   04/11/24 (!) 140/66   02/28/24 (!) 148/76   01/10/24 132/50     PLAN:                                                      Sent to provider to review and advise.    Robbie Sabillon RN on 6/3/2024 at 9:47 AM

## 2024-06-10 ENCOUNTER — TELEPHONE (OUTPATIENT)
Dept: NEUROLOGY | Facility: CLINIC | Age: 89
End: 2024-06-10
Payer: COMMERCIAL

## 2024-06-10 NOTE — TELEPHONE ENCOUNTER
Called and spoke with patient. Relayed message from Dr. Mccain as follows:    Lee Johnson,     All of your blood work came back normal. As we discussed, I suspect the neuropathy is related to diabetes.     We had talked about the EMG test at our visit. Let us know if this is something you would like to do.     Regards,  Daniel Mccain MD      Patient stated understanding. No further questions at this time.

## 2024-06-11 ENCOUNTER — OFFICE VISIT (OUTPATIENT)
Dept: PEDIATRICS | Facility: CLINIC | Age: 89
End: 2024-06-11
Payer: COMMERCIAL

## 2024-06-11 ENCOUNTER — ANCILLARY PROCEDURE (OUTPATIENT)
Dept: CT IMAGING | Facility: CLINIC | Age: 89
End: 2024-06-11
Attending: NURSE PRACTITIONER
Payer: COMMERCIAL

## 2024-06-11 ENCOUNTER — OFFICE VISIT (OUTPATIENT)
Dept: URGENT CARE | Facility: URGENT CARE | Age: 89
End: 2024-06-11
Payer: COMMERCIAL

## 2024-06-11 VITALS
DIASTOLIC BLOOD PRESSURE: 76 MMHG | TEMPERATURE: 98.1 F | RESPIRATION RATE: 18 BRPM | SYSTOLIC BLOOD PRESSURE: 176 MMHG | OXYGEN SATURATION: 97 % | HEART RATE: 68 BPM

## 2024-06-11 VITALS
HEART RATE: 56 BPM | TEMPERATURE: 97 F | RESPIRATION RATE: 15 BRPM | SYSTOLIC BLOOD PRESSURE: 177 MMHG | DIASTOLIC BLOOD PRESSURE: 72 MMHG | OXYGEN SATURATION: 99 %

## 2024-06-11 DIAGNOSIS — C61 PROSTATE CANCER (H): ICD-10-CM

## 2024-06-11 DIAGNOSIS — R33.9 URINARY RETENTION: Primary | ICD-10-CM

## 2024-06-11 DIAGNOSIS — R10.84 ABDOMINAL PAIN, GENERALIZED: Primary | ICD-10-CM

## 2024-06-11 DIAGNOSIS — R14.0 ABDOMINAL DISTENSION: ICD-10-CM

## 2024-06-11 DIAGNOSIS — D64.9 ANEMIA, UNSPECIFIED TYPE: ICD-10-CM

## 2024-06-11 DIAGNOSIS — N30.01 ACUTE CYSTITIS WITH HEMATURIA: ICD-10-CM

## 2024-06-11 DIAGNOSIS — E11.65 UNCONTROLLED TYPE 2 DIABETES MELLITUS WITH HYPERGLYCEMIA (H): ICD-10-CM

## 2024-06-11 DIAGNOSIS — N18.4 ACUTE RENAL FAILURE SUPERIMPOSED ON STAGE 4 CHRONIC KIDNEY DISEASE, UNSPECIFIED ACUTE RENAL FAILURE TYPE (H): ICD-10-CM

## 2024-06-11 DIAGNOSIS — N17.9 ACUTE RENAL FAILURE SUPERIMPOSED ON STAGE 4 CHRONIC KIDNEY DISEASE, UNSPECIFIED ACUTE RENAL FAILURE TYPE (H): ICD-10-CM

## 2024-06-11 DIAGNOSIS — D69.6 THROMBOCYTOPENIA (H): ICD-10-CM

## 2024-06-11 DIAGNOSIS — R33.9 URINARY RETENTION: ICD-10-CM

## 2024-06-11 DIAGNOSIS — D72.829 LEUKOCYTOSIS, UNSPECIFIED TYPE: ICD-10-CM

## 2024-06-11 DIAGNOSIS — I10 HYPERTENSION GOAL BP (BLOOD PRESSURE) < 150/90: ICD-10-CM

## 2024-06-11 LAB
ALBUMIN SERPL BCG-MCNC: 4.5 G/DL (ref 3.5–5.2)
ALBUMIN UR-MCNC: 30 MG/DL
ALP SERPL-CCNC: 109 U/L (ref 40–150)
ALT SERPL W P-5'-P-CCNC: <5 U/L (ref 0–70)
ANION GAP SERPL CALCULATED.3IONS-SCNC: 15 MMOL/L (ref 7–15)
APPEARANCE UR: CLEAR
AST SERPL W P-5'-P-CCNC: 22 U/L (ref 0–45)
BACTERIA #/AREA URNS HPF: ABNORMAL /HPF
BILIRUB SERPL-MCNC: 1.3 MG/DL
BILIRUB UR QL STRIP: NEGATIVE
BUN SERPL-MCNC: 48.5 MG/DL (ref 8–23)
CALCIUM SERPL-MCNC: 10 MG/DL (ref 8.8–10.2)
CHLORIDE SERPL-SCNC: 100 MMOL/L (ref 98–107)
COLOR UR AUTO: ABNORMAL
CREAT SERPL-MCNC: 3.11 MG/DL (ref 0.67–1.17)
CRP SERPL-MCNC: 9.67 MG/L
DEPRECATED HCO3 PLAS-SCNC: 21 MMOL/L (ref 22–29)
EGFRCR SERPLBLD CKD-EPI 2021: 19 ML/MIN/1.73M2
ERYTHROCYTE [DISTWIDTH] IN BLOOD BY AUTOMATED COUNT: 16.9 % (ref 10–15)
GLUCOSE SERPL-MCNC: 300 MG/DL (ref 70–99)
GLUCOSE UR STRIP-MCNC: >1000 MG/DL
HCT VFR BLD AUTO: 33.9 % (ref 40–53)
HGB BLD-MCNC: 11 G/DL (ref 13.3–17.7)
HGB UR QL STRIP: ABNORMAL
KETONES UR STRIP-MCNC: NEGATIVE MG/DL
LEUKOCYTE ESTERASE UR QL STRIP: NEGATIVE
MCH RBC QN AUTO: 27.1 PG (ref 26.5–33)
MCHC RBC AUTO-ENTMCNC: 32.4 G/DL (ref 31.5–36.5)
MCV RBC AUTO: 84 FL (ref 78–100)
NITRATE UR QL: NEGATIVE
NRBC # BLD AUTO: 0 10E3/UL
NRBC BLD AUTO-RTO: 0 /100
PH UR STRIP: 5.5 [PH] (ref 5–7)
PLATELET # BLD AUTO: 72 10E3/UL (ref 150–450)
POTASSIUM SERPL-SCNC: 4.6 MMOL/L (ref 3.4–5.3)
PROT SERPL-MCNC: 8.2 G/DL (ref 6.4–8.3)
RBC # BLD AUTO: 4.06 10E6/UL (ref 4.4–5.9)
RBC #/AREA URNS AUTO: >100 /HPF
SODIUM SERPL-SCNC: 136 MMOL/L (ref 135–145)
SP GR UR STRIP: 1.02 (ref 1–1.03)
SQUAMOUS #/AREA URNS AUTO: ABNORMAL /LPF
UROBILINOGEN UR STRIP-MCNC: NORMAL MG/DL
WBC # BLD AUTO: 20.5 10E3/UL (ref 4–11)
WBC #/AREA URNS AUTO: ABNORMAL /HPF

## 2024-06-11 PROCEDURE — 99207 REFERRAL TO ACUTE AND DIAGNOSTIC SERVICES: CPT | Performed by: PHYSICIAN ASSISTANT

## 2024-06-11 PROCEDURE — 81001 URINALYSIS AUTO W/SCOPE: CPT | Performed by: NURSE PRACTITIONER

## 2024-06-11 PROCEDURE — 80053 COMPREHEN METABOLIC PANEL: CPT | Performed by: NURSE PRACTITIONER

## 2024-06-11 PROCEDURE — 99417 PROLNG OP E/M EACH 15 MIN: CPT | Performed by: NURSE PRACTITIONER

## 2024-06-11 PROCEDURE — 99215 OFFICE O/P EST HI 40 MIN: CPT | Performed by: NURSE PRACTITIONER

## 2024-06-11 PROCEDURE — 87040 BLOOD CULTURE FOR BACTERIA: CPT | Performed by: NURSE PRACTITIONER

## 2024-06-11 PROCEDURE — 86140 C-REACTIVE PROTEIN: CPT | Performed by: NURSE PRACTITIONER

## 2024-06-11 PROCEDURE — 85007 BL SMEAR W/DIFF WBC COUNT: CPT | Performed by: NURSE PRACTITIONER

## 2024-06-11 PROCEDURE — 87086 URINE CULTURE/COLONY COUNT: CPT | Performed by: NURSE PRACTITIONER

## 2024-06-11 PROCEDURE — 85025 COMPLETE CBC W/AUTO DIFF WBC: CPT | Performed by: NURSE PRACTITIONER

## 2024-06-11 PROCEDURE — 74176 CT ABD & PELVIS W/O CONTRAST: CPT | Mod: GC | Performed by: STUDENT IN AN ORGANIZED HEALTH CARE EDUCATION/TRAINING PROGRAM

## 2024-06-11 PROCEDURE — 36415 COLL VENOUS BLD VENIPUNCTURE: CPT | Performed by: NURSE PRACTITIONER

## 2024-06-11 ASSESSMENT — ENCOUNTER SYMPTOMS
DIARRHEA: 0
CARDIOVASCULAR NEGATIVE: 1
ABDOMINAL PAIN: 0
FATIGUE: 0
COLOR CHANGE: 0
CONSTIPATION: 1
HEMATURIA: 1
FLANK PAIN: 0
WOUND: 0
CHILLS: 0
PALPITATIONS: 0
NAUSEA: 0
DYSURIA: 0
DIFFICULTY URINATING: 1
VOMITING: 0
FEVER: 0
BLOOD IN STOOL: 0
FREQUENCY: 1

## 2024-06-11 ASSESSMENT — PAIN SCALES - GENERAL: PAINLEVEL: SEVERE PAIN (7)

## 2024-06-11 NOTE — PROGRESS NOTES
Acute and Diagnostic Services Clinic Visit    Assessment & Plan   Problem List Items Addressed This Visit       Hypertension goal BP (blood pressure) < 150/90    Prostate cancer (H)    Thrombocytopenia (H24)     Other Visit Diagnoses       Urinary retention    -  Primary    Relevant Orders    CBC with platelets differential    Comprehensive metabolic panel (Completed)    CRP inflammation (Completed)    UA with Microscopic reflex to Culture (Completed)    CT Abdomen Pelvis w/o Contrast (Completed)    UA Microscopic with Reflex to Culture (Completed)    Blood Culture Peripheral Blood    Blood Culture Peripheral Blood    Leukocytosis, unspecified type        Relevant Orders    Blood Culture Peripheral Blood    Blood Culture Peripheral Blood    Acute cystitis with hematuria        Relevant Orders    Blood Culture Peripheral Blood    Blood Culture Peripheral Blood    Urine Culture Aerobic Bacterial - lab collect    Uncontrolled type 2 diabetes mellitus with hyperglycemia (H)        Anemia, unspecified type        Acute renal failure superimposed on stage 4 chronic kidney disease, unspecified acute renal failure type (H)             89-year-old male patient presents to [facility] today accompanied by his significant other with chronic conditions including hypertension, history of prostate cancer, type 2 diabetes with kidney complication, hypothyroidism, DEMOND, diabetic polyneuropathy, stage III chronic kidney disease, BPH with obstruction, class I obesity, normocytic anemia, thrombocytopenia, essential tremor, poor balance, low LDL, anemia of chronic kidney disease, dizziness.  Patient indicates that he is unable to void and believes his last void was on Sunday, 6/9/2024.  He reports abdominal pressure and distention.  Patient denies any fever, chills, nausea or vomiting, shortness of breath or difficulty breathing, constipation, diarrhea.  Physical exam reveals a lethargic male who appears quite fatigued lying in the exam room  "answers questions slowly.  Chest is clear cardiovascular regular abdomen is distended and tender.  Bladder scan completed showing greater than 800 cc.  Subsequent Barbosa catheter 16 coudé is placed initial 300 cc output then clamped, 5 minutes between each additional 200 cc output for total urinary output of 1150 cc.    Diagnostics include a urine which shows bacteria, greater than 100 red blood cells, greater than 1000 glucose, protein and a culture is obtained, CBC elevation white count at 20,500 hemoglobin is 11 platelets now down to 72 manual differential is flagged and sent for further testing, CMP shows an elevated BUN of 48.5, elevated creatinine at 3.11 with reduction in GFR from 36 down to 19 showing an acute on chronic kidney injury, glucose is 300 with elevation in bili at 1.3, CRP elevated at 9.67.  Blood cultures x 2 are pending.    CT abdomen pelvis without contrast completed and is unremarkable.  Patient is transferred via private vehicle to [facility] for further evaluation and treatment for acute on chronic kidney disease, acute cystitis with hematuria, urinary retention.  Awaiting blood cultures x 2.  Report given to emergency room physician.    > 70 minutes were spent doing chart review, history and exam, documentation and further activities per the note.     BMI  Estimated body mass index is 27.37 kg/m  as calculated from the following:    Height as of 5/28/24: 1.727 m (5' 8\").    Weight as of 5/28/24: 81.6 kg (180 lb).           No follow-ups on file.      Florin Johnson is a 89 year old, presenting for the following health issues:  Abdominal Pain and Urinary Problem        4/11/2024    12:53 PM   Additional Questions   Roomed by Amada   Accompanied by self     HPI     Genitourinary - Male  Onset/Duration: a couple days  Description:   Dysuria (painful urination): unable to urinate}  Hematuria (blood in urine): YES- \" a little\"  Frequency: YES  Waking at night to urinate: " YES  Hesitancy (delay in urine): N/A  Retention (unable to empty): YES  Decrease in urinary flow: YES  Incontinence: No  Progression of Symptoms:  worsening  Accompanying Signs & Symptoms:  Fever: No  Back/Flank pain: No  Urethral discharge: No  Testicle lumps/masses/pain: No  Nausea and/or vomiting: No  Abdominal pain: YES  History:   History of frequent UTI s: No  History of kidney stones: No  History of hernias: No  Personal or Family history of Prostate problems: YES  Sexually active: N/A  Precipitating or alleviating factors: None  Therapies tried and outcome: none        Review of Systems  Constitutional, HEENT, cardiovascular, pulmonary, GI, , musculoskeletal, neuro, skin, endocrine and psych systems are negative, except as otherwise noted.      Objective    BP (!) 177/72 (BP Location: Right arm, Patient Position: Supine, Cuff Size: Adult Regular)   Pulse 56   Temp 97  F (36.1  C) (Oral)   Resp 15   SpO2 99%   There is no height or weight on file to calculate BMI.  Physical Exam   GENERAL: alert, elderly, and fatigued  EYES: Eyes grossly normal to inspection,conjunctivae and sclerae normal  RESP: lungs clear to auscultation - no rales, rhonchi or wheezes  CV: regular rate and rhythm, normal S1 S2   ABDOMEN: bowel sounds normal and distended, diffused tenderness    Results for orders placed or performed in visit on 06/11/24   CT Abdomen Pelvis w/o Contrast     Status: None    Narrative    EXAMINATION: CT ABDOMEN PELVIS W/O CONTRAST  6/11/2024 2:41 PM      CLINICAL HISTORY: Urinary retention    COMPARISON: 5/24/2019    PROCEDURE COMMENTS: CT of the abdomen was performed without   intravenous contrast. Coronal and sagittal reformatted images were  obtained.    FINDINGS:  Lower thorax:   Mild coronary artery atherosclerotic calcification. Subsegmental  dependent atelectasis.    Abdomen and pelvis:  Scattered benign-appearing hypodense lesions within the liver, likely  cysts. Cholelithiasis without acute  cholecystitis. Mildly atrophic  pancreas without mass or duct dilatation. Normal adrenal glands and  spleen.  Nonspecific perinephric fat stranding. Nonobstructing 3 mm calculi  within the inferior pole right kidney. No hydronephrosis. Normal  caliber of the ureters. The urinary bladder is decompressed with Barbosa  catheter. Prostatic brachytherapy seeds. Prostate is normal in size.  Pelvic phleboliths.    There are no abnormally dilated or thickened loops of small bowel or  colon. Colonic diverticulosis without acute diverticulitis. Normal  appendix. There is no free air or free fluid. There are no abnormally  sized lymph nodes.    Moderate aortic atherosclerotic calcification without aneurysm.    Osseous structures:   Small fat-containing indirect inguinal hernias. Mild spinal  spondylosis. No acute or aggressive osseous lesion.      Impression    IMPRESSION:  1. No anatomic explanation for urinary retention or bladder outlet  obstruction. No hydroureteronephrosis. The urinary bladder is  decompressed with Barbosa catheter. The prostate is normal in size with  brachytherapy seeds.  2. Nonobstructing 3 mm stones in the inferior pole right kidney, no  distal urinary tract stone.     I have personally reviewed the examination and initial interpretation  and I agree with the findings.    KAREN JOHNSON DO         SYSTEM ID:  [id number]   Results for orders placed or performed in visit on 06/11/24   UA with Microscopic reflex to Culture     Status: Abnormal    Specimen: Urine, Barbosa Catheter   Result Value Ref Range    Color Urine Light Yellow Colorless, Straw, Light Yellow, Yellow    Appearance Urine Clear Clear    Glucose Urine >1000 (A) Negative mg/dL    Bilirubin Urine Negative Negative    Ketones Urine Negative Negative mg/dL    Specific Gravity Urine 1.020 0.999 - 1.035    Blood Urine Large (A) Negative    pH Urine 5.5 5.0 - 7.0    Protein Albumin Urine 30 (A) Negative mg/dL    Nitrite Urine Negative Negative     Leukocyte Esterase Urine Negative Negative    Urobilinogen Urine Normal Normal, 2.0 mg/dL   Comprehensive metabolic panel     Status: Abnormal   Result Value Ref Range    Sodium 136 135 - 145 mmol/L    Potassium 4.6 3.4 - 5.3 mmol/L    Carbon Dioxide (CO2) 21 (L) 22 - 29 mmol/L    Anion Gap 15 7 - 15 mmol/L    Urea Nitrogen 48.5 (H) 8.0 - 23.0 mg/dL    Creatinine 3.11 (H) 0.67 - 1.17 mg/dL    GFR Estimate 19 (L) >60 mL/min/1.73m2    Calcium 10.0 8.8 - 10.2 mg/dL    Chloride 100 98 - 107 mmol/L    Glucose 300 (H) 70 - 99 mg/dL    Alkaline Phosphatase 109 40 - 150 U/L    AST 22 0 - 45 U/L    ALT <5 0 - 70 U/L    Protein Total 8.2 6.4 - 8.3 g/dL    Albumin 4.5 3.5 - 5.2 g/dL    Bilirubin Total 1.3 (H) <=1.2 mg/dL   CRP inflammation     Status: Abnormal   Result Value Ref Range    CRP Inflammation 9.67 (H) <5.00 mg/L   CBC with platelets and differential     Status: Abnormal   Result Value Ref Range    WBC Count 20.5 (H) 4.0 - 11.0 10e3/uL    RBC Count 4.06 (L) 4.40 - 5.90 10e6/uL    Hemoglobin 11.0 (L) 13.3 - 17.7 g/dL    Hematocrit 33.9 (L) 40.0 - 53.0 %    MCV 84 78 - 100 fL    MCH 27.1 26.5 - 33.0 pg    MCHC 32.4 31.5 - 36.5 g/dL    RDW 16.9 (H) 10.0 - 15.0 %    Platelet Count 72 (L) 150 - 450 10e3/uL    NRBCs per 100 WBC 0 <1 /100    Absolute NRBCs 0.0 10e3/uL   UA Microscopic with Reflex to Culture     Status: Abnormal   Result Value Ref Range    Bacteria Urine Few (A) None Seen /HPF    RBC Urine >100 (A) 0-2 /HPF /HPF    WBC Urine 0-5 0-5 /HPF /HPF    Squamous Epithelials Urine Few (A) None Seen /LPF    Narrative    Urine Culture not indicated   CBC with platelets differential     Status: Abnormal (In process)    Narrative    The following orders were created for panel order CBC with platelets differential.  Procedure                               Abnormality         Status                     ---------                               -----------         ------                     CBC with platelets and  d...[[id number]]  Abnormal            Final result               Manual Differential[[id number]]                              In process                   Please view results for these tests on the individual orders.     Results for orders placed or performed in visit on 06/11/24 (from the past 24 hour(s))   UA with Microscopic reflex to Culture    Specimen: Urine, Barbosa Catheter   Result Value Ref Range    Color Urine Light Yellow Colorless, Straw, Light Yellow, Yellow    Appearance Urine Clear Clear    Glucose Urine >1000 (A) Negative mg/dL    Bilirubin Urine Negative Negative    Ketones Urine Negative Negative mg/dL    Specific Gravity Urine 1.020 0.999 - 1.035    Blood Urine Large (A) Negative    pH Urine 5.5 5.0 - 7.0    Protein Albumin Urine 30 (A) Negative mg/dL    Nitrite Urine Negative Negative    Leukocyte Esterase Urine Negative Negative    Urobilinogen Urine Normal Normal, 2.0 mg/dL   UA Microscopic with Reflex to Culture   Result Value Ref Range    Bacteria Urine Few (A) None Seen /HPF    RBC Urine >100 (A) 0-2 /HPF /HPF    WBC Urine 0-5 0-5 /HPF /HPF    Squamous Epithelials Urine Few (A) None Seen /LPF    Narrative    Urine Culture not indicated   CBC with platelets differential    Narrative    The following orders were created for panel order CBC with platelets differential.  Procedure                               Abnormality         Status                     ---------                               -----------         ------                     CBC with platelets and d...[[id number]]  Abnormal            Final result               Manual Differential[[id number]]                              In process                   Please view results for these tests on the individual orders.   Comprehensive metabolic panel   Result Value Ref Range    Sodium 136 135 - 145 mmol/L    Potassium 4.6 3.4 - 5.3 mmol/L    Carbon Dioxide (CO2) 21 (L) 22 - 29 mmol/L    Anion Gap 15 7 - 15 mmol/L    Urea Nitrogen 48.5 (H) 8.0  - 23.0 mg/dL    Creatinine 3.11 (H) 0.67 - 1.17 mg/dL    GFR Estimate 19 (L) >60 mL/min/1.73m2    Calcium 10.0 8.8 - 10.2 mg/dL    Chloride 100 98 - 107 mmol/L    Glucose 300 (H) 70 - 99 mg/dL    Alkaline Phosphatase 109 40 - 150 U/L    AST 22 0 - 45 U/L    ALT <5 0 - 70 U/L    Protein Total 8.2 6.4 - 8.3 g/dL    Albumin 4.5 3.5 - 5.2 g/dL    Bilirubin Total 1.3 (H) <=1.2 mg/dL   CRP inflammation   Result Value Ref Range    CRP Inflammation 9.67 (H) <5.00 mg/L   CBC with platelets and differential   Result Value Ref Range    WBC Count 20.5 (H) 4.0 - 11.0 10e3/uL    RBC Count 4.06 (L) 4.40 - 5.90 10e6/uL    Hemoglobin 11.0 (L) 13.3 - 17.7 g/dL    Hematocrit 33.9 (L) 40.0 - 53.0 %    MCV 84 78 - 100 fL    MCH 27.1 26.5 - 33.0 pg    MCHC 32.4 31.5 - 36.5 g/dL    RDW 16.9 (H) 10.0 - 15.0 %    Platelet Count 72 (L) 150 - 450 10e3/uL    NRBCs per 100 WBC 0 <1 /100    Absolute NRBCs 0.0 10e3/uL         Current Outpatient Medications   Medication Sig Dispense Refill    amLODIPine (NORVASC) 5 MG tablet TAKE 1 TABLET BY MOUTH DAILY. 90 tablet 3    blood glucose (ACCU-CHEK SMARTVIEW) test strip TEST BLOOD SUGAR TWICE DAILY  OR AS DIRECTED 200 strip 3    blood glucose (NO BRAND SPECIFIED) lancets standard Use to test blood sugar 2 times daily or as directed. 200 each 0    blood glucose (NO BRAND SPECIFIED) test strip Use to test blood sugar 2 times daily or as directed. To accompany: Blood Glucose Monitor Brands: per insurance 200 strip 3    blood glucose calibration (NO BRAND SPECIFIED) solution To accompany: Blood Glucose Monitor Brands: per insurance. 1 Bottle 3    blood glucose monitoring (ACCU-CHEK MILADYS SMARTVIEW) meter device kit Use to test blood sugar 2 times daily or as directed. 1 kit 0    blood glucose monitoring (NO BRAND SPECIFIED) meter device kit Use to test blood sugar 2 times daily or as directed. Preferred blood glucose meter OR supplies to accompany: Blood Glucose Monitor Brands: per insurance 1 kit 0     FARXIGA 10 MG TABS tablet TAKE 1 TABLET BY MOUTH DAILY 90 tablet 1    ferrous sulfate (FEROSUL) 325 (65 Fe) MG tablet Take 1 tablet (325 mg) by mouth daily (with breakfast) 90 tablet 3    folic acid (FOLVITE) 1 MG tablet Take 1 tablet (1 mg) by mouth daily 90 tablet 3    glimepiride (AMARYL) 4 MG tablet Take 2 tablets (8 mg) by mouth every morning (before breakfast) 180 tablet 3    levothyroxine (SYNTHROID/LEVOTHROID) 88 MCG tablet TAKE 1 TABLET BY MOUTH EVERY DAY GENERIC EQUIVALENT FOR SYNTHROID 90 tablet 0    losartan-hydrochlorothiazide (HYZAAR) 100-25 MG tablet TAKE 1 TABLET BY MOUTH EVERY MORNING 90 tablet 0    metFORMIN (GLUCOPHAGE XR) 500 MG 24 hr tablet Take 2 tablets (1,000 mg) by mouth daily (with dinner) 180 tablet 1    pregabalin (LYRICA) 75 MG capsule TAKE ONE CAPSULE BY MOUTH TWICE DAILY. GENERIC EQUIVALENT FOR LYRICA 180 capsule 3    propranolol ER (INDERAL LA) 80 MG 24 hr capsule TAKE ONE CAPSULE BY MOUTH DAILY 90 capsule 3    tamsulosin (FLOMAX) 0.4 MG capsule TAKE TWO CAPSULE BY MOUTH EVERY DAY. 180 capsule 3    thin (NO BRAND SPECIFIED) lancets Use with lanceting device to test blood sugar 2 times daily or as directed. To accompany: Blood Glucose Monitor Brands: per insurance. 200 each 3     No current facility-administered medications for this visit.     Past Medical History:   Diagnosis Date    Anemia due to chronic kidney disease 10/10/2023    BPH (benign prostatic hypertrophy)     BPH with obstruction/lower urinary tract symptoms 09/17/2020    BPV (benign positional vertigo), unspecified laterality 10/21/2021    CARDIOVASCULAR SCREENING; LDL GOAL LESS THAN 100 12/29/2011    CARDIOVASCULAR SCREENING; LDL GOAL LESS THAN 130 12/29/2011    Class 1 obesity due to excess calories with body mass index (BMI) of 31.0 to 31.9 in adult 09/17/2020    Dizziness 10/10/2023    Essential tremor 10/21/2019    Hypertension goal BP (blood pressure) < 140/90 12/29/2011    Hypothyroidism due to acquired atrophy of  thyroid 12/05/2016    Iron deficiency 10/10/2023    Low HDL (under 40) 11/29/2022    Normocytic anemia 08/15/2017    Poor balance 10/21/2021    Prostate cancer (H) 01/2007    Had Radiation    Thrombocytopenia (H24) 09/18/2019    Type 2 diabetes mellitus with hyperglycemia (H) 10/21/2010    Type 2 diabetes, HbA1c goal < 7% (H)      Past Surgical History:   Procedure Laterality Date    COLONOSCOPY       Family History   Problem Relation Age of Onset    Hypertension Mother     Dementia Mother     Prostate Cancer Paternal Grandfather     Cancer No family hx of     Diabetes No family hx of     Cerebrovascular Disease No family hx of     Thyroid Disease No family hx of     Glaucoma No family hx of     Macular Degeneration No family hx of        Signed Electronically by: Comfort Solorzano, CNP

## 2024-06-11 NOTE — PROGRESS NOTES
Florin Johnson is a 89 year old, presenting for the following health issues:  Abdominal Pain (Abdominal cramps with frequent urination all night. Now unable to urinate.)    HPI   Genitourinary - Male  Onset/Duration: 1day  Description:   Dysuria (painful urination): No  Hematuria (blood in urine): YES  Frequency: YES, urgency  Waking at night to urinate: No  Hesitancy (delay in urine): YES  Retention (unable to empty): YES  Decrease in urinary flow: No  Incontinence: No  Progression of Symptoms:  worsening  Accompanying Signs & Symptoms:  Fever: No  Back/Flank pain: No  Urethral discharge: No  Testicle lumps/masses/pain: No  Nausea and/or vomiting: No  Abdominal pain: YES- cramping  History:   History of frequent UTI s: No  History of kidney stones: No  History of hernias: No  Personal or Family history of Prostate problems: YES- hx of prostate cancer s/p radiation  Sexually active: No  Precipitating or alleviating factors: None  Therapies tried and outcome: increase fluid intake with minimal relief    Patient Active Problem List   Diagnosis    Hypertension goal BP (blood pressure) < 150/90    CARDIOVASCULAR SCREENING; LDL GOAL LESS THAN 100    Prostate cancer (H)    Type 2 diabetes mellitus with kidney complication, without long-term current use of insulin (H)    Advanced directives, counseling/discussion    White coat hypertension    Hypothyroidism due to acquired atrophy of thyroid    DEMOND on CPAP    Diabetic polyneuropathy associated with type 2 diabetes mellitus (H)    CKD (chronic kidney disease) stage 3, GFR 30-59 ml/min (H)    BPH with obstruction/lower urinary tract symptoms    Class 1 obesity due to excess calories with body mass index (BMI) of 31.0 to 31.9 in adult    Normocytic anemia    Thrombocytopenia (H24)    Poor balance    Essential tremor    Low HDL (under 40)    Anemia due to chronic kidney disease    Iron deficiency    Dizziness     Current Outpatient Medications   Medication Sig Dispense  Refill    amLODIPine (NORVASC) 5 MG tablet TAKE 1 TABLET BY MOUTH DAILY. 90 tablet 3    blood glucose (ACCU-CHEK SMARTVIEW) test strip TEST BLOOD SUGAR TWICE DAILY  OR AS DIRECTED 200 strip 3    blood glucose (NO BRAND SPECIFIED) lancets standard Use to test blood sugar 2 times daily or as directed. 200 each 0    blood glucose (NO BRAND SPECIFIED) test strip Use to test blood sugar 2 times daily or as directed. To accompany: Blood Glucose Monitor Brands: per insurance 200 strip 3    blood glucose calibration (NO BRAND SPECIFIED) solution To accompany: Blood Glucose Monitor Brands: per insurance. 1 Bottle 3    blood glucose monitoring (ACCU-CHEK MILADYS SMARTVIEW) meter device kit Use to test blood sugar 2 times daily or as directed. 1 kit 0    blood glucose monitoring (NO BRAND SPECIFIED) meter device kit Use to test blood sugar 2 times daily or as directed. Preferred blood glucose meter OR supplies to accompany: Blood Glucose Monitor Brands: per insurance 1 kit 0    FARXIGA 10 MG TABS tablet TAKE 1 TABLET BY MOUTH DAILY 90 tablet 1    ferrous sulfate (FEROSUL) 325 (65 Fe) MG tablet Take 1 tablet (325 mg) by mouth daily (with breakfast) 90 tablet 3    folic acid (FOLVITE) 1 MG tablet Take 1 tablet (1 mg) by mouth daily 90 tablet 3    glimepiride (AMARYL) 4 MG tablet Take 2 tablets (8 mg) by mouth every morning (before breakfast) 180 tablet 3    levothyroxine (SYNTHROID/LEVOTHROID) 88 MCG tablet TAKE 1 TABLET BY MOUTH EVERY DAY GENERIC EQUIVALENT FOR SYNTHROID 90 tablet 0    losartan-hydrochlorothiazide (HYZAAR) 100-25 MG tablet TAKE 1 TABLET BY MOUTH EVERY MORNING 90 tablet 0    metFORMIN (GLUCOPHAGE XR) 500 MG 24 hr tablet Take 2 tablets (1,000 mg) by mouth daily (with dinner) 180 tablet 1    pregabalin (LYRICA) 75 MG capsule TAKE ONE CAPSULE BY MOUTH TWICE DAILY. GENERIC EQUIVALENT FOR LYRICA 180 capsule 3    propranolol ER (INDERAL LA) 80 MG 24 hr capsule TAKE ONE CAPSULE BY MOUTH DAILY 90 capsule 3    tamsulosin  (FLOMAX) 0.4 MG capsule TAKE TWO CAPSULE BY MOUTH EVERY DAY. 180 capsule 3    thin (NO BRAND SPECIFIED) lancets Use with lanceting device to test blood sugar 2 times daily or as directed. To accompany: Blood Glucose Monitor Brands: per insurance. 200 each 3     No current facility-administered medications for this visit.        Allergies   Allergen Reactions    Lisinopril Cough     Developed an ACE cough on the Lisinopril, pt denies this is a current allergy as of 6/19/2020.       Review of Systems   Constitutional:  Negative for chills, fatigue and fever.   Cardiovascular: Negative.  Negative for chest pain, palpitations and leg swelling.   Gastrointestinal:  Positive for constipation. Negative for abdominal pain, blood in stool, diarrhea, nausea and vomiting.   Genitourinary:  Positive for difficulty urinating, frequency, hematuria and urgency. Negative for dysuria, flank pain, genital sores, penile discharge, scrotal swelling and testicular pain.   Skin: Negative.  Negative for color change, pallor, rash and wound.   All other systems reviewed and are negative.          Objective    BP (!) 176/76 (BP Location: Left arm, Patient Position: Sitting)   Pulse 68   Temp 98.1  F (36.7  C) (Tympanic)   Resp 18   SpO2 97%   There is no height or weight on file to calculate BMI.  Physical Exam  Vitals and nursing note reviewed.   Constitutional:       General: He is not in acute distress.     Appearance: Normal appearance. He is well-developed and normal weight. He is not ill-appearing.   Cardiovascular:      Rate and Rhythm: Normal rate and regular rhythm.      Pulses: Normal pulses.      Heart sounds: Normal heart sounds, S1 normal and S2 normal. No murmur heard.     No friction rub. No gallop.   Pulmonary:      Effort: Pulmonary effort is normal. No accessory muscle usage or respiratory distress.      Breath sounds: Normal breath sounds and air entry. No wheezing or rales.   Abdominal:      General: Abdomen is  protuberant. Bowel sounds are normal. There is distension.      Palpations: Abdomen is soft. There is no mass.      Tenderness: There is generalized abdominal tenderness. There is no right CVA tenderness, left CVA tenderness, guarding or rebound. Negative signs include Mak's sign, Rovsing's sign and McBurney's sign.      Hernia: No hernia is present.   Skin:     General: Skin is warm and dry.   Neurological:      Mental Status: He is alert and oriented to person, place, and time.   Psychiatric:         Mood and Affect: Mood normal.         Behavior: Behavior normal.         Thought Content: Thought content normal.         Judgment: Judgment normal.             Assessment/Plan:  Abdominal pain, generalized: Along with urinary retention, abdominal distention, and urinary urgency.  H&P is concerning for bladder distention vs urinary obstruction vs UTI.  Recommend further evaluation and management in the [facility].  Will most likely need further workup with labs, cath and/or imaging.   Discussed case with [facility] who has agreed to take on the patient.   Patient has declined transportation via ambulance and will have family drive her/him.  Understands risks and benefits of ambulance transfer and s/he has declined.  Call 911 if worsening symptoms.  S/he plans to go to [place] [facility].  S/he left in stable condition with AVS in hand.  F/u with PCP after [facility] visit.   -     Referral to Acute and Diagnostic Services (Day of diagnostic / First order acute); Future    Urinary retention    Abdominal distension        Vane See LOUISE Harris

## 2024-06-12 LAB — BACTERIA UR CULT: NO GROWTH

## 2024-06-16 LAB
BACTERIA BLD CULT: NO GROWTH
BACTERIA BLD CULT: NO GROWTH
BASOPHILS # BLD MANUAL: 0 10E3/UL (ref 0–0.2)
BASOPHILS NFR BLD MANUAL: 0 %
EOSINOPHIL # BLD MANUAL: 0.2 10E3/UL (ref 0–0.7)
EOSINOPHIL NFR BLD MANUAL: 1 %
LYMPHOCYTES # BLD MANUAL: 1.4 10E3/UL (ref 0.8–5.3)
LYMPHOCYTES NFR BLD MANUAL: 7 %
METAMYELOCYTES # BLD MANUAL: 0.2 10E3/UL
METAMYELOCYTES NFR BLD MANUAL: 1 %
MONOCYTES # BLD MANUAL: 4.1 10E3/UL (ref 0–1.3)
MONOCYTES NFR BLD MANUAL: 20 %
MYELOCYTES # BLD MANUAL: 0.2 10E3/UL
MYELOCYTES NFR BLD MANUAL: 1 %
NEUTROPHILS # BLD MANUAL: 14.4 10E3/UL (ref 1.6–8.3)
NEUTROPHILS NFR BLD MANUAL: 70 %
PLAT MORPH BLD: ABNORMAL
POLYCHROMASIA BLD QL SMEAR: SLIGHT
RBC MORPH BLD: ABNORMAL
STOMATOCYTES BLD QL SMEAR: SLIGHT

## 2024-07-15 ENCOUNTER — TRANSFERRED RECORDS (OUTPATIENT)
Dept: HEALTH INFORMATION MANAGEMENT | Facility: CLINIC | Age: 89
End: 2024-07-15
Payer: COMMERCIAL

## 2024-07-22 ENCOUNTER — LAB (OUTPATIENT)
Dept: LAB | Facility: CLINIC | Age: 89
End: 2024-07-22
Payer: COMMERCIAL

## 2024-07-22 DIAGNOSIS — Z12.5 SCREENING FOR PROSTATE CANCER: Primary | ICD-10-CM

## 2024-07-22 DIAGNOSIS — E11.42 DIABETIC POLYNEUROPATHY ASSOCIATED WITH TYPE 2 DIABETES MELLITUS (H): ICD-10-CM

## 2024-07-22 DIAGNOSIS — N18.30 CKD (CHRONIC KIDNEY DISEASE) STAGE 3, GFR 30-59 ML/MIN (H): ICD-10-CM

## 2024-07-22 LAB
CHOLEST SERPL-MCNC: 97 MG/DL
FASTING STATUS PATIENT QL REPORTED: NO
HDLC SERPL-MCNC: 24 MG/DL
HOLD SPECIMEN: NORMAL
LDLC SERPL CALC-MCNC: 51 MG/DL
NONHDLC SERPL-MCNC: 73 MG/DL
PSA SERPL DL<=0.01 NG/ML-MCNC: <0.01 NG/ML
TRIGL SERPL-MCNC: 110 MG/DL

## 2024-07-22 PROCEDURE — G0103 PSA SCREENING: HCPCS

## 2024-07-22 PROCEDURE — 80061 LIPID PANEL: CPT

## 2024-07-22 PROCEDURE — 36415 COLL VENOUS BLD VENIPUNCTURE: CPT

## 2024-07-24 ENCOUNTER — OFFICE VISIT (OUTPATIENT)
Dept: FAMILY MEDICINE | Facility: CLINIC | Age: 89
End: 2024-07-24
Payer: COMMERCIAL

## 2024-07-24 VITALS
HEART RATE: 87 BPM | HEIGHT: 68 IN | DIASTOLIC BLOOD PRESSURE: 71 MMHG | TEMPERATURE: 98.1 F | OXYGEN SATURATION: 99 % | BODY MASS INDEX: 27.11 KG/M2 | WEIGHT: 178.9 LBS | SYSTOLIC BLOOD PRESSURE: 167 MMHG | RESPIRATION RATE: 12 BRPM

## 2024-07-24 DIAGNOSIS — D63.1 ANEMIA DUE TO STAGE 3B CHRONIC KIDNEY DISEASE (H): ICD-10-CM

## 2024-07-24 DIAGNOSIS — E11.21 TYPE 2 DIABETES MELLITUS WITH DIABETIC NEPHROPATHY, WITHOUT LONG-TERM CURRENT USE OF INSULIN (H): ICD-10-CM

## 2024-07-24 DIAGNOSIS — N17.9 AKI (ACUTE KIDNEY INJURY) (H): ICD-10-CM

## 2024-07-24 DIAGNOSIS — N13.9 LOWER OBSTRUCTIVE UROPATHY: Primary | ICD-10-CM

## 2024-07-24 DIAGNOSIS — E03.4 HYPOTHYROIDISM DUE TO ACQUIRED ATROPHY OF THYROID: ICD-10-CM

## 2024-07-24 DIAGNOSIS — N18.32 STAGE 3B CHRONIC KIDNEY DISEASE (H): ICD-10-CM

## 2024-07-24 DIAGNOSIS — N18.32 ANEMIA DUE TO STAGE 3B CHRONIC KIDNEY DISEASE (H): ICD-10-CM

## 2024-07-24 DIAGNOSIS — D69.6 THROMBOCYTOPENIA (H): ICD-10-CM

## 2024-07-24 DIAGNOSIS — I10 HYPERTENSION GOAL BP (BLOOD PRESSURE) < 150/90: ICD-10-CM

## 2024-07-24 DIAGNOSIS — N30.01 ACUTE CYSTITIS WITH HEMATURIA: ICD-10-CM

## 2024-07-24 LAB
CREAT UR-MCNC: 80.5 MG/DL
MICROALBUMIN UR-MCNC: 428 MG/L
MICROALBUMIN/CREAT UR: 531.68 MG/G CR (ref 0–17)

## 2024-07-24 PROCEDURE — 99214 OFFICE O/P EST MOD 30 MIN: CPT | Performed by: INTERNAL MEDICINE

## 2024-07-24 PROCEDURE — 82570 ASSAY OF URINE CREATININE: CPT

## 2024-07-24 PROCEDURE — 82043 UR ALBUMIN QUANTITATIVE: CPT

## 2024-07-24 ASSESSMENT — PAIN SCALES - GENERAL: PAINLEVEL: MILD PAIN (2)

## 2024-07-24 NOTE — PROGRESS NOTES
Assessment & Plan     1.  Lower obstructive uropathy with NAVEED + cystitis requiring hospitalization and placement of Barbosa's catheter.  Obstruction possibly due to blood clot or prostatic obstruction patient with previous radiation seed implants for prostate cancer.  Patient to have a trial of voiding following removal of Barbosa's catheter tomorrow urology office.  This is second episode so neurologist will have to possibly check it out further with cystoscopy and a treatment plan.  2.  NAVEED on top of chronic kidney disease secondary to urinary obstruction resolved following Barbosa's catheter placement.  I will check renal panel will get back to him with results.  3.  Acute cystitis with hematuria.  Enterococci culture.  Currently on Augmentin.  In the hospital initially treated with Rocephin followed by ampicillin.  4.  Type 2 diabetes mellitus with diabetic nephropathy.  Glycemic control has been less than optimal milligram with combination of Amaryl, metformin and Farxiga.  Patient does not want to take all these medications so I discussed the option of insulin.  The patient and his wife will discuss it get back to me.  5.  Essential hypertension with blood pressure elevated in the clinic today he does have whitecoat syndrome.  He will continue with losartan HCT and amlodipine.  He is also on propranolol for essential tremors.  6.  Stage IIIb chronic kidney disease.  7.  Anemia due to chronic kidney disease.  Hemoglobin did drop down more during recent hospitalization.  8.  Hypothyroidism adequately treated with levothyroxine 88 mcg a day.  9.  Normal lipid profile on 7/22/2024 with cholesterol 97 HDL 24 LDL 51 triglyceride 131.  10.  History of prostate cancer treated with implanted radiation seeds.  His PSAs less than 1.01 on 7/22/2024.  11.  Chronic thrombocytopenia with a platelet count around 70,000        MED REC REQUIRED  Post Medication Reconciliation Status:   BMI  Estimated body mass index is 27.2 kg/m   "as calculated from the following:    Height as of this encounter: 1.727 m (5' 8\").    Weight as of this encounter: 81.1 kg (178 lb 14.4 oz).       Florin Johnson is a 89 year old, presenting for the following health issues:  Hospital F/U (Post hospital follow up [facility] 07/19/24)        7/24/2024    11:54 AM   Additional Questions   Roomed by Yolande LEYVA   Accompanied by Wife         7/24/2024    11:54 AM   Patient Reported Additional Medications   Patient reports taking the following new medications Augmentin and Florastor     HPI     Patient was recently hospitalized with obstructive uropathy with NAVEED and Enterococcus cystitis.  He was adequately treated and then discharged with indwelling Barbosa's catheter.  He has a follow-up with urologist in a couple weeks.  He has a trial of voiding tomorrow at urology office.    Hospital Follow-up Visit:    Hospital/Nursing Home/IP Rehab Facility:  [facility]   Date of Admission: 07/15/24  Date of Discharge: 07/18/24  Reason(s) for Admission: Acute kidney injury   Was the patient in the ICU or did the patient experience delirium during hospitalization?  No  Do you have any other stressors you would like to discuss with your provider? OTHER: pt declined    Problems taking medications regularly:  None  Medication changes since discharge: Yes, see pt reported medications  Problems adhering to non-medication therapy:  None    Summary of hospitalization:  CareEverywhere information obtained and reviewed  Diagnostic Tests/Treatments reviewed.  Follow up needed: Lab  Other Healthcare Providers Involved in Patient s Care:         None  Update since discharge: improved.         Plan of care communicated with patient           Review of Systems  Constitutional, HEENT, cardiovascular, pulmonary, GI, , musculoskeletal, neuro, skin, endocrine and psych systems are negative, except as otherwise noted.      Objective    BP (!) 167/71 (BP Location: Right arm, Patient Position: " "Sitting, Cuff Size: Adult Regular)   Pulse 87   Temp 98.1  F (36.7  C) (Temporal)   Resp 12   Ht 1.727 m (5' 8\")   Wt 81.1 kg (178 lb 14.4 oz)   SpO2 99%   BMI 27.20 kg/m    Body mass index is 27.2 kg/m .  Physical Exam   GENERAL: alert and no distress  NECK: no adenopathy, no asymmetry, masses, or scars  RESP: lungs clear to auscultation - no rales, rhonchi or wheezes  CV: regular rate and rhythm, normal S1 S2, no S3 or S4, no murmur, click or rub, no peripheral edema  ABDOMEN: soft, nontender, no hepatosplenomegaly, no masses and bowel sounds normal  MS: no gross musculoskeletal defects noted, no edema            Signed Electronically by: Aj Garces MD    "

## 2024-07-28 DIAGNOSIS — N18.32 STAGE 3B CHRONIC KIDNEY DISEASE (H): Primary | ICD-10-CM

## 2024-07-28 NOTE — PROGRESS NOTES
I had ordered a renal panel on 7/24/2024.  For some reason the lab did not run it.  Other labs ordered the same day were done.  Appreciate if the staff can look into it.  Placed another order for renal panel today.  Please contact patient and see if he is willing to come in sometime in the lab done.

## 2024-07-30 DIAGNOSIS — E03.4 HYPOTHYROIDISM DUE TO ACQUIRED ATROPHY OF THYROID: ICD-10-CM

## 2024-07-30 DIAGNOSIS — I10 HYPERTENSION GOAL BP (BLOOD PRESSURE) < 140/90: ICD-10-CM

## 2024-07-30 DIAGNOSIS — E11.21 TYPE 2 DIABETES MELLITUS WITH DIABETIC NEPHROPATHY, WITHOUT LONG-TERM CURRENT USE OF INSULIN (H): ICD-10-CM

## 2024-07-30 RX ORDER — LOSARTAN POTASSIUM AND HYDROCHLOROTHIAZIDE 25; 100 MG/1; MG/1
1 TABLET ORAL EVERY MORNING
Qty: 90 TABLET | Refills: 0 | OUTPATIENT
Start: 2024-07-30

## 2024-07-30 RX ORDER — LEVOTHYROXINE SODIUM 88 UG/1
TABLET ORAL
Qty: 90 TABLET | Refills: 0 | Status: SHIPPED | OUTPATIENT
Start: 2024-07-30

## 2024-08-01 ENCOUNTER — LAB (OUTPATIENT)
Dept: LAB | Facility: CLINIC | Age: 89
End: 2024-08-01
Payer: COMMERCIAL

## 2024-08-01 DIAGNOSIS — N18.32 STAGE 3B CHRONIC KIDNEY DISEASE (H): ICD-10-CM

## 2024-08-01 PROCEDURE — 36415 COLL VENOUS BLD VENIPUNCTURE: CPT

## 2024-08-01 PROCEDURE — 80069 RENAL FUNCTION PANEL: CPT

## 2024-08-02 LAB
ALBUMIN SERPL BCG-MCNC: 4.2 G/DL (ref 3.5–5.2)
ANION GAP SERPL CALCULATED.3IONS-SCNC: 13 MMOL/L (ref 7–15)
BUN SERPL-MCNC: 40.2 MG/DL (ref 8–23)
CALCIUM SERPL-MCNC: 8.8 MG/DL (ref 8.8–10.4)
CHLORIDE SERPL-SCNC: 101 MMOL/L (ref 98–107)
CREAT SERPL-MCNC: 1.91 MG/DL (ref 0.67–1.17)
EGFRCR SERPLBLD CKD-EPI 2021: 34 ML/MIN/1.73M2
GLUCOSE SERPL-MCNC: 239 MG/DL (ref 70–99)
HCO3 SERPL-SCNC: 22 MMOL/L (ref 22–29)
PHOSPHATE SERPL-MCNC: 3.7 MG/DL (ref 2.5–4.5)
POTASSIUM SERPL-SCNC: 4.8 MMOL/L (ref 3.4–5.3)
SODIUM SERPL-SCNC: 136 MMOL/L (ref 135–145)

## 2024-08-02 RX ORDER — METFORMIN HCL 500 MG
1000 TABLET, EXTENDED RELEASE 24 HR ORAL
Qty: 180 TABLET | Refills: 3 | Status: SHIPPED | OUTPATIENT
Start: 2024-08-02

## 2024-08-06 DIAGNOSIS — I10 HYPERTENSION GOAL BP (BLOOD PRESSURE) < 140/90: ICD-10-CM

## 2024-08-06 NOTE — TELEPHONE ENCOUNTER
Medication Question or Refill        What medication are you calling about (include dose and sig)?: losartan-hydrochlorothiazide (HYZAAR) 100-25 MG tablet     Preferred Pharmacy:  [facility] - [place], [place] - [address] AT [address]  [address] [facility] [address]  Phone: [phone] Fax: [phone] Alternate Fax: [phone]      Controlled Substance Agreement on file:   CSA -- Patient Level:    CSA: None found at the patient level.       Who prescribed the medication?: Dr Garces    Do you need a refill? Yes    When did you use the medication last? daily    Patient offered an appointment? No    Do you have any questions or concerns?  No

## 2024-08-07 RX ORDER — LOSARTAN POTASSIUM AND HYDROCHLOROTHIAZIDE 25; 100 MG/1; MG/1
1 TABLET ORAL EVERY MORNING
Qty: 90 TABLET | Refills: 3 | Status: SHIPPED | OUTPATIENT
Start: 2024-08-07

## 2024-08-14 DIAGNOSIS — E11.42 DIABETIC POLYNEUROPATHY ASSOCIATED WITH TYPE 2 DIABETES MELLITUS (H): ICD-10-CM

## 2024-08-14 DIAGNOSIS — E61.1 IRON DEFICIENCY: ICD-10-CM

## 2024-08-14 DIAGNOSIS — I10 HYPERTENSION GOAL BP (BLOOD PRESSURE) < 140/90: ICD-10-CM

## 2024-08-14 DIAGNOSIS — N40.0 BENIGN PROSTATIC HYPERPLASIA WITHOUT LOWER URINARY TRACT SYMPTOMS: ICD-10-CM

## 2024-08-14 RX ORDER — FERROUS SULFATE 325(65) MG
325 TABLET ORAL
Qty: 90 TABLET | Refills: 3 | OUTPATIENT
Start: 2024-08-14

## 2024-08-14 NOTE — TELEPHONE ENCOUNTER
Medication Question or Refill    Contacts       Contact Date/Time Type Contact Phone/Fax    08/14/2024 03:08 PM CDT Phone (Incoming) Alex Lozano (Self) [phone] (H)            What medication are you calling about (include dose and sig)?: pregabalin (LYRICA) 75 MG capsule     Preferred Pharmacy:    [facility] - [facility] [address] AT [address]  [address]  [facility] [address]  Phone: [phone] Fax: [phone] Alternate Fax: [phone]          Controlled Substance Agreement on file:   CSA -- Patient Level:    CSA: None found at the patient level.       Who prescribed the medication?: Aj Garces MD     Do you need a refill? Yes    Patient offered an appointment? No    Do you have any questions or concerns?  No      Could we send this information to you in [facility] or would you prefer to receive a phone call?:   Patient would prefer a phone call   Okay to leave a detailed message?: Yes at Cell number on file:    Telephone Information:   Mobile [phone]   Mobile Not on file.

## 2024-08-15 DIAGNOSIS — N40.0 BENIGN PROSTATIC HYPERPLASIA WITHOUT LOWER URINARY TRACT SYMPTOMS: ICD-10-CM

## 2024-08-15 DIAGNOSIS — I10 HYPERTENSION GOAL BP (BLOOD PRESSURE) < 140/90: ICD-10-CM

## 2024-08-15 RX ORDER — AMLODIPINE BESYLATE 10 MG/1
TABLET ORAL
Qty: 90 TABLET | Refills: 3 | OUTPATIENT
Start: 2024-08-15

## 2024-08-15 RX ORDER — PREGABALIN 75 MG/1
CAPSULE ORAL
Qty: 180 CAPSULE | Refills: 3 | Status: SHIPPED | OUTPATIENT
Start: 2024-08-15

## 2024-08-15 RX ORDER — TAMSULOSIN HYDROCHLORIDE 0.4 MG/1
CAPSULE ORAL
Qty: 180 CAPSULE | Refills: 3 | OUTPATIENT
Start: 2024-08-15

## 2024-08-15 RX ORDER — AMLODIPINE BESYLATE 10 MG/1
TABLET ORAL
Qty: 90 TABLET | Refills: 3 | Status: SHIPPED | OUTPATIENT
Start: 2024-08-15

## 2024-08-15 RX ORDER — TAMSULOSIN HYDROCHLORIDE 0.4 MG/1
CAPSULE ORAL
Qty: 180 CAPSULE | Refills: 3 | Status: SHIPPED | OUTPATIENT
Start: 2024-08-15

## 2024-08-16 ENCOUNTER — TRANSFERRED RECORDS (OUTPATIENT)
Dept: HEALTH INFORMATION MANAGEMENT | Facility: CLINIC | Age: 89
End: 2024-08-16
Payer: COMMERCIAL

## 2024-08-16 ENCOUNTER — TELEPHONE (OUTPATIENT)
Dept: FAMILY MEDICINE | Facility: CLINIC | Age: 89
End: 2024-08-16
Payer: COMMERCIAL

## 2024-08-16 NOTE — TELEPHONE ENCOUNTER
Please take the virtual spot of 9:30 AM on 8/27/2024 and schedule him with me for preop.  He will need to come in person.

## 2024-08-16 NOTE — TELEPHONE ENCOUNTER
Reason for Call:  Appointment Request    Patient requesting this type of appt: Pre-op    Requested provider:  Any provider    Reason patient unable to be scheduled: Not within requested timeframe    When does patient want to be seen/preferred time:  Needs to be scheduled before 08-30-24    Comments: Pre-op  09-06-24  Prostate  Dr. Julio Ochoa, unsure of location     Could we send this information to you in [facility] or would you prefer to receive a phone call?:   Patient would prefer a phone call   Okay to leave a detailed message?: Yes at Home number on file [phone] (home)    Call taken on 8/16/2024 at 2:45 PM by Mandy Crawford

## 2024-08-28 ENCOUNTER — OFFICE VISIT (OUTPATIENT)
Dept: FAMILY MEDICINE | Facility: CLINIC | Age: 89
End: 2024-08-28
Payer: COMMERCIAL

## 2024-08-28 VITALS
WEIGHT: 181.7 LBS | RESPIRATION RATE: 16 BRPM | BODY MASS INDEX: 27.54 KG/M2 | DIASTOLIC BLOOD PRESSURE: 69 MMHG | HEIGHT: 68 IN | OXYGEN SATURATION: 97 % | HEART RATE: 65 BPM | SYSTOLIC BLOOD PRESSURE: 165 MMHG | TEMPERATURE: 96.5 F

## 2024-08-28 DIAGNOSIS — D69.6 THROMBOCYTOPENIA (H): ICD-10-CM

## 2024-08-28 DIAGNOSIS — E11.21 TYPE 2 DIABETES MELLITUS WITH DIABETIC NEPHROPATHY, WITHOUT LONG-TERM CURRENT USE OF INSULIN (H): ICD-10-CM

## 2024-08-28 DIAGNOSIS — N40.1 BPH WITH OBSTRUCTION/LOWER URINARY TRACT SYMPTOMS: ICD-10-CM

## 2024-08-28 DIAGNOSIS — N13.8 BPH WITH OBSTRUCTION/LOWER URINARY TRACT SYMPTOMS: ICD-10-CM

## 2024-08-28 DIAGNOSIS — I10 HYPERTENSION GOAL BP (BLOOD PRESSURE) < 150/90: ICD-10-CM

## 2024-08-28 DIAGNOSIS — H60.391 INFECTIVE OTITIS EXTERNA, RIGHT: ICD-10-CM

## 2024-08-28 DIAGNOSIS — G47.33 OSA ON CPAP: ICD-10-CM

## 2024-08-28 DIAGNOSIS — Z01.818 PREOP GENERAL PHYSICAL EXAM: Primary | ICD-10-CM

## 2024-08-28 DIAGNOSIS — N18.32 STAGE 3B CHRONIC KIDNEY DISEASE (H): ICD-10-CM

## 2024-08-28 DIAGNOSIS — C61 PROSTATE CANCER (H): ICD-10-CM

## 2024-08-28 DIAGNOSIS — E03.4 HYPOTHYROIDISM DUE TO ACQUIRED ATROPHY OF THYROID: ICD-10-CM

## 2024-08-28 LAB
ALBUMIN SERPL BCG-MCNC: 4.4 G/DL (ref 3.5–5.2)
ANION GAP SERPL CALCULATED.3IONS-SCNC: 10 MMOL/L (ref 7–15)
BUN SERPL-MCNC: 33.1 MG/DL (ref 8–23)
CALCIUM SERPL-MCNC: 9.3 MG/DL (ref 8.8–10.4)
CHLORIDE SERPL-SCNC: 104 MMOL/L (ref 98–107)
CREAT SERPL-MCNC: 1.8 MG/DL (ref 0.67–1.17)
EGFRCR SERPLBLD CKD-EPI 2021: 36 ML/MIN/1.73M2
ERYTHROCYTE [DISTWIDTH] IN BLOOD BY AUTOMATED COUNT: 17.2 % (ref 10–15)
GLUCOSE SERPL-MCNC: 218 MG/DL (ref 70–99)
HBA1C MFR BLD: 6.2 % (ref 0–5.6)
HCO3 SERPL-SCNC: 24 MMOL/L (ref 22–29)
HCT VFR BLD AUTO: 29.9 % (ref 40–53)
HGB BLD-MCNC: 9.4 G/DL (ref 13.3–17.7)
MCH RBC QN AUTO: 26.9 PG (ref 26.5–33)
MCHC RBC AUTO-ENTMCNC: 31.4 G/DL (ref 31.5–36.5)
MCV RBC AUTO: 86 FL (ref 78–100)
PHOSPHATE SERPL-MCNC: 3.1 MG/DL (ref 2.5–4.5)
PLATELET # BLD AUTO: 85 10E3/UL (ref 150–450)
POTASSIUM SERPL-SCNC: 4.7 MMOL/L (ref 3.4–5.3)
RBC # BLD AUTO: 3.49 10E6/UL (ref 4.4–5.9)
SODIUM SERPL-SCNC: 138 MMOL/L (ref 135–145)
WBC # BLD AUTO: 12.6 10E3/UL (ref 4–11)

## 2024-08-28 PROCEDURE — 85027 COMPLETE CBC AUTOMATED: CPT | Performed by: INTERNAL MEDICINE

## 2024-08-28 PROCEDURE — 99214 OFFICE O/P EST MOD 30 MIN: CPT | Performed by: INTERNAL MEDICINE

## 2024-08-28 PROCEDURE — 93000 ELECTROCARDIOGRAM COMPLETE: CPT | Performed by: INTERNAL MEDICINE

## 2024-08-28 PROCEDURE — 36415 COLL VENOUS BLD VENIPUNCTURE: CPT | Performed by: INTERNAL MEDICINE

## 2024-08-28 PROCEDURE — G2211 COMPLEX E/M VISIT ADD ON: HCPCS | Performed by: INTERNAL MEDICINE

## 2024-08-28 PROCEDURE — 80069 RENAL FUNCTION PANEL: CPT | Performed by: INTERNAL MEDICINE

## 2024-08-28 PROCEDURE — 83036 HEMOGLOBIN GLYCOSYLATED A1C: CPT | Performed by: INTERNAL MEDICINE

## 2024-08-28 RX ORDER — OXYCODONE HYDROCHLORIDE 5 MG/1
5 TABLET ORAL EVERY 6 HOURS PRN
Qty: 20 TABLET | Refills: 0 | Status: SHIPPED | OUTPATIENT
Start: 2024-08-28 | End: 2024-09-02

## 2024-08-28 ASSESSMENT — PAIN SCALES - GENERAL: PAINLEVEL: NO PAIN (0)

## 2024-08-28 NOTE — PROGRESS NOTES
Preoperative Evaluation  [address] [address]  Phone: [phone]  Primary Provider: Aj Garces MD  Pre-op Performing Provider: Aj Garces MD  Aug 28, 2024             8/26/2024   Surgical Information   What procedure is being done? physical   Facility or Hospital where procedure/surgery will be performed: do not know   Who is doing the procedure / surgery? Dr. Day   Date of surgery / procedure: 9/6/24   Time of surgery / procedure: prostrate   Where do you plan to recover after surgery? at home with family        Fax number for surgical facility:  [phone]      Assessment & Plan     1.  Preop general physical exam completed.  Patient is medically optimized to undergo the planned surgical procedure.  2.  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  Patient to undergo cystoscopy with UroLift implantation.  3.  Stage IIIb chronic kidney disease.  Currently on Farxiga 10 mg daily  4.  Hypertension with blood pressure under control with losartan HCT, propranolol, amlodipine.  Will be checking BMP today.  5.  Type 2 diabetes mellitus being treated with Farxiga, glimepiride 8 mg and metformin 1 g a day.  Hemoglobin A1c good at 6.2 today.  6.  Infective otitis externa right with severe pain x 1 week.  Patient has been putting ofloxacin eardrops without relief.  I put a wick in today and advised to continue with the eardrops.  Will refer to ENT.  For pain relief oxycodone 5 mg every 6 hours as needed prescribed for few days.  Advised not to use nonsteroidal anti-inflammatories because of chronic kidney disease.  7.  Hypothyroidism adequately treated with levothyroxine.  8.  Obstructive sleep apnea on CPAP.  9.  Thrombocytopenia chronic with a platelet count around 70,000.  I will be checking CBC today.  10.  Prostate cancer treated with brachytherapy in 2007.      EKG today  showed sinus bradycardia.  CBC renal panel pending.    The proposed surgical procedure is considered LOW risk.    Recommendation  Approval given to proceed with proposed procedure, without further diagnostic evaluation.    Florin Johnson is a 89 year old, presenting for the following:  No chief complaint on file.          8/28/2024     1:39 PM   Additional Questions   Roomed by Amada   Accompanied by spouse     HPI related to upcoming procedure:     89-year-old gentleman with multiple health problems which include hypertension, hypothyroidism, chronic kidney disease, diabetes has had issue with urinary retention and benign prostatic hypertrophy.  He is currently self catheterizing.  He is scheduled to undergo UroLift implant.  Patient denies chest pain or shortness of breath.  But he has been having a lot of pain in the right ear for the past week.  Was seen in urgent care about a week ago and started on ofloxacin eardrops for otitis externa.  Symptoms have not improved at all.  No fever or chills.  The pain is severe and keeps him up at night.  Denies having issues with bleeding or low blood sugar.        8/26/2024   Pre-Op Questionnaire   Have you ever had a heart attack or stroke? No   Have you ever had surgery on your heart or blood vessels, such as a stent placement, a coronary artery bypass, or surgery on an artery in your head, neck, heart, or legs? No   Do you have chest pain with activity? No   Do you have a history of heart failure? No   Do you currently have a cold, bronchitis or symptoms of other infection? (!) UNKNOWN    Do you have a cough, shortness of breath, or wheezing? No   Do you or anyone in your family have previous history of blood clots? (!) UNKNOWN    Do you or does anyone in your family have a serious bleeding problem such as prolonged bleeding following surgeries or cuts? (!) UNKNOWN    Have you ever had problems with anemia or been told to take iron pills? (!) YES    Have you had any  abnormal blood loss such as black, tarry or bloody stools? No   Have you ever had a blood transfusion? No   Are you willing to have a blood transfusion if it is medically needed before, during, or after your surgery? Yes   Have you or any of your relatives ever had problems with anesthesia? (!) UNKNOWN    Do you have sleep apnea, excessive snoring or daytime drowsiness? (!) YES   Do you have a CPAP machine? Yes   Do you have any artifical heart valves or other implanted medical devices like a pacemaker, defibrillator, or continuous glucose monitor? No   Do you have artificial joints? No   Are you allergic to latex? No        Health Care Directive  Patient does not have a Health Care Directive or Living Will: Discussed advance care planning with patient; however, patient declined at this time.      Patient Active Problem List    Diagnosis Date Noted    Anemia due to chronic kidney disease 10/10/2023     Priority: Medium    Iron deficiency 10/10/2023     Priority: Medium    Dizziness 10/10/2023     Priority: Medium    Low HDL (under 40) 11/29/2022     Priority: Medium    Poor balance 10/21/2021     Priority: Medium    BPH with obstruction/lower urinary tract symptoms 09/17/2020     Priority: Medium    Class 1 obesity due to excess calories with body mass index (BMI) of 31.0 to 31.9 in adult 09/17/2020     Priority: Medium    CKD (chronic kidney disease) stage 3, GFR 30-59 ml/min (H) 06/09/2020     Priority: Medium    Essential tremor 10/21/2019     Priority: Medium    Thrombocytopenia (H24) 09/18/2019     Priority: Medium    Normocytic anemia 08/15/2017     Priority: Medium    Diabetic polyneuropathy associated with type 2 diabetes mellitus (H) 07/15/2017     Priority: Medium    DEMOND on CPAP 03/06/2017     Priority: Medium     CPAP      Hypothyroidism due to acquired atrophy of thyroid 12/05/2016     Priority: Medium    White coat hypertension 10/13/2015     Priority: Medium    Type 2 diabetes mellitus with kidney  complication, without long-term current use of insulin (H) 09/28/2014     Priority: Medium    Hypertension goal BP (blood pressure) < 150/90 12/29/2011     Priority: Medium    CARDIOVASCULAR SCREENING; LDL GOAL LESS THAN 100 12/29/2011     Priority: Medium    Prostate cancer (H) 01/01/2007     Priority: Medium     Diagnosed in 2007 in [place], Had Radiation        Past Medical History:   Diagnosis Date    Anemia due to chronic kidney disease 10/10/2023    BPH (benign prostatic hypertrophy)     BPH with obstruction/lower urinary tract symptoms 09/17/2020    BPV (benign positional vertigo), unspecified laterality 10/21/2021    CARDIOVASCULAR SCREENING; LDL GOAL LESS THAN 100 12/29/2011    CARDIOVASCULAR SCREENING; LDL GOAL LESS THAN 130 12/29/2011    Class 1 obesity due to excess calories with body mass index (BMI) of 31.0 to 31.9 in adult 09/17/2020    Dizziness 10/10/2023    Essential tremor 10/21/2019    Hypertension goal BP (blood pressure) < 140/90 12/29/2011    Hypothyroidism due to acquired atrophy of thyroid 12/05/2016    Iron deficiency 10/10/2023    Low HDL (under 40) 11/29/2022    Normocytic anemia 08/15/2017    Poor balance 10/21/2021    Prostate cancer (H) 01/2007    Had Radiation    Thrombocytopenia (H24) 09/18/2019    Type 2 diabetes mellitus with hyperglycemia (H) 10/21/2010    Type 2 diabetes, HbA1c goal < 7% (H)      Past Surgical History:   Procedure Laterality Date    COLONOSCOPY       Current Outpatient Medications   Medication Sig Dispense Refill    amLODIPine (NORVASC) 10 MG tablet TAKE 1 TABLET BY MOUTH DAILY 90 tablet 3    amLODIPine (NORVASC) 5 MG tablet TAKE 1 TABLET BY MOUTH DAILY. 90 tablet 3    blood glucose (ACCU-CHEK SMARTVIEW) test strip TEST BLOOD SUGAR TWICE DAILY  OR AS DIRECTED 200 strip 3    blood glucose (NO BRAND SPECIFIED) lancets standard Use to test blood sugar 2 times daily or as directed. 200 each 0    blood glucose (NO BRAND SPECIFIED) test strip Use to test blood sugar 2  times daily or as directed. To accompany: Blood Glucose Monitor Brands: per insurance 200 strip 3    blood glucose calibration (NO BRAND SPECIFIED) solution To accompany: Blood Glucose Monitor Brands: per insurance. 1 Bottle 3    blood glucose monitoring (ACCU-CHEK MILADYS SMARTVIEW) meter device kit Use to test blood sugar 2 times daily or as directed. 1 kit 0    blood glucose monitoring (NO BRAND SPECIFIED) meter device kit Use to test blood sugar 2 times daily or as directed. Preferred blood glucose meter OR supplies to accompany: Blood Glucose Monitor Brands: per insurance 1 kit 0    FARXIGA 10 MG TABS tablet TAKE 1 TABLET BY MOUTH DAILY 90 tablet 1    ferrous sulfate (FEROSUL) 325 (65 Fe) MG tablet Take 1 tablet (325 mg) by mouth daily (with breakfast) 90 tablet 3    folic acid (FOLVITE) 1 MG tablet Take 1 tablet (1 mg) by mouth daily 90 tablet 3    glimepiride (AMARYL) 4 MG tablet Take 2 tablets (8 mg) by mouth every morning (before breakfast) 180 tablet 3    levothyroxine (SYNTHROID/LEVOTHROID) 88 MCG tablet TAKE 1 TABLET BY MOUTH EVERY DAY GENERIC EQUIVALENT FOR SYNTHROID 90 tablet 0    losartan-hydrochlorothiazide (HYZAAR) 100-25 MG tablet Take 1 tablet by mouth every morning 90 tablet 3    metFORMIN (GLUCOPHAGE XR) 500 MG 24 hr tablet TAKE 2 TABLETS BY MOUTH DAILY WITH DINNER 180 tablet 3    pregabalin (LYRICA) 75 MG capsule TAKE ONE CAPSULE BY MOUTH TWICE DAILY. GENERIC EQUIVALENT FOR LYRICA 180 capsule 3    propranolol ER (INDERAL LA) 80 MG 24 hr capsule TAKE ONE CAPSULE BY MOUTH DAILY 90 capsule 3    tamsulosin (FLOMAX) 0.4 MG capsule TAKE 2 CAPSULES BY MOUTH EVERY  capsule 3    thin (NO BRAND SPECIFIED) lancets Use with lanceting device to test blood sugar 2 times daily or as directed. To accompany: Blood Glucose Monitor Brands: per insurance. 200 each 3       Allergies   Allergen Reactions    Lisinopril Cough     Developed an ACE cough on the Lisinopril, pt denies this is a current allergy as of  "6/19/2020.        Social History     Tobacco Use    Smoking status: Never     Passive exposure: Never    Smokeless tobacco: Never   Substance Use Topics    Alcohol use: No     Family History   Problem Relation Age of Onset    Hypertension Mother     Dementia Mother     Prostate Cancer Paternal Grandfather     Cancer No family hx of     Diabetes No family hx of     Cerebrovascular Disease No family hx of     Thyroid Disease No family hx of     Glaucoma No family hx of     Macular Degeneration No family hx of      History   Drug Use No             Review of Systems  Constitutional, HEENT, cardiovascular, pulmonary, GI, , musculoskeletal, neuro, skin, endocrine and psych systems are negative, except as otherwise noted.    Objective    There were no vitals taken for this visit.   Estimated body mass index is 27.2 kg/m  as calculated from the following:    Height as of 7/24/24: 1.727 m (5' 8\").    Weight as of 7/24/24: 81.1 kg (178 lb 14.4 oz).  Physical Exam  GENERAL: alert and no distress  EYES: Eyes grossly normal to inspection, PERRL and conjunctivae and sclerae normal  HENT: normal cephalic/atraumatic, nose and mouth without ulcers or lesions, oropharynx clear, oral mucous membranes moist, and right external auditory canal is swollen shut and quite tender.  I put a wick.  And advised to continue with the ofloxacin eardrops.  NECK: no adenopathy, no asymmetry, masses, or scars  RESP: lungs clear to auscultation - no rales, rhonchi or wheezes  CV: regular rate and rhythm, normal S1 S2, no S3 or S4, no murmur, click or rub, no peripheral edema  ABDOMEN: soft, nontender, no hepatosplenomegaly, no masses and bowel sounds normal  MS: no gross musculoskeletal defects noted, no edema  SKIN: no suspicious lesions or rashes  NEURO: Normal strength and tone, mentation intact and speech normal    Recent Labs   Lab Test 08/01/24  1132 06/11/24  1324 04/11/24  1333 01/10/24  1149   HGB  --  11.0* 10.7* 11.3*   PLT  --  72* 84* " 60*    136 139 138   POTASSIUM 4.8 4.6 4.3 4.2   CR 1.91* 3.11* 1.81* 1.94*   A1C  --   --  9.0* 9.1*        Diagnostics  Recent Results (from the past 24 hour(s))   CBC with platelets    Collection Time: 08/28/24  2:53 PM   Result Value Ref Range    WBC Count 12.6 (H) 4.0 - 11.0 10e3/uL    RBC Count 3.49 (L) 4.40 - 5.90 10e6/uL    Hemoglobin 9.4 (L) 13.3 - 17.7 g/dL    Hematocrit 29.9 (L) 40.0 - 53.0 %    MCV 86 78 - 100 fL    MCH 26.9 26.5 - 33.0 pg    MCHC 31.4 (L) 31.5 - 36.5 g/dL    RDW 17.2 (H) 10.0 - 15.0 %    Platelet Count 85 (L) 150 - 450 10e3/uL   Hemoglobin A1c    Collection Time: 08/28/24  2:53 PM   Result Value Ref Range    Hemoglobin A1C 6.2 (H) 0.0 - 5.6 %      EKG: sinus bradycardia, normal axis, normal intervals, no acute ST/T changes c/w ischemia, no LVH by voltage criteria    Revised Cardiac Risk Index (RCRI)  The patient has the following serious cardiovascular risks for perioperative complications:   - No serious cardiac risks = 0 points     RCRI Interpretation: 0 points: Class I (very low risk - 0.4% complication rate)         Signed Electronically by: Aj Garces MD  A copy of this evaluation report is provided to the requesting physician.

## 2024-09-03 ENCOUNTER — TELEPHONE (OUTPATIENT)
Dept: FAMILY MEDICINE | Facility: CLINIC | Age: 89
End: 2024-09-03
Payer: COMMERCIAL

## 2024-09-03 NOTE — TELEPHONE ENCOUNTER
DERECKI - Status Update    Who is Calling: patient    Update: He has a fax number to send his pre opp Paper work to.  [facility] [phone] his procedure is Fri     Does caller want a call/response back: No

## 2024-09-06 DIAGNOSIS — I10 HYPERTENSION GOAL BP (BLOOD PRESSURE) < 140/90: ICD-10-CM

## 2024-09-06 RX ORDER — AMLODIPINE BESYLATE 10 MG/1
TABLET ORAL
Qty: 90 TABLET | Refills: 3 | OUTPATIENT
Start: 2024-09-06

## 2024-09-09 RX ORDER — AMLODIPINE BESYLATE 10 MG/1
TABLET ORAL
Qty: 90 TABLET | Refills: 3 | Status: CANCELLED | OUTPATIENT
Start: 2024-09-09

## 2024-09-11 ENCOUNTER — LAB REQUISITION (OUTPATIENT)
Dept: LAB | Facility: CLINIC | Age: 89
End: 2024-09-11
Payer: COMMERCIAL

## 2024-09-11 DIAGNOSIS — H60.339 SWIMMER'S EAR, UNSPECIFIED EAR: ICD-10-CM

## 2024-09-11 PROCEDURE — 87070 CULTURE OTHR SPECIMN AEROBIC: CPT | Mod: ORL | Performed by: OTOLARYNGOLOGY

## 2024-09-13 LAB — BACTERIA SPEC CULT: ABNORMAL

## 2024-09-20 ENCOUNTER — TRANSFERRED RECORDS (OUTPATIENT)
Dept: HEALTH INFORMATION MANAGEMENT | Facility: CLINIC | Age: 89
End: 2024-09-20
Payer: COMMERCIAL

## 2024-09-27 DIAGNOSIS — G25.0 ESSENTIAL TREMOR: ICD-10-CM

## 2024-09-27 RX ORDER — PROPRANOLOL HYDROCHLORIDE 80 MG/1
CAPSULE, EXTENDED RELEASE ORAL
Qty: 90 CAPSULE | Refills: 3 | OUTPATIENT
Start: 2024-09-27

## 2024-10-09 ENCOUNTER — LAB REQUISITION (OUTPATIENT)
Dept: LAB | Facility: CLINIC | Age: 89
End: 2024-10-09
Payer: COMMERCIAL

## 2024-10-09 DIAGNOSIS — H60.339 SWIMMER'S EAR, UNSPECIFIED EAR: ICD-10-CM

## 2024-10-09 DIAGNOSIS — E11.21 TYPE 2 DIABETES MELLITUS WITH DIABETIC NEPHROPATHY, WITHOUT LONG-TERM CURRENT USE OF INSULIN (H): ICD-10-CM

## 2024-10-09 PROCEDURE — 87070 CULTURE OTHR SPECIMN AEROBIC: CPT | Mod: ORL | Performed by: OTOLARYNGOLOGY

## 2024-10-09 RX ORDER — DAPAGLIFLOZIN 10 MG/1
10 TABLET, FILM COATED ORAL DAILY
Qty: 90 TABLET | Refills: 0 | Status: SHIPPED | OUTPATIENT
Start: 2024-10-09

## 2024-10-11 LAB — BACTERIA SPEC CULT: NO GROWTH

## 2024-10-16 DIAGNOSIS — E11.21 TYPE 2 DIABETES MELLITUS WITH DIABETIC NEPHROPATHY, WITHOUT LONG-TERM CURRENT USE OF INSULIN (H): ICD-10-CM

## 2024-10-16 DIAGNOSIS — G25.0 ESSENTIAL TREMOR: ICD-10-CM

## 2024-10-16 RX ORDER — PROPRANOLOL HYDROCHLORIDE 80 MG/1
CAPSULE, EXTENDED RELEASE ORAL
Qty: 90 CAPSULE | Refills: 0 | Status: SHIPPED | OUTPATIENT
Start: 2024-10-16

## 2024-10-16 RX ORDER — DAPAGLIFLOZIN 10 MG/1
10 TABLET, FILM COATED ORAL DAILY
Qty: 90 TABLET | Refills: 0 | OUTPATIENT
Start: 2024-10-16

## 2024-10-21 SDOH — HEALTH STABILITY: PHYSICAL HEALTH: ON AVERAGE, HOW MANY MINUTES DO YOU ENGAGE IN EXERCISE AT THIS LEVEL?: 0 MIN

## 2024-10-21 SDOH — HEALTH STABILITY: PHYSICAL HEALTH: ON AVERAGE, HOW MANY DAYS PER WEEK DO YOU ENGAGE IN MODERATE TO STRENUOUS EXERCISE (LIKE A BRISK WALK)?: 0 DAYS

## 2024-10-21 ASSESSMENT — SOCIAL DETERMINANTS OF HEALTH (SDOH): HOW OFTEN DO YOU GET TOGETHER WITH FRIENDS OR RELATIVES?: ONCE A WEEK

## 2024-10-22 ENCOUNTER — OFFICE VISIT (OUTPATIENT)
Dept: FAMILY MEDICINE | Facility: CLINIC | Age: 89
End: 2024-10-22
Attending: INTERNAL MEDICINE
Payer: COMMERCIAL

## 2024-10-22 VITALS
SYSTOLIC BLOOD PRESSURE: 122 MMHG | DIASTOLIC BLOOD PRESSURE: 50 MMHG | HEART RATE: 62 BPM | BODY MASS INDEX: 27.43 KG/M2 | OXYGEN SATURATION: 98 % | HEIGHT: 68 IN | RESPIRATION RATE: 18 BRPM | WEIGHT: 181 LBS | TEMPERATURE: 98 F

## 2024-10-22 DIAGNOSIS — N18.32 ANEMIA DUE TO STAGE 3B CHRONIC KIDNEY DISEASE (H): ICD-10-CM

## 2024-10-22 DIAGNOSIS — R42 DIZZINESS: ICD-10-CM

## 2024-10-22 DIAGNOSIS — Z23 ENCOUNTER FOR IMMUNIZATION: ICD-10-CM

## 2024-10-22 DIAGNOSIS — E03.4 HYPOTHYROIDISM DUE TO ACQUIRED ATROPHY OF THYROID: ICD-10-CM

## 2024-10-22 DIAGNOSIS — R26.89 POOR BALANCE: ICD-10-CM

## 2024-10-22 DIAGNOSIS — E66.09 CLASS 1 OBESITY DUE TO EXCESS CALORIES WITH SERIOUS COMORBIDITY AND BODY MASS INDEX (BMI) OF 31.0 TO 31.9 IN ADULT: ICD-10-CM

## 2024-10-22 DIAGNOSIS — R29.6 FALLS FREQUENTLY: ICD-10-CM

## 2024-10-22 DIAGNOSIS — E11.21 TYPE 2 DIABETES MELLITUS WITH DIABETIC NEPHROPATHY, WITHOUT LONG-TERM CURRENT USE OF INSULIN (H): ICD-10-CM

## 2024-10-22 DIAGNOSIS — E11.42 DIABETIC PERIPHERAL NEUROPATHY (H): ICD-10-CM

## 2024-10-22 DIAGNOSIS — H60.311 CHRONIC DIFFUSE OTITIS EXTERNA OF RIGHT EAR: ICD-10-CM

## 2024-10-22 DIAGNOSIS — E66.811 CLASS 1 OBESITY DUE TO EXCESS CALORIES WITH SERIOUS COMORBIDITY AND BODY MASS INDEX (BMI) OF 31.0 TO 31.9 IN ADULT: ICD-10-CM

## 2024-10-22 DIAGNOSIS — N18.32 STAGE 3B CHRONIC KIDNEY DISEASE (H): ICD-10-CM

## 2024-10-22 DIAGNOSIS — Z00.00 MEDICARE ANNUAL WELLNESS VISIT, SUBSEQUENT: Primary | ICD-10-CM

## 2024-10-22 DIAGNOSIS — D63.1 ANEMIA DUE TO STAGE 3B CHRONIC KIDNEY DISEASE (H): ICD-10-CM

## 2024-10-22 DIAGNOSIS — C61 PROSTATE CANCER (H): ICD-10-CM

## 2024-10-22 DIAGNOSIS — G47.33 OSA ON CPAP: ICD-10-CM

## 2024-10-22 DIAGNOSIS — E11.42 DIABETIC POLYNEUROPATHY ASSOCIATED WITH TYPE 2 DIABETES MELLITUS (H): ICD-10-CM

## 2024-10-22 DIAGNOSIS — I10 HYPERTENSION GOAL BP (BLOOD PRESSURE) < 150/90: ICD-10-CM

## 2024-10-22 DIAGNOSIS — G25.0 ESSENTIAL TREMOR: ICD-10-CM

## 2024-10-22 DIAGNOSIS — D69.6 THROMBOCYTOPENIA (H): ICD-10-CM

## 2024-10-22 LAB
ERYTHROCYTE [DISTWIDTH] IN BLOOD BY AUTOMATED COUNT: 17 % (ref 10–15)
EST. AVERAGE GLUCOSE BLD GHB EST-MCNC: 174 MG/DL
HBA1C MFR BLD: 7.7 % (ref 0–5.6)
HCT VFR BLD AUTO: 26.7 % (ref 40–53)
HGB BLD-MCNC: 8.1 G/DL (ref 13.3–17.7)
MCH RBC QN AUTO: 26.6 PG (ref 26.5–33)
MCHC RBC AUTO-ENTMCNC: 30.3 G/DL (ref 31.5–36.5)
MCV RBC AUTO: 88 FL (ref 78–100)
PLATELET # BLD AUTO: 58 10E3/UL (ref 150–450)
RBC # BLD AUTO: 3.04 10E6/UL (ref 4.4–5.9)
WBC # BLD AUTO: 16.5 10E3/UL (ref 4–11)

## 2024-10-22 PROCEDURE — 91320 SARSCV2 VAC 30MCG TRS-SUC IM: CPT | Performed by: INTERNAL MEDICINE

## 2024-10-22 PROCEDURE — 85027 COMPLETE CBC AUTOMATED: CPT | Performed by: INTERNAL MEDICINE

## 2024-10-22 PROCEDURE — 84443 ASSAY THYROID STIM HORMONE: CPT | Performed by: INTERNAL MEDICINE

## 2024-10-22 PROCEDURE — 36415 COLL VENOUS BLD VENIPUNCTURE: CPT | Performed by: INTERNAL MEDICINE

## 2024-10-22 PROCEDURE — 83036 HEMOGLOBIN GLYCOSYLATED A1C: CPT | Performed by: INTERNAL MEDICINE

## 2024-10-22 PROCEDURE — 99214 OFFICE O/P EST MOD 30 MIN: CPT | Mod: 25 | Performed by: INTERNAL MEDICINE

## 2024-10-22 PROCEDURE — 90480 ADMN SARSCOV2 VAC 1/ONLY CMP: CPT | Performed by: INTERNAL MEDICINE

## 2024-10-22 PROCEDURE — 90662 IIV NO PRSV INCREASED AG IM: CPT | Performed by: INTERNAL MEDICINE

## 2024-10-22 PROCEDURE — G0439 PPPS, SUBSEQ VISIT: HCPCS | Performed by: INTERNAL MEDICINE

## 2024-10-22 PROCEDURE — 84439 ASSAY OF FREE THYROXINE: CPT | Performed by: INTERNAL MEDICINE

## 2024-10-22 PROCEDURE — 80048 BASIC METABOLIC PNL TOTAL CA: CPT | Performed by: INTERNAL MEDICINE

## 2024-10-22 PROCEDURE — G0008 ADMIN INFLUENZA VIRUS VAC: HCPCS | Performed by: INTERNAL MEDICINE

## 2024-10-22 ASSESSMENT — PAIN SCALES - GENERAL: PAINLEVEL: MILD PAIN (2)

## 2024-10-22 NOTE — PROGRESS NOTES
Preventive Care Visit  [facility]  Aj Garces MD, Internal Medicine  Oct 22, 2024      Assessment & Plan     1.  Medicare annual wellness visit completed.  2.  Type 2 diabetes mellitus with diabetic nephropathy.  Last A1c had dropped down to 6.2 on 8/28/2024.  I will recheck A1c today and get back to him with recommendation and plans for follow-up.  Currently on dapagliflozin 10 mg a day glimepiride 8 mg daily.  If his A1c is low we will gradually wean him off glimepiride.  He will continue with metformin 1000 mg a day.  3.  Hypertension.  Blood pressure under control.  Continue with losartan /25 daily also on propranolol LA 80 mg a day and amlodipine 10 mg a day.  BMP and CBC will be checked today.  For.  Stage IIIb chronic kidney disease been treated with dapagliflozin.  Will check CBC and BMP today.  5.  Hypothyroidism being treated with levothyroxine 88 mcg a day.  Last TSH was therapeutic on 4/11/2024.  I will be rechecking it.  6.  Poor balance.  Previously been to physical therapy.  Uses walker and cane.  7.  Fall frequently.  8.  Anemia due to stage IIIb chronic kidney disease as well as #11..  Will be checking CBC.  9.  Chronic diffuse otitis externa of the right ear with some pain.  Being managed by ENT.  10.  Obstructive sleep apnea on CPAP.  Uses consistently.  11.  Thrombocytopenia with pancytopenia.  Evaluated by hematologist Dr. Hernandez on 4/20/2021 and felt likely to be late onset territory spherocytosis.  12.  Essential tremor.  Better with propranolol.  13.  Diabetes peripheral neuropathy being treated with pregabalin.  14.  Prostate cancer treated with external beam radiation therapy in 2007.  No evidence of recurrence.  Last PSA less than 0.01 on 7/22/2024.  15.  Dizziness chronic unchanged.  16.  Class I obesity.  17.  Encounter for immunization.  Flu and COVID-vaccine provided today.  18.  Currently not on statin therapy for diabetes.  Last cholesterol was  "97 HDL 24 LDL 51 measured on 7/22/2024.  19.  Status post UroLift implantation for prostate hypertrophy and obstruction on 9/6/2024.  Good results.      Will get back to him with the results of A1c TSH BMP CBC with recommendation for follow-up.    Patient has been advised of split billing requirements and indicates understanding: Yes        BMI  Estimated body mass index is 27.52 kg/m  as calculated from the following:    Height as of this encounter: 1.727 m (5' 8\").    Weight as of this encounter: 82.1 kg (181 lb).       Counseling  Appropriate preventive services were addressed with this patient via screening, questionnaire, or discussion as appropriate for fall prevention, nutrition, physical activity, Tobacco-use cessation, social engagement, weight loss and cognition.  Checklist reviewing preventive services available has been given to the patient.  Reviewed patient's diet, addressing concerns and/or questions.   He is at risk for psychosocial distress and has been provided with information to reduce risk.   Discussed possible causes of fatigue. Patient reported safety concerns were addressed today.The patient was provided with written information regarding signs of hearing loss.   Information on urinary incontinence and treatment options given to patient.       Florin Johnson is a 89 year old, presenting for the following:  Physical        10/22/2024    12:40 PM   Additional Questions   Roomed by Melvi   Accompanied by wife         10/22/2024    12:40 PM   Patient Reported Additional Medications   Patient reports taking the following new medications no         Health Care Directive  Patient has a Health Care Directive on file  Advance care planning document is on file and is current.    HPI  89-year-old gentleman comes in for annual physical examination as well as follow-up of chronic health issues which include diabetes, obstructive sleep apnea, hypothyroidism, hypertension, chronic kidney disease, " peripheral neuropathy, essential tremors and other issues stated in the problem list.  He was accompanied by his wife.  Wife indicated that if fell just a week ago.  He has fallen off and on.  Balance remains poor.  Physical therapy in the past has not helped.  Has chronic dizziness off and on though its better lately.  No hypoglycemia.            10/21/2024   General Health   How would you rate your overall physical health? (!) POOR   Feel stress (tense, anxious, or unable to sleep) Only a little      (!) STRESS CONCERN      10/21/2024   Nutrition   Diet: Regular (no restrictions)            10/21/2024   Exercise   Days per week of moderate/strenous exercise 0 days   Average minutes spent exercising at this level 0 min      (!) EXERCISE CONCERN      10/21/2024   Social Factors   Frequency of gathering with friends or relatives Once a week   Worry food won't last until get money to buy more No   Food not last or not have enough money for food? No   Do you have housing? (Housing is defined as stable permanent housing and does not include staying ouside in a car, in a tent, in an abandoned building, in an overnight shelter, or couch-surfing.) Yes   Are you worried about losing your housing? No   Lack of transportation? No   Unable to get utilities (heat,electricity)? No            10/22/2024   Fall Risk   Gait Speed Test Interpretation Greater than 5.01 seconds - ABNORMAL              10/21/2024   Activities of Daily Living- Home Safety   Needs help with the following daily activites None of the above   Safety concerns in the home Throw rugs in the hallway            10/21/2024   Dental   Dentist two times every year? Yes            10/21/2024   Hearing Screening   Hearing concerns? (!) I NEED TO ASK PEOPLE TO SPEAK UP OR REPEAT THEMSELVES.    (!) TROUBLE UNDESTANDING A SPEAKER IN A PUBLIC MEETING OR [employer] SERVICE.    (!) TROUBLE UNDERSTANDING SOFT OR WHISPERED SPEECH.    (!) TROUBLE UNDERSTANDING SPEECH ON THE  TELEPHONE       Multiple values from one day are sorted in reverse-chronological order         10/21/2024   Driving Risk Screening   Patient/family members have concerns about driving No            10/21/2024   General Alertness/Fatigue Screening   Have you been more tired than usual lately? (!) YES            10/21/2024   Urinary Incontinence Screening   Bothered by leaking urine in past 6 months Yes            10/21/2024   TB Screening   Were you born outside of the [place]? No            Today's PHQ-2 Score:       10/21/2024     3:14 PM   PHQ-2 ( 1999 [employer])   Q1: Little interest or pleasure in doing things 0   Q2: Feeling down, depressed or hopeless 0   PHQ-2 Score 0   Q1: Little interest or pleasure in doing things Not at all   Q2: Feeling down, depressed or hopeless Not at all   PHQ-2 Score 0           10/21/2024   Substance Use   Alcohol more than 3/day or more than 7/wk Not Applicable   Do you have a current opioid prescription? No   How severe/bad is pain from 1 to 10? 3/10   Do you use any other substances recreationally? No        Social History     Tobacco Use    Smoking status: Never     Passive exposure: Never    Smokeless tobacco: Never   Vaping Use    Vaping status: Never Used   Substance Use Topics    Alcohol use: No    Drug use: No             Reviewed and updated as needed this visit by Provider                    Past Medical History:   Diagnosis Date    Anemia due to chronic kidney disease 10/10/2023    BPH (benign prostatic hypertrophy)     BPH with obstruction/lower urinary tract symptoms 09/17/2020    BPV (benign positional vertigo), unspecified laterality 10/21/2021    CARDIOVASCULAR SCREENING; LDL GOAL LESS THAN 100 12/29/2011    CARDIOVASCULAR SCREENING; LDL GOAL LESS THAN 130 12/29/2011    Chronic diffuse otitis externa of right ear 10/22/2024    Class 1 obesity due to excess calories with body mass index (BMI) of 31.0 to 31.9 in adult 09/17/2020    Dizziness 10/10/2023    Essential tremor  10/21/2019    Falls frequently 10/22/2024    Hypertension goal BP (blood pressure) < 140/90 12/29/2011    Hypothyroidism due to acquired atrophy of thyroid 12/05/2016    Iron deficiency 10/10/2023    Low HDL (under 40) 11/29/2022    Normocytic anemia 08/15/2017    Poor balance 10/21/2021    Prostate cancer (H) 01/2007    Had Radiation    Thrombocytopenia (H) 09/18/2019    Type 2 diabetes mellitus with hyperglycemia (H) 10/21/2010    Type 2 diabetes, HbA1c goal < 7% (H)      Past Surgical History:   Procedure Laterality Date    COLONOSCOPY       BP Readings from Last 3 Encounters:   10/22/24 122/50   08/28/24 (!) 165/69   07/24/24 (!) 167/71    Wt Readings from Last 3 Encounters:   10/22/24 82.1 kg (181 lb)   08/28/24 82.4 kg (181 lb 11.2 oz)   07/24/24 81.1 kg (178 lb 14.4 oz)                  Patient Active Problem List   Diagnosis    Hypertension goal BP (blood pressure) < 150/90    CARDIOVASCULAR SCREENING; LDL GOAL LESS THAN 100    Prostate cancer (H)    Type 2 diabetes mellitus with kidney complication, without long-term current use of insulin (H)    White coat hypertension    Hypothyroidism due to acquired atrophy of thyroid    DEMOND on CPAP    Diabetic polyneuropathy associated with type 2 diabetes mellitus (H)    CKD (chronic kidney disease) stage 3, GFR 30-59 ml/min (H)    BPH with obstruction/lower urinary tract symptoms    Class 1 obesity due to excess calories with body mass index (BMI) of 31.0 to 31.9 in adult    Normocytic anemia    Thrombocytopenia (H)    Poor balance    Essential tremor    Low HDL (under 40)    Anemia due to chronic kidney disease    Iron deficiency    Dizziness    Falls frequently    Chronic diffuse otitis externa of right ear     Past Surgical History:   Procedure Laterality Date    COLONOSCOPY         Social History     Tobacco Use    Smoking status: Never     Passive exposure: Never    Smokeless tobacco: Never   Substance Use Topics    Alcohol use: No     Family History   Problem  Relation Age of Onset    Hypertension Mother     Dementia Mother     Prostate Cancer Paternal Grandfather     Cancer No family hx of     Diabetes No family hx of     Cerebrovascular Disease No family hx of     Thyroid Disease No family hx of     Glaucoma No family hx of     Macular Degeneration No family hx of          Current Outpatient Medications   Medication Sig Dispense Refill    amLODIPine (NORVASC) 10 MG tablet TAKE 1 TABLET BY MOUTH DAILY 90 tablet 3    blood glucose (NO BRAND SPECIFIED) lancets standard Use to test blood sugar 2 times daily or as directed. 200 each 0    blood glucose calibration (NO BRAND SPECIFIED) solution To accompany: Blood Glucose Monitor Brands: per insurance. 1 Bottle 3    blood glucose monitoring (ACCU-CHEK MILADYS SMARTVIEW) meter device kit Use to test blood sugar 2 times daily or as directed. 1 kit 0    blood glucose monitoring (NO BRAND SPECIFIED) meter device kit Use to test blood sugar 2 times daily or as directed. Preferred blood glucose meter OR supplies to accompany: Blood Glucose Monitor Brands: per insurance 1 kit 0    dapagliflozin (FARXIGA) 10 MG TABS tablet TAKE 1 TABLET BY MOUTH DAILY 90 tablet 0    ferrous sulfate (FEROSUL) 325 (65 Fe) MG tablet Take 1 tablet (325 mg) by mouth daily (with breakfast) 90 tablet 3    folic acid (FOLVITE) 1 MG tablet Take 1 tablet (1 mg) by mouth daily 90 tablet 3    glimepiride (AMARYL) 4 MG tablet Take 2 tablets (8 mg) by mouth every morning (before breakfast) 180 tablet 3    levothyroxine (SYNTHROID/LEVOTHROID) 88 MCG tablet TAKE 1 TABLET BY MOUTH EVERY DAY GENERIC EQUIVALENT FOR SYNTHROID 90 tablet 0    losartan-hydrochlorothiazide (HYZAAR) 100-25 MG tablet Take 1 tablet by mouth every morning 90 tablet 3    metFORMIN (GLUCOPHAGE XR) 500 MG 24 hr tablet TAKE 2 TABLETS BY MOUTH DAILY WITH DINNER 180 tablet 3    pregabalin (LYRICA) 75 MG capsule TAKE ONE CAPSULE BY MOUTH TWICE DAILY. GENERIC EQUIVALENT FOR LYRICA 180 capsule 3     propranolol ER (INDERAL LA) 80 MG 24 hr capsule TAKE ONE CAPSULE BY MOUTH DAILY 90 capsule 0    tamsulosin (FLOMAX) 0.4 MG capsule TAKE 2 CAPSULES BY MOUTH EVERY  capsule 3    thin (NO BRAND SPECIFIED) lancets Use with lanceting device to test blood sugar 2 times daily or as directed. To accompany: Blood Glucose Monitor Brands: per insurance. 200 each 3     Allergies   Allergen Reactions    Lisinopril Cough     Developed an ACE cough on the Lisinopril, pt denies this is a current allergy as of 6/19/2020.     Recent Labs   Lab Test 08/28/24  1453 08/01/24  1132 07/22/24  1310 06/11/24  1324 05/28/24  1645 04/11/24  1333 01/10/24  1149 10/06/23  0807 07/10/23  0806 08/23/22  1058 05/09/22  0930 10/21/21  0937 04/08/21  1055 12/03/20  1055   A1C 6.2*  --   --   --   --  9.0* 9.1* 7.7* 8.7*   < > 9.1*   < > 7.0* 6.2*   LDL  --   --  51  --   --   --   --   --  22  --  16  --   --  52   HDL  --   --  24*  --   --   --   --   --  24*  --  27*  --   --  34*   TRIG  --   --  110  --   --   --   --   --  269*  --  400*  --   --  166*   ALT  --   --   --  <5 9  --   --   --   --   --  27   < > 36  --    CR 1.80* 1.91*  --  3.11*  --  1.81* 1.94* 1.85* 1.67*   < > 1.34*   < > 1.43* 1.21   GFRESTIMATED 36* 34*  --  19*  --  36* 33* 35* 40*   < > 52*   < > 45* 55*   GFRESTBLACK  --   --   --   --   --   --   --   --   --   --   --   --  52* 63   POTASSIUM 4.7 4.8  --  4.6  --  4.3 4.2 4.4 4.3   < >  --    < > 4.4 3.9   TSH  --   --   --   --   --  4.18  --  4.24* 5.05*   < > 6.79*   < >  --   --     < > = values in this interval not displayed.      Current providers sharing in care for this patient include:  Patient Care Team:  Aj Garces MD as PCP - General (Internal Medicine)  Aj Garces MD as Assigned PCP  Trever Acevedo MD as MD (Dermatology)  Danny Swain MD as Assigned Surgical Provider  René Golden PA-C as Assigned Sleep Provider  Daniel Workman MD as MD (Neurology)  Daniel Mccain MD  "as Assigned Neuroscience Provider  Idania Monte APRN CNP as Nurse Practitioner (Otolaryngology)  Hayden Mcghee AuD as Audiologist (Audiology)    The following health maintenance items are reviewed in Epic and correct as of today:  Health Maintenance   Topic Date Due    RSV VACCINE (1 - 1-dose 75+ series) Never done    ZOSTER IMMUNIZATION (2 of 2) 12/11/2019    MEDICARE ANNUAL WELLNESS VISIT  10/16/2020    DIABETIC FOOT EXAM  10/21/2022    INFLUENZA VACCINE (1) 09/01/2024    COVID-19 Vaccine (7 - 2024-25 season) 09/01/2024    DTAP/TDAP/TD IMMUNIZATION (2 - Td or Tdap) 09/22/2024    ANNUAL REVIEW OF HM ORDERS  10/10/2024    EYE EXAM  02/13/2025    A1C  02/28/2025    TSH W/FREE T4 REFLEX  04/11/2025    LIPID  07/22/2025    PSA  07/22/2025    MICROALBUMIN  07/24/2025    BMP  08/28/2025    CBC  08/28/2025    HEMOGLOBIN  08/28/2025    FALL RISK ASSESSMENT  10/22/2025    ADVANCE CARE PLANNING  08/28/2029    PHQ-2 (once per calendar year)  Completed    Pneumococcal Vaccine: 65+ Years  Completed    URINALYSIS  Completed    HPV IMMUNIZATION  Aged Out    MENINGITIS IMMUNIZATION  Aged Out    RSV MONOCLONAL ANTIBODY  Aged Out    COLORECTAL CANCER SCREENING  Discontinued         Review of Systems  Constitutional, HEENT, cardiovascular, pulmonary, GI, , musculoskeletal, neuro, skin, endocrine and psych systems are negative, except as otherwise noted.     Objective    Exam  BP (!) 148/65 (BP Location: Right arm, Patient Position: Sitting, Cuff Size: Adult Regular)   Pulse 62   Temp 98  F (36.7  C) (Temporal)   Resp 18   Ht 1.727 m (5' 8\")   Wt 82.1 kg (181 lb)   SpO2 98%   BMI 27.52 kg/m     Estimated body mass index is 27.52 kg/m  as calculated from the following:    Height as of this encounter: 1.727 m (5' 8\").    Weight as of this encounter: 82.1 kg (181 lb). Second BP: 148/65     Physical Exam  GENERAL: alert and no distress  EYES: Eyes grossly normal to inspection, PERRL and conjunctivae and sclerae " normal  HENT: normal cephalic/atraumatic, nose and mouth without ulcers or lesions, oropharynx clear, oral mucous membranes moist, and right ear examination reveals a wick in place.  NECK: no adenopathy, no asymmetry, masses, or scars  RESP: lungs clear to auscultation - no rales, rhonchi or wheezes  CV: regular rate and rhythm, normal S1 S2, no S3 or S4, no murmur, click or rub, no peripheral edema  ABDOMEN: soft, nontender, no hepatosplenomegaly, no masses and bowel sounds normal   (male): normal male genitalia without lesions or urethral discharge, no hernia  MS: no gross musculoskeletal defects noted, no edema  SKIN: no suspicious lesions or rashes  NEURO: Normal strength and tone, mentation intact and speech normal  PSYCH: mentation appears normal, affect normal/bright  LYMPH: no cervical, supraclavicular, axillary, or inguinal adenopathy  Diabetic foot exam: normal DP and PT pulses, no trophic changes or ulcerative lesions, and normal sensory exam        10/22/2024   Mini Cog   Clock Draw Score 2 Normal   3 Item Recall 3 objects recalled   Mini Cog Total Score 5                 Signed Electronically by: Aj Garces MD

## 2024-10-22 NOTE — PROGRESS NOTES
Prior to immunization administration, verified patients identity using patient s name and date of birth. Please see Immunization Activity for additional information.     Screening Questionnaire for Adult Immunization    Are you sick today?   No   Do you have allergies to medications, food, a vaccine component or latex?   No   Have you ever had a serious reaction after receiving a vaccination?   No   Do you have a long-term health problem with heart, lung, kidney, or metabolic disease (e.g., diabetes), asthma, a blood disorder, no spleen, complement component deficiency, a cochlear implant, or a spinal fluid leak?  Are you on long-term aspirin therapy?   No   Do you have cancer, leukemia, HIV/AIDS, or any other immune system problem?   No   Do you have a parent, brother, or sister with an immune system problem?   No   In the past 3 months, have you taken medications that affect  your immune system, such as prednisone, other steroids, or anticancer drugs; drugs for the treatment of rheumatoid arthritis, Crohn s disease, or psoriasis; or have you had radiation treatments?   No   Have you had a seizure, or a brain or other nervous system problem?   No   During the past year, have you received a transfusion of blood or blood    products, or been given immune (gamma) globulin or antiviral drug?   No   For women: Are you pregnant or is there a chance you could become       pregnant during the next month?   No   Have you received any vaccinations in the past 4 weeks?   No     Immunization questionnaire answers were all negative.      Patient instructed to remain in clinic for 15 minutes afterwards, and to report any adverse reactions.     Screening performed by Adriana Reed MA on 10/22/2024 at 1:25 PM.

## 2024-10-23 LAB
ANION GAP SERPL CALCULATED.3IONS-SCNC: 13 MMOL/L (ref 7–15)
BUN SERPL-MCNC: 37.2 MG/DL (ref 8–23)
CALCIUM SERPL-MCNC: 8.7 MG/DL (ref 8.8–10.4)
CHLORIDE SERPL-SCNC: 104 MMOL/L (ref 98–107)
CREAT SERPL-MCNC: 2 MG/DL (ref 0.67–1.17)
EGFRCR SERPLBLD CKD-EPI 2021: 32 ML/MIN/1.73M2
GLUCOSE SERPL-MCNC: 257 MG/DL (ref 70–99)
HCO3 SERPL-SCNC: 20 MMOL/L (ref 22–29)
POTASSIUM SERPL-SCNC: 5.1 MMOL/L (ref 3.4–5.3)
SODIUM SERPL-SCNC: 137 MMOL/L (ref 135–145)
T4 FREE SERPL-MCNC: 1.27 NG/DL (ref 0.9–1.7)
TSH SERPL DL<=0.005 MIU/L-ACNC: 6.22 UIU/ML (ref 0.3–4.2)

## 2024-10-27 DIAGNOSIS — D64.9 NORMOCYTIC ANEMIA: ICD-10-CM

## 2024-10-27 DIAGNOSIS — D72.829 LEUKOCYTOSIS, UNSPECIFIED TYPE: ICD-10-CM

## 2024-10-27 DIAGNOSIS — D69.6 THROMBOCYTOPENIA (H): Primary | ICD-10-CM

## 2024-10-27 NOTE — PROGRESS NOTES
"I talked to the patient over the phone and informed him that his hemoglobin has now dropped to 8.1 and the platelet count is low.  White count is little elevated.  I am wondering if he has myelodysplastic syndrome.  He had been seen by hematologist, Dr. Hernandez on 4/20 2021 and was felt to likely have late onset territory spherocytosis.  I am now wondering if he has myelodysplastic syndrome and needs reassessment.  I will check his iron levels and part of the cause of anemia could be chronic kidney disease.  Discussed this with the patient over the phone.  Initially reluctant to consider consultation indicating that he realizes he is failing and \" what is the point\".  After further discussion is agreeable to the consultation.  "

## 2024-10-28 DIAGNOSIS — E61.1 IRON DEFICIENCY: ICD-10-CM

## 2024-10-28 RX ORDER — FERROUS SULFATE 325(65) MG
325 TABLET ORAL
Qty: 90 TABLET | Refills: 3 | OUTPATIENT
Start: 2024-10-28

## 2024-10-29 ENCOUNTER — TRANSFERRED RECORDS (OUTPATIENT)
Dept: HEALTH INFORMATION MANAGEMENT | Facility: CLINIC | Age: 89
End: 2024-10-29
Payer: COMMERCIAL

## 2024-11-07 ENCOUNTER — TRANSFERRED RECORDS (OUTPATIENT)
Dept: HEALTH INFORMATION MANAGEMENT | Facility: CLINIC | Age: 89
End: 2024-11-07

## 2024-11-12 ENCOUNTER — ONCOLOGY VISIT (OUTPATIENT)
Dept: ONCOLOGY | Facility: CLINIC | Age: 89
End: 2024-11-12
Attending: INTERNAL MEDICINE
Payer: COMMERCIAL

## 2024-11-12 VITALS
OXYGEN SATURATION: 99 % | BODY MASS INDEX: 28.59 KG/M2 | DIASTOLIC BLOOD PRESSURE: 69 MMHG | SYSTOLIC BLOOD PRESSURE: 157 MMHG | WEIGHT: 188 LBS | HEART RATE: 63 BPM

## 2024-11-12 DIAGNOSIS — D61.818 PANCYTOPENIA (H): Primary | ICD-10-CM

## 2024-11-12 PROCEDURE — 99214 OFFICE O/P EST MOD 30 MIN: CPT | Performed by: INTERNAL MEDICINE

## 2024-11-12 PROCEDURE — G0463 HOSPITAL OUTPT CLINIC VISIT: HCPCS | Performed by: INTERNAL MEDICINE

## 2024-11-12 ASSESSMENT — PAIN SCALES - GENERAL: PAINLEVEL_OUTOF10: MILD PAIN (2)

## 2024-11-12 NOTE — LETTER
2024      Milo Lozano  [address]  [facility] [address]      Dear Colleague,    Thank you for referring your patient, Milo Lozano, to the [facility]. Please see a copy of my visit note below.    [facility] Hematology / Oncology  Progress Note  Name: Milo Lozano  :  1935  MRN:  [id number]    --------------------    Assessment / Plan:  Pancytopenia.    Reviewed with Alex recent worsening of anemia is worrisome for potential progression for bone marrow issue or potentially bleeding or alternative cause.  Alex remains very hesitant to do anything about his bone marrow and blood related issues; he was very distant throughout our visit and resistant to discussion.  Reviewed that if we proceed with a bone marrow biopsy, we will plan on full sedation and he will need H&P with primary care provider and anesthesia arranged.  Repeat evaluation for blood count issues has been reassuring; no clear nutritional deficiencies or separate issues.  Reviewed treatment options broadly, but ultimately marrow is required to guide best decision making.  Reviewed that without treatment, he will likely become transfusion dependent in the near future which would be supportive but not treatment focus.  Ultimately, I have given Alex and his wife several days to think about things and our nursing staff will contact him Thursday for an answer.    Follow-up:  Marrow pending Alex's decision.    Federico Prince MD    Diagnosis Codes:  1. Pancytopenia (H)      Lab and Imaging Orders:  Orders Placed This Encounter   Procedures     Bone marrow biopsy     Flow Cytometry     CHROMOSOME ANALYSIS, BONE MARROW, DIAGNOSIS/RELAPSE With Professional Interpretation     FISH With Professional Interpretation     Myeloid Malignancy NGS Panel     Medication Orders:  No orders of the defined types were placed in this encounter.    --------------------    Interval  History:  Alex presents for follow-up of pancytopenia accompanied by his wife.  All in all, his biggest complaints relate to ongoing whole body achiness.  He describes chronic fatigue.  He has no concerns for recurrent or severe infections or unexplained bleeding or bruising.  He does have some occasional nosebleeds as well as some easy bruising on the back of his hands.  No drenching night sweats.  Stable appetite and weight.    --------------------    Physical Exam:  VS: BP (!) 157/69 (BP Location: Right arm)   Pulse 63   Wt 85.3 kg (188 lb)   SpO2 99%   BMI 28.59 kg/m    GEN: Well appearing.    Data:  Reviewed serial CBC, bone marrow biopsy pathology report.      Again, thank you for allowing me to participate in the care of your patient.        Sincerely,        Federico Prince MD

## 2024-11-13 NOTE — PROGRESS NOTES
[facility] Hematology / Oncology  Progress Note  Name: Milo Lozano  :  1935  MRN:  [id number]    --------------------    Assessment / Plan:  Pancytopenia.    Reviewed with Alex recent worsening of anemia is worrisome for potential progression for bone marrow issue or potentially bleeding or alternative cause.  Alex remains very hesitant to do anything about his bone marrow and blood related issues; he was very distant throughout our visit and resistant to discussion.  Reviewed that if we proceed with a bone marrow biopsy, we will plan on full sedation and he will need H&P with primary care provider and anesthesia arranged.  Repeat evaluation for blood count issues has been reassuring; no clear nutritional deficiencies or separate issues.  Reviewed treatment options broadly, but ultimately marrow is required to guide best decision making.  Reviewed that without treatment, he will likely become transfusion dependent in the near future which would be supportive but not treatment focus.  Ultimately, I have given Alex and his wife several days to think about things and our nursing staff will contact him Thursday for an answer.    Follow-up:  Marrow pending Alex's decision.    Federico Prince MD    Diagnosis Codes:  1. Pancytopenia (H)      Lab and Imaging Orders:  Orders Placed This Encounter   Procedures    Bone marrow biopsy    Flow Cytometry    CHROMOSOME ANALYSIS, BONE MARROW, DIAGNOSIS/RELAPSE With Professional Interpretation    FISH With Professional Interpretation    Myeloid Malignancy NGS Panel     Medication Orders:  No orders of the defined types were placed in this encounter.    --------------------    Interval History:  Alex presents for follow-up of pancytopenia accompanied by his wife.  All in all, his biggest complaints relate to ongoing whole body achiness.  He describes chronic fatigue.  He has no concerns for recurrent or severe infections or unexplained bleeding or bruising.   He does have some occasional nosebleeds as well as some easy bruising on the back of his hands.  No drenching night sweats.  Stable appetite and weight.    --------------------    Physical Exam:  VS: BP (!) 157/69 (BP Location: Right arm)   Pulse 63   Wt 85.3 kg (188 lb)   SpO2 99%   BMI 28.59 kg/m    GEN: Well appearing.    Data:  Reviewed serial CBC, bone marrow biopsy pathology report.

## 2024-11-14 ENCOUNTER — PATIENT OUTREACH (OUTPATIENT)
Dept: ONCOLOGY | Facility: CLINIC | Age: 89
End: 2024-11-14
Payer: COMMERCIAL

## 2024-11-14 DIAGNOSIS — D69.6 THROMBOCYTOPENIA (H): Primary | ICD-10-CM

## 2024-11-14 NOTE — PROGRESS NOTES
"Spoke to patient re staff message sent regarding BMBX: \"Dr. Prince wanted follow up with this patient on Thursday to see what he has decided regarding doing a bone marrow biopsy or not.      Patient was unsure at the time of his visit on 11/12.     If patient proceed, he will need a fully sedated BMBX at the  (and then, follow up with Dr. Prince around 2 weeks after to review results and treatment plan).     Patient has MDS.\"       Patient states that he has reluctantly chosen to move forward with the biopsy. RNCC reviewed next steps with the patient and he voiced understanding.   "

## 2024-11-20 DIAGNOSIS — D61.818 PANCYTOPENIA (H): Primary | ICD-10-CM

## 2024-11-26 ENCOUNTER — OFFICE VISIT (OUTPATIENT)
Dept: FAMILY MEDICINE | Facility: CLINIC | Age: 89
End: 2024-11-26
Payer: COMMERCIAL

## 2024-11-26 VITALS
TEMPERATURE: 98.1 F | RESPIRATION RATE: 19 BRPM | SYSTOLIC BLOOD PRESSURE: 112 MMHG | HEART RATE: 92 BPM | HEIGHT: 68 IN | WEIGHT: 188.6 LBS | BODY MASS INDEX: 28.58 KG/M2 | DIASTOLIC BLOOD PRESSURE: 64 MMHG | OXYGEN SATURATION: 97 %

## 2024-11-26 DIAGNOSIS — E11.21 TYPE 2 DIABETES MELLITUS WITH DIABETIC NEPHROPATHY, WITHOUT LONG-TERM CURRENT USE OF INSULIN (H): ICD-10-CM

## 2024-11-26 DIAGNOSIS — D69.6 THROMBOCYTOPENIA (H): ICD-10-CM

## 2024-11-26 DIAGNOSIS — E11.42 DIABETIC POLYNEUROPATHY ASSOCIATED WITH TYPE 2 DIABETES MELLITUS (H): ICD-10-CM

## 2024-11-26 DIAGNOSIS — R29.6 FALLS FREQUENTLY: ICD-10-CM

## 2024-11-26 DIAGNOSIS — Z01.818 PREOPERATIVE EXAMINATION: Primary | ICD-10-CM

## 2024-11-26 DIAGNOSIS — E61.1 IRON DEFICIENCY: ICD-10-CM

## 2024-11-26 DIAGNOSIS — D61.818 PANCYTOPENIA (H): ICD-10-CM

## 2024-11-26 LAB
ANION GAP SERPL CALCULATED.3IONS-SCNC: 13 MMOL/L (ref 7–15)
BASOPHILS # BLD MANUAL: 0.2 10E3/UL (ref 0–0.2)
BASOPHILS NFR BLD MANUAL: 1 %
BUN SERPL-MCNC: 43.4 MG/DL (ref 8–23)
CALCIUM SERPL-MCNC: 8.6 MG/DL (ref 8.8–10.4)
CHLORIDE SERPL-SCNC: 100 MMOL/L (ref 98–107)
CREAT SERPL-MCNC: 2.24 MG/DL (ref 0.67–1.17)
DACRYOCYTES BLD QL SMEAR: SLIGHT
EGFRCR SERPLBLD CKD-EPI 2021: 28 ML/MIN/1.73M2
ELLIPTOCYTES BLD QL SMEAR: SLIGHT
EOSINOPHIL # BLD MANUAL: 0 10E3/UL (ref 0–0.7)
EOSINOPHIL NFR BLD MANUAL: 0 %
ERYTHROCYTE [DISTWIDTH] IN BLOOD BY AUTOMATED COUNT: 16.1 % (ref 10–15)
FRAGMENTS BLD QL SMEAR: SLIGHT
GLUCOSE SERPL-MCNC: 289 MG/DL (ref 70–99)
HCO3 SERPL-SCNC: 20 MMOL/L (ref 22–29)
HCT VFR BLD AUTO: 24 % (ref 40–53)
HGB BLD-MCNC: 7.8 G/DL (ref 13.3–17.7)
LYMPHOCYTES # BLD MANUAL: 2.2 10E3/UL (ref 0.8–5.3)
LYMPHOCYTES NFR BLD MANUAL: 13 %
MCH RBC QN AUTO: 26.9 PG (ref 26.5–33)
MCHC RBC AUTO-ENTMCNC: 32.5 G/DL (ref 31.5–36.5)
MCV RBC AUTO: 83 FL (ref 78–100)
MONOCYTES # BLD MANUAL: 5.6 10E3/UL (ref 0–1.3)
MONOCYTES NFR BLD MANUAL: 33 %
NEUTROPHILS # BLD MANUAL: 8.8 10E3/UL (ref 1.6–8.3)
NEUTROPHILS NFR BLD MANUAL: 53 %
PLAT MORPH BLD: ABNORMAL
PLATELET # BLD AUTO: 75 10E3/UL (ref 150–450)
POLYCHROMASIA BLD QL SMEAR: SLIGHT
POTASSIUM SERPL-SCNC: 4.2 MMOL/L (ref 3.4–5.3)
RBC # BLD AUTO: 2.9 10E6/UL (ref 4.4–5.9)
RBC MORPH BLD: ABNORMAL
SODIUM SERPL-SCNC: 133 MMOL/L (ref 135–145)
WBC # BLD AUTO: 16.8 10E3/UL (ref 4–11)

## 2024-11-26 PROCEDURE — 99214 OFFICE O/P EST MOD 30 MIN: CPT | Performed by: NURSE PRACTITIONER

## 2024-11-26 PROCEDURE — 80048 BASIC METABOLIC PNL TOTAL CA: CPT | Performed by: NURSE PRACTITIONER

## 2024-11-26 PROCEDURE — 85007 BL SMEAR W/DIFF WBC COUNT: CPT | Performed by: NURSE PRACTITIONER

## 2024-11-26 PROCEDURE — 85027 COMPLETE CBC AUTOMATED: CPT | Performed by: NURSE PRACTITIONER

## 2024-11-26 PROCEDURE — 36415 COLL VENOUS BLD VENIPUNCTURE: CPT | Performed by: NURSE PRACTITIONER

## 2024-11-26 RX ORDER — DULOXETIN HYDROCHLORIDE 20 MG/1
20 CAPSULE, DELAYED RELEASE ORAL DAILY
Qty: 90 CAPSULE | Refills: 0 | Status: SHIPPED | OUTPATIENT
Start: 2024-11-26

## 2024-11-26 ASSESSMENT — PAIN SCALES - GENERAL: PAINLEVEL_OUTOF10: MODERATE PAIN (4)

## 2024-11-26 NOTE — PATIENT INSTRUCTIONS
How to Take Your Medication Before Surgery  Preoperative Medication Instructions   Antiplatelet or Anticoagulation Medication Instructions   - Patient is on no antiplatelet or anticoagulation medications.    Additional Medication Instructions  Take all scheduled medications on the day of surgery EXCEPT for modifications listed below:   - ACE/ARB: Continue without modification (e.g., MAC anesthesia, neurosurgery, spine surgery, heart failure, or labile hypertension with risk of hypertension).   - metformin: DO NOT TAKE day of surgery.   - sulfonylurea (e.g. glyburide, glipizide): DO NOT TAKE day of surgery   - SGLT2 Inhibitor (canagliflozin, dapagliflozin, or empagliflozin): DO NOT TAKE 3 days before surgery.      Can take: Losartan/HCTZ  Pt. Currently not taking Farxiga  Hold Metformin and Glyburide day of procedure.   General Instructions:  If you have been seen for a concern and are worse or not improving, please schedule a follow-up or reach out to a member of our care team or nurses if it is urgent.   Nurse advice is available 24/7 by calling [phone].  For emergencies, please call [phone].    You may see your results before we can make recommendations. This is common, as sometimes we are awaiting labs to return or we are out of office on a particular day. Please be patient, and if you don't see a response from me or one of my colleagues within 2-3 business days, and you have a specific concern, please send us a message.     If you have run out of refills, please schedule follow-up visits. This is generally an indication for when you are due for a follow-up visit. Most mental health or chronic issues we are treating require some type of visit every 6 months. We are now offering many issues to be done through telephone or video visits! However, if exams are needed or it is a complex concern, we may ask you to be seen in person.    Physicals/Preventative exam slots fill up fast. Please consider scheduling these  2-3 months in advance to allow for the appointment time that fits you best. If you have medications ordered or other issues addressed at these visits, please be aware there are extra costs associated with this.      Sincerely,   [facility] Team

## 2024-11-26 NOTE — PROGRESS NOTES
Preoperative Evaluation  [facility] [place]  [address], [address]  [place] [address]  Phone: [phone]  Fax: [phone]  Primary Provider: Aj Garces MD  Pre-op Performing Provider: STEFFEN Nath CNP  Nov 26, 2024 11/26/2024   Surgical Information   What procedure is being done? pre op    Facility or Hospital where procedure/surgery will be performed: [facility]    Who is doing the procedure / surgery? [facility]- [facility]   Date of surgery / procedure: December 3    Time of surgery / procedure: 1 pm anticipated according to chart.    Where do you plan to recover after surgery? at home with family        Patient-reported     Fax number for surgical facility: Note does not need to be faxed, will be available electronically in Epic.    Assessment & Plan     The proposed surgical procedure is considered LOW risk.      ICD-10-CM    1. Preoperative examination  Z01.818 CBC with Platelets & Differential     Basic metabolic panel      2. Pancytopenia (H)  D61.818       3. Iron deficiency  E61.1       4. Thrombocytopenia (H)  D69.6       5. Type 2 diabetes mellitus with diabetic nephropathy, without long-term current use of insulin (H)  E11.21       6. Falls frequently  R29.6       7. Diabetic polyneuropathy associated with type 2 diabetes mellitus (H)  E11.42 DULoxetine (CYMBALTA) 20 MG capsule          Advised to wear Cpap post-op.           - No identified additional risk factors other than previously addressed    Antiplatelet or Anticoagulation Medication Instructions   - Patient is on no antiplatelet or anticoagulation medications.    Additional Medication Instructions    Take all scheduled medications on the day of surgery EXCEPT for modifications listed below:   - ACE/ARB: Continue without modification (e.g., MAC anesthesia, neurosurgery, spine surgery, heart failure, or labile hypertension with risk of hypertension).   - metformin: DO NOT TAKE day of surgery.   -  sulfonylurea (e.g. glyburide, glipizide): DO NOT TAKE day of surgery   - SGLT2 Inhibitor (canagliflozin, dapagliflozin, or empagliflozin): DO NOT TAKE 3 days before surgery.       Recommendation  Approval given to proceed with proposed procedure, without further diagnostic evaluation.    Starting Duloxetine for mood, chronic pain.     Florin Johnson is a 89 year old, presenting for the following:  Pre-Op Exam    Recurrent Pancytopenia. Had inconclusive bone marrow biopsy 6/2020 ago.   Bone marrow biopsy again being examined for anemia cause.  + weakness, fatigue, hypersomnia.   Frequent falls.         11/26/2024     1:09 PM   Additional Questions   Roomed by AD   Accompanied by Spouse     HPI related to upcoming procedure: Yes        11/26/2024   Pre-Op Questionnaire   Have you ever had a heart attack or stroke? No    Have you ever had surgery on your heart or blood vessels, such as a stent placement, a coronary artery bypass, or surgery on an artery in your head, neck, heart, or legs? No    Do you have chest pain with activity? No    Do you have a history of heart failure? No    Do you currently have a cold, bronchitis or symptoms of other infection? No    Do you have a cough, shortness of breath, or wheezing? No    Do you or anyone in your family have previous history of blood clots? No    Do you or does anyone in your family have a serious bleeding problem such as prolonged bleeding following surgeries or cuts? No    Have you ever had problems with anemia or been told to take iron pills? (!) YES on currently- reason for procedure.     Have you had any abnormal blood loss such as black, tarry or bloody stools? No    Have you ever had a blood transfusion? No    Are you willing to have a blood transfusion if it is medically needed before, during, or after your surgery? Yes    Have you or any of your relatives ever had problems with anesthesia? No    Do you have sleep apnea, excessive snoring or daytime  drowsiness? (!) YES , uses Cpap   Do you have a CPAP machine? Yes    Do you have any artifical heart valves or other implanted medical devices like a pacemaker, defibrillator, or continuous glucose monitor? No    Do you have artificial joints? No    Are you allergic to latex? No        Patient-reported     Health Care Directive  Patient has a Health Care Directive on file      Preoperative Review of    reviewed - controlled substances reflected in medication list.      Status of Chronic Conditions:  See problem list for active medical problems.  Problems all longstanding and stable, except as noted/documented.  See ROS for pertinent symptoms related to these conditions.    Patient Active Problem List    Diagnosis Date Noted    Falls frequently 10/22/2024     Priority: Medium    Chronic diffuse otitis externa of right ear 10/22/2024     Priority: Medium    Anemia due to chronic kidney disease 10/10/2023     Priority: Medium    Iron deficiency 10/10/2023     Priority: Medium    Dizziness 10/10/2023     Priority: Medium    Low HDL (under 40) 11/29/2022     Priority: Medium    Poor balance 10/21/2021     Priority: Medium    BPH with obstruction/lower urinary tract symptoms 09/17/2020     Priority: Medium    Class 1 obesity due to excess calories with body mass index (BMI) of 31.0 to 31.9 in adult 09/17/2020     Priority: Medium    CKD (chronic kidney disease) stage 3, GFR 30-59 ml/min (H) 06/09/2020     Priority: Medium    Essential tremor 10/21/2019     Priority: Medium    Thrombocytopenia (H) 09/18/2019     Priority: Medium    Normocytic anemia 08/15/2017     Priority: Medium    Diabetic polyneuropathy associated with type 2 diabetes mellitus (H) 07/15/2017     Priority: Medium    DEMOND on CPAP 03/06/2017     Priority: Medium     CPAP      Hypothyroidism due to acquired atrophy of thyroid 12/05/2016     Priority: Medium    White coat hypertension 10/13/2015     Priority: Medium    Diabetic peripheral neuropathy  (H) 06/19/2015     Priority: Medium    Type 2 diabetes mellitus with kidney complication, without long-term current use of insulin (H) 09/28/2014     Priority: Medium    Hypertension goal BP (blood pressure) < 150/90 12/29/2011     Priority: Medium    CARDIOVASCULAR SCREENING; LDL GOAL LESS THAN 100 12/29/2011     Priority: Medium    Prostate cancer (H) 01/01/2007     Priority: Medium     Diagnosed in 2007 in [place], Had Radiation        Past Medical History:   Diagnosis Date    Anemia due to chronic kidney disease 10/10/2023    BPH (benign prostatic hypertrophy)     BPH with obstruction/lower urinary tract symptoms 09/17/2020    BPV (benign positional vertigo), unspecified laterality 10/21/2021    CARDIOVASCULAR SCREENING; LDL GOAL LESS THAN 100 12/29/2011    CARDIOVASCULAR SCREENING; LDL GOAL LESS THAN 130 12/29/2011    Chronic diffuse otitis externa of right ear 10/22/2024    Class 1 obesity due to excess calories with body mass index (BMI) of 31.0 to 31.9 in adult 09/17/2020    Dizziness 10/10/2023    Essential tremor 10/21/2019    Falls frequently 10/22/2024    Hypertension goal BP (blood pressure) < 140/90 12/29/2011    Hypothyroidism due to acquired atrophy of thyroid 12/05/2016    Iron deficiency 10/10/2023    Low HDL (under 40) 11/29/2022    Normocytic anemia 08/15/2017    Poor balance 10/21/2021    Prostate cancer (H) 01/2007    Had Radiation    Thrombocytopenia (H) 09/18/2019    Type 2 diabetes mellitus with hyperglycemia (H) 10/21/2010    Type 2 diabetes, HbA1c goal < 7% (H)      Past Surgical History:   Procedure Laterality Date    COLONOSCOPY       Current Outpatient Medications   Medication Sig Dispense Refill    amLODIPine (NORVASC) 10 MG tablet TAKE 1 TABLET BY MOUTH DAILY 90 tablet 3    blood glucose (NO BRAND SPECIFIED) lancets standard Use to test blood sugar 2 times daily or as directed. 200 each 0    blood glucose calibration (NO BRAND SPECIFIED) solution To accompany: Blood Glucose Monitor  Brands: per insurance. 1 Bottle 3    blood glucose monitoring (ACCU-CHEK MILADYS SMARTVIEW) meter device kit Use to test blood sugar 2 times daily or as directed. 1 kit 0    blood glucose monitoring (NO BRAND SPECIFIED) meter device kit Use to test blood sugar 2 times daily or as directed. Preferred blood glucose meter OR supplies to accompany: Blood Glucose Monitor Brands: per insurance 1 kit 0    dapagliflozin (FARXIGA) 10 MG TABS tablet TAKE 1 TABLET BY MOUTH DAILY 90 tablet 0    ferrous sulfate (FEROSUL) 325 (65 Fe) MG tablet Take 1 tablet (325 mg) by mouth daily (with breakfast) 90 tablet 3    folic acid (FOLVITE) 1 MG tablet Take 1 tablet (1 mg) by mouth daily 90 tablet 3    glimepiride (AMARYL) 4 MG tablet Take 2 tablets (8 mg) by mouth every morning (before breakfast) 180 tablet 3    levothyroxine (SYNTHROID/LEVOTHROID) 88 MCG tablet TAKE 1 TABLET BY MOUTH EVERY DAY GENERIC EQUIVALENT FOR SYNTHROID 90 tablet 0    losartan-hydrochlorothiazide (HYZAAR) 100-25 MG tablet Take 1 tablet by mouth every morning 90 tablet 3    metFORMIN (GLUCOPHAGE XR) 500 MG 24 hr tablet TAKE 2 TABLETS BY MOUTH DAILY WITH DINNER 180 tablet 3    pregabalin (LYRICA) 75 MG capsule TAKE ONE CAPSULE BY MOUTH TWICE DAILY. GENERIC EQUIVALENT FOR LYRICA 180 capsule 3    propranolol ER (INDERAL LA) 80 MG 24 hr capsule TAKE ONE CAPSULE BY MOUTH DAILY 90 capsule 0    tamsulosin (FLOMAX) 0.4 MG capsule TAKE 2 CAPSULES BY MOUTH EVERY  capsule 3    thin (NO BRAND SPECIFIED) lancets Use with lanceting device to test blood sugar 2 times daily or as directed. To accompany: Blood Glucose Monitor Brands: per insurance. 200 each 3       Allergies   Allergen Reactions    Lisinopril Cough     Developed an ACE cough on the Lisinopril, pt denies this is a current allergy as of 6/19/2020.        Social History     Tobacco Use    Smoking status: Never     Passive exposure: Never    Smokeless tobacco: Never   Substance Use Topics    Alcohol use: No  "      History   Drug Use No             Review of Systems  Constitutional, HEENT, cardiovascular, pulmonary, gi and gu systems are negative, except as otherwise noted.    Objective    /64 (BP Location: Left arm, Patient Position: Sitting, Cuff Size: Adult Regular)   Pulse 92   Temp 98.1  F (36.7  C) (Temporal)   Resp 19   Ht 1.727 m (5' 7.99\")   Wt 85.5 kg (188 lb 9.6 oz)   SpO2 97%   BMI 28.68 kg/m     Estimated body mass index is 28.68 kg/m  as calculated from the following:    Height as of this encounter: 1.727 m (5' 7.99\").    Weight as of this encounter: 85.5 kg (188 lb 9.6 oz).  Physical Exam  GENERAL: alert and no distress, mild yellowing to skin- uses a walker, assist of one with large transfers to exam table.   EYES: Eyes grossly normal to inspection, PERRL and conjunctivae and sclerae normal  HENT: ear canals and TM's normal, nose and mouth without ulcers or lesions  NECK: no adenopathy, no asymmetry, masses, or scars  RESP: lungs clear to auscultation - no rales, rhonchi or wheezes  CV: regular rate and rhythm, normal S1 S2, no S3 or S4, no murmur, click or rub, R> L peripheral edema + 2, hemostasis hyperpigmentation.   ABDOMEN: soft, nontender, without hepatosplenomegaly or masses, bowel sounds normal, and protrubant- baseline  MS: no gross musculoskeletal defects noted, no edema  SKIN: no suspicious lesions or rashes, SK's on scalp  NEURO: Normal strength and tone, mentation intact and speech normal  PSYCH: mentation appears normal and affect flat    Recent Labs   Lab Test 10/22/24  1504 10/22/24  1424 08/28/24  1453   HGB 8.1*  --  9.4*   PLT 58*  --  85*   NA  --  137 138   POTASSIUM  --  5.1 4.7   CR  --  2.00* 1.80*   A1C 7.7*  --  6.2*        Diagnostics  Labs pending at this time.  Results will be reviewed when available.   No EKG this visit, completed in the last 90 days.    Revised Cardiac Risk Index (RCRI)  The patient has the following serious cardiovascular risks for " perioperative complications:   - No serious cardiac risks = 0 points     RCRI Interpretation: 0 points: Class I (very low risk - 0.4% complication rate)         Signed Electronically by: STEFFEN Nath CNP  A copy of this evaluation report is provided to the requesting physician.

## 2024-11-27 NOTE — PROGRESS NOTES
Please call patient and ask him to come and see me in the clinic this coming Friday.  Please double book him at p.m.  It send regarding his recent kidney function and anemia.

## 2024-11-27 NOTE — RESULT ENCOUNTER NOTE
Lee Johnson (and [place]),   Your kidney function is down a little bit, as is your sodium level. You are still OK to have the bone marrow biopsy.  I would like you to try to get in some food and fluids, to help balance your swelling and aid your kidneys.   If you can get the compression stockings on during the day as we discussed, that would also help.   Dr. Garces is aware of your labs, and the bone marrow biopsy plan, and his team will be reaching out to you to schedule follow-up.   Happy Thanksgiving,   STEFFEN Nath CNP

## 2024-12-02 ENCOUNTER — ANESTHESIA EVENT (OUTPATIENT)
Dept: SURGERY | Facility: AMBULATORY SURGERY CENTER | Age: 89
End: 2024-12-02
Payer: COMMERCIAL

## 2024-12-03 ENCOUNTER — OFFICE VISIT (OUTPATIENT)
Dept: ONCOLOGY | Facility: CLINIC | Age: 89
End: 2024-12-03
Attending: PHYSICIAN ASSISTANT
Payer: COMMERCIAL

## 2024-12-03 ENCOUNTER — HOSPITAL ENCOUNTER (OUTPATIENT)
Facility: AMBULATORY SURGERY CENTER | Age: 89
Discharge: HOME | End: 2024-12-03
Attending: PHYSICIAN ASSISTANT
Payer: COMMERCIAL

## 2024-12-03 ENCOUNTER — ANESTHESIA (OUTPATIENT)
Dept: SURGERY | Facility: AMBULATORY SURGERY CENTER | Age: 89
End: 2024-12-03
Payer: COMMERCIAL

## 2024-12-03 ENCOUNTER — LAB (OUTPATIENT)
Dept: LAB | Facility: CLINIC | Age: 89
End: 2024-12-03
Attending: PHYSICIAN ASSISTANT
Payer: COMMERCIAL

## 2024-12-03 VITALS
OXYGEN SATURATION: 98 % | HEART RATE: 60 BPM | TEMPERATURE: 98.8 F | HEIGHT: 67 IN | DIASTOLIC BLOOD PRESSURE: 60 MMHG | SYSTOLIC BLOOD PRESSURE: 124 MMHG | RESPIRATION RATE: 16 BRPM | WEIGHT: 180 LBS | BODY MASS INDEX: 28.25 KG/M2

## 2024-12-03 VITALS — HEART RATE: 62 BPM

## 2024-12-03 DIAGNOSIS — D61.818 PANCYTOPENIA (H): ICD-10-CM

## 2024-12-03 DIAGNOSIS — D61.818 PANCYTOPENIA (H): Primary | ICD-10-CM

## 2024-12-03 LAB
BASOPHILS # BLD MANUAL: 0.4 10E3/UL (ref 0–0.2)
BASOPHILS NFR BLD MANUAL: 2 %
EOSINOPHIL # BLD MANUAL: 0 10E3/UL (ref 0–0.7)
EOSINOPHIL NFR BLD MANUAL: 0 %
ERYTHROCYTE [DISTWIDTH] IN BLOOD BY AUTOMATED COUNT: 16.4 % (ref 10–15)
HCT VFR BLD AUTO: 25.7 % (ref 40–53)
HGB BLD-MCNC: 8.4 G/DL (ref 13.3–17.7)
LYMPHOCYTES # BLD MANUAL: 1.8 10E3/UL (ref 0.8–5.3)
LYMPHOCYTES NFR BLD MANUAL: 9 %
MCH RBC QN AUTO: 26.2 PG (ref 26.5–33)
MCHC RBC AUTO-ENTMCNC: 32.7 G/DL (ref 31.5–36.5)
MCV RBC AUTO: 80 FL (ref 78–100)
METAMYELOCYTES # BLD MANUAL: 0.2 10E3/UL
METAMYELOCYTES NFR BLD MANUAL: 1 %
MONOCYTES # BLD MANUAL: 3.2 10E3/UL (ref 0–1.3)
MONOCYTES NFR BLD MANUAL: 16 %
MYELOCYTES # BLD MANUAL: 1.2 10E3/UL
MYELOCYTES NFR BLD MANUAL: 6 %
NEUTROPHILS # BLD MANUAL: 13.6 10E3/UL (ref 1.6–8.3)
NEUTROPHILS NFR BLD MANUAL: 66 %
PLAT MORPH BLD: ABNORMAL
PLATELET # BLD AUTO: 143 10E3/UL (ref 150–450)
RBC # BLD AUTO: 3.21 10E6/UL (ref 4.4–5.9)
RBC MORPH BLD: ABNORMAL
WBC # BLD AUTO: 20.4 10E3/UL (ref 4–11)

## 2024-12-03 PROCEDURE — 85007 BL SMEAR W/DIFF WBC COUNT: CPT

## 2024-12-03 PROCEDURE — 88237 TISSUE CULTURE BONE MARROW: CPT

## 2024-12-03 PROCEDURE — 85027 COMPLETE CBC AUTOMATED: CPT

## 2024-12-03 PROCEDURE — 88264 CHROMOSOME ANALYSIS 20-25: CPT

## 2024-12-03 PROCEDURE — 81450 HL NEO GSAP 5-50DNA/DNA&RNA: CPT | Performed by: INTERNAL MEDICINE

## 2024-12-03 PROCEDURE — 88271 CYTOGENETICS DNA PROBE: CPT

## 2024-12-03 PROCEDURE — G0452 MOLECULAR PATHOLOGY INTERPR: HCPCS | Mod: 26 | Performed by: STUDENT IN AN ORGANIZED HEALTH CARE EDUCATION/TRAINING PROGRAM

## 2024-12-03 PROCEDURE — 88275 CYTOGENETICS 100-300: CPT

## 2024-12-03 PROCEDURE — 36415 COLL VENOUS BLD VENIPUNCTURE: CPT

## 2024-12-03 PROCEDURE — 38222 DX BONE MARROW BX & ASPIR: CPT

## 2024-12-03 PROCEDURE — 88280 CHROMOSOME KARYOTYPE STUDY: CPT

## 2024-12-03 RX ORDER — PROPOFOL 10 MG/ML
INJECTION, EMULSION INTRAVENOUS CONTINUOUS PRN
Status: DISCONTINUED | OUTPATIENT
Start: 2024-12-03 | End: 2024-12-03

## 2024-12-03 RX ORDER — HYDROMORPHONE HYDROCHLORIDE 1 MG/ML
0.2 INJECTION, SOLUTION INTRAMUSCULAR; INTRAVENOUS; SUBCUTANEOUS EVERY 5 MIN PRN
Status: DISCONTINUED | OUTPATIENT
Start: 2024-12-03 | End: 2024-12-04 | Stop reason: HOSPADM

## 2024-12-03 RX ORDER — HYDROMORPHONE HYDROCHLORIDE 1 MG/ML
0.4 INJECTION, SOLUTION INTRAMUSCULAR; INTRAVENOUS; SUBCUTANEOUS EVERY 5 MIN PRN
Status: DISCONTINUED | OUTPATIENT
Start: 2024-12-03 | End: 2024-12-04 | Stop reason: HOSPADM

## 2024-12-03 RX ORDER — NALOXONE HYDROCHLORIDE 0.4 MG/ML
0.1 INJECTION, SOLUTION INTRAMUSCULAR; INTRAVENOUS; SUBCUTANEOUS
Status: DISCONTINUED | OUTPATIENT
Start: 2024-12-03 | End: 2024-12-04 | Stop reason: HOSPADM

## 2024-12-03 RX ORDER — LIDOCAINE HYDROCHLORIDE 10 MG/ML
8-10 INJECTION, SOLUTION EPIDURAL; INFILTRATION; INTRACAUDAL; PERINEURAL
Status: CANCELLED | OUTPATIENT
Start: 2024-12-03

## 2024-12-03 RX ORDER — FENTANYL CITRATE 50 UG/ML
25 INJECTION, SOLUTION INTRAMUSCULAR; INTRAVENOUS EVERY 5 MIN PRN
Status: DISCONTINUED | OUTPATIENT
Start: 2024-12-03 | End: 2024-12-04 | Stop reason: HOSPADM

## 2024-12-03 RX ORDER — DEXAMETHASONE SODIUM PHOSPHATE 10 MG/ML
4 INJECTION, SOLUTION INTRAMUSCULAR; INTRAVENOUS
Status: DISCONTINUED | OUTPATIENT
Start: 2024-12-03 | End: 2024-12-04 | Stop reason: HOSPADM

## 2024-12-03 RX ORDER — ONDANSETRON 2 MG/ML
4 INJECTION INTRAMUSCULAR; INTRAVENOUS EVERY 30 MIN PRN
Status: DISCONTINUED | OUTPATIENT
Start: 2024-12-03 | End: 2024-12-04 | Stop reason: HOSPADM

## 2024-12-03 RX ORDER — LIDOCAINE 40 MG/G
CREAM TOPICAL
Status: DISCONTINUED | OUTPATIENT
Start: 2024-12-03 | End: 2024-12-04 | Stop reason: HOSPADM

## 2024-12-03 RX ORDER — LIDOCAINE 40 MG/G
CREAM TOPICAL
Status: CANCELLED | OUTPATIENT
Start: 2024-12-03

## 2024-12-03 RX ORDER — LIDOCAINE HYDROCHLORIDE 20 MG/ML
INJECTION, SOLUTION INFILTRATION; PERINEURAL PRN
Status: DISCONTINUED | OUTPATIENT
Start: 2024-12-03 | End: 2024-12-03

## 2024-12-03 RX ORDER — ONDANSETRON 4 MG/1
4 TABLET, ORALLY DISINTEGRATING ORAL EVERY 30 MIN PRN
Status: DISCONTINUED | OUTPATIENT
Start: 2024-12-03 | End: 2024-12-04 | Stop reason: HOSPADM

## 2024-12-03 RX ORDER — FENTANYL CITRATE 50 UG/ML
50 INJECTION, SOLUTION INTRAMUSCULAR; INTRAVENOUS EVERY 5 MIN PRN
Status: DISCONTINUED | OUTPATIENT
Start: 2024-12-03 | End: 2024-12-04 | Stop reason: HOSPADM

## 2024-12-03 RX ORDER — LIDOCAINE HYDROCHLORIDE 10 MG/ML
8-10 INJECTION, SOLUTION EPIDURAL; INFILTRATION; INTRACAUDAL; PERINEURAL
Status: DISCONTINUED | OUTPATIENT
Start: 2024-12-03 | End: 2024-12-04 | Stop reason: HOSPADM

## 2024-12-03 RX ORDER — PROPOFOL 10 MG/ML
INJECTION, EMULSION INTRAVENOUS PRN
Status: DISCONTINUED | OUTPATIENT
Start: 2024-12-03 | End: 2024-12-03

## 2024-12-03 RX ORDER — OXYCODONE HYDROCHLORIDE 5 MG/1
5 TABLET ORAL
Status: DISCONTINUED | OUTPATIENT
Start: 2024-12-03 | End: 2024-12-04 | Stop reason: HOSPADM

## 2024-12-03 RX ORDER — ACETAMINOPHEN 325 MG/1
975 TABLET ORAL ONCE
Status: COMPLETED | OUTPATIENT
Start: 2024-12-03 | End: 2024-12-03

## 2024-12-03 RX ORDER — OXYCODONE HYDROCHLORIDE 5 MG/1
10 TABLET ORAL
Status: DISCONTINUED | OUTPATIENT
Start: 2024-12-03 | End: 2024-12-04 | Stop reason: HOSPADM

## 2024-12-03 RX ADMIN — PROPOFOL 30 MG: 10 INJECTION, EMULSION INTRAVENOUS at 12:22

## 2024-12-03 RX ADMIN — PROPOFOL 200 MCG/KG/MIN: 10 INJECTION, EMULSION INTRAVENOUS at 12:22

## 2024-12-03 RX ADMIN — PROPOFOL 30 MG: 10 INJECTION, EMULSION INTRAVENOUS at 12:27

## 2024-12-03 RX ADMIN — LIDOCAINE HYDROCHLORIDE 100 MG: 20 INJECTION, SOLUTION INFILTRATION; PERINEURAL at 12:22

## 2024-12-03 NOTE — ANESTHESIA PREPROCEDURE EVALUATION
Anesthesia Pre-Procedure Evaluation    Patient: Milo Lozano   MRN: [id number] : 1935        Procedure : Procedure(s):  BIOPSY, BONE MARROW          Past Medical History:   Diagnosis Date    Anemia due to chronic kidney disease 10/10/2023    BPH (benign prostatic hypertrophy)     BPH with obstruction/lower urinary tract symptoms 2020    BPV (benign positional vertigo), unspecified laterality 10/21/2021    CARDIOVASCULAR SCREENING; LDL GOAL LESS THAN 100 2011    CARDIOVASCULAR SCREENING; LDL GOAL LESS THAN 130 2011    Chronic diffuse otitis externa of right ear 10/22/2024    Class 1 obesity due to excess calories with body mass index (BMI) of 31.0 to 31.9 in adult 2020    Dizziness 10/10/2023    Essential tremor 10/21/2019    Falls frequently 10/22/2024    Hypertension goal BP (blood pressure) < 140/90 2011    Hypothyroidism due to acquired atrophy of thyroid 2016    Iron deficiency 10/10/2023    Low HDL (under 40) 2022    Normocytic anemia 08/15/2017    Poor balance 10/21/2021    Prostate cancer (H) 2007    Had Radiation    Thrombocytopenia (H) 2019    Type 2 diabetes mellitus with hyperglycemia (H) 10/21/2010    Type 2 diabetes, HbA1c goal < 7% (H)       Past Surgical History:   Procedure Laterality Date    CATARACT EXTRACTION Bilateral         COLONOSCOPY      cystoscopy with uroLIft implant  2024      Allergies   Allergen Reactions    Lisinopril Cough     Developed an ACE cough on the Lisinopril, pt denies this is a current allergy as of 2020.      Social History     Tobacco Use    Smoking status: Never     Passive exposure: Never    Smokeless tobacco: Never   Substance Use Topics    Alcohol use: No      Wt Readings from Last 1 Encounters:   24 85.3 kg (188 lb)           Physical Exam    Airway        Mallampati: II   TM distance: > 3 FB   Neck ROM: full   Mouth opening: > 3 cm    Respiratory Devices and Support         Dental      "  (+) Modest Abnormalities - crowns, retainers, 1 or 2 missing teeth and Removable bridges or other hardware      Cardiovascular   cardiovascular exam normal          Pulmonary   pulmonary exam normal                OUTSIDE LABS:  CBC:   Lab Results   Component Value Date    WBC 16.8 (H) 11/26/2024    WBC 16.5 (H) 10/22/2024    HGB 7.8 (LL) 11/26/2024    HGB 8.1 (L) 10/22/2024    HCT 24.0 (L) 11/26/2024    HCT 26.7 (L) 10/22/2024    PLT 75 (L) 11/26/2024    PLT 58 (L) 10/22/2024     BMP:   Lab Results   Component Value Date     (L) 11/26/2024     10/22/2024    POTASSIUM 4.2 11/26/2024    POTASSIUM 5.1 10/22/2024    CHLORIDE 100 11/26/2024    CHLORIDE 104 10/22/2024    CO2 20 (L) 11/26/2024    CO2 20 (L) 10/22/2024    BUN 43.4 (H) 11/26/2024    BUN 37.2 (H) 10/22/2024    CR 2.24 (H) 11/26/2024    CR 2.00 (H) 10/22/2024     (H) 11/26/2024     (H) 10/22/2024     COAGS: No results found for: \"PTT\", \"INR\", \"FIBR\"  POC: No results found for: \"BGM\", \"HCG\", \"HCGS\"  HEPATIC:   Lab Results   Component Value Date    ALBUMIN 4.4 08/28/2024    PROTTOTAL 8.2 06/11/2024    ALT <5 06/11/2024    AST 22 06/11/2024    ALKPHOS 109 06/11/2024    BILITOTAL 1.3 (H) 06/11/2024     OTHER:   Lab Results   Component Value Date    A1C 7.7 (H) 10/22/2024    DUSTY 8.6 (L) 11/26/2024    PHOS 3.1 08/28/2024    TSH 6.22 (H) 10/22/2024    T4 1.27 10/22/2024       Anesthesia Plan    ASA Status:  3    NPO Status:  NPO Appropriate    Anesthesia Type: MAC.     - Reason for MAC: straight local not clinically adequate   Induction: Intravenous, Propofol.   Maintenance: TIVA.        Consents    Anesthesia Plan(s) and associated risks, benefits, and realistic alternatives discussed. Questions answered and patient/representative(s) expressed understanding.     - Discussed: Risks, Benefits and Alternatives for BOTH SEDATION and the PROCEDURE were discussed     - Discussed with:  Patient, Spouse      - Extended Intubation/Ventilatory " "Support Discussed: No.      - Patient is DNR/DNI Status: No     Use of blood products discussed: No .     Postoperative Care    Pain management: IV analgesics, Oral pain medications, Multi-modal analgesia.   PONV prophylaxis: Dexamethasone or Solumedrol, Ondansetron (or other 5HT-3), Background Propofol Infusion     Comments:               Everardo Zuluaga MD    I have reviewed the pertinent notes and labs in the chart from the past 30 days and (re)examined the patient.  Any updates or changes from those notes are reflected in this note.               # Hypertension: Noted on problem list          # DMII: A1C = 7.7 % (Ref range: 0.0 - 5.6 %) within past 6 months    # Overweight: Estimated body mass index is 29.44 kg/m  as calculated from the following:    Height as of 11/29/24: 1.702 m (5' 7\").    Weight as of 11/29/24: 85.3 kg (188 lb).             "

## 2024-12-03 NOTE — ANESTHESIA CARE TRANSFER NOTE
Patient: Milo Lozano    Procedure: Procedure(s):  BIOPSY, BONE MARROW       Diagnosis: Thrombocytopenia (H) [D69.6]  Diagnosis Additional Information: No value filed.    Anesthesia Type:   MAC     Note:    Oropharynx: oropharynx clear of all foreign objects and spontaneously breathing  Level of Consciousness: awake  Oxygen Supplementation: room air    Independent Airway: airway patency satisfactory and stable  Dentition: dentition unchanged  Vital Signs Stable: post-procedure vital signs reviewed and stable  Report to RN Given: handoff report given  Patient transferred to: Phase II  Comments: VSS and WNL, comfortable, no PONV, report to Lyla DON  Handoff Report: Identifed the Patient, Identified the Reponsible Provider, Reviewed the pertinent medical history, Discussed the surgical course, Reviewed Intra-OP anesthesia mangement and issues during anesthesia, Set expectations for post-procedure period and Allowed opportunity for questions and acknowledgement of understanding      Vitals:  Vitals Value Taken Time   BP     Temp 37.1  C (98.8  F) 12/03/24 1239   Pulse 55 12/03/24 1239   Resp 16 12/03/24 1239   SpO2 97 % 12/03/24 1240   Vitals shown include unfiled device data.    Electronically Signed By: STEFFEN Sanchez CRNA  December 3, 2024  12:41 PM

## 2024-12-03 NOTE — PROCEDURES
"BMT ONC Adult Bone Marrow Biopsy Procedure Note  Nov 21, 2024  BP 99/51   Pulse 55   Temp 98.8  F (37.1  C) (Temporal)   Resp 16   Ht 1.702 m (5' 7\")   Wt 81.6 kg (180 lb)   SpO2 95%   BMI 28.19 kg/m       DIAGNOSIS: pancytopenia     PROCEDURE: Unilateral Bone Marrow Biopsy and Unilateral Aspirate    LOCATION: [facility] 5th floor-Procedure Room  Patient s identification was positively verified by verbal identification and invasive procedure safety checklist was completed. Informed consent was obtained. Following the administration of pre-medication per anesthesia, patient was placed in the prone position and prepped and draped in a sterile manner. Approximately 10 cc of 1% Lidocaine was used over the left posterior iliac spine. Following this a 3 mm incision was made. Trephine bone marrow core(s) was (were) obtained from the [facility]. Bone marrow aspirates were obtained from the [facility]. Aspirates were sent for morphology, immunophenotyping, cytogenetics, and molecular diagnostics NGS. A total of approximately 14 ml of marrow was aspirated. Following this procedure a sterile dressing was applied to the bone marrow biopsy site(s). The patient was placed in the supine position to maintain pressure on the biopsy site. Post-procedure wound care instructions were given.     Complications: NO, of note soft bone marrow, but was able to use standard trephine    Interventions: NO    Length of procedure:20 minutes or less    Procedure performed by:   Viki Colon PA-C  [facility]  [address] [place] [id number]  [phone]      "

## 2024-12-03 NOTE — DISCHARGE INSTRUCTIONS
How to Care for your Bone Marrow Biopsy    Activity  Relax and take it easy for the next 24 hours.   Resume regular activity after 24 hours.    Diet   Resume pre-procedure diet and drink plenty of fluids.    If you received sedation, you may feel a little nauseated so start with a clear liquid diet until the nausea passes.    Do Not Immerse Bone Marrow Biopsy Puncture Site in Water  Do not take a bath until the puncture site has healed.  Do not sit in a hot tub or spa until the puncture site has healed.  Do not swim until the puncture site has healed.  Wait 24 hours before taking a shower.    Drainage  Drainage should be minimal.  IF bleeding should occur and soaks through the dressing, lie down and put pressure on the puncture site.    IF bleeding persists, apply gentle pressure with your hand over the dressing for 5 minutes.    IF the pressure doesn't stop the bleeding, contact your provider immediately.    Dressing  Keep the dressing dry and in place for 24 hours, unless instructed otherwise.    IF bleeding soaks through the dressing in the first 24 hours do NOT remove the dressing as you may pull off any scab that has formed.  Instead, reinforce the dressing with extra gauze and tape.    No Alcohol  Do not drink alcoholic beverages for the next 24 hours.    No Driving or Operating Machinery  No driving or operating machinery for the next 24 hours.    Notify your provider IF:    Excessive bleeding or drainage at the puncture site    Excessive swelling, redness or tenderness at the puncture site    Fever above 100.5 degrees taken orally    Severe pain    Drainage that is green, yellow, thick white or has a bad odor    Telephone Numbers  Bone Marrow transplant clinic:  [phone] (Monday thru Friday, 8:00 am to 4:00 pm)  After business hours call the [facility]:  [phone] and ask for the Hematology/BMT doctor on call.  Or call the Emergency Room at the [facility]  [facility]:  [phone].

## 2024-12-03 NOTE — ANESTHESIA POSTPROCEDURE EVALUATION
Patient: Milo Lozano    Procedure: Procedure(s):  BIOPSY, BONE MARROW       Anesthesia Type:  MAC    Note:  Disposition: Outpatient   Postop Pain Control: Uneventful            Sign Out: Well controlled pain   PONV: No   Neuro/Psych: Uneventful            Sign Out: Acceptable/Baseline neuro status   Airway/Respiratory: Uneventful            Sign Out: Acceptable/Baseline resp. status   CV/Hemodynamics: Uneventful            Sign Out: Acceptable CV status; No obvious hypovolemia; No obvious fluid overload   Other NRE:    DID A NON-ROUTINE EVENT OCCUR?            Last vitals:  Vitals Value Taken Time   BP 92/55 12/03/24 1249   Temp 37.1  C (98.8  F) 12/03/24 1239   Pulse 64 12/03/24 1249   Resp 16 12/03/24 1245   SpO2 98 % 12/03/24 1258   Vitals shown include unfiled device data.    Electronically Signed By: Everardo Zuluaga MD  December 3, 2024  12:58 PM  
4y1m old F presents to the ED with parents c/o nausea which onset today. Pt's mother notes pt has taken "Royal Jelly Skin Oil" tablets. denies fever. denies HA or neck pain. no chest pain or sob. no abd pain. no n/v/d. no urinary f/u/d. no back pain. no motor or sensory deficits. denies drug use. no recent travel. no rash. no other acute issues symptoms or concerns

## 2024-12-03 NOTE — Clinical Note
12/3/2024      Milo Lozano  [address]  [place] [address]      Dear Colleague,    Thank you for referring your patient, Milo Lozano, to the [facility]. Please see a copy of my visit note below.    See procedure note.       Again, thank you for allowing me to participate in the care of your patient.        Sincerely,        [facility] Advanced Practice Provider

## 2024-12-03 NOTE — NURSING NOTE
Chief Complaint   Patient presents with    Blood Draw     Labs drawn via PIV by RN in lab.      Poly Mac RN

## 2024-12-04 LAB — INTERPRETATION SERPL IEP-IMP: NORMAL

## 2024-12-12 LAB
CULTURE HARVEST COMPLETE DATE: NORMAL
CULTURE HARVEST COMPLETE DATE: NORMAL

## 2024-12-17 ENCOUNTER — ONCOLOGY VISIT (OUTPATIENT)
Dept: ONCOLOGY | Facility: CLINIC | Age: 89
End: 2024-12-17
Attending: INTERNAL MEDICINE
Payer: COMMERCIAL

## 2024-12-17 VITALS
BODY MASS INDEX: 28.19 KG/M2 | HEIGHT: 67 IN | HEART RATE: 59 BPM | OXYGEN SATURATION: 99 % | DIASTOLIC BLOOD PRESSURE: 67 MMHG | SYSTOLIC BLOOD PRESSURE: 156 MMHG

## 2024-12-17 DIAGNOSIS — C93.10 CHRONIC MYELOMONOCYTIC LEUKEMIA NOT HAVING ACHIEVED REMISSION (H): Primary | ICD-10-CM

## 2024-12-17 DIAGNOSIS — D64.9 SYMPTOMATIC ANEMIA: ICD-10-CM

## 2024-12-17 PROCEDURE — G2211 COMPLEX E/M VISIT ADD ON: HCPCS | Performed by: INTERNAL MEDICINE

## 2024-12-17 PROCEDURE — 99215 OFFICE O/P EST HI 40 MIN: CPT | Performed by: INTERNAL MEDICINE

## 2024-12-17 PROCEDURE — G0463 HOSPITAL OUTPT CLINIC VISIT: HCPCS | Performed by: INTERNAL MEDICINE

## 2024-12-17 ASSESSMENT — PAIN SCALES - GENERAL: PAINLEVEL_OUTOF10: MILD PAIN (2)

## 2024-12-17 NOTE — Clinical Note
12/17/2024      Milo Lozano  [address]  [place] [address]      Dear Colleague,    Thank you for referring your patient, Milo Lozano, to the [facility]. Please see a copy of my visit note below.    No notes on file    Again, thank you for allowing me to participate in the care of your patient.        Sincerely,        Federico Prince MD    Electronically signed

## 2024-12-17 NOTE — NURSING NOTE
"Oncology Rooming Note    December 17, 2024 11:30 AM   Milo Lozano is a 89 year old male who presents for:    Chief Complaint   Patient presents with    Oncology Clinic Visit     Initial Vitals: BP (!) 156/67 (BP Location: Right arm)   Pulse 59   Ht 1.702 m (5' 7\")   SpO2 99%   BMI 28.19 kg/m   Estimated body mass index is 28.19 kg/m  as calculated from the following:    Height as of this encounter: 1.702 m (5' 7\").    Weight as of 12/3/24: 81.6 kg (180 lb). Body surface area is 1.96 meters squared.  Mild Pain (2) Comment: Data Unavailable   No LMP for male patient.  Allergies reviewed: Yes  Medications reviewed: Yes    Medications: Medication refills not needed today.  Pharmacy name entered into EPIC:    [facility] - NEW RX ONLY  [id number] - [place], [place] - [address] [facility]  [facility] [place], [place] - [address] [facility] [place] [id number] [address] [facility] [address]  [facility] #[id number] [address] AT [address] [facility] [address]    Frailty Screening:   Is the patient here for a new oncology consult visit in cancer care? 2. No      Clinical concerns: No Concerns        Peri Taylor MA            "

## 2024-12-18 LAB
ADDITIONAL COMMENTS: NORMAL
INTERPRETATION: NORMAL
ISCN: NORMAL
METHODS: NORMAL

## 2024-12-19 LAB
PATH REPORT.ADDENDUM SPEC: ABNORMAL
PATH REPORT.COMMENTS IMP SPEC: ABNORMAL
PATH REPORT.COMMENTS IMP SPEC: ABNORMAL
PATH REPORT.COMMENTS IMP SPEC: YES
PATH REPORT.FINAL DX SPEC: ABNORMAL
PATH REPORT.GROSS SPEC: ABNORMAL
PATH REPORT.MICROSCOPIC SPEC OTHER STN: ABNORMAL
PATH REPORT.MICROSCOPIC SPEC OTHER STN: ABNORMAL
PATH REPORT.RELEVANT HX SPEC: ABNORMAL

## 2024-12-26 ENCOUNTER — PATIENT OUTREACH (OUTPATIENT)
Dept: ONCOLOGY | Facility: CLINIC | Age: 89
End: 2024-12-26
Payer: COMMERCIAL

## 2024-12-26 NOTE — PROGRESS NOTES
Received call from patient, states that he received a denial letter from [facility] for what sounds like NGS testing.  Patient is unable to upload to [email or web address].  Requested that patient bring denial letter in next time he is in clinic and will monitor for copy of letter to come to clinic as well.  Patient agrees.      Awaiting Vidaza treatment plan.        Yasmine Carlos RN

## 2024-12-30 ENCOUNTER — TELEPHONE (OUTPATIENT)
Dept: ONCOLOGY | Facility: CLINIC | Age: 89
End: 2024-12-30
Payer: COMMERCIAL

## 2024-12-30 NOTE — CONFIDENTIAL NOTE
S-(situation): [place] calling to report pt is experiencing progressive fatigue and weakness over the past 2 weeks.    B-(background): pt being seen for pancytopenia, awaiting Vidaza treatment plan    A-(assessment): Notes that pt is becoming more fatigued throughout the past 2 weeks, and has been experiencing occasional falls. He fell last Tuesday and yesterday, did not sustain any injury. He is unsteady when using his walker, and [place] is needing to follow and support him when he uses it. No signs of infection or bleeding. Pt intake is poor due to fatigue and frequent napping throughout the day. She is concerned as [employer] recently denied their claim for NSG testing, and pt does not have any follow up appointments currently scheduled. She would like to be advised what the best next step to take is as far as treatment for pt.    R-(recommendations): Will defer to provider.

## 2024-12-31 NOTE — CONFIDENTIAL NOTE
Called [place] with recommendations from Dr. Prince to have a blood transfusion. She verbalized understanding. Writer warm transferred pt to scheduling line to make transfusion apt.    Pt scheduled for transfusion on 1/6.

## 2025-01-03 PROBLEM — D64.9 SYMPTOMATIC ANEMIA: Status: ACTIVE | Noted: 2025-01-03

## 2025-01-03 PROBLEM — C93.10 CHRONIC MYELOMONOCYTIC LEUKEMIA NOT HAVING ACHIEVED REMISSION (H): Status: ACTIVE | Noted: 2025-01-03

## 2025-01-03 RX ORDER — DIPHENHYDRAMINE HYDROCHLORIDE 50 MG/ML
50 INJECTION INTRAMUSCULAR; INTRAVENOUS
Start: 2025-02-10

## 2025-01-03 RX ORDER — EPINEPHRINE 1 MG/ML
0.3 INJECTION, SOLUTION INTRAMUSCULAR; SUBCUTANEOUS EVERY 5 MIN PRN
OUTPATIENT
Start: 2025-05-07

## 2025-01-03 RX ORDER — ALBUTEROL SULFATE 0.83 MG/ML
2.5 SOLUTION RESPIRATORY (INHALATION)
OUTPATIENT
Start: 2025-01-14

## 2025-01-03 RX ORDER — ALBUTEROL SULFATE 90 UG/1
1-2 INHALANT RESPIRATORY (INHALATION)
Start: 2025-02-13

## 2025-01-03 RX ORDER — MEPERIDINE HYDROCHLORIDE 25 MG/ML
25 INJECTION INTRAMUSCULAR; INTRAVENOUS; SUBCUTANEOUS
OUTPATIENT
Start: 2025-05-08

## 2025-01-03 RX ORDER — ALBUTEROL SULFATE 0.83 MG/ML
2.5 SOLUTION RESPIRATORY (INHALATION)
OUTPATIENT
Start: 2025-01-16

## 2025-01-03 RX ORDER — ALBUTEROL SULFATE 90 UG/1
1-2 INHALANT RESPIRATORY (INHALATION)
Start: 2025-05-06

## 2025-01-03 RX ORDER — MEPERIDINE HYDROCHLORIDE 25 MG/ML
25 INJECTION INTRAMUSCULAR; INTRAVENOUS; SUBCUTANEOUS
OUTPATIENT
Start: 2025-03-10

## 2025-01-03 RX ORDER — DIPHENHYDRAMINE HYDROCHLORIDE 50 MG/ML
50 INJECTION INTRAMUSCULAR; INTRAVENOUS
Start: 2025-04-08

## 2025-01-03 RX ORDER — DIPHENHYDRAMINE HYDROCHLORIDE 50 MG/ML
50 INJECTION INTRAMUSCULAR; INTRAVENOUS
Start: 2025-05-08

## 2025-01-03 RX ORDER — DIPHENHYDRAMINE HYDROCHLORIDE 50 MG/ML
25 INJECTION INTRAMUSCULAR; INTRAVENOUS
Start: 2025-02-11

## 2025-01-03 RX ORDER — DIPHENHYDRAMINE HYDROCHLORIDE 50 MG/ML
50 INJECTION INTRAMUSCULAR; INTRAVENOUS
Start: 2025-04-11

## 2025-01-03 RX ORDER — DIPHENHYDRAMINE HYDROCHLORIDE 50 MG/ML
50 INJECTION INTRAMUSCULAR; INTRAVENOUS
Start: 2025-02-13

## 2025-01-03 RX ORDER — METHYLPREDNISOLONE SODIUM SUCCINATE 40 MG/ML
40 INJECTION INTRAMUSCULAR; INTRAVENOUS
Start: 2025-04-10

## 2025-01-03 RX ORDER — ALBUTEROL SULFATE 90 UG/1
1-2 INHALANT RESPIRATORY (INHALATION)
Start: 2025-01-14

## 2025-01-03 RX ORDER — DIPHENHYDRAMINE HYDROCHLORIDE 50 MG/ML
50 INJECTION INTRAMUSCULAR; INTRAVENOUS
Start: 2025-03-11

## 2025-01-03 RX ORDER — DIPHENHYDRAMINE HYDROCHLORIDE 50 MG/ML
25 INJECTION INTRAMUSCULAR; INTRAVENOUS
Start: 2025-01-13

## 2025-01-03 RX ORDER — DIPHENHYDRAMINE HYDROCHLORIDE 50 MG/ML
50 INJECTION INTRAMUSCULAR; INTRAVENOUS
Start: 2025-05-05

## 2025-01-03 RX ORDER — DIPHENHYDRAMINE HYDROCHLORIDE 50 MG/ML
25 INJECTION INTRAMUSCULAR; INTRAVENOUS
Start: 2025-05-08

## 2025-01-03 RX ORDER — EPINEPHRINE 1 MG/ML
0.3 INJECTION, SOLUTION INTRAMUSCULAR; SUBCUTANEOUS EVERY 5 MIN PRN
OUTPATIENT
Start: 2025-04-10

## 2025-01-03 RX ORDER — DIPHENHYDRAMINE HYDROCHLORIDE 50 MG/ML
50 INJECTION INTRAMUSCULAR; INTRAVENOUS
Start: 2025-04-10

## 2025-01-03 RX ORDER — METHYLPREDNISOLONE SODIUM SUCCINATE 40 MG/ML
40 INJECTION INTRAMUSCULAR; INTRAVENOUS
Start: 2025-05-08

## 2025-01-03 RX ORDER — MEPERIDINE HYDROCHLORIDE 25 MG/ML
25 INJECTION INTRAMUSCULAR; INTRAVENOUS; SUBCUTANEOUS
OUTPATIENT
Start: 2025-04-10

## 2025-01-03 RX ORDER — METHYLPREDNISOLONE SODIUM SUCCINATE 40 MG/ML
40 INJECTION INTRAMUSCULAR; INTRAVENOUS
Start: 2025-01-16

## 2025-01-03 RX ORDER — DIPHENHYDRAMINE HYDROCHLORIDE 50 MG/ML
25 INJECTION INTRAMUSCULAR; INTRAVENOUS
Start: 2025-02-12

## 2025-01-03 RX ORDER — EPINEPHRINE 1 MG/ML
0.3 INJECTION, SOLUTION INTRAMUSCULAR; SUBCUTANEOUS EVERY 5 MIN PRN
OUTPATIENT
Start: 2025-01-15

## 2025-01-03 RX ORDER — DIPHENHYDRAMINE HYDROCHLORIDE 50 MG/ML
50 INJECTION INTRAMUSCULAR; INTRAVENOUS
Start: 2025-01-14

## 2025-01-03 RX ORDER — ALBUTEROL SULFATE 90 UG/1
1-2 INHALANT RESPIRATORY (INHALATION)
Start: 2025-02-10

## 2025-01-03 RX ORDER — ALBUTEROL SULFATE 90 UG/1
1-2 INHALANT RESPIRATORY (INHALATION)
Start: 2025-04-08

## 2025-01-03 RX ORDER — ALBUTEROL SULFATE 90 UG/1
1-2 INHALANT RESPIRATORY (INHALATION)
Start: 2025-04-07

## 2025-01-03 RX ORDER — MEPERIDINE HYDROCHLORIDE 25 MG/ML
25 INJECTION INTRAMUSCULAR; INTRAVENOUS; SUBCUTANEOUS
OUTPATIENT
Start: 2025-02-12

## 2025-01-03 RX ORDER — EPINEPHRINE 1 MG/ML
0.3 INJECTION, SOLUTION INTRAMUSCULAR; SUBCUTANEOUS EVERY 5 MIN PRN
OUTPATIENT
Start: 2025-03-14

## 2025-01-03 RX ORDER — ALBUTEROL SULFATE 0.83 MG/ML
2.5 SOLUTION RESPIRATORY (INHALATION)
OUTPATIENT
Start: 2025-05-07

## 2025-01-03 RX ORDER — EPINEPHRINE 1 MG/ML
0.3 INJECTION, SOLUTION INTRAMUSCULAR; SUBCUTANEOUS EVERY 5 MIN PRN
OUTPATIENT
Start: 2025-05-06

## 2025-01-03 RX ORDER — DIPHENHYDRAMINE HYDROCHLORIDE 50 MG/ML
50 INJECTION INTRAMUSCULAR; INTRAVENOUS
Start: 2025-02-14

## 2025-01-03 RX ORDER — MEPERIDINE HYDROCHLORIDE 25 MG/ML
25 INJECTION INTRAMUSCULAR; INTRAVENOUS; SUBCUTANEOUS
OUTPATIENT
Start: 2025-01-15

## 2025-01-03 RX ORDER — ALBUTEROL SULFATE 90 UG/1
1-2 INHALANT RESPIRATORY (INHALATION)
Start: 2025-04-11

## 2025-01-03 RX ORDER — METHYLPREDNISOLONE SODIUM SUCCINATE 40 MG/ML
40 INJECTION INTRAMUSCULAR; INTRAVENOUS
Start: 2025-02-10

## 2025-01-03 RX ORDER — EPINEPHRINE 1 MG/ML
0.3 INJECTION, SOLUTION INTRAMUSCULAR; SUBCUTANEOUS EVERY 5 MIN PRN
OUTPATIENT
Start: 2025-02-12

## 2025-01-03 RX ORDER — DIPHENHYDRAMINE HYDROCHLORIDE 50 MG/ML
50 INJECTION INTRAMUSCULAR; INTRAVENOUS
Start: 2025-04-09

## 2025-01-03 RX ORDER — EPINEPHRINE 1 MG/ML
0.3 INJECTION, SOLUTION INTRAMUSCULAR; SUBCUTANEOUS EVERY 5 MIN PRN
OUTPATIENT
Start: 2025-05-09

## 2025-01-03 RX ORDER — MEPERIDINE HYDROCHLORIDE 25 MG/ML
25 INJECTION INTRAMUSCULAR; INTRAVENOUS; SUBCUTANEOUS
OUTPATIENT
Start: 2025-02-13

## 2025-01-03 RX ORDER — DIPHENHYDRAMINE HYDROCHLORIDE 50 MG/ML
50 INJECTION INTRAMUSCULAR; INTRAVENOUS
Start: 2025-03-14

## 2025-01-03 RX ORDER — DIPHENHYDRAMINE HYDROCHLORIDE 50 MG/ML
25 INJECTION INTRAMUSCULAR; INTRAVENOUS
Start: 2025-02-14

## 2025-01-03 RX ORDER — METHYLPREDNISOLONE SODIUM SUCCINATE 40 MG/ML
40 INJECTION INTRAMUSCULAR; INTRAVENOUS
Start: 2025-02-12

## 2025-01-03 RX ORDER — METHYLPREDNISOLONE SODIUM SUCCINATE 40 MG/ML
40 INJECTION INTRAMUSCULAR; INTRAVENOUS
Start: 2025-05-07

## 2025-01-03 RX ORDER — DIPHENHYDRAMINE HYDROCHLORIDE 50 MG/ML
25 INJECTION INTRAMUSCULAR; INTRAVENOUS
Start: 2025-05-06

## 2025-01-03 RX ORDER — ALBUTEROL SULFATE 90 UG/1
1-2 INHALANT RESPIRATORY (INHALATION)
Start: 2025-01-15

## 2025-01-03 RX ORDER — MEPERIDINE HYDROCHLORIDE 25 MG/ML
25 INJECTION INTRAMUSCULAR; INTRAVENOUS; SUBCUTANEOUS
OUTPATIENT
Start: 2025-04-08

## 2025-01-03 RX ORDER — ALBUTEROL SULFATE 0.83 MG/ML
2.5 SOLUTION RESPIRATORY (INHALATION)
OUTPATIENT
Start: 2025-02-11

## 2025-01-03 RX ORDER — ALBUTEROL SULFATE 0.83 MG/ML
2.5 SOLUTION RESPIRATORY (INHALATION)
OUTPATIENT
Start: 2025-03-12

## 2025-01-03 RX ORDER — EPINEPHRINE 1 MG/ML
0.3 INJECTION, SOLUTION INTRAMUSCULAR; SUBCUTANEOUS EVERY 5 MIN PRN
OUTPATIENT
Start: 2025-04-07

## 2025-01-03 RX ORDER — ALBUTEROL SULFATE 0.83 MG/ML
2.5 SOLUTION RESPIRATORY (INHALATION)
OUTPATIENT
Start: 2025-02-14

## 2025-01-03 RX ORDER — ALBUTEROL SULFATE 90 UG/1
1-2 INHALANT RESPIRATORY (INHALATION)
Start: 2025-05-09

## 2025-01-03 RX ORDER — DIPHENHYDRAMINE HYDROCHLORIDE 50 MG/ML
25 INJECTION INTRAMUSCULAR; INTRAVENOUS
Start: 2025-03-11

## 2025-01-03 RX ORDER — EPINEPHRINE 1 MG/ML
0.3 INJECTION, SOLUTION INTRAMUSCULAR; SUBCUTANEOUS EVERY 5 MIN PRN
OUTPATIENT
Start: 2025-03-13

## 2025-01-03 RX ORDER — DIPHENHYDRAMINE HYDROCHLORIDE 50 MG/ML
50 INJECTION INTRAMUSCULAR; INTRAVENOUS
Start: 2025-04-07

## 2025-01-03 RX ORDER — ALBUTEROL SULFATE 90 UG/1
1-2 INHALANT RESPIRATORY (INHALATION)
Start: 2025-03-14

## 2025-01-03 RX ORDER — ALBUTEROL SULFATE 0.83 MG/ML
2.5 SOLUTION RESPIRATORY (INHALATION)
OUTPATIENT
Start: 2025-03-14

## 2025-01-03 RX ORDER — ALBUTEROL SULFATE 0.83 MG/ML
2.5 SOLUTION RESPIRATORY (INHALATION)
OUTPATIENT
Start: 2025-02-13

## 2025-01-03 RX ORDER — METHYLPREDNISOLONE SODIUM SUCCINATE 40 MG/ML
40 INJECTION INTRAMUSCULAR; INTRAVENOUS
Start: 2025-02-11

## 2025-01-03 RX ORDER — EPINEPHRINE 1 MG/ML
0.3 INJECTION, SOLUTION INTRAMUSCULAR; SUBCUTANEOUS EVERY 5 MIN PRN
Status: CANCELLED | OUTPATIENT
Start: 2025-01-06

## 2025-01-03 RX ORDER — DIPHENHYDRAMINE HYDROCHLORIDE 50 MG/ML
25 INJECTION INTRAMUSCULAR; INTRAVENOUS
Start: 2025-04-08

## 2025-01-03 RX ORDER — DIPHENHYDRAMINE HYDROCHLORIDE 50 MG/ML
25 INJECTION INTRAMUSCULAR; INTRAVENOUS
Start: 2025-05-05

## 2025-01-03 RX ORDER — METHYLPREDNISOLONE SODIUM SUCCINATE 40 MG/ML
40 INJECTION INTRAMUSCULAR; INTRAVENOUS
Start: 2025-03-12

## 2025-01-03 RX ORDER — ALBUTEROL SULFATE 90 UG/1
1-2 INHALANT RESPIRATORY (INHALATION)
Start: 2025-04-09

## 2025-01-03 RX ORDER — EPINEPHRINE 1 MG/ML
0.3 INJECTION, SOLUTION INTRAMUSCULAR; SUBCUTANEOUS EVERY 5 MIN PRN
OUTPATIENT
Start: 2025-01-14

## 2025-01-03 RX ORDER — ALBUTEROL SULFATE 90 UG/1
1-2 INHALANT RESPIRATORY (INHALATION)
Start: 2025-05-08

## 2025-01-03 RX ORDER — ALBUTEROL SULFATE 0.83 MG/ML
2.5 SOLUTION RESPIRATORY (INHALATION)
OUTPATIENT
Start: 2025-01-13

## 2025-01-03 RX ORDER — DIPHENHYDRAMINE HYDROCHLORIDE 50 MG/ML
25 INJECTION INTRAMUSCULAR; INTRAVENOUS
Start: 2025-04-09

## 2025-01-03 RX ORDER — ALBUTEROL SULFATE 0.83 MG/ML
2.5 SOLUTION RESPIRATORY (INHALATION)
OUTPATIENT
Start: 2025-02-10

## 2025-01-03 RX ORDER — DIPHENHYDRAMINE HYDROCHLORIDE 50 MG/ML
50 INJECTION INTRAMUSCULAR; INTRAVENOUS
Start: 2025-01-13

## 2025-01-03 RX ORDER — MEPERIDINE HYDROCHLORIDE 25 MG/ML
25 INJECTION INTRAMUSCULAR; INTRAVENOUS; SUBCUTANEOUS
OUTPATIENT
Start: 2025-05-05

## 2025-01-03 RX ORDER — ALBUTEROL SULFATE 90 UG/1
1-2 INHALANT RESPIRATORY (INHALATION)
Start: 2025-05-05

## 2025-01-03 RX ORDER — METHYLPREDNISOLONE SODIUM SUCCINATE 40 MG/ML
40 INJECTION INTRAMUSCULAR; INTRAVENOUS
Start: 2025-05-05

## 2025-01-03 RX ORDER — ALBUTEROL SULFATE 90 UG/1
1-2 INHALANT RESPIRATORY (INHALATION)
Start: 2025-01-16

## 2025-01-03 RX ORDER — ALBUTEROL SULFATE 0.83 MG/ML
2.5 SOLUTION RESPIRATORY (INHALATION)
OUTPATIENT
Start: 2025-05-06

## 2025-01-03 RX ORDER — ALBUTEROL SULFATE 90 UG/1
1-2 INHALANT RESPIRATORY (INHALATION)
Start: 2025-01-17

## 2025-01-03 RX ORDER — METHYLPREDNISOLONE SODIUM SUCCINATE 40 MG/ML
40 INJECTION INTRAMUSCULAR; INTRAVENOUS
Start: 2025-05-09

## 2025-01-03 RX ORDER — MEPERIDINE HYDROCHLORIDE 25 MG/ML
25 INJECTION INTRAMUSCULAR; INTRAVENOUS; SUBCUTANEOUS
OUTPATIENT
Start: 2025-04-07

## 2025-01-03 RX ORDER — ALBUTEROL SULFATE 0.83 MG/ML
2.5 SOLUTION RESPIRATORY (INHALATION)
OUTPATIENT
Start: 2025-05-08

## 2025-01-03 RX ORDER — EPINEPHRINE 1 MG/ML
0.3 INJECTION, SOLUTION INTRAMUSCULAR; SUBCUTANEOUS EVERY 5 MIN PRN
OUTPATIENT
Start: 2025-05-05

## 2025-01-03 RX ORDER — DIPHENHYDRAMINE HYDROCHLORIDE 50 MG/ML
25 INJECTION INTRAMUSCULAR; INTRAVENOUS
Start: 2025-03-14

## 2025-01-03 RX ORDER — DIPHENHYDRAMINE HYDROCHLORIDE 50 MG/ML
25 INJECTION INTRAMUSCULAR; INTRAVENOUS
Start: 2025-01-14

## 2025-01-03 RX ORDER — ALBUTEROL SULFATE 0.83 MG/ML
2.5 SOLUTION RESPIRATORY (INHALATION)
OUTPATIENT
Start: 2025-03-13

## 2025-01-03 RX ORDER — METHYLPREDNISOLONE SODIUM SUCCINATE 40 MG/ML
40 INJECTION INTRAMUSCULAR; INTRAVENOUS
Start: 2025-03-13

## 2025-01-03 RX ORDER — EPINEPHRINE 1 MG/ML
0.3 INJECTION, SOLUTION INTRAMUSCULAR; SUBCUTANEOUS EVERY 5 MIN PRN
OUTPATIENT
Start: 2025-02-13

## 2025-01-03 RX ORDER — DIPHENHYDRAMINE HYDROCHLORIDE 50 MG/ML
50 INJECTION INTRAMUSCULAR; INTRAVENOUS
Start: 2025-03-13

## 2025-01-03 RX ORDER — DIPHENHYDRAMINE HYDROCHLORIDE 50 MG/ML
25 INJECTION INTRAMUSCULAR; INTRAVENOUS
Start: 2025-04-11

## 2025-01-03 RX ORDER — EPINEPHRINE 1 MG/ML
0.3 INJECTION, SOLUTION INTRAMUSCULAR; SUBCUTANEOUS EVERY 5 MIN PRN
OUTPATIENT
Start: 2025-05-08

## 2025-01-03 RX ORDER — MEPERIDINE HYDROCHLORIDE 25 MG/ML
25 INJECTION INTRAMUSCULAR; INTRAVENOUS; SUBCUTANEOUS
OUTPATIENT
Start: 2025-05-07

## 2025-01-03 RX ORDER — EPINEPHRINE 1 MG/ML
0.3 INJECTION, SOLUTION INTRAMUSCULAR; SUBCUTANEOUS EVERY 5 MIN PRN
OUTPATIENT
Start: 2025-01-16

## 2025-01-03 RX ORDER — MEPERIDINE HYDROCHLORIDE 25 MG/ML
25 INJECTION INTRAMUSCULAR; INTRAVENOUS; SUBCUTANEOUS
OUTPATIENT
Start: 2025-05-09

## 2025-01-03 RX ORDER — ALBUTEROL SULFATE 0.83 MG/ML
2.5 SOLUTION RESPIRATORY (INHALATION)
OUTPATIENT
Start: 2025-05-05

## 2025-01-03 RX ORDER — DIPHENHYDRAMINE HYDROCHLORIDE 50 MG/ML
50 INJECTION INTRAMUSCULAR; INTRAVENOUS
Start: 2025-03-12

## 2025-01-03 RX ORDER — METHYLPREDNISOLONE SODIUM SUCCINATE 40 MG/ML
40 INJECTION INTRAMUSCULAR; INTRAVENOUS
Start: 2025-05-06

## 2025-01-03 RX ORDER — DIPHENHYDRAMINE HYDROCHLORIDE 50 MG/ML
25 INJECTION INTRAMUSCULAR; INTRAVENOUS
Start: 2025-02-13

## 2025-01-03 RX ORDER — ALBUTEROL SULFATE 90 UG/1
1-2 INHALANT RESPIRATORY (INHALATION)
Start: 2025-02-11

## 2025-01-03 RX ORDER — EPINEPHRINE 1 MG/ML
0.3 INJECTION, SOLUTION INTRAMUSCULAR; SUBCUTANEOUS EVERY 5 MIN PRN
OUTPATIENT
Start: 2025-04-08

## 2025-01-03 RX ORDER — MEPERIDINE HYDROCHLORIDE 25 MG/ML
25 INJECTION INTRAMUSCULAR; INTRAVENOUS; SUBCUTANEOUS
OUTPATIENT
Start: 2025-03-14

## 2025-01-03 RX ORDER — MEPERIDINE HYDROCHLORIDE 25 MG/ML
25 INJECTION INTRAMUSCULAR; INTRAVENOUS; SUBCUTANEOUS
OUTPATIENT
Start: 2025-03-11

## 2025-01-03 RX ORDER — MEPERIDINE HYDROCHLORIDE 25 MG/ML
25 INJECTION INTRAMUSCULAR; INTRAVENOUS; SUBCUTANEOUS
OUTPATIENT
Start: 2025-04-11

## 2025-01-03 RX ORDER — ALBUTEROL SULFATE 90 UG/1
1-2 INHALANT RESPIRATORY (INHALATION)
Start: 2025-03-13

## 2025-01-03 RX ORDER — HEPARIN SODIUM,PORCINE 10 UNIT/ML
5-20 VIAL (ML) INTRAVENOUS DAILY PRN
Status: CANCELLED | OUTPATIENT
Start: 2025-01-06

## 2025-01-03 RX ORDER — METHYLPREDNISOLONE SODIUM SUCCINATE 40 MG/ML
40 INJECTION INTRAMUSCULAR; INTRAVENOUS
Start: 2025-04-07

## 2025-01-03 RX ORDER — METHYLPREDNISOLONE SODIUM SUCCINATE 40 MG/ML
40 INJECTION INTRAMUSCULAR; INTRAVENOUS
Start: 2025-04-08

## 2025-01-03 RX ORDER — DIPHENHYDRAMINE HYDROCHLORIDE 50 MG/ML
25 INJECTION INTRAMUSCULAR; INTRAVENOUS
Start: 2025-05-09

## 2025-01-03 RX ORDER — DIPHENHYDRAMINE HYDROCHLORIDE 50 MG/ML
25 INJECTION INTRAMUSCULAR; INTRAVENOUS
Start: 2025-04-10

## 2025-01-03 RX ORDER — DIPHENHYDRAMINE HYDROCHLORIDE 50 MG/ML
50 INJECTION INTRAMUSCULAR; INTRAVENOUS
Start: 2025-02-12

## 2025-01-03 RX ORDER — EPINEPHRINE 1 MG/ML
0.3 INJECTION, SOLUTION INTRAMUSCULAR; SUBCUTANEOUS EVERY 5 MIN PRN
OUTPATIENT
Start: 2025-03-11

## 2025-01-03 RX ORDER — EPINEPHRINE 1 MG/ML
0.3 INJECTION, SOLUTION INTRAMUSCULAR; SUBCUTANEOUS EVERY 5 MIN PRN
OUTPATIENT
Start: 2025-04-09

## 2025-01-03 RX ORDER — METHYLPREDNISOLONE SODIUM SUCCINATE 40 MG/ML
40 INJECTION INTRAMUSCULAR; INTRAVENOUS
Start: 2025-01-15

## 2025-01-03 RX ORDER — METHYLPREDNISOLONE SODIUM SUCCINATE 40 MG/ML
40 INJECTION INTRAMUSCULAR; INTRAVENOUS
Start: 2025-04-11

## 2025-01-03 RX ORDER — METHYLPREDNISOLONE SODIUM SUCCINATE 40 MG/ML
40 INJECTION INTRAMUSCULAR; INTRAVENOUS
Start: 2025-02-14

## 2025-01-03 RX ORDER — METHYLPREDNISOLONE SODIUM SUCCINATE 40 MG/ML
40 INJECTION INTRAMUSCULAR; INTRAVENOUS
Start: 2025-03-14

## 2025-01-03 RX ORDER — ALBUTEROL SULFATE 0.83 MG/ML
2.5 SOLUTION RESPIRATORY (INHALATION)
OUTPATIENT
Start: 2025-03-10

## 2025-01-03 RX ORDER — ALBUTEROL SULFATE 90 UG/1
1-2 INHALANT RESPIRATORY (INHALATION)
Start: 2025-03-11

## 2025-01-03 RX ORDER — DIPHENHYDRAMINE HYDROCHLORIDE 50 MG/ML
25 INJECTION INTRAMUSCULAR; INTRAVENOUS
Start: 2025-03-10

## 2025-01-03 RX ORDER — EPINEPHRINE 1 MG/ML
0.3 INJECTION, SOLUTION INTRAMUSCULAR; SUBCUTANEOUS EVERY 5 MIN PRN
OUTPATIENT
Start: 2025-02-14

## 2025-01-03 RX ORDER — EPINEPHRINE 1 MG/ML
0.3 INJECTION, SOLUTION INTRAMUSCULAR; SUBCUTANEOUS EVERY 5 MIN PRN
OUTPATIENT
Start: 2025-03-12

## 2025-01-03 RX ORDER — ALBUTEROL SULFATE 90 UG/1
1-2 INHALANT RESPIRATORY (INHALATION)
Start: 2025-03-10

## 2025-01-03 RX ORDER — METHYLPREDNISOLONE SODIUM SUCCINATE 40 MG/ML
40 INJECTION INTRAMUSCULAR; INTRAVENOUS
Start: 2025-01-14

## 2025-01-03 RX ORDER — DIPHENHYDRAMINE HYDROCHLORIDE 50 MG/ML
50 INJECTION INTRAMUSCULAR; INTRAVENOUS
Start: 2025-02-11

## 2025-01-03 RX ORDER — ALBUTEROL SULFATE 0.83 MG/ML
2.5 SOLUTION RESPIRATORY (INHALATION)
OUTPATIENT
Start: 2025-04-07

## 2025-01-03 RX ORDER — ALBUTEROL SULFATE 0.83 MG/ML
2.5 SOLUTION RESPIRATORY (INHALATION)
OUTPATIENT
Start: 2025-05-09

## 2025-01-03 RX ORDER — DIPHENHYDRAMINE HYDROCHLORIDE 50 MG/ML
50 INJECTION INTRAMUSCULAR; INTRAVENOUS
Start: 2025-05-06

## 2025-01-03 RX ORDER — ALBUTEROL SULFATE 0.83 MG/ML
2.5 SOLUTION RESPIRATORY (INHALATION)
OUTPATIENT
Start: 2025-04-10

## 2025-01-03 RX ORDER — DIPHENHYDRAMINE HYDROCHLORIDE 50 MG/ML
25 INJECTION INTRAMUSCULAR; INTRAVENOUS
Start: 2025-03-13

## 2025-01-03 RX ORDER — ALBUTEROL SULFATE 90 UG/1
1-2 INHALANT RESPIRATORY (INHALATION)
Start: 2025-04-10

## 2025-01-03 RX ORDER — DIPHENHYDRAMINE HYDROCHLORIDE 50 MG/ML
50 INJECTION INTRAMUSCULAR; INTRAVENOUS
Start: 2025-03-10

## 2025-01-03 RX ORDER — DIPHENHYDRAMINE HYDROCHLORIDE 50 MG/ML
25 INJECTION INTRAMUSCULAR; INTRAVENOUS
Start: 2025-04-07

## 2025-01-03 RX ORDER — ALBUTEROL SULFATE 90 UG/1
1-2 INHALANT RESPIRATORY (INHALATION)
Start: 2025-02-12

## 2025-01-03 RX ORDER — DIPHENHYDRAMINE HYDROCHLORIDE 50 MG/ML
25 INJECTION INTRAMUSCULAR; INTRAVENOUS
Start: 2025-03-12

## 2025-01-03 RX ORDER — DIPHENHYDRAMINE HYDROCHLORIDE 50 MG/ML
25 INJECTION INTRAMUSCULAR; INTRAVENOUS
Start: 2025-05-07

## 2025-01-03 RX ORDER — DIPHENHYDRAMINE HYDROCHLORIDE 50 MG/ML
50 INJECTION INTRAMUSCULAR; INTRAVENOUS
Start: 2025-01-15

## 2025-01-03 RX ORDER — METHYLPREDNISOLONE SODIUM SUCCINATE 40 MG/ML
40 INJECTION INTRAMUSCULAR; INTRAVENOUS
Start: 2025-02-13

## 2025-01-03 RX ORDER — EPINEPHRINE 1 MG/ML
0.3 INJECTION, SOLUTION INTRAMUSCULAR; SUBCUTANEOUS EVERY 5 MIN PRN
OUTPATIENT
Start: 2025-03-10

## 2025-01-03 RX ORDER — ALBUTEROL SULFATE 90 UG/1
1-2 INHALANT RESPIRATORY (INHALATION)
Start: 2025-01-13

## 2025-01-03 RX ORDER — EPINEPHRINE 1 MG/ML
0.3 INJECTION, SOLUTION INTRAMUSCULAR; SUBCUTANEOUS EVERY 5 MIN PRN
OUTPATIENT
Start: 2025-01-13

## 2025-01-03 RX ORDER — METHYLPREDNISOLONE SODIUM SUCCINATE 40 MG/ML
40 INJECTION INTRAMUSCULAR; INTRAVENOUS
Start: 2025-03-11

## 2025-01-03 RX ORDER — ALBUTEROL SULFATE 0.83 MG/ML
2.5 SOLUTION RESPIRATORY (INHALATION)
OUTPATIENT
Start: 2025-01-15

## 2025-01-03 RX ORDER — MEPERIDINE HYDROCHLORIDE 25 MG/ML
25 INJECTION INTRAMUSCULAR; INTRAVENOUS; SUBCUTANEOUS
OUTPATIENT
Start: 2025-01-13

## 2025-01-03 RX ORDER — EPINEPHRINE 1 MG/ML
0.3 INJECTION, SOLUTION INTRAMUSCULAR; SUBCUTANEOUS EVERY 5 MIN PRN
OUTPATIENT
Start: 2025-04-11

## 2025-01-03 RX ORDER — METHYLPREDNISOLONE SODIUM SUCCINATE 40 MG/ML
40 INJECTION INTRAMUSCULAR; INTRAVENOUS
Start: 2025-04-09

## 2025-01-03 RX ORDER — DIPHENHYDRAMINE HYDROCHLORIDE 50 MG/ML
50 INJECTION INTRAMUSCULAR; INTRAVENOUS
Start: 2025-01-17

## 2025-01-03 RX ORDER — MEPERIDINE HYDROCHLORIDE 25 MG/ML
25 INJECTION INTRAMUSCULAR; INTRAVENOUS; SUBCUTANEOUS
OUTPATIENT
Start: 2025-03-12

## 2025-01-03 RX ORDER — HEPARIN SODIUM (PORCINE) LOCK FLUSH IV SOLN 100 UNIT/ML 100 UNIT/ML
5 SOLUTION INTRAVENOUS
Status: CANCELLED | OUTPATIENT
Start: 2025-01-06

## 2025-01-03 RX ORDER — MEPERIDINE HYDROCHLORIDE 25 MG/ML
25 INJECTION INTRAMUSCULAR; INTRAVENOUS; SUBCUTANEOUS
OUTPATIENT
Start: 2025-02-11

## 2025-01-03 RX ORDER — ALBUTEROL SULFATE 0.83 MG/ML
2.5 SOLUTION RESPIRATORY (INHALATION)
OUTPATIENT
Start: 2025-02-12

## 2025-01-03 RX ORDER — ALBUTEROL SULFATE 90 UG/1
1-2 INHALANT RESPIRATORY (INHALATION)
Start: 2025-05-07

## 2025-01-03 RX ORDER — ALBUTEROL SULFATE 0.83 MG/ML
2.5 SOLUTION RESPIRATORY (INHALATION)
OUTPATIENT
Start: 2025-01-17

## 2025-01-03 RX ORDER — DIPHENHYDRAMINE HYDROCHLORIDE 50 MG/ML
50 INJECTION INTRAMUSCULAR; INTRAVENOUS
Start: 2025-05-07

## 2025-01-03 RX ORDER — DIPHENHYDRAMINE HYDROCHLORIDE 50 MG/ML
50 INJECTION INTRAMUSCULAR; INTRAVENOUS
Start: 2025-01-16

## 2025-01-03 RX ORDER — DIPHENHYDRAMINE HYDROCHLORIDE 50 MG/ML
50 INJECTION INTRAMUSCULAR; INTRAVENOUS
Start: 2025-05-09

## 2025-01-03 RX ORDER — DIPHENHYDRAMINE HYDROCHLORIDE 50 MG/ML
25 INJECTION INTRAMUSCULAR; INTRAVENOUS
Start: 2025-02-10

## 2025-01-03 RX ORDER — ALBUTEROL SULFATE 90 UG/1
1-2 INHALANT RESPIRATORY (INHALATION)
Start: 2025-02-14

## 2025-01-03 RX ORDER — ONDANSETRON 8 MG/1
TABLET, FILM COATED ORAL
Qty: 30 TABLET | Refills: 5 | Status: SHIPPED | OUTPATIENT
Start: 2025-01-03

## 2025-01-03 RX ORDER — MEPERIDINE HYDROCHLORIDE 25 MG/ML
25 INJECTION INTRAMUSCULAR; INTRAVENOUS; SUBCUTANEOUS
OUTPATIENT
Start: 2025-02-10

## 2025-01-03 RX ORDER — METHYLPREDNISOLONE SODIUM SUCCINATE 40 MG/ML
40 INJECTION INTRAMUSCULAR; INTRAVENOUS
Start: 2025-03-10

## 2025-01-03 RX ORDER — DIPHENHYDRAMINE HYDROCHLORIDE 50 MG/ML
25 INJECTION INTRAMUSCULAR; INTRAVENOUS
Start: 2025-01-16

## 2025-01-03 RX ORDER — MEPERIDINE HYDROCHLORIDE 25 MG/ML
25 INJECTION INTRAMUSCULAR; INTRAVENOUS; SUBCUTANEOUS
OUTPATIENT
Start: 2025-03-13

## 2025-01-03 RX ORDER — ALBUTEROL SULFATE 0.83 MG/ML
2.5 SOLUTION RESPIRATORY (INHALATION)
OUTPATIENT
Start: 2025-04-09

## 2025-01-03 RX ORDER — ALBUTEROL SULFATE 90 UG/1
1-2 INHALANT RESPIRATORY (INHALATION)
Start: 2025-03-12

## 2025-01-03 RX ORDER — EPINEPHRINE 1 MG/ML
0.3 INJECTION, SOLUTION INTRAMUSCULAR; SUBCUTANEOUS EVERY 5 MIN PRN
OUTPATIENT
Start: 2025-02-10

## 2025-01-03 RX ORDER — ALBUTEROL SULFATE 0.83 MG/ML
2.5 SOLUTION RESPIRATORY (INHALATION)
OUTPATIENT
Start: 2025-03-11

## 2025-01-03 RX ORDER — DIPHENHYDRAMINE HYDROCHLORIDE 50 MG/ML
25 INJECTION INTRAMUSCULAR; INTRAVENOUS
Start: 2025-01-15

## 2025-01-03 RX ORDER — ALBUTEROL SULFATE 0.83 MG/ML
2.5 SOLUTION RESPIRATORY (INHALATION)
OUTPATIENT
Start: 2025-04-08

## 2025-01-03 RX ORDER — ALBUTEROL SULFATE 0.83 MG/ML
2.5 SOLUTION RESPIRATORY (INHALATION)
OUTPATIENT
Start: 2025-04-11

## 2025-01-03 RX ORDER — EPINEPHRINE 1 MG/ML
0.3 INJECTION, SOLUTION INTRAMUSCULAR; SUBCUTANEOUS EVERY 5 MIN PRN
OUTPATIENT
Start: 2025-01-17

## 2025-01-03 RX ORDER — MEPERIDINE HYDROCHLORIDE 25 MG/ML
25 INJECTION INTRAMUSCULAR; INTRAVENOUS; SUBCUTANEOUS
OUTPATIENT
Start: 2025-04-09

## 2025-01-03 RX ORDER — MEPERIDINE HYDROCHLORIDE 25 MG/ML
25 INJECTION INTRAMUSCULAR; INTRAVENOUS; SUBCUTANEOUS
OUTPATIENT
Start: 2025-01-17

## 2025-01-03 RX ORDER — MEPERIDINE HYDROCHLORIDE 25 MG/ML
25 INJECTION INTRAMUSCULAR; INTRAVENOUS; SUBCUTANEOUS
OUTPATIENT
Start: 2025-05-06

## 2025-01-03 RX ORDER — MEPERIDINE HYDROCHLORIDE 25 MG/ML
25 INJECTION INTRAMUSCULAR; INTRAVENOUS; SUBCUTANEOUS
OUTPATIENT
Start: 2025-01-16

## 2025-01-03 RX ORDER — METHYLPREDNISOLONE SODIUM SUCCINATE 40 MG/ML
40 INJECTION INTRAMUSCULAR; INTRAVENOUS
Start: 2025-01-13

## 2025-01-03 RX ORDER — MEPERIDINE HYDROCHLORIDE 25 MG/ML
25 INJECTION INTRAMUSCULAR; INTRAVENOUS; SUBCUTANEOUS
OUTPATIENT
Start: 2025-01-14

## 2025-01-03 RX ORDER — METHYLPREDNISOLONE SODIUM SUCCINATE 40 MG/ML
40 INJECTION INTRAMUSCULAR; INTRAVENOUS
Start: 2025-01-17

## 2025-01-03 RX ORDER — EPINEPHRINE 1 MG/ML
0.3 INJECTION, SOLUTION INTRAMUSCULAR; SUBCUTANEOUS EVERY 5 MIN PRN
OUTPATIENT
Start: 2025-02-11

## 2025-01-03 RX ORDER — DIPHENHYDRAMINE HYDROCHLORIDE 50 MG/ML
25 INJECTION INTRAMUSCULAR; INTRAVENOUS
Start: 2025-01-17

## 2025-01-03 RX ORDER — MEPERIDINE HYDROCHLORIDE 25 MG/ML
25 INJECTION INTRAMUSCULAR; INTRAVENOUS; SUBCUTANEOUS
OUTPATIENT
Start: 2025-02-14

## 2025-01-03 RX ORDER — DIPHENHYDRAMINE HYDROCHLORIDE 50 MG/ML
50 INJECTION INTRAMUSCULAR; INTRAVENOUS
Status: CANCELLED
Start: 2025-01-06

## 2025-01-03 NOTE — PATIENT INSTRUCTIONS
1) Cycles 1-5 Treatment.  2) BARI w/ Cycles 1, 3 and 4.  3) BJT w/ Cycles 2 and 5.  4) Weekly labs (CBC, T&S).    Federico Prince MD.

## 2025-01-05 NOTE — PROGRESS NOTES
"[facility] Hematology / Oncology  Progress Note  Name: Milo Lozano  :  1935  MRN:  [id number]    --------------------    Subjective:  Alex returns for follow-up of pancytopenia accompanied by his wife.  Marrow went okay.  No complications.  Entirety of time spent reviewing marrow and planning neck steps of management.    --------------------    Objective:  VS: BP (!) 156/67 (BP Location: Right arm)   Pulse 59   Ht 1.702 m (5' 7\")   SpO2 99%   BMI 28.19 kg/m    Gen: Well-appearing.    We reviewed marrow morphology, flow cytometry, cytogenetics, NGS.    --------------------    Assessment / Plan:  Chronic myelomonocytic leukemia.  Symptomatic anemia 2/2 above.  Thrombocytopenia 2/2 above.    Alex, his wife and I reviewed the natural history of CMML, its complications and potential therapies.  We reviewed options for treatment including HMA; I will plan to discuss his case with Lj for possible MAYKEL instead.  For now, we will plan C1-5 treatment with the understanding that it may take several cycles for effect.  For now, we will plan weekly labs (CBC, T&S) as transfusion support will be required.    Patient Instructions   1) Cycles 1-5 Treatment.  2) BARI w/ Cycles 1, 3 and 4.  3) BJT w/ Cycles 2 and 5.  4) Weekly labs (CBC, T&S).    Federico Prince MD.  "

## 2025-01-06 ENCOUNTER — LAB (OUTPATIENT)
Dept: INFUSION THERAPY | Facility: CLINIC | Age: 89
End: 2025-01-06
Attending: INTERNAL MEDICINE
Payer: COMMERCIAL

## 2025-01-06 VITALS
TEMPERATURE: 97.9 F | SYSTOLIC BLOOD PRESSURE: 149 MMHG | OXYGEN SATURATION: 93 % | RESPIRATION RATE: 18 BRPM | HEART RATE: 67 BPM | DIASTOLIC BLOOD PRESSURE: 76 MMHG

## 2025-01-06 DIAGNOSIS — D69.6 THROMBOCYTOPENIA: ICD-10-CM

## 2025-01-06 DIAGNOSIS — D64.9 SYMPTOMATIC ANEMIA: Primary | ICD-10-CM

## 2025-01-06 DIAGNOSIS — G25.0 ESSENTIAL TREMOR: ICD-10-CM

## 2025-01-06 LAB
ABO + RH BLD: NORMAL
ABO + RH BLD: NORMAL
BASOPHILS # BLD MANUAL: 0 10E3/UL (ref 0–0.2)
BASOPHILS NFR BLD MANUAL: 0 %
BLD GP AB SCN SERPL QL: NEGATIVE
BLD PROD TYP BPU: NORMAL
BLOOD COMPONENT TYPE: NORMAL
CODING SYSTEM: NORMAL
CROSSMATCH: NORMAL
CULTURE HARVEST COMPLETE DATE: NORMAL
EOSINOPHIL # BLD MANUAL: 0 10E3/UL (ref 0–0.7)
EOSINOPHIL NFR BLD MANUAL: 0 %
ERYTHROCYTE [DISTWIDTH] IN BLOOD BY AUTOMATED COUNT: 17.5 % (ref 10–15)
HCT VFR BLD AUTO: 26.6 % (ref 40–53)
HGB BLD-MCNC: 8.1 G/DL (ref 13.3–17.7)
HOLD SPECIMEN: NORMAL
HOLD SPECIMEN: NORMAL
ISSUE DATE AND TIME: NORMAL
LYMPHOCYTES # BLD MANUAL: 0.7 10E3/UL (ref 0.8–5.3)
LYMPHOCYTES NFR BLD MANUAL: 5 %
MCH RBC QN AUTO: 23.9 PG (ref 26.5–33)
MCHC RBC AUTO-ENTMCNC: 30.5 G/DL (ref 31.5–36.5)
MCV RBC AUTO: 79 FL (ref 78–100)
MONOCYTES # BLD MANUAL: 2.6 10E3/UL (ref 0–1.3)
MONOCYTES NFR BLD MANUAL: 19 %
MYELOCYTES # BLD MANUAL: 0.1 10E3/UL
MYELOCYTES NFR BLD MANUAL: 1 %
NEUTROPHILS # BLD MANUAL: 10.4 10E3/UL (ref 1.6–8.3)
NEUTROPHILS NFR BLD MANUAL: 75 %
PLAT MORPH BLD: ABNORMAL
PLATELET # BLD AUTO: 148 10E3/UL (ref 150–450)
RBC # BLD AUTO: 3.39 10E6/UL (ref 4.4–5.9)
RBC MORPH BLD: ABNORMAL
SPECIMEN EXP DATE BLD: NORMAL
SPECIMEN EXP DATE BLD: NORMAL
STOMATOCYTES BLD QL SMEAR: SLIGHT
UNIT ABO/RH: NORMAL
UNIT NUMBER: NORMAL
UNIT STATUS: NORMAL
UNIT TYPE ISBT: 9500
WBC # BLD AUTO: 13.9 10E3/UL (ref 4–11)

## 2025-01-06 PROCEDURE — 86923 COMPATIBILITY TEST ELECTRIC: CPT | Performed by: INTERNAL MEDICINE

## 2025-01-06 PROCEDURE — P9016 RBC LEUKOCYTES REDUCED: HCPCS | Performed by: INTERNAL MEDICINE

## 2025-01-06 PROCEDURE — 36430 TRANSFUSION BLD/BLD COMPNT: CPT

## 2025-01-06 PROCEDURE — 36415 COLL VENOUS BLD VENIPUNCTURE: CPT | Performed by: INTERNAL MEDICINE

## 2025-01-06 PROCEDURE — 99207 PR NO CHARGE LOS: CPT

## 2025-01-06 PROCEDURE — 85007 BL SMEAR W/DIFF WBC COUNT: CPT | Performed by: INTERNAL MEDICINE

## 2025-01-06 PROCEDURE — 86900 BLOOD TYPING SEROLOGIC ABO: CPT | Performed by: INTERNAL MEDICINE

## 2025-01-06 PROCEDURE — 85018 HEMOGLOBIN: CPT | Performed by: INTERNAL MEDICINE

## 2025-01-06 RX ORDER — HEPARIN SODIUM,PORCINE 10 UNIT/ML
5-20 VIAL (ML) INTRAVENOUS DAILY PRN
OUTPATIENT
Start: 2025-01-06

## 2025-01-06 RX ORDER — PROPRANOLOL HYDROCHLORIDE 80 MG/1
CAPSULE, EXTENDED RELEASE ORAL
Qty: 90 CAPSULE | Refills: 3 | Status: SHIPPED | OUTPATIENT
Start: 2025-01-06

## 2025-01-06 RX ORDER — HEPARIN SODIUM (PORCINE) LOCK FLUSH IV SOLN 100 UNIT/ML 100 UNIT/ML
5 SOLUTION INTRAVENOUS
OUTPATIENT
Start: 2025-01-06

## 2025-01-06 RX ORDER — DIPHENHYDRAMINE HYDROCHLORIDE 50 MG/ML
50 INJECTION INTRAMUSCULAR; INTRAVENOUS
Start: 2025-01-06

## 2025-01-06 RX ORDER — EPINEPHRINE 1 MG/ML
0.3 INJECTION, SOLUTION INTRAMUSCULAR; SUBCUTANEOUS EVERY 5 MIN PRN
OUTPATIENT
Start: 2025-01-06

## 2025-01-06 NOTE — PROGRESS NOTES
"Infusion Nursing Note:  Milo Lozano presents today for his first blood transfusion - one unit of packed red blood cells.    Patient seen by provider today: Yes: Dr Prince stopped in infusion department to give/obtain an informed consent.   present during visit today: Not Applicable.    Note:   Patient states he is no longer taking Ferrous Sulfate. Unable to verbalize why he stopped taking it.    Patient states he is fatigued and sleeps \"around the clock.\" Wife states patient only wakes up to use the bathroom and eat. States it has been like this for the past couple of months.    Wife states patient has been week and falling frequently, almost daily, at home. Uses rolling walker. Other than some abrasions (on scalp,) bruises, on upper arms, and pain (of right shoulder/neck/chest,) no injuries.    Dyspnea with dressing. No tachycardia.    Intravenous Access:  Peripheral IV placed.    Treatment Conditions:  Lab Results   Component Value Date    HGB 8.1 (L) 01/06/2025    WBC 13.9 (H) 01/06/2025    ANEU 10.4 (H) 01/06/2025     (L) 01/06/2025        Results reviewed, labs did NOT meet treatment parameters: Hgb > 8. Dr Prince met with patient and discussed benefits of proceeding with a transfusion. Dr Prince asked patient if he wished to proceed today based on fatigue, weakness and dyspnea with minimal activity. Patient wanted to proceed.    Blood transfusion consent signed today and placed in basket to bee scanned into Epic.    Post Infusion Assessment:  Patient tolerated infusion without incident.  Blood return noted pre and post infusion.  Site patent and intact, free from redness, edema or discomfort.  No evidence of extravasations.  Access discontinued per protocol.       Discharge Plan:   AVS to patient via [employer]. Charge nurse met with patient/wife at end of transfusion. States we are still working on getting Vidaza appointments. States we will contact patient when next appointments " are made.  Patient discharged in stable condition accompanied by: wife.  Departure Mode: Ambulatory.      Parvin Wisdom RN

## 2025-01-08 NOTE — NURSING NOTE
Milo Lozano's chief complaint for this visit includes:  Chief Complaint   Patient presents with    Wound Check     Mohs- anterior crown. DOS: 07-. No concerns with infection or the site.      PCP: Aj Garces    Referring Provider:  No referring provider defined for this encounter.    There were no vitals taken for this visit.  No Pain (0)        Allergies   Allergen Reactions    Lisinopril Cough     Developed an ACE cough on the Lisinopril, pt denies this is a current allergy as of 6/19/2020.         Do you need any medication refills at today's visit?  No.       Bharti Mclaughlin LPN      Patient report called.

## 2025-01-12 NOTE — PROGRESS NOTES
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 When his CBC was repeated on 8/15/2017, he was mildly pancytopenic, with Hb of 12.1 g/dl, MCV of 93,  mild leucopenia and borderline thrombocytopenia.   Absolute differential counts were normal and his peripheral blood smear showed slight normochromic normocytic anemia; minimal poikilocytosis and slight thrombocytopenia with overall normal platelet morphology. There was polychromasia. Absolute reticulocyte count was elevated at 165.7. B12 was normal in 07/2017 at 494. Ferritin was elevated at 412. Creatinine was mildly elevated at 1.32. FERMIN screen was negative. No M protein was noted on serum immunofixation. TSH was normal. Stool hemoccult was negative in 07/2017. He has never had a colonoscopy. UA showed no hematuria but did glucosuria and proteinuria. He still had persisted marked reticulocytosis in 09/2017 with absolute retic count of 143. He had a normal LDH, negative LAURA, normal haptoglobin. Peripheral blood smear on 9/5/17 showed normocytic slight anemia with slight morphologic evidence of increase in red cell regeneration with no morphologic evidence of hemolysis. There was reactive lymphocyte morphology.   He had f/up labs on 5/1/2018. Hb was 11.7 g/dl with normal MCV. WBC and platelet counts were low normal. Absolute retic count was still elevated at 143. Peripheral blood smear on 5/1/2018 showed Mild normochromic normocytic anemia. Adequate neutrophils with mild shift to immaturity. The lymphocyte population shows a population   of both small mature lymphocytes, large granular lymphocytes and reactive-appearing lymphocytes.  The morphology of the platelets was overall normal including large platelets.  US abdomen on 5/29/2018 showed  findings c/w hepatosplenomegaly- hyperechoic liver, suspicious for fatty liver and enlarged spleen-  measuring 12.7 x 6.6 x 13.5 cm as well as gallstones.  CT scan of the chest abdomen and pelvis without IV contrast showed no evidence of splenomegaly on the CT, with spleen size measuring 11 cm.  There was borderline hepatomegaly and gallstones noted as well.   Osmotic fragility testing demonstrated mildly increased osmotic fragility. Occasional spherocytes were noted on peripheral blood smear. I felt  his diagnosis was mild hereditary spherocytosis (HS) (retic count <6% is c/w mild HS) albeit MCHC is normal. HS can present in 9th decade. He has mild anemia with Hb >11 g/dl and gallstones.   He has no family history of anemia.   G6PD deficiency screen was negative. He was started on daily folic acid supplementation, 1 mg PO Qday.     Peripheral blood smear on May 10, 2019 showed normochromic normocytic anemia with increased red cell regeneration and slight thrombocytopenia and with normal platelet morphology.  Granulocytes were mature and well granulated and not dysplastic.  No circulating blasts were seen. Autoimmune hemolysis work-up was negative.   He had myelocytes on differential in 12/2019.   Repeat peripheral blood smear on 5/28/2020 showed slight normochromic, normocytic anemia; increased erythrocyte   regeneration as well as slight neutrophilic left shift with rare cells suspicious for circulating blasts. Moderate thrombocytopenia. Platelet count was 72.  We proceeded with a bone marrow biopsy on 6/19/2020 which showed:  Hypercellular marrow for age (80%) with trilineage hematopoiesis     - No overt dysplasia and no increase in marrow blasts   Flow cytometry showed polytypic B cells; no aberrant immunophenotype on T cells and no increase in myeloid blasts and no definitive abnormal myeloid   blast population. Cytogenetics was normal - 46 XY.      Prostate cancer- treated with XRT in 2007. PSA remains  undetectable.    Interval History: Mr Lozano and wife come to clinic for review of pancytopenia and start of Vidaza. Pt is very stoic and has limited conversation. Pt states he is doing well overall but wife mentions that he is not eating well and is sleeping more - only 2 meals daily with breakfast being just a bowl of oatmeal so has only 1 good meal daily. Denies nausea or stomach irritation/regurgitation. States bowels are normal with last BM was yesterday.   He did have blood transfusion last week after finding of hgb=8.1- admits to more energy.   Admits to recent falls with dizziness though no harm and is using walker even in home with wearing shoes.   Denies pains, SOB, fevers or illness currently.   Rest of comprehensive and complete ROS is reviewed and is negative.   Past Medical History:   Diagnosis Date    Anemia due to chronic kidney disease 10/10/2023    BPH (benign prostatic hypertrophy)     BPH with obstruction/lower urinary tract symptoms 09/17/2020    BPV (benign positional vertigo), unspecified laterality 10/21/2021    CARDIOVASCULAR SCREENING; LDL GOAL LESS THAN 100 12/29/2011    CARDIOVASCULAR SCREENING; LDL GOAL LESS THAN 130 12/29/2011    Chronic diffuse otitis externa of right ear 10/22/2024    Class 1 obesity due to excess calories with body mass index (BMI) of 31.0 to 31.9 in adult 09/17/2020    Dizziness 10/10/2023    Essential tremor 10/21/2019    Falls frequently 10/22/2024    Hypertension goal BP (blood pressure) < 140/90 12/29/2011    Hypothyroidism due to acquired atrophy of thyroid 12/05/2016    Iron deficiency 10/10/2023    Low HDL (under 40) 11/29/2022    Normocytic anemia 08/15/2017    Poor balance 10/21/2021    Prostate cancer (H) 01/2007    Had Radiation    Thrombocytopenia 09/18/2019    Type 2 diabetes mellitus with hyperglycemia (H) 10/21/2010    Type 2 diabetes, HbA1c goal < 7% (H)      Current Outpatient Medications   Medication Sig Dispense Refill    amLODIPine (NORVASC)  10 MG tablet TAKE 1 TABLET BY MOUTH DAILY 90 tablet 3    DULoxetine (CYMBALTA) 20 MG capsule Take 1 capsule (20 mg) by mouth daily. 90 capsule 0    folic acid (FOLVITE) 1 MG tablet Take 1 tablet (1 mg) by mouth daily 90 tablet 3    glimepiride (AMARYL) 4 MG tablet Take 2 tablets (8 mg) by mouth every morning (before breakfast) 180 tablet 3    levothyroxine (SYNTHROID/LEVOTHROID) 88 MCG tablet TAKE 1 TABLET BY MOUTH EVERY DAY GENERIC EQUIVALENT FOR SYNTHROID 90 tablet 0    losartan (COZAAR) 100 MG tablet Take 1 tablet (100 mg) by mouth daily. 90 tablet 3    metFORMIN (GLUCOPHAGE XR) 500 MG 24 hr tablet TAKE 2 TABLETS BY MOUTH DAILY WITH DINNER 180 tablet 3    pregabalin (LYRICA) 75 MG capsule TAKE ONE CAPSULE BY MOUTH TWICE DAILY. GENERIC EQUIVALENT FOR LYRICA 180 capsule 3    propranolol ER (INDERAL LA) 80 MG 24 hr capsule TAKE ONE CAPSULE BY MOUTH DAILY 90 capsule 3    tamsulosin (FLOMAX) 0.4 MG capsule TAKE 2 CAPSULES BY MOUTH EVERY  capsule 3    blood glucose (NO BRAND SPECIFIED) lancets standard Use to test blood sugar 2 times daily or as directed. (Patient not taking: Reported on 1/13/2025) 200 each 0    blood glucose calibration (NO BRAND SPECIFIED) solution To accompany: Blood Glucose Monitor Brands: per insurance. (Patient not taking: Reported on 1/13/2025) 1 Bottle 3    blood glucose monitoring (ACCU-CHEK MILADYS SMARTVIEW) meter device kit Use to test blood sugar 2 times daily or as directed. (Patient not taking: Reported on 1/13/2025) 1 kit 0    blood glucose monitoring (NO BRAND SPECIFIED) meter device kit Use to test blood sugar 2 times daily or as directed. Preferred blood glucose meter OR supplies to accompany: Blood Glucose Monitor Brands: per insurance (Patient not taking: Reported on 1/13/2025) 1 kit 0    dapagliflozin (FARXIGA) 10 MG TABS tablet TAKE 1 TABLET BY MOUTH DAILY (Patient not taking: Reported on 1/13/2025) 90 tablet 0    ferrous sulfate (FEROSUL) 325 (65 Fe) MG tablet Take 1 tablet  (325 mg) by mouth daily (with breakfast) (Patient not taking: Reported on 1/13/2025) 90 tablet 3    ondansetron (ZOFRAN) 8 MG tablet Take 1 tab (8 mg) by mouth prior to azacitidine, then 1 tab every 8 hours as needed for nausea. (Patient not taking: Reported on 1/13/2025) 30 tablet 5    thin (NO BRAND SPECIFIED) lancets Use with lanceting device to test blood sugar 2 times daily or as directed. To accompany: Blood Glucose Monitor Brands: per insurance. (Patient not taking: Reported on 1/13/2025) 200 each 3     No current facility-administered medications for this visit.     Allergies   Allergen Reactions    Lisinopril Cough     Developed an ACE cough on the Lisinopril, pt denies this is a current allergy as of 6/19/2020.       Physical Exam:BP (!) 161/70 (BP Location: Right arm)   Pulse 63   Wt 81.7 kg (180 lb 3.2 oz)   SpO2 98%   BMI 28.22 kg/m   - weight stable from December.   ECOG PS- 1  Constitutional: Alert, cooperative, and in no distress.   ENT: Eyes bright, No mouth sores  Neck: Supple, No adenopathy.  Cardiac: Heart rate and rhythm is regular and strong without murmur  Respiratory: Breathing easy. Lung sounds clear to auscultation  GI: Abdomen is soft, non-tender, BS normal. No masses or organomegaly  MS: Muscle tone good with walker, extremities normal with no edema.   Skin: No suspicious lesions or rashes- Head with open sore to top pf head - has had derm treatment to area for suspicious findings.   Neuro: Sensory grossly WNL, gait normal.   Lymph: Normal ant/post cervical, axillary, supraclavicular nodes  Psych: Mentation appears normal and affect very reserved- answers questions with delay and in short answers.     Laboratory/Imaging Results:   Results for orders placed or performed in visit on 01/13/25   Comprehensive metabolic panel     Status: Abnormal   Result Value Ref Range    Sodium 137 135 - 145 mmol/L    Potassium 4.8 3.4 - 5.3 mmol/L    Carbon Dioxide (CO2) 24 22 - 29 mmol/L    Anion Gap  12 7 - 15 mmol/L    Urea Nitrogen 20.3 8.0 - 23.0 mg/dL    Creatinine 1.45 (H) 0.67 - 1.17 mg/dL    GFR Estimate 47 (L) >60 mL/min/1.73m2    Calcium 9.1 8.8 - 10.4 mg/dL    Chloride 101 98 - 107 mmol/L    Glucose 212 (H) 70 - 99 mg/dL    Alkaline Phosphatase 107 40 - 150 U/L    AST 22 0 - 45 U/L    ALT 23 0 - 70 U/L    Protein Total 8.2 6.4 - 8.3 g/dL    Albumin 4.0 3.5 - 5.2 g/dL    Bilirubin Total 1.1 <=1.2 mg/dL   Extra Tube     Status: None    Narrative    The following orders were created for panel order Extra Tube.  Procedure                               Abnormality         Status                     ---------                               -----------         ------                     Extra Serum Separator Tu...[[id number]]                      Final result                 Please view results for these tests on the individual orders.   CBC with platelets and differential     Status: Abnormal   Result Value Ref Range    WBC Count 12.8 (H) 4.0 - 11.0 10e3/uL    RBC Count 3.93 (L) 4.40 - 5.90 10e6/uL    Hemoglobin 9.2 (L) 13.3 - 17.7 g/dL    Hematocrit 30.2 (L) 40.0 - 53.0 %    MCV 77 (L) 78 - 100 fL    MCH 23.4 (L) 26.5 - 33.0 pg    MCHC 30.5 (L) 31.5 - 36.5 g/dL    RDW 16.9 (H) 10.0 - 15.0 %    Platelet Count 125 (L) 150 - 450 10e3/uL   Extra Serum Separator Tube (SST)     Status: None   Result Value Ref Range    Hold Specimen JIC    Manual Differential     Status: Abnormal   Result Value Ref Range    % Neutrophils 57 %    % Lymphocytes 7 %    % Monocytes 31 %    % Eosinophils 2 %    % Basophils 0 %    % Metamyelocytes 2 %    % Myelocytes 1 %    Absolute Neutrophils 7.3 1.6 - 8.3 10e3/uL    Absolute Lymphocytes 0.9 0.8 - 5.3 10e3/uL    Absolute Monocytes 4.0 (H) 0.0 - 1.3 10e3/uL    Absolute Eosinophils 0.3 0.0 - 0.7 10e3/uL    Absolute Basophils 0.0 0.0 - 0.2 10e3/uL    Absolute Metamyelocytes 0.3 (H) <=0.0 10e3/uL    Absolute Myelocytes 0.1 (H) <=0.0 10e3/uL    RBC Morphology Confirmed RBC Indices      Platelet Assessment (A) Automated Count Confirmed. Platelet morphology is normal.     Automated Count Confirmed. Giant platelets are present.    Polychromasia Slight (A) None Seen    Spherocytes Slight (A) None Seen    Stomatocytes Slight (A) None Seen    Giant Platelets Slight (A) None Seen   CBC with platelets differential     Status: Abnormal    Narrative    The following orders were created for panel order CBC with platelets differential.  Procedure                               Abnormality         Status                     ---------                               -----------         ------                     CBC with platelets and d...[[id number]]  Abnormal            Final result               Manual Differential[[id number]]          Abnormal            Final result                 Please view results for these tests on the individual orders.       Assessment and Plan:   Pancytopenia- Pt to begin Vidaza today. We did review potential side effects/questions were answered and pt did start zofran today to help with potential for nausea. Pt does give permission to start this plan today and continue with the injections this week.   Review of labs showing continuation of anemia post replacement last week of 9.2 and the platelets of 125. WBC are elevated but ANC is normal.   Suggest recheck of cbc  and type and cross weekly- may be at closest clinic   Pt will return in 4 weeks for review with labs prior to cycle 2.     Ckd Stage 3 - improvement noted today with Creatinine.     Concern for weight loss- though has been stable over last weeks - wife is sharing weight loss and change in eating habits. Suggest use of protein shake daily between his 2 daily meals.     Balance issues- may be related to weakness with anemia but encouraged use of shoes and walker at all times even in home Could offer PT in future but did not recommend this today  as I wanted him to start program and not be overwhelmed.     The total time of  this encounter amounted to  40 minutes. This time included face to face time spent with the patient, prep work, ordering tests, and performing post visit documentation.  Nuvia Dasilva,[place]

## 2025-01-13 ENCOUNTER — ONCOLOGY VISIT (OUTPATIENT)
Dept: ONCOLOGY | Facility: CLINIC | Age: 89
End: 2025-01-13
Attending: INTERNAL MEDICINE
Payer: COMMERCIAL

## 2025-01-13 ENCOUNTER — INFUSION THERAPY VISIT (OUTPATIENT)
Dept: INFUSION THERAPY | Facility: CLINIC | Age: 89
End: 2025-01-13
Attending: INTERNAL MEDICINE
Payer: COMMERCIAL

## 2025-01-13 VITALS
WEIGHT: 180.2 LBS | HEART RATE: 63 BPM | OXYGEN SATURATION: 98 % | DIASTOLIC BLOOD PRESSURE: 70 MMHG | BODY MASS INDEX: 28.22 KG/M2 | SYSTOLIC BLOOD PRESSURE: 161 MMHG

## 2025-01-13 VITALS — HEIGHT: 67 IN | BODY MASS INDEX: 28.62 KG/M2

## 2025-01-13 DIAGNOSIS — C93.10 CHRONIC MYELOMONOCYTIC LEUKEMIA NOT HAVING ACHIEVED REMISSION (H): Primary | ICD-10-CM

## 2025-01-13 DIAGNOSIS — N18.31 STAGE 3A CHRONIC KIDNEY DISEASE (H): ICD-10-CM

## 2025-01-13 DIAGNOSIS — D69.6 THROMBOCYTOPENIA: ICD-10-CM

## 2025-01-13 DIAGNOSIS — D64.9 SYMPTOMATIC ANEMIA: ICD-10-CM

## 2025-01-13 DIAGNOSIS — D61.818 PANCYTOPENIA (H): Primary | ICD-10-CM

## 2025-01-13 LAB
ALBUMIN SERPL BCG-MCNC: 4 G/DL (ref 3.5–5.2)
ALP SERPL-CCNC: 107 U/L (ref 40–150)
ALT SERPL W P-5'-P-CCNC: 23 U/L (ref 0–70)
ANION GAP SERPL CALCULATED.3IONS-SCNC: 12 MMOL/L (ref 7–15)
AST SERPL W P-5'-P-CCNC: 22 U/L (ref 0–45)
BASOPHILS # BLD MANUAL: 0 10E3/UL (ref 0–0.2)
BASOPHILS NFR BLD MANUAL: 0 %
BILIRUB SERPL-MCNC: 1.1 MG/DL
BUN SERPL-MCNC: 20.3 MG/DL (ref 8–23)
CALCIUM SERPL-MCNC: 9.1 MG/DL (ref 8.8–10.4)
CHLORIDE SERPL-SCNC: 101 MMOL/L (ref 98–107)
CREAT SERPL-MCNC: 1.45 MG/DL (ref 0.67–1.17)
EGFRCR SERPLBLD CKD-EPI 2021: 47 ML/MIN/1.73M2
EOSINOPHIL # BLD MANUAL: 0.3 10E3/UL (ref 0–0.7)
EOSINOPHIL NFR BLD MANUAL: 2 %
ERYTHROCYTE [DISTWIDTH] IN BLOOD BY AUTOMATED COUNT: 16.9 % (ref 10–15)
GIANT PLATELETS BLD QL SMEAR: SLIGHT
GLUCOSE SERPL-MCNC: 212 MG/DL (ref 70–99)
HCO3 SERPL-SCNC: 24 MMOL/L (ref 22–29)
HCT VFR BLD AUTO: 30.2 % (ref 40–53)
HGB BLD-MCNC: 9.2 G/DL (ref 13.3–17.7)
HOLD SPECIMEN: NORMAL
LYMPHOCYTES # BLD MANUAL: 0.9 10E3/UL (ref 0.8–5.3)
LYMPHOCYTES NFR BLD MANUAL: 7 %
MCH RBC QN AUTO: 23.4 PG (ref 26.5–33)
MCHC RBC AUTO-ENTMCNC: 30.5 G/DL (ref 31.5–36.5)
MCV RBC AUTO: 77 FL (ref 78–100)
METAMYELOCYTES # BLD MANUAL: 0.3 10E3/UL
METAMYELOCYTES NFR BLD MANUAL: 2 %
MONOCYTES # BLD MANUAL: 4 10E3/UL (ref 0–1.3)
MONOCYTES NFR BLD MANUAL: 31 %
MYELOCYTES # BLD MANUAL: 0.1 10E3/UL
MYELOCYTES NFR BLD MANUAL: 1 %
NEUTROPHILS # BLD MANUAL: 7.3 10E3/UL (ref 1.6–8.3)
NEUTROPHILS NFR BLD MANUAL: 57 %
PLAT MORPH BLD: ABNORMAL
PLATELET # BLD AUTO: 125 10E3/UL (ref 150–450)
POLYCHROMASIA BLD QL SMEAR: SLIGHT
POTASSIUM SERPL-SCNC: 4.8 MMOL/L (ref 3.4–5.3)
PROT SERPL-MCNC: 8.2 G/DL (ref 6.4–8.3)
RBC # BLD AUTO: 3.93 10E6/UL (ref 4.4–5.9)
RBC MORPH BLD: ABNORMAL
SODIUM SERPL-SCNC: 137 MMOL/L (ref 135–145)
SPHEROCYTES BLD QL SMEAR: SLIGHT
STOMATOCYTES BLD QL SMEAR: SLIGHT
WBC # BLD AUTO: 12.8 10E3/UL (ref 4–11)

## 2025-01-13 PROCEDURE — 85014 HEMATOCRIT: CPT | Performed by: INTERNAL MEDICINE

## 2025-01-13 PROCEDURE — 96401 CHEMO ANTI-NEOPL SQ/IM: CPT

## 2025-01-13 PROCEDURE — 84238 ASSAY NONENDOCRINE RECEPTOR: CPT

## 2025-01-13 PROCEDURE — G0463 HOSPITAL OUTPT CLINIC VISIT: HCPCS | Performed by: NURSE PRACTITIONER

## 2025-01-13 PROCEDURE — 85007 BL SMEAR W/DIFF WBC COUNT: CPT | Performed by: INTERNAL MEDICINE

## 2025-01-13 PROCEDURE — 250N000011 HC RX IP 250 OP 636: Mod: JW | Performed by: INTERNAL MEDICINE

## 2025-01-13 PROCEDURE — 80053 COMPREHEN METABOLIC PANEL: CPT | Performed by: INTERNAL MEDICINE

## 2025-01-13 PROCEDURE — 99215 OFFICE O/P EST HI 40 MIN: CPT | Performed by: NURSE PRACTITIONER

## 2025-01-13 PROCEDURE — 36415 COLL VENOUS BLD VENIPUNCTURE: CPT | Performed by: INTERNAL MEDICINE

## 2025-01-13 PROCEDURE — 99207 PR NO CHARGE LOS: CPT

## 2025-01-13 PROCEDURE — 82310 ASSAY OF CALCIUM: CPT | Performed by: INTERNAL MEDICINE

## 2025-01-13 RX ADMIN — AZACITIDINE 150 MG: 100 INJECTION, POWDER, LYOPHILIZED, FOR SOLUTION INTRAVENOUS; SUBCUTANEOUS at 14:39

## 2025-01-13 ASSESSMENT — PAIN SCALES - GENERAL: PAINLEVEL_OUTOF10: NO PAIN (0)

## 2025-01-13 NOTE — PROGRESS NOTES
Infusion Nursing Note:  Milo Lozano presents today for C1D1 Vidaza.    Patient seen by provider today: Yes - Nuvia Dasilva NP    present during visit today: Not Applicable.    Note: The patient is not new to [facility] but is new to Vidaza. Educational handouts on Vidaza, chemo safety at home, and chemo discharge education printed out, reviewed with, and given to the patient. Questions answered to patient satisfaction.     Pharmacy staff met with patient and education on Zofran provided. The patient confirms he will take Zofran daily.     Hgb 9.2 - the patient did not meet treatment parameters for RBC today. Per treatment plan transfuse if Hgb is 8 or less. Nuvia Dasilva NP stated she would like the patient to come in 2 weeks for labs and a possible RBC transfusion. Scheduling updated and working on appointments.     Intravenous Access:  No Intravenous access/labs at this visit.    Treatment Conditions:  Lab Results   Component Value Date    HGB 9.2 (L) 01/13/2025    WBC 12.8 (H) 01/13/2025    ANEU 7.3 01/13/2025     (L) 01/13/2025        Lab Results   Component Value Date     01/13/2025    POTASSIUM 4.8 01/13/2025    CR 1.45 (H) 01/13/2025    DUSTY 9.1 01/13/2025    BILITOTAL 1.1 01/13/2025    ALBUMIN 4.0 01/13/2025    ALT 23 01/13/2025    AST 22 01/13/2025       Results reviewed, labs MET treatment parameters, ok to proceed with treatment.      Post Infusion Assessment:  Patient tolerated injection without incident.    Discharge Plan:   AVS to patient via My [facility].  Patient will return 1/14/25 for next appointment. Future appts have been reviewed and crosschecked with appt note and plan.   Patient discharged in stable condition accompanied by: self.  Departure Mode: Ambulatory.      Maryanne Rueda RN

## 2025-01-13 NOTE — NURSING NOTE
"Oncology Rooming Note    January 13, 2025 1:15 PM   Milo Lozano is a 89 year old male who presents for:    Chief Complaint   Patient presents with    Oncology Clinic Visit     Initial Vitals: BP (!) 161/70 (BP Location: Right arm)   Pulse 63   Wt 81.7 kg (180 lb 3.2 oz)   SpO2 98%   BMI 28.22 kg/m   Estimated body mass index is 28.22 kg/m  as calculated from the following:    Height as of 12/17/24: 1.702 m (5' 7\").    Weight as of this encounter: 81.7 kg (180 lb 3.2 oz). Body surface area is 1.97 meters squared.  No Pain (0) Comment: Data Unavailable   No LMP for male patient.  Allergies reviewed: Yes  Medications reviewed: Yes    Medications: Medication refills not needed today.  Pharmacy name entered into EPIC:    [facility] - NEW RX ONLY  [id number] - [place], [place] - [address] AT [facility] - [place], [place] - [address] AT [address]  [id number] - [place], [place] - [address] AT [facility] & [facility] [address]  [id number] [place], [place] - [address] AT [facility] OF [facility] [address] [facility]    Frailty Screening:   Is the patient here for a new oncology consult visit in cancer care? 2. No      Clinical concerns: Patient will discuss concerns with provider.       Peri Taylor MA            "

## 2025-01-13 NOTE — LETTER
1/13/2025      Milo Lozano  [address]  [facility] [address]      Dear Colleague,    Thank you for referring your patient, Milo Lozano, to the [facility]. Please see a copy of my visit note below.    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 When his CBC was repeated on 8/15/2017, he was mildly pancytopenic, with Hb of 12.1 g/dl, MCV of 93,  mild leucopenia and borderline thrombocytopenia.   Absolute differential counts were normal and his peripheral blood smear showed slight normochromic normocytic anemia; minimal poikilocytosis and slight thrombocytopenia with overall normal platelet morphology. There was polychromasia. Absolute reticulocyte count was elevated at 165.7. B12 was normal in 07/2017 at 494. Ferritin was elevated at 412. Creatinine was mildly elevated at 1.32. FERMIN screen was negative. No M protein was noted on serum immunofixation. TSH was normal. Stool hemoccult was negative in 07/2017. He has never had a colonoscopy. UA showed no hematuria but did glucosuria and proteinuria. He still had persisted marked reticulocytosis in 09/2017 with absolute retic count of 143. He had a normal LDH, negative LAURA, normal haptoglobin. Peripheral blood smear on 9/5/17 showed normocytic slight anemia with slight morphologic evidence of increase in red cell regeneration with no morphologic evidence of hemolysis. There was reactive lymphocyte morphology.   He had f/up labs on 5/1/2018. Hb was 11.7 g/dl with normal MCV. WBC and platelet counts were low normal. Absolute retic count was still elevated at 143. Peripheral blood smear on 5/1/2018 showed Mild normochromic normocytic anemia. Adequate neutrophils with mild shift to  immaturity. The lymphocyte population shows a population   of both small mature lymphocytes, large granular lymphocytes and reactive-appearing lymphocytes.  The morphology of the platelets was overall normal including large platelets.  US abdomen on 5/29/2018 showed findings c/w hepatosplenomegaly- hyperechoic liver, suspicious for fatty liver and enlarged spleen-  measuring 12.7 x 6.6 x 13.5 cm as well as gallstones.  CT scan of the chest abdomen and pelvis without IV contrast showed no evidence of splenomegaly on the CT, with spleen size measuring 11 cm.  There was borderline hepatomegaly and gallstones noted as well.   Osmotic fragility testing demonstrated mildly increased osmotic fragility. Occasional spherocytes were noted on peripheral blood smear. I felt  his diagnosis was mild hereditary spherocytosis (HS) (retic count <6% is c/w mild HS) albeit MCHC is normal. HS can present in 9th decade. He has mild anemia with Hb >11 g/dl and gallstones.   He has no family history of anemia.   G6PD deficiency screen was negative. He was started on daily folic acid supplementation, 1 mg PO Qday.     Peripheral blood smear on May 10, 2019 showed normochromic normocytic anemia with increased red cell regeneration and slight thrombocytopenia and with normal platelet morphology.  Granulocytes were mature and well granulated and not dysplastic.  No circulating blasts were seen. Autoimmune hemolysis work-up was negative.   He had myelocytes on differential in 12/2019.   Repeat peripheral blood smear on 5/28/2020 showed slight normochromic, normocytic anemia; increased erythrocyte   regeneration as well as slight neutrophilic left shift with rare cells suspicious for circulating blasts. Moderate thrombocytopenia. Platelet count was 72.  We proceeded with a bone marrow biopsy on 6/19/2020 which showed:  Hypercellular marrow for age (80%) with trilineage hematopoiesis     - No overt dysplasia and no increase in marrow blasts    Flow cytometry showed polytypic B cells; no aberrant immunophenotype on T cells and no increase in myeloid blasts and no definitive abnormal myeloid   blast population. Cytogenetics was normal - 46 XY.      Prostate cancer- treated with XRT in 2007. PSA remains undetectable.    Interval History: Mr Lozano and wife come to clinic for review of pancytopenia and start of Vidaza. Pt is very stoic and has limited conversation. Pt states he is doing well overall but wife mentions that he is not eating well and is sleeping more - only 2 meals daily with breakfast being just a bowl of oatmeal so has only 1 good meal daily. Denies nausea or stomach irritation/regurgitation. States bowels are normal with last BM was yesterday.   He did have blood transfusion last week after finding of hgb=8.1- admits to more energy.   Admits to recent falls with dizziness though no harm and is using walker even in home with wearing shoes.   Denies pains, SOB, fevers or illness currently.   Rest of comprehensive and complete ROS is reviewed and is negative.   Past Medical History:   Diagnosis Date     Anemia due to chronic kidney disease 10/10/2023     BPH (benign prostatic hypertrophy)      BPH with obstruction/lower urinary tract symptoms 09/17/2020     BPV (benign positional vertigo), unspecified laterality 10/21/2021     CARDIOVASCULAR SCREENING; LDL GOAL LESS THAN 100 12/29/2011     CARDIOVASCULAR SCREENING; LDL GOAL LESS THAN 130 12/29/2011     Chronic diffuse otitis externa of right ear 10/22/2024     Class 1 obesity due to excess calories with body mass index (BMI) of 31.0 to 31.9 in adult 09/17/2020     Dizziness 10/10/2023     Essential tremor 10/21/2019     Falls frequently 10/22/2024     Hypertension goal BP (blood pressure) < 140/90 12/29/2011     Hypothyroidism due to acquired atrophy of thyroid 12/05/2016     Iron deficiency 10/10/2023     Low HDL (under 40) 11/29/2022     Normocytic anemia 08/15/2017     Poor balance  10/21/2021     Prostate cancer (H) 01/2007    Had Radiation     Thrombocytopenia 09/18/2019     Type 2 diabetes mellitus with hyperglycemia (H) 10/21/2010     Type 2 diabetes, HbA1c goal < 7% (H)      Current Outpatient Medications   Medication Sig Dispense Refill     amLODIPine (NORVASC) 10 MG tablet TAKE 1 TABLET BY MOUTH DAILY 90 tablet 3     DULoxetine (CYMBALTA) 20 MG capsule Take 1 capsule (20 mg) by mouth daily. 90 capsule 0     folic acid (FOLVITE) 1 MG tablet Take 1 tablet (1 mg) by mouth daily 90 tablet 3     glimepiride (AMARYL) 4 MG tablet Take 2 tablets (8 mg) by mouth every morning (before breakfast) 180 tablet 3     levothyroxine (SYNTHROID/LEVOTHROID) 88 MCG tablet TAKE 1 TABLET BY MOUTH EVERY DAY GENERIC EQUIVALENT FOR SYNTHROID 90 tablet 0     losartan (COZAAR) 100 MG tablet Take 1 tablet (100 mg) by mouth daily. 90 tablet 3     metFORMIN (GLUCOPHAGE XR) 500 MG 24 hr tablet TAKE 2 TABLETS BY MOUTH DAILY WITH DINNER 180 tablet 3     pregabalin (LYRICA) 75 MG capsule TAKE ONE CAPSULE BY MOUTH TWICE DAILY. GENERIC EQUIVALENT FOR LYRICA 180 capsule 3     propranolol ER (INDERAL LA) 80 MG 24 hr capsule TAKE ONE CAPSULE BY MOUTH DAILY 90 capsule 3     tamsulosin (FLOMAX) 0.4 MG capsule TAKE 2 CAPSULES BY MOUTH EVERY  capsule 3     blood glucose (NO BRAND SPECIFIED) lancets standard Use to test blood sugar 2 times daily or as directed. (Patient not taking: Reported on 1/13/2025) 200 each 0     blood glucose calibration (NO BRAND SPECIFIED) solution To accompany: Blood Glucose Monitor Brands: per insurance. (Patient not taking: Reported on 1/13/2025) 1 Bottle 3     blood glucose monitoring (ACCU-CHEK MILADYS SMARTVIEW) meter device kit Use to test blood sugar 2 times daily or as directed. (Patient not taking: Reported on 1/13/2025) 1 kit 0     blood glucose monitoring (NO BRAND SPECIFIED) meter device kit Use to test blood sugar 2 times daily or as directed. Preferred blood glucose meter OR supplies to  accompany: Blood Glucose Monitor Brands: per insurance (Patient not taking: Reported on 1/13/2025) 1 kit 0     dapagliflozin (FARXIGA) 10 MG TABS tablet TAKE 1 TABLET BY MOUTH DAILY (Patient not taking: Reported on 1/13/2025) 90 tablet 0     ferrous sulfate (FEROSUL) 325 (65 Fe) MG tablet Take 1 tablet (325 mg) by mouth daily (with breakfast) (Patient not taking: Reported on 1/13/2025) 90 tablet 3     ondansetron (ZOFRAN) 8 MG tablet Take 1 tab (8 mg) by mouth prior to azacitidine, then 1 tab every 8 hours as needed for nausea. (Patient not taking: Reported on 1/13/2025) 30 tablet 5     thin (NO BRAND SPECIFIED) lancets Use with lanceting device to test blood sugar 2 times daily or as directed. To accompany: Blood Glucose Monitor Brands: per insurance. (Patient not taking: Reported on 1/13/2025) 200 each 3     No current facility-administered medications for this visit.     Allergies   Allergen Reactions     Lisinopril Cough     Developed an ACE cough on the Lisinopril, pt denies this is a current allergy as of 6/19/2020.       Physical Exam:BP (!) 161/70 (BP Location: Right arm)   Pulse 63   Wt 81.7 kg (180 lb 3.2 oz)   SpO2 98%   BMI 28.22 kg/m   - weight stable from December.   ECOG PS- 1  Constitutional: Alert, cooperative, and in no distress.   ENT: Eyes bright, No mouth sores  Neck: Supple, No adenopathy.  Cardiac: Heart rate and rhythm is regular and strong without murmur  Respiratory: Breathing easy. Lung sounds clear to auscultation  GI: Abdomen is soft, non-tender, BS normal. No masses or organomegaly  MS: Muscle tone good with walker, extremities normal with no edema.   Skin: No suspicious lesions or rashes- Head with open sore to top pf head - has had derm treatment to area for suspicious findings.   Neuro: Sensory grossly WNL, gait normal.   Lymph: Normal ant/post cervical, axillary, supraclavicular nodes  Psych: Mentation appears normal and affect very reserved- answers questions with delay and in  short answers.     Laboratory/Imaging Results:   Results for orders placed or performed in visit on 01/13/25   Comprehensive metabolic panel     Status: Abnormal   Result Value Ref Range    Sodium 137 135 - 145 mmol/L    Potassium 4.8 3.4 - 5.3 mmol/L    Carbon Dioxide (CO2) 24 22 - 29 mmol/L    Anion Gap 12 7 - 15 mmol/L    Urea Nitrogen 20.3 8.0 - 23.0 mg/dL    Creatinine 1.45 (H) 0.67 - 1.17 mg/dL    GFR Estimate 47 (L) >60 mL/min/1.73m2    Calcium 9.1 8.8 - 10.4 mg/dL    Chloride 101 98 - 107 mmol/L    Glucose 212 (H) 70 - 99 mg/dL    Alkaline Phosphatase 107 40 - 150 U/L    AST 22 0 - 45 U/L    ALT 23 0 - 70 U/L    Protein Total 8.2 6.4 - 8.3 g/dL    Albumin 4.0 3.5 - 5.2 g/dL    Bilirubin Total 1.1 <=1.2 mg/dL   Extra Tube     Status: None    Narrative    The following orders were created for panel order Extra Tube.  Procedure                               Abnormality         Status                     ---------                               -----------         ------                     Extra Serum Separator Tu...[[id number]]                      Final result                 Please view results for these tests on the individual orders.   CBC with platelets and differential     Status: Abnormal   Result Value Ref Range    WBC Count 12.8 (H) 4.0 - 11.0 10e3/uL    RBC Count 3.93 (L) 4.40 - 5.90 10e6/uL    Hemoglobin 9.2 (L) 13.3 - 17.7 g/dL    Hematocrit 30.2 (L) 40.0 - 53.0 %    MCV 77 (L) 78 - 100 fL    MCH 23.4 (L) 26.5 - 33.0 pg    MCHC 30.5 (L) 31.5 - 36.5 g/dL    RDW 16.9 (H) 10.0 - 15.0 %    Platelet Count 125 (L) 150 - 450 10e3/uL   Extra Serum Separator Tube (SST)     Status: None   Result Value Ref Range    Hold Specimen JIC    Manual Differential     Status: Abnormal   Result Value Ref Range    % Neutrophils 57 %    % Lymphocytes 7 %    % Monocytes 31 %    % Eosinophils 2 %    % Basophils 0 %    % Metamyelocytes 2 %    % Myelocytes 1 %    Absolute Neutrophils 7.3 1.6 - 8.3 10e3/uL    Absolute  Lymphocytes 0.9 0.8 - 5.3 10e3/uL    Absolute Monocytes 4.0 (H) 0.0 - 1.3 10e3/uL    Absolute Eosinophils 0.3 0.0 - 0.7 10e3/uL    Absolute Basophils 0.0 0.0 - 0.2 10e3/uL    Absolute Metamyelocytes 0.3 (H) <=0.0 10e3/uL    Absolute Myelocytes 0.1 (H) <=0.0 10e3/uL    RBC Morphology Confirmed RBC Indices     Platelet Assessment (A) Automated Count Confirmed. Platelet morphology is normal.     Automated Count Confirmed. Giant platelets are present.    Polychromasia Slight (A) None Seen    Spherocytes Slight (A) None Seen    Stomatocytes Slight (A) None Seen    Giant Platelets Slight (A) None Seen   CBC with platelets differential     Status: Abnormal    Narrative    The following orders were created for panel order CBC with platelets differential.  Procedure                               Abnormality         Status                     ---------                               -----------         ------                     CBC with platelets and d...[[id number]]  Abnormal            Final result               Manual Differential[[id number]]          Abnormal            Final result                 Please view results for these tests on the individual orders.       Assessment and Plan:   Pancytopenia- Pt to begin Vidaza today. We did review potential side effects/questions were answered and pt did start zofran today to help with potential for nausea. Pt does give permission to start this plan today and continue with the injections this week.   Review of labs showing continuation of anemia post replacement last week of 9.2 and the platelets of 125. WBC are elevated but ANC is normal.   Suggest recheck of cbc  and type and cross weekly- may be at closest clinic   Pt will return in 4 weeks for review with labs prior to cycle 2.     Ckd Stage 3 - improvement noted today with Creatinine.     Concern for weight loss- though has been stable over last weeks - wife is sharing weight loss and change in eating habits. Suggest use of  protein shake daily between his 2 daily meals.     Balance issues- may be related to weakness with anemia but encouraged use of shoes and walker at all times even in home Could offer PT in future but did not recommend this today  as I wanted him to start program and not be overwhelmed.     The total time of this encounter amounted to  40 minutes. This time included face to face time spent with the patient, prep work, ordering tests, and performing post visit documentation.  Nuvia Dasilva Cnp      Again, thank you for allowing me to participate in the care of your patient.        Sincerely,        Nuvia Dasilva, FINESSE, APRN CNP    Electronically signed

## 2025-01-13 NOTE — PATIENT INSTRUCTIONS
[facility]     When you go home:     Stay active.   Stay away from people who are obviously sick.   Take antinausea medications as needed.   Eat enough protein and calories to maintain your weight.   Drink at least 2-3 quarts of fluid per day.   Do not eat or drink food that may be undercooked or spoiled.   Flush twice after using the toilet.   Use condoms during sex for at least 48 hours after each treatment.   Use sunscreen when spending time outdoors.   Wash your hands:   Before preparing food   After doing housework   After going to the bathroom   Any other time your hands may become soiled        Call Nurse Triage 24 hours per day, at [phone], if you experience:     Temperature 100.4 or higher   Shaking chills or sweating   Vomiting not controlled by medication (more than 4-5 times in 24 hours)   Diarrhea not controlled by medication (more than 4-6 times in 24 hours)   Shortness of Breath   Skin rash, itching, hives, swelling of face, lips, tongue or throat   Chest pain   Bleeding   Other new, or concerning symptoms that you feel should not wait until regular clinic hours       Call Nurse Triage during business hours if you experience:     Redness, swelling or drainage at the IV (or port) site   Mouth sores or mouth pain that is keeping you from eating   Feeling dizzy or weak   Having new pain or a change in your usual pain   Are so tired you cannot do your daily activities like bathing, getting dressed & eating   Other symptoms that you feel should not wait until your next appointment       To refill a prescription, please call your pharmacy.     If you have any concerning symptoms or wish to speak to a provider before your next infusion visit, please call your care coordinator, or triage, to notify them so we can adequately serve you.        Nurse Triage [phone] (24 hours a day for urgent symptom management)     Scheduling [phone]

## 2025-01-14 ENCOUNTER — INFUSION THERAPY VISIT (OUTPATIENT)
Dept: INFUSION THERAPY | Facility: CLINIC | Age: 89
End: 2025-01-14
Attending: INTERNAL MEDICINE
Payer: COMMERCIAL

## 2025-01-14 VITALS
DIASTOLIC BLOOD PRESSURE: 76 MMHG | OXYGEN SATURATION: 96 % | TEMPERATURE: 97.9 F | SYSTOLIC BLOOD PRESSURE: 161 MMHG | RESPIRATION RATE: 18 BRPM | HEART RATE: 73 BPM

## 2025-01-14 DIAGNOSIS — C93.10 CHRONIC MYELOMONOCYTIC LEUKEMIA NOT HAVING ACHIEVED REMISSION (H): Primary | ICD-10-CM

## 2025-01-14 PROCEDURE — 96401 CHEMO ANTI-NEOPL SQ/IM: CPT

## 2025-01-14 PROCEDURE — 99207 PR NO CHARGE LOS: CPT

## 2025-01-14 PROCEDURE — 250N000011 HC RX IP 250 OP 636: Performed by: INTERNAL MEDICINE

## 2025-01-14 RX ADMIN — AZACITIDINE 150 MG: 100 INJECTION, POWDER, LYOPHILIZED, FOR SOLUTION INTRAVENOUS; SUBCUTANEOUS at 14:57

## 2025-01-14 NOTE — PROGRESS NOTES
Infusion Nursing Note:  Milo Lozano presents today for C1D2 Vidaza.    Patient seen by provider today: No   present during visit today: Not Applicable.    Note: Patient expressed a bout of diarrhea last night but no other side effects. Injections sites from yesterday slightly pink, per patient no itching/pain or peeling noted. Patient expressed some knee pain that he has had in the past as well, usually goes away on its own in a few hours. Patient also noted to have a cough that has been persistent over the last few days in which he is coughing up clear phlegm, advised if a fever presents or the color changes to let MD know which patient verbalized understanding.      Intravenous Access:  No Intravenous access/labs at this visit.    Treatment Conditions:  Not Applicable.      Post Infusion Assessment:  Patient tolerated injection without incident.  Site patent and intact, free from redness, edema or discomfort.  No evidence of extravasations.       Discharge Plan:   AVS to patient via MYCHART.  Patient will return 1/15/25 for next appointment.   Patient discharged in stable condition accompanied by: self and wife.  Departure Mode: Ambulatory.      Shruti Lai RN

## 2025-01-15 ENCOUNTER — INFUSION THERAPY VISIT (OUTPATIENT)
Dept: INFUSION THERAPY | Facility: CLINIC | Age: 89
End: 2025-01-15
Attending: INTERNAL MEDICINE
Payer: COMMERCIAL

## 2025-01-15 VITALS
DIASTOLIC BLOOD PRESSURE: 69 MMHG | TEMPERATURE: 97.9 F | SYSTOLIC BLOOD PRESSURE: 155 MMHG | OXYGEN SATURATION: 97 % | RESPIRATION RATE: 18 BRPM | HEART RATE: 73 BPM

## 2025-01-15 DIAGNOSIS — C93.10 CHRONIC MYELOMONOCYTIC LEUKEMIA NOT HAVING ACHIEVED REMISSION (H): Primary | ICD-10-CM

## 2025-01-15 PROCEDURE — 250N000011 HC RX IP 250 OP 636: Mod: JW | Performed by: INTERNAL MEDICINE

## 2025-01-15 PROCEDURE — 99207 PR NO CHARGE LOS: CPT

## 2025-01-15 PROCEDURE — 96401 CHEMO ANTI-NEOPL SQ/IM: CPT

## 2025-01-15 RX ADMIN — AZACITIDINE 150 MG: 100 INJECTION, POWDER, LYOPHILIZED, FOR SOLUTION INTRAVENOUS; SUBCUTANEOUS at 15:52

## 2025-01-15 ASSESSMENT — PAIN SCALES - GENERAL: PAINLEVEL_OUTOF10: MODERATE PAIN (4)

## 2025-01-15 NOTE — PROGRESS NOTES
Infusion Nursing Note:  Milo Lozano presents today for C1D3 Vidaza.    Patient seen by provider today: No   present during visit today: Not Applicable.    Note: Pt voices no new concerns overnight, but still with cough, but sounds a little more dry today. Previous injection sites to abdomen pink.      Intravenous Access:  No Intravenous access/labs at this visit.    Treatment Conditions:  Lab Results   Component Value Date    HGB 9.2 (L) 01/13/2025    WBC 12.8 (H) 01/13/2025    ANEU 7.3 01/13/2025     (L) 01/13/2025        Results reviewed, labs MET treatment parameters, ok to proceed with treatment.      Post Infusion Assessment:  Patient tolerated injections x 2 without incident.  Site patent and intact, free from redness, edema or discomfort.       Discharge Plan:   AVS to patient via MYCHART.  Patient will return tomorrow 1/16/25 for next appointment. Future appts have been reviewed and crosschecked with appt note and plan.   Patient discharged in stable condition accompanied by: wife.  Departure Mode: Ambulatory with a walker.      Roselia Santana RN

## 2025-01-16 ENCOUNTER — INFUSION THERAPY VISIT (OUTPATIENT)
Dept: INFUSION THERAPY | Facility: CLINIC | Age: 89
End: 2025-01-16
Attending: INTERNAL MEDICINE
Payer: COMMERCIAL

## 2025-01-16 VITALS
RESPIRATION RATE: 16 BRPM | TEMPERATURE: 98.6 F | OXYGEN SATURATION: 96 % | HEART RATE: 72 BPM | SYSTOLIC BLOOD PRESSURE: 154 MMHG | DIASTOLIC BLOOD PRESSURE: 96 MMHG

## 2025-01-16 DIAGNOSIS — C93.10 CHRONIC MYELOMONOCYTIC LEUKEMIA NOT HAVING ACHIEVED REMISSION (H): Primary | ICD-10-CM

## 2025-01-16 PROCEDURE — 250N000011 HC RX IP 250 OP 636: Mod: JW | Performed by: INTERNAL MEDICINE

## 2025-01-16 RX ADMIN — AZACITIDINE 150 MG: 100 INJECTION, POWDER, LYOPHILIZED, FOR SOLUTION INTRAVENOUS; SUBCUTANEOUS at 15:19

## 2025-01-16 NOTE — PROGRESS NOTES
"Infusion Nursing Note:  Milo Lozano presents today for C1 D4 Vidaza.    Patient seen by provider today: No   present during visit today: Not Applicable.    Note: Patient arrives today with a very quiet affect. Reports feeling ok but is experiencing more pain in his knees. Moving appears to be challenging for him but he is steady on his feet with the use of the rolling walker.    Patient stated he took his zofran prior to coming in. Both patient and wife asking the question \"how does one tell if this medication is working?\"     Provided some insight based off Dr. Prince's note but encouraged them to ask Dr. Prince at the next visit.       Intravenous Access:  No Intravenous access/labs at this visit.    Treatment Conditions:  Not Applicable.      Post Infusion Assessment:  Patient tolerated injection without incident.  Site patent and intact, free from redness, edema or discomfort.       Discharge Plan:   Discharge instructions reviewed with: Patient and Family.  Patient and/or family verbalized understanding of discharge instructions and all questions answered.  Patient discharged in stable condition accompanied by: wife.  Departure Mode: Ambulatory.      Kylee Renae RN    "

## 2025-01-28 ENCOUNTER — LAB (OUTPATIENT)
Dept: INFUSION THERAPY | Facility: CLINIC | Age: 89
End: 2025-01-28
Attending: INTERNAL MEDICINE
Payer: COMMERCIAL

## 2025-01-28 VITALS
SYSTOLIC BLOOD PRESSURE: 167 MMHG | RESPIRATION RATE: 16 BRPM | DIASTOLIC BLOOD PRESSURE: 73 MMHG | TEMPERATURE: 98.1 F | OXYGEN SATURATION: 95 % | HEART RATE: 89 BPM

## 2025-01-28 DIAGNOSIS — D61.818 PANCYTOPENIA (H): ICD-10-CM

## 2025-01-28 DIAGNOSIS — C93.10 CHRONIC MYELOMONOCYTIC LEUKEMIA NOT HAVING ACHIEVED REMISSION (H): Primary | ICD-10-CM

## 2025-01-28 LAB
BASOPHILS # BLD MANUAL: 0.1 10E3/UL (ref 0–0.2)
BASOPHILS NFR BLD MANUAL: 1 %
EOSINOPHIL # BLD MANUAL: 0.1 10E3/UL (ref 0–0.7)
EOSINOPHIL NFR BLD MANUAL: 1 %
ERYTHROCYTE [DISTWIDTH] IN BLOOD BY AUTOMATED COUNT: 18.6 % (ref 10–15)
GIANT PLATELETS BLD QL SMEAR: SLIGHT
HCT VFR BLD AUTO: 27.6 % (ref 40–53)
HGB BLD-MCNC: 8.4 G/DL (ref 13.3–17.7)
HOLD SPECIMEN: NORMAL
HOLD SPECIMEN: NORMAL
LYMPHOCYTES # BLD MANUAL: 0.8 10E3/UL (ref 0.8–5.3)
LYMPHOCYTES NFR BLD MANUAL: 6 %
MCH RBC QN AUTO: 23.3 PG (ref 26.5–33)
MCHC RBC AUTO-ENTMCNC: 30.4 G/DL (ref 31.5–36.5)
MCV RBC AUTO: 77 FL (ref 78–100)
METAMYELOCYTES # BLD MANUAL: 0.3 10E3/UL
METAMYELOCYTES NFR BLD MANUAL: 2 %
MONOCYTES # BLD MANUAL: 1.4 10E3/UL (ref 0–1.3)
MONOCYTES NFR BLD MANUAL: 10 %
NEUTROPHILS # BLD MANUAL: 11 10E3/UL (ref 1.6–8.3)
NEUTROPHILS NFR BLD MANUAL: 80 %
PLAT MORPH BLD: ABNORMAL
PLATELET # BLD AUTO: 111 10E3/UL (ref 150–450)
RBC # BLD AUTO: 3.61 10E6/UL (ref 4.4–5.9)
RBC MORPH BLD: ABNORMAL
SPHEROCYTES BLD QL SMEAR: SLIGHT
STOMATOCYTES BLD QL SMEAR: SLIGHT
WBC # BLD AUTO: 13.8 10E3/UL (ref 4–11)

## 2025-01-28 PROCEDURE — 85007 BL SMEAR W/DIFF WBC COUNT: CPT

## 2025-01-28 PROCEDURE — 85014 HEMATOCRIT: CPT

## 2025-01-28 PROCEDURE — 36415 COLL VENOUS BLD VENIPUNCTURE: CPT

## 2025-01-28 PROCEDURE — 85041 AUTOMATED RBC COUNT: CPT

## 2025-01-28 PROCEDURE — 99207 PR NO CHARGE LOS: CPT

## 2025-01-28 ASSESSMENT — PAIN SCALES - GENERAL: PAINLEVEL_OUTOF10: MODERATE PAIN (4)

## 2025-01-28 NOTE — PROGRESS NOTES
Infusion Nursing Note:  Milo Lozano presents today for possible RBC's-NOT NEEDED Hgb 8.4.    Patient seen by provider today: No   present during visit today: Not Applicable.    Note: Pt continues to voice that he sleeps all day, is SOB with activity, and weak. He states that even after he received a unit of blood the first time for Hgb 8.1, he didn't feel any better. Initially, he thought he was going to get blood today regardless of his labs and thought Dr. Prince would come and speak with him, but after some explanation and a phone call to his wife, Anita, they were in agreement that since pt does NOT meet treatment parameters and his BP and HR are stable, we will follow the treatment plan and pt will not get a transfusion today. Verified with pt and his wife that they will come back for their next appt on 2/10 for labs, a Dr. Prince visit, and treatment in infusion.       Intravenous Access:  No Intravenous access.    Treatment Conditions:  Lab Results   Component Value Date    HGB 8.4 (L) 01/28/2025    WBC 13.8 (H) 01/28/2025    ANEU 11.0 (H) 01/28/2025     (L) 01/28/2025        Results reviewed, labs did NOT meet treatment parameters: Hgb 8.4. Orders to transfuse 1 unit for Hgb < 8.0.    Post Infusion Assessment:  Patient tolerated infusion without incident.  Blood return noted pre and post infusion.  Site patent and intact, free from redness, edema or discomfort.  No evidence of extravasations.  Access discontinued per protocol.       Discharge Plan:   AVS to patient via MYCHART.  Patient will return 2/10/25 for next appointment. Future appts have been reviewed and crosschecked with appt note and plan.   Patient discharged in stable condition accompanied by: wife.  Departure Mode: Ambulatory with a walker.      Roselia Santana RN

## 2025-01-30 ENCOUNTER — HOSPITAL ENCOUNTER (INPATIENT)
Facility: CLINIC | Age: 89
End: 2025-01-30
Attending: EMERGENCY MEDICINE | Admitting: INTERNAL MEDICINE
Payer: COMMERCIAL

## 2025-01-30 ENCOUNTER — APPOINTMENT (OUTPATIENT)
Dept: GENERAL RADIOLOGY | Facility: CLINIC | Age: 89
DRG: 064 | End: 2025-01-30
Attending: EMERGENCY MEDICINE
Payer: COMMERCIAL

## 2025-01-30 DIAGNOSIS — N18.9 ACUTE ON CHRONIC RENAL INSUFFICIENCY: ICD-10-CM

## 2025-01-30 DIAGNOSIS — S06.5XAA SDH (SUBDURAL HEMATOMA) (H): ICD-10-CM

## 2025-01-30 DIAGNOSIS — J18.9 COMMUNITY ACQUIRED PNEUMONIA OF LEFT LOWER LOBE OF LUNG: ICD-10-CM

## 2025-01-30 DIAGNOSIS — I48.91 ATRIAL FIBRILLATION WITH RVR (H): Primary | ICD-10-CM

## 2025-01-30 DIAGNOSIS — N28.9 ACUTE ON CHRONIC RENAL INSUFFICIENCY: ICD-10-CM

## 2025-01-30 DIAGNOSIS — C93.10 CHRONIC MYELOMONOCYTIC LEUKEMIA NOT HAVING ACHIEVED REMISSION (H): ICD-10-CM

## 2025-01-30 DIAGNOSIS — H66.91 ACUTE INFECTION OF RIGHT EAR: ICD-10-CM

## 2025-01-30 DIAGNOSIS — Z51.5 END OF LIFE CARE: ICD-10-CM

## 2025-01-30 DIAGNOSIS — R33.9 URINARY RETENTION: ICD-10-CM

## 2025-01-30 DIAGNOSIS — E87.5 HYPERKALEMIA: ICD-10-CM

## 2025-01-30 LAB
ALBUMIN SERPL BCG-MCNC: 4 G/DL (ref 3.5–5.2)
ALBUMIN UR-MCNC: 70 MG/DL
ALP SERPL-CCNC: 108 U/L (ref 40–150)
ALT SERPL W P-5'-P-CCNC: 21 U/L (ref 0–70)
ANION GAP SERPL CALCULATED.3IONS-SCNC: 14 MMOL/L (ref 7–15)
ANION GAP SERPL CALCULATED.3IONS-SCNC: 14 MMOL/L (ref 7–15)
APPEARANCE UR: CLEAR
AST SERPL W P-5'-P-CCNC: 22 U/L (ref 0–45)
ATRIAL RATE - MUSE: NORMAL BPM
BASE EXCESS BLDV CALC-SCNC: -4 MMOL/L (ref -3–3)
BASOPHILS # BLD AUTO: 0 10E3/UL (ref 0–0.2)
BASOPHILS NFR BLD AUTO: 0 %
BILIRUB SERPL-MCNC: 1.2 MG/DL
BILIRUB UR QL STRIP: NEGATIVE
BUN SERPL-MCNC: 59.9 MG/DL (ref 8–23)
BUN SERPL-MCNC: 61 MG/DL (ref 8–23)
CALCIUM SERPL-MCNC: 8.9 MG/DL (ref 8.8–10.4)
CALCIUM SERPL-MCNC: 9.2 MG/DL (ref 8.8–10.4)
CHLORIDE SERPL-SCNC: 97 MMOL/L (ref 98–107)
CHLORIDE SERPL-SCNC: 98 MMOL/L (ref 98–107)
COLOR UR AUTO: YELLOW
CREAT SERPL-MCNC: 2.61 MG/DL (ref 0.67–1.17)
CREAT SERPL-MCNC: 2.62 MG/DL (ref 0.67–1.17)
DIASTOLIC BLOOD PRESSURE - MUSE: NORMAL MMHG
EGFRCR SERPLBLD CKD-EPI 2021: 23 ML/MIN/1.73M2
EGFRCR SERPLBLD CKD-EPI 2021: 23 ML/MIN/1.73M2
EOSINOPHIL # BLD AUTO: 0.1 10E3/UL (ref 0–0.7)
EOSINOPHIL NFR BLD AUTO: 1 %
ERYTHROCYTE [DISTWIDTH] IN BLOOD BY AUTOMATED COUNT: 19 % (ref 10–15)
EST. AVERAGE GLUCOSE BLD GHB EST-MCNC: 137 MG/DL
FLUAV RNA SPEC QL NAA+PROBE: NEGATIVE
FLUBV RNA RESP QL NAA+PROBE: NEGATIVE
GLUCOSE BLDC GLUCOMTR-MCNC: 210 MG/DL (ref 70–99)
GLUCOSE SERPL-MCNC: 238 MG/DL (ref 70–99)
GLUCOSE SERPL-MCNC: 256 MG/DL (ref 70–99)
GLUCOSE UR STRIP-MCNC: NEGATIVE MG/DL
HBA1C MFR BLD: 6.4 %
HCO3 BLDV-SCNC: 21 MMOL/L (ref 21–28)
HCO3 SERPL-SCNC: 21 MMOL/L (ref 22–29)
HCO3 SERPL-SCNC: 22 MMOL/L (ref 22–29)
HCT VFR BLD AUTO: 27.1 % (ref 40–53)
HGB BLD-MCNC: 8.5 G/DL (ref 13.3–17.7)
HGB UR QL STRIP: NEGATIVE
HOLD SPECIMEN: NORMAL
HYALINE CASTS: 1 /LPF
IMM GRANULOCYTES # BLD: 0.1 10E3/UL
IMM GRANULOCYTES NFR BLD: 1 %
INTERPRETATION ECG - MUSE: NORMAL
KETONES UR STRIP-MCNC: NEGATIVE MG/DL
LACTATE BLD-SCNC: 1.4 MMOL/L
LEUKOCYTE ESTERASE UR QL STRIP: NEGATIVE
LYMPHOCYTES # BLD AUTO: 0.7 10E3/UL (ref 0.8–5.3)
LYMPHOCYTES NFR BLD AUTO: 8 %
MCH RBC QN AUTO: 23.6 PG (ref 26.5–33)
MCHC RBC AUTO-ENTMCNC: 31.4 G/DL (ref 31.5–36.5)
MCV RBC AUTO: 75 FL (ref 78–100)
MONOCYTES # BLD AUTO: 1.2 10E3/UL (ref 0–1.3)
MONOCYTES NFR BLD AUTO: 13 %
MUCOUS THREADS #/AREA URNS LPF: PRESENT /LPF
NEUTROPHILS # BLD AUTO: 7 10E3/UL (ref 1.6–8.3)
NEUTROPHILS NFR BLD AUTO: 77 %
NITRATE UR QL: NEGATIVE
NRBC # BLD AUTO: 0 10E3/UL
NRBC BLD AUTO-RTO: 0 /100
NT-PROBNP SERPL-MCNC: 2790 PG/ML (ref 0–1800)
P AXIS - MUSE: NORMAL DEGREES
PCO2 BLDV: 35 MM HG (ref 40–50)
PH BLDV: 7.38 [PH] (ref 7.32–7.43)
PH UR STRIP: 5.5 [PH] (ref 5–7)
PLATELET # BLD AUTO: 154 10E3/UL (ref 150–450)
PO2 BLDV: 20 MM HG (ref 25–47)
POTASSIUM SERPL-SCNC: 5.1 MMOL/L (ref 3.4–5.3)
POTASSIUM SERPL-SCNC: 6 MMOL/L (ref 3.4–5.3)
POTASSIUM SERPL-SCNC: 6 MMOL/L (ref 3.4–5.3)
PR INTERVAL - MUSE: NORMAL MS
PROCALCITONIN SERPL IA-MCNC: 0.17 NG/ML
PROT SERPL-MCNC: 8.3 G/DL (ref 6.4–8.3)
QRS DURATION - MUSE: 90 MS
QT - MUSE: 322 MS
QTC - MUSE: 419 MS
R AXIS - MUSE: 58 DEGREES
RBC # BLD AUTO: 3.6 10E6/UL (ref 4.4–5.9)
RBC URINE: 1 /HPF
RSV RNA SPEC NAA+PROBE: NEGATIVE
SAO2 % BLDV: 32 % (ref 70–75)
SARS-COV-2 RNA RESP QL NAA+PROBE: NEGATIVE
SODIUM SERPL-SCNC: 133 MMOL/L (ref 135–145)
SODIUM SERPL-SCNC: 133 MMOL/L (ref 135–145)
SP GR UR STRIP: 1.02 (ref 1–1.03)
SYSTOLIC BLOOD PRESSURE - MUSE: NORMAL MMHG
T AXIS - MUSE: 39 DEGREES
T4 FREE SERPL-MCNC: 1.58 NG/DL (ref 0.9–1.7)
TROPONIN T SERPL HS-MCNC: 103 NG/L
TROPONIN T SERPL HS-MCNC: 111 NG/L
TSH SERPL DL<=0.005 MIU/L-ACNC: 10.47 UIU/ML (ref 0.3–4.2)
UROBILINOGEN UR STRIP-MCNC: NORMAL MG/DL
VENTRICULAR RATE- MUSE: 102 BPM
WBC # BLD AUTO: 9.1 10E3/UL (ref 4–11)
WBC URINE: 2 /HPF

## 2025-01-30 PROCEDURE — 83880 ASSAY OF NATRIURETIC PEPTIDE: CPT | Performed by: EMERGENCY MEDICINE

## 2025-01-30 PROCEDURE — 258N000003 HC RX IP 258 OP 636: Performed by: EMERGENCY MEDICINE

## 2025-01-30 PROCEDURE — 36415 COLL VENOUS BLD VENIPUNCTURE: CPT | Performed by: INTERNAL MEDICINE

## 2025-01-30 PROCEDURE — 250N000013 HC RX MED GY IP 250 OP 250 PS 637: Performed by: INTERNAL MEDICINE

## 2025-01-30 PROCEDURE — 84439 ASSAY OF FREE THYROXINE: CPT | Performed by: EMERGENCY MEDICINE

## 2025-01-30 PROCEDURE — 82962 GLUCOSE BLOOD TEST: CPT

## 2025-01-30 PROCEDURE — 84132 ASSAY OF SERUM POTASSIUM: CPT | Performed by: INTERNAL MEDICINE

## 2025-01-30 PROCEDURE — 120N000001 HC R&B MED SURG/OB

## 2025-01-30 PROCEDURE — 93005 ELECTROCARDIOGRAM TRACING: CPT

## 2025-01-30 PROCEDURE — 84145 PROCALCITONIN (PCT): CPT | Performed by: EMERGENCY MEDICINE

## 2025-01-30 PROCEDURE — 87637 SARSCOV2&INF A&B&RSV AMP PRB: CPT | Performed by: EMERGENCY MEDICINE

## 2025-01-30 PROCEDURE — 36415 COLL VENOUS BLD VENIPUNCTURE: CPT | Performed by: EMERGENCY MEDICINE

## 2025-01-30 PROCEDURE — 258N000003 HC RX IP 258 OP 636: Performed by: INTERNAL MEDICINE

## 2025-01-30 PROCEDURE — 99285 EMERGENCY DEPT VISIT HI MDM: CPT | Mod: 25

## 2025-01-30 PROCEDURE — 81001 URINALYSIS AUTO W/SCOPE: CPT | Performed by: EMERGENCY MEDICINE

## 2025-01-30 PROCEDURE — 250N000009 HC RX 250: Performed by: INTERNAL MEDICINE

## 2025-01-30 PROCEDURE — 85025 COMPLETE CBC W/AUTO DIFF WBC: CPT | Performed by: EMERGENCY MEDICINE

## 2025-01-30 PROCEDURE — 84484 ASSAY OF TROPONIN QUANT: CPT | Performed by: EMERGENCY MEDICINE

## 2025-01-30 PROCEDURE — 82565 ASSAY OF CREATININE: CPT | Performed by: EMERGENCY MEDICINE

## 2025-01-30 PROCEDURE — 99223 1ST HOSP IP/OBS HIGH 75: CPT | Performed by: INTERNAL MEDICINE

## 2025-01-30 PROCEDURE — 83036 HEMOGLOBIN GLYCOSYLATED A1C: CPT | Performed by: INTERNAL MEDICINE

## 2025-01-30 PROCEDURE — 250N000011 HC RX IP 250 OP 636: Performed by: INTERNAL MEDICINE

## 2025-01-30 PROCEDURE — 84443 ASSAY THYROID STIM HORMONE: CPT | Performed by: EMERGENCY MEDICINE

## 2025-01-30 PROCEDURE — 82803 BLOOD GASES ANY COMBINATION: CPT

## 2025-01-30 PROCEDURE — 250N000011 HC RX IP 250 OP 636: Performed by: EMERGENCY MEDICINE

## 2025-01-30 PROCEDURE — 82565 ASSAY OF CREATININE: CPT | Performed by: INTERNAL MEDICINE

## 2025-01-30 PROCEDURE — 87040 BLOOD CULTURE FOR BACTERIA: CPT | Performed by: EMERGENCY MEDICINE

## 2025-01-30 PROCEDURE — 71046 X-RAY EXAM CHEST 2 VIEWS: CPT

## 2025-01-30 RX ORDER — TAMSULOSIN HYDROCHLORIDE 0.4 MG/1
0.8 CAPSULE ORAL DAILY
Status: ACTIVE | OUTPATIENT
Start: 2025-01-31

## 2025-01-30 RX ORDER — FUROSEMIDE 10 MG/ML
20 INJECTION INTRAMUSCULAR; INTRAVENOUS ONCE
Status: COMPLETED | OUTPATIENT
Start: 2025-01-30 | End: 2025-01-30

## 2025-01-30 RX ORDER — AZITHROMYCIN MONOHYDRATE 500 MG/5ML
500 INJECTION, POWDER, LYOPHILIZED, FOR SOLUTION INTRAVENOUS EVERY 24 HOURS
Status: DISCONTINUED | OUTPATIENT
Start: 2025-01-30 | End: 2025-01-30

## 2025-01-30 RX ORDER — PROPRANOLOL HYDROCHLORIDE 80 MG/1
80 CAPSULE, EXTENDED RELEASE ORAL DAILY
OUTPATIENT
Start: 2025-01-31

## 2025-01-30 RX ORDER — AMOXICILLIN 250 MG
2 CAPSULE ORAL 2 TIMES DAILY PRN
Status: ACTIVE | OUTPATIENT
Start: 2025-01-30

## 2025-01-30 RX ORDER — AMOXICILLIN 250 MG
1 CAPSULE ORAL 2 TIMES DAILY PRN
Status: ACTIVE | OUTPATIENT
Start: 2025-01-30

## 2025-01-30 RX ORDER — CEFTRIAXONE 2 G/1
2 INJECTION, POWDER, FOR SOLUTION INTRAMUSCULAR; INTRAVENOUS EVERY 24 HOURS
Status: ACTIVE | OUTPATIENT
Start: 2025-01-31

## 2025-01-30 RX ORDER — ONDANSETRON 2 MG/ML
4 INJECTION INTRAMUSCULAR; INTRAVENOUS EVERY 6 HOURS PRN
Status: ACTIVE | OUTPATIENT
Start: 2025-01-30

## 2025-01-30 RX ORDER — ONDANSETRON 4 MG/1
4 TABLET, ORALLY DISINTEGRATING ORAL EVERY 6 HOURS PRN
Status: ACTIVE | OUTPATIENT
Start: 2025-01-30

## 2025-01-30 RX ORDER — FOLIC ACID 1 MG/1
1 TABLET ORAL DAILY
Status: ACTIVE | OUTPATIENT
Start: 2025-01-31

## 2025-01-30 RX ORDER — LEVOTHYROXINE SODIUM 88 UG/1
88 TABLET ORAL EVERY MORNING
OUTPATIENT
Start: 2025-01-31

## 2025-01-30 RX ORDER — DEXTROSE MONOHYDRATE 25 G/50ML
25-50 INJECTION, SOLUTION INTRAVENOUS
Status: ACTIVE | OUTPATIENT
Start: 2025-01-30

## 2025-01-30 RX ORDER — AZITHROMYCIN MONOHYDRATE 500 MG/5ML
500 INJECTION, POWDER, LYOPHILIZED, FOR SOLUTION INTRAVENOUS EVERY 24 HOURS
Status: ACTIVE | OUTPATIENT
Start: 2025-01-31

## 2025-01-30 RX ORDER — LIDOCAINE HYDROCHLORIDE 20 MG/ML
10 JELLY TOPICAL ONCE
Status: COMPLETED | OUTPATIENT
Start: 2025-01-30 | End: 2025-01-30

## 2025-01-30 RX ORDER — CEFTRIAXONE 2 G/1
2 INJECTION, POWDER, FOR SOLUTION INTRAMUSCULAR; INTRAVENOUS EVERY 24 HOURS
Status: DISCONTINUED | OUTPATIENT
Start: 2025-01-30 | End: 2025-01-30

## 2025-01-30 RX ORDER — LIDOCAINE 40 MG/G
CREAM TOPICAL
Status: ACTIVE | OUTPATIENT
Start: 2025-01-30

## 2025-01-30 RX ORDER — ACETAMINOPHEN 650 MG/1
650 SUPPOSITORY RECTAL EVERY 4 HOURS PRN
Status: ACTIVE | OUTPATIENT
Start: 2025-01-30

## 2025-01-30 RX ORDER — PREGABALIN 75 MG/1
75 CAPSULE ORAL 2 TIMES DAILY
Status: DISPENSED | OUTPATIENT
Start: 2025-01-30

## 2025-01-30 RX ORDER — AZITHROMYCIN MONOHYDRATE 500 MG/5ML
500 INJECTION, POWDER, LYOPHILIZED, FOR SOLUTION INTRAVENOUS ONCE
Status: COMPLETED | OUTPATIENT
Start: 2025-01-30 | End: 2025-01-30

## 2025-01-30 RX ORDER — DULOXETIN HYDROCHLORIDE 20 MG/1
20 CAPSULE, DELAYED RELEASE ORAL DAILY
OUTPATIENT
Start: 2025-01-31

## 2025-01-30 RX ORDER — ACETAMINOPHEN 325 MG/1
650 TABLET ORAL EVERY 4 HOURS PRN
Status: ACTIVE | OUTPATIENT
Start: 2025-01-30

## 2025-01-30 RX ORDER — NICOTINE POLACRILEX 4 MG
15-30 LOZENGE BUCCAL
Status: ACTIVE | OUTPATIENT
Start: 2025-01-30

## 2025-01-30 RX ORDER — CEFTRIAXONE 2 G/1
2 INJECTION, POWDER, FOR SOLUTION INTRAMUSCULAR; INTRAVENOUS ONCE
Status: COMPLETED | OUTPATIENT
Start: 2025-01-30 | End: 2025-01-30

## 2025-01-30 RX ADMIN — PREGABALIN 75 MG: 75 CAPSULE ORAL at 20:30

## 2025-01-30 RX ADMIN — AZITHROMYCIN MONOHYDRATE 500 MG: 500 INJECTION, POWDER, LYOPHILIZED, FOR SOLUTION INTRAVENOUS at 18:52

## 2025-01-30 RX ADMIN — CEFTRIAXONE SODIUM 2 G: 2 INJECTION, POWDER, FOR SOLUTION INTRAMUSCULAR; INTRAVENOUS at 18:11

## 2025-01-30 RX ADMIN — LIDOCAINE HYDROCHLORIDE 10 ML: 20 JELLY TOPICAL at 22:46

## 2025-01-30 RX ADMIN — FUROSEMIDE 20 MG: 10 INJECTION, SOLUTION INTRAMUSCULAR; INTRAVENOUS at 20:06

## 2025-01-30 RX ADMIN — SODIUM CHLORIDE 500 ML: 9 INJECTION, SOLUTION INTRAVENOUS at 17:33

## 2025-01-30 RX ADMIN — SODIUM CHLORIDE 500 ML: 9 INJECTION, SOLUTION INTRAVENOUS at 18:54

## 2025-01-30 ASSESSMENT — ACTIVITIES OF DAILY LIVING (ADL)
ADLS_ACUITY_SCORE: 41
DEPENDENT_IADLS:: CLEANING;COOKING;LAUNDRY;SHOPPING;MEAL PREPARATION;MEDICATION MANAGEMENT;MONEY MANAGEMENT;TRANSPORTATION;INCONTINENCE

## 2025-01-30 NOTE — ED TRIAGE NOTES
BIBA from home where he lives with his wife. Pt has hx of bone cancer and had procedure recently and he has been having increasing weakness since. Unable to get off toilet today, EMS had to assist pt off toilet. .

## 2025-01-30 NOTE — ED PROVIDER NOTES
Emergency Department Note      History of Present Illness     Chief Complaint   Generalized Weakness      FANNY Lozano is a 89 year old male with history of type 2 diabetes, CML, hypertension CKD, and BPH who presents to the ED with generalized weakness. The patient reports that he has been weak recently and today was unable to get off the toilet, so he called EMS who was able to assist him off of it. His wife explains that he has a history of bone cancer and had a biopsy done earlier this month, and he has been getting progressively weaker since then. She states that he had a series of chemotherapy treatment for his CML earlier this month but is unsure if he is in remission. The patient's last session was 13 days ago. He also endorses lightheadedness that started today and shortness of breath and cough that has been present prior to receiving chemotherapy. He denies fever, chest pain, nausea, vomiting, diarrhea, abdominal pain, black/bloody stools, headache, and dizziness. Wife notes that his last blood transfusion was early December. He is not anticoagulated.    Independent Historian   Wife as detailed above.    Review of External Notes   I reviewed the patient's 1/13/25 oncology visit note     Past Medical History     Medical History and Problem List   NAVEED  BPH  Chronic otitis externa (R)  CKD  Diabetic polyneuropathy  Type 2 diabetes  Hypertension  Hypothyroidism  DEMOND  Anemia  Chronic myelomonocytic leukemia  Obesity  Iron deficiency  Thrombocytopenia  BPV    Medications   Norvasc  Farxiga  Cymbalta  Ferosul  Folvite  Amaryl  Levothyroxine  Cozaar  Glucophage  Zofran  Lyrica  Inderal  Flomax    Surgical History   Bone marrow biopsy  Cataract extraction (B)  Colonoscopy  Cystoscopy    Physical Exam     Patient Vitals for the past 24 hrs:   BP Temp Temp src Pulse Resp SpO2 Height Weight   01/30/25 1652 125/65 -- -- 103 19 94 % -- --   01/30/25 1619 -- -- -- -- -- 96 % -- --   01/30/25 1617 -- -- --  "-- -- 96 % -- --   01/30/25 1616 -- -- -- -- -- 95 % -- --   01/30/25 1543 -- -- -- 77 21 -- -- --   01/30/25 1430 109/64 97.7  F (36.5  C) Oral 106 18 95 % 1.727 m (5' 8\") 74.4 kg (164 lb)     Physical Exam  Constitutional:       Appearance: Normal appearance.   HENT:      Head: Normocephalic and atraumatic.   Eyes:      Extraocular Movements: Extraocular movements intact.      Conjunctiva/sclera: Conjunctivae normal.   Cardiovascular:      Rate and Rhythm: Mildly tachycardic rate and irregularly irregular rhythm.   Pulmonary:      Effort: Pulmonary effort is normal.  No respiratory distress.      Breath sounds: Clear to auscultation bilaterally.  No wheezing.  No crackles.  Abdominal:      General: Abdomen is flat. There is no distension.      Palpations: Abdomen is soft.      Tenderness: There is no abdominal tenderness.   Musculoskeletal:      Cervical back: Normal range of motion. No rigidity.       Right lower leg: Mild edema.      Left lower leg: Mild edema.   Skin:     General: Skin is warm and dry.  Pale.  Neurological:      General: No focal deficit present.      Mental Status: Alert and oriented to person, place, and time.   Psychiatric:         Mood and Affect: Mood normal.         Behavior: Behavior normal.    Diagnostics     Lab Results   Labs Ordered and Resulted from Time of ED Arrival to Time of ED Departure   COMPREHENSIVE METABOLIC PANEL - Abnormal       Result Value    Sodium 133 (*)     Potassium 6.0 (*)     Carbon Dioxide (CO2) 22      Anion Gap 14      Urea Nitrogen 59.9 (*)     Creatinine 2.62 (*)     GFR Estimate 23 (*)     Calcium 9.2      Chloride 97 (*)     Glucose 256 (*)     Alkaline Phosphatase 108      AST 22      ALT 21      Protein Total 8.3      Albumin 4.0      Bilirubin Total 1.2     ROUTINE UA WITH MICROSCOPIC REFLEX TO CULTURE - Abnormal    Color Urine Yellow      Appearance Urine Clear      Glucose Urine Negative      Bilirubin Urine Negative      Ketones Urine Negative      " Specific Gravity Urine 1.021      Blood Urine Negative      pH Urine 5.5      Protein Albumin Urine 70 (*)     Urobilinogen Urine Normal      Nitrite Urine Negative      Leukocyte Esterase Urine Negative      Mucus Urine Present (*)     RBC Urine 1      WBC Urine 2      Hyaline Casts Urine 1     TROPONIN T, HIGH SENSITIVITY - Abnormal    Troponin T, High Sensitivity 111 (*)    TSH WITH FREE T4 REFLEX - Abnormal    TSH 10.47 (*)    NT PROBNP INPATIENT - Abnormal    N terminal Pro BNP Inpatient 2,790 (*)    CBC WITH PLATELETS AND DIFFERENTIAL - Abnormal    WBC Count 9.1      RBC Count 3.60 (*)     Hemoglobin 8.5 (*)     Hematocrit 27.1 (*)     MCV 75 (*)     MCH 23.6 (*)     MCHC 31.4 (*)     RDW 19.0 (*)     Platelet Count 154      % Neutrophils 77      % Lymphocytes 8      % Monocytes 13      % Eosinophils 1      % Basophils 0      % Immature Granulocytes 1      NRBCs per 100 WBC 0      Absolute Neutrophils 7.0      Absolute Lymphocytes 0.7 (*)     Absolute Monocytes 1.2      Absolute Eosinophils 0.1      Absolute Basophils 0.0      Absolute Immature Granulocytes 0.1      Absolute NRBCs 0.0     ISTAT GASES LACTATE VENOUS POCT - Abnormal    Lactic Acid POCT 1.4      Bicarbonate Venous POCT 21      O2 Sat, Venous POCT 32 (*)     pCO2 Venous POCT 35 (*)     pH Venous POCT 7.38      pO2 Venous POCT 20 (*)     Base Excess/Deficit (+/-) POCT -4.0 (*)    TROPONIN T, HIGH SENSITIVITY - Abnormal    Troponin T, High Sensitivity 103 (*)    HEMOGLOBIN A1C - Abnormal    Estimated Average Glucose 137 (*)     Hemoglobin A1C 6.4 (*)    BASIC METABOLIC PANEL - Abnormal    Sodium 133 (*)     Potassium 6.0 (*)     Chloride 98      Carbon Dioxide (CO2) 21 (*)     Anion Gap 14      Urea Nitrogen 61.0 (*)     Creatinine 2.61 (*)     GFR Estimate 23 (*)     Calcium 8.9      Glucose 238 (*)    INFLUENZA A/B, RSV AND SARS-COV2 PCR - Normal    Influenza A PCR Negative      Influenza B PCR Negative      RSV PCR Negative      SARS CoV2 PCR  Negative     T4 FREE - Normal    Free T4 1.58     PROCALCITONIN - Normal    Procalcitonin 0.17     GLUCOSE MONITOR NURSING POCT   GLUCOSE MONITOR NURSING POCT   GLUCOSE MONITOR NURSING POCT   BLOOD CULTURE   BLOOD CULTURE       Imaging   XR Chest 2 Views   Final Result   IMPRESSION:    Left lower lobe infiltrate. Question some left pleural fluid.          EKG   See ED course below    Independent Interpretation   See ED course    ED Course      Medications Administered   Medications   sodium chloride (PF) 0.9% PF flush 3 mL (has no administration in time range)   sodium chloride (PF) 0.9% PF flush 3 mL ( Intracatheter Canceled Entry 1/30/25 1545)   cefTRIAXone (ROCEPHIN) 2 g vial to attach to  ml bag for ADULTS or NS 50 ml bag for PEDS (2 g Intravenous $New Bag 1/30/25 1811)   azithromycin (ZITHROMAX) 500 mg vial to attach to  mL bag (has no administration in time range)   sodium chloride 0.9% BOLUS 500 mL (500 mLs Intravenous $New Bag 1/30/25 1733)   cefTRIAXone (ROCEPHIN) 2 g vial to attach to  ml bag for ADULTS or NS 50 ml bag for PEDS (2 g Intravenous Not Given 1/30/25 1819)   azithromycin (ZITHROMAX) 500 mg vial to attach to  mL bag (500 mg Intravenous Not Given 1/30/25 1819)   lidocaine 1 % 0.1-1 mL (has no administration in time range)   lidocaine (LMX4) cream (has no administration in time range)   sodium chloride (PF) 0.9% PF flush 3 mL (3 mLs Intracatheter $Given 1/30/25 1819)   sodium chloride (PF) 0.9% PF flush 3 mL (has no administration in time range)       Procedures   Procedures     Discussion of Management   See ED course    ED Course   ED Course as of 01/30/25 1820   Thu Jan 30, 2025   1438 EKG 12-lead, tracing only  Atrial fibrillation with RVR.  Rate of 102.  Normal QRS.  QTc 419.  No acute ST elevation or depression as compared with 8/28/2024 EKG.   1442 I obtained history and examined the patient as noted above   1524 Hemoglobin(!): 8.5  Near baseline   1530 TSH(!):  "10.47  Reflex free T4 pending   1531 Creatinine(!): 2.62  Acute on chronic renal injury   1545 Troponin T, High Sensitivity(!!): 111  Possibly demand   1555 XR Chest 2 Views  On my independent interpretation of chest x-ray, there is no obvious focal opacity, mediastinal widening, or pneumothorax.   1609 XR Chest 2 Views  LLL infiltrate on formal   1621 Discussed patient with Pedro    1644 Per RN: \"Pt unable to void, straight cath'd for 225 ml of tacos clear urine. Sample sent to lab.\"   1721 Troponin T, High Sensitivity(!!): 103  2 set flat       Additional Documentation  None    Medical Decision Making / Diagnosis     CMS Diagnoses: None    MIPS       None    MDM   Milo Lozano is a 89 year old male as described above presents to the emergency department for increased generalized weakness inability to get himself off the toilet.  Patient hemodynamically stable at time of evaluation.  Afebrile.  New onset atrial fibrillation with RVR, but otherwise hemodynamically stable and right reasonably controlled without intervention.  Differential diagnosis considered includes, but not limited to, acute viral syndrome, pneumonia, urinary tract infection, sepsis, ACS, and cardiomyopathy.  Generalized weakness workup ordered.  Will obtain chest x-ray for evaluation for underlying infectious process and pulmonary edema.  Urine analysis.  Sepsis workup.  Discussed care plan with patient and wife at bedside who voiced understanding and agreement with plan.  Answered all questions.  Additional workup and orders as listed in chart.    Ultimately, work up shows suspected left lower lobe pneumonia.  Patient was initiated on IV Rocephin and azithromycin. Acute on chronic renal injury with creatinine of 2.61 and potassium of 6.0.  Patient was given 5 cc IV fluid bolus.  Cautious fluid administration given elevated BNP and patient's age.  Patient was admitted to the hospitalist service for further evaluation and treatment.  " Suspect troponin elevation likely secondary to demand.  Patient's EKG is otherwise unremarkable.    Please refer to ED course above as part of continuation of MDM for details on the patient's treatment course and any potential changes or updates beyond my initial evaluation and MDM creation.    Disposition   The patient was admitted to the hospital.     Diagnosis     ICD-10-CM    1. Atrial fibrillation with RVR (H)  I48.91       2. Community acquired pneumonia of left lower lobe of lung  J18.9       3. Hyperkalemia  E87.5       4. Acute on chronic renal insufficiency  N28.9     N18.9       5. Urinary retention  R33.9            Discharge Medications   New Prescriptions    No medications on file         Scribe Disclosure:  I, Luis Ramey, am serving as a scribe at 2:36 PM on 1/30/2025 to document services personally performed by Thien Munoz DO based on my observations and the provider's statements to me.        Thien Munoz DO  01/30/25 1822

## 2025-01-30 NOTE — H&P
[facility]    History and Physical - Hospitalist Service       Date of Admission:  1/30/2025    Assessment & Plan      Milo Lozano is a 89 year old male admitted on 1/30/2025. He ***    Pneumonia  CXR Left lower lobe   Patient admitted with weakness and CXR findings of pneumonia.   Started on rocephin and zithromax.  SARS/COVID/RSV negative.   Lactate is normal 1.4  Blood cultures X 2 ordered.       Chronic myelomonocytic leukemia.   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 Prince.   Bone marrow biopsy 12/2024   On     Atrial fibrillation with mild RVR  Elevated troponin (potential supply/demand)   Newly diagnosed of uncertain duration   Multiple mentions of progressive anemia.   On propranolol PTA  ECHO (no prior found)   Serial troponin first elevatyed 111  Currently holding on anticoagulation given the anemia, CML, and uncertain risk/benefit at this time  Will have hematology/oncology input tomorrow and cardiology.     Hyperkalemia   Potassium at 6   No overt EKG changes.   Stop cozaar  Repeat after fluids.     DM:   Patient on amaryl 8 mg po am >> hold for now pending appetite  Glucophage XR 1000 mg po at dinner >> hold with sepsis     Mild hereditary spherocytosis  On daily folate 1 mg po daily   History of occasional spherocytes on peripheral smea.     Recent reported weight loss.   Noted last hematology visit with weight loss and change in eating habits.     Recent balance issues reported  In hematology clinic reports of balance issues with weight loss and anemia     CKD stage 3    History of prostate cancer  Treated with XRT 2007    Pain   Lyrica 75 mg po BID     DEMOND:   On CPAP     GOALS of care  Patient's wife Anita in the ER and step daughter called Delphine. Started discussion of his multiple diagnosis on admit. Afib and HR elevated. Anticoagulation, renal concerns, potassium.           Diet:  regular   DVT Prophylaxis: {DVT PROPHYLAXIS:873719}  Chelsey  Catheter: PRESENT, indication:    Lines: None     Cardiac Monitoring: None  Code Status:  ***    Clinically Significant Risk Factors Present on Admission   { TIP  This section helps capture the illness of the patient on admission.     - Review diagnoses highlighted in blue; right click, edit & delete if not appropriate   - If blank, no additional diagnoses identified   :58185}     # Hyperkalemia: Highest K = 6 mmol/L in last 2 days, will monitor as appropriate  # Hyponatremia: Lowest Na = 133 mmol/L in last 2 days, will monitor as appropriate  # Hypochloremia: Lowest Cl = 97 mmol/L in last 2 days, will monitor as appropriate         # Acute Kidney Injury, unspecified: based on a >150% or 0.3 mg/dL increase in last creatinine compared to past 90 day average, will monitor renal function  # Hypertension: Noted on problem list      # Anemia: based on hgb <11      # DMII: A1C = N/A within past 6 months               Disposition Plan   {TIP  It is advised to update the Medical Readiness for Discharge [MRD] daily, until the patient is 'Ready Now.' Last Documentation-    . Use the SmartList below to update for today:513003}  Medically Ready for Discharge: {TIME; MEDICALLY READY FOR DISCHARGE:52003125}           ANKIT MELGAR MD  Hospitalist Service  [facility]  Securely message with [employer] (more info)  Text page via [employer] Paging/Directory     ______________________________________________________________________    Chief Complaint   ***    {History obtained from:6555801}    History of Present Illness   Milo Lozano is a 89 year old male who ***    Patient presented to the emergency room with complaints of generalized weakness.  Reportedly unable to get off the toilet so he called EMS who was able to assist him to get off the toilet.  He has a history of CML and has been receiving treatment with last session 13 days ago.  Reports lightheadedness that started yesterday and shortness of  breath with cough prior to the chemo.  Patient denies fevers nausea vomiting diarrhea abdominal pain.    He did receive a blood transfusion early December.    On presentation to the emergency room noted a blood pressure 109/64 and a pulse of 106 afebrile.  Patient was found to be in atrial fibrillation with mild tachycardia irregularly irregular rhythm.  White count 9.1 hemoglobin 8.5 platelets of 154.  Sodium 133 with potassium of 6 and a creatinine of 2.62  Troponin elevated at 111.  TSH 10.4  EKG with rapid ventricular response.  No acute ST segment changes.  Patient admitted with        Past Medical History    Past Medical History:   Diagnosis Date    Anemia due to chronic kidney disease 10/10/2023    BPH (benign prostatic hypertrophy)     BPH with obstruction/lower urinary tract symptoms 09/17/2020    BPV (benign positional vertigo), unspecified laterality 10/21/2021    CARDIOVASCULAR SCREENING; LDL GOAL LESS THAN 100 12/29/2011    CARDIOVASCULAR SCREENING; LDL GOAL LESS THAN 130 12/29/2011    Chronic diffuse otitis externa of right ear 10/22/2024    Class 1 obesity due to excess calories with body mass index (BMI) of 31.0 to 31.9 in adult 09/17/2020    Dizziness 10/10/2023    Essential tremor 10/21/2019    Falls frequently 10/22/2024    Hypertension goal BP (blood pressure) < 140/90 12/29/2011    Hypothyroidism due to acquired atrophy of thyroid 12/05/2016    Iron deficiency 10/10/2023    Low HDL (under 40) 11/29/2022    Normocytic anemia 08/15/2017    Poor balance 10/21/2021    Prostate cancer (H) 01/2007    Had Radiation    Thrombocytopenia 09/18/2019    Type 2 diabetes mellitus with hyperglycemia (H) 10/21/2010    Type 2 diabetes, HbA1c goal < 7% (H)        Past Surgical History   Past Surgical History:   Procedure Laterality Date    BONE MARROW BIOPSY, BONE SPECIMEN, NEEDLE/TROCAR Left 12/3/2024    Procedure: BIOPSY, BONE MARROW;  Surgeon: Viki Colon PA-C;  Location: [facility] OR    CATARACT  EXTRACTION Bilateral     2020    COLONOSCOPY      cystoscopy with uroLIft implant  08/2024       Prior to Admission Medications   Prior to Admission Medications   Prescriptions Last Dose Informant Patient Reported? Taking?   DULoxetine (CYMBALTA) 20 MG capsule 1/29/2025 Spouse/Significant Other No Yes   Sig: Take 1 capsule (20 mg) by mouth daily.   amLODIPine (NORVASC) 10 MG tablet 1/29/2025 Spouse/Significant Other No Yes   Sig: TAKE 1 TABLET BY MOUTH DAILY   folic acid (FOLVITE) 1 MG tablet 1/29/2025 Spouse/Significant Other No Yes   Sig: Take 1 tablet (1 mg) by mouth daily   glimepiride (AMARYL) 4 MG tablet 1/29/2025 Spouse/Significant Other No Yes   Sig: Take 2 tablets (8 mg) by mouth every morning (before breakfast)   levothyroxine (SYNTHROID/LEVOTHROID) 88 MCG tablet 1/29/2025 Spouse/Significant Other No Yes   Sig: TAKE 1 TABLET BY MOUTH EVERY DAY GENERIC EQUIVALENT FOR SYNTHROID   losartan (COZAAR) 100 MG tablet 1/29/2025 Spouse/Significant Other No Yes   Sig: Take 1 tablet (100 mg) by mouth daily.   metFORMIN (GLUCOPHAGE XR) 500 MG 24 hr tablet 1/29/2025 Spouse/Significant Other No Yes   Sig: TAKE 2 TABLETS BY MOUTH DAILY WITH DINNER   pregabalin (LYRICA) 75 MG capsule 1/29/2025 Spouse/Significant Other No Yes   Sig: TAKE ONE CAPSULE BY MOUTH TWICE DAILY. GENERIC EQUIVALENT FOR LYRICA   propranolol ER (INDERAL LA) 80 MG 24 hr capsule 1/29/2025 Spouse/Significant Other No Yes   Sig: TAKE ONE CAPSULE BY MOUTH DAILY   tamsulosin (FLOMAX) 0.4 MG capsule 1/29/2025 Spouse/Significant Other No Yes   Sig: TAKE 2 CAPSULES BY MOUTH EVERY DAY      Facility-Administered Medications: None      {Additional Note Sections (OPTIONAL)  :950233}     Physical Exam   Vital Signs: Temp: 97.7  F (36.5  C) Temp src: Oral BP: 109/64 Pulse: 106   Resp: 18 SpO2: 95 %      Weight: 164 lbs 0 oz    {Recommend personal SmartPhrase or Notewriter for exam (OPTIONAL)    :102168}    Medical Decision Making   { TIP   MDM Calculator    MDM  "grid (w/ times)    Coding Support Chat  Billing is now based on time OR medical decision making complexity. Medical decision making included in your A&P does NOT need to be re-documented here.    :89088}    {Time  :442771::\"*** MINUTES SPENT BY ME on the date of service doing chart review, history, exam, documentation & further activities per the note.\"}      Data   {INSERT LABS/IMAGING (OPTIONAL)   :299807}  " Date    BONE MARROW BIOPSY, BONE SPECIMEN, NEEDLE/TROCAR Left 12/3/2024    Procedure: BIOPSY, BONE MARROW;  Surgeon: Viki Colon PA-C;  Location: [facility] OR    CATARACT EXTRACTION Bilateral     2020    COLONOSCOPY      cystoscopy with uroLIft implant  08/2024       Prior to Admission Medications   Prior to Admission Medications   Prescriptions Last Dose Informant Patient Reported? Taking?   DULoxetine (CYMBALTA) 20 MG capsule 1/29/2025 Spouse/Significant Other No Yes   Sig: Take 1 capsule (20 mg) by mouth daily.   amLODIPine (NORVASC) 10 MG tablet 1/29/2025 Spouse/Significant Other No Yes   Sig: TAKE 1 TABLET BY MOUTH DAILY   folic acid (FOLVITE) 1 MG tablet 1/29/2025 Spouse/Significant Other No Yes   Sig: Take 1 tablet (1 mg) by mouth daily   glimepiride (AMARYL) 4 MG tablet 1/29/2025 Spouse/Significant Other No Yes   Sig: Take 2 tablets (8 mg) by mouth every morning (before breakfast)   levothyroxine (SYNTHROID/LEVOTHROID) 88 MCG tablet 1/29/2025 Spouse/Significant Other No Yes   Sig: TAKE 1 TABLET BY MOUTH EVERY DAY GENERIC EQUIVALENT FOR SYNTHROID   losartan (COZAAR) 100 MG tablet 1/29/2025 Spouse/Significant Other No Yes   Sig: Take 1 tablet (100 mg) by mouth daily.   metFORMIN (GLUCOPHAGE XR) 500 MG 24 hr tablet 1/29/2025 Spouse/Significant Other No Yes   Sig: TAKE 2 TABLETS BY MOUTH DAILY WITH DINNER   pregabalin (LYRICA) 75 MG capsule 1/29/2025 Spouse/Significant Other No Yes   Sig: TAKE ONE CAPSULE BY MOUTH TWICE DAILY. GENERIC EQUIVALENT FOR LYRICA   propranolol ER (INDERAL LA) 80 MG 24 hr capsule 1/29/2025 Spouse/Significant Other No Yes   Sig: TAKE ONE CAPSULE BY MOUTH DAILY   tamsulosin (FLOMAX) 0.4 MG capsule 1/29/2025 Spouse/Significant Other No Yes   Sig: TAKE 2 CAPSULES BY MOUTH EVERY DAY      Facility-Administered Medications: None        Review of Systems    The 10 point Review of Systems is negative other than noted in the HPI or here.     Social History   I have reviewed this patient's  social history and updated it with pertinent information if needed.  Social History     Tobacco Use    Smoking status: Never     Passive exposure: Never    Smokeless tobacco: Never   Vaping Use    Vaping status: Never Used   Substance Use Topics    Alcohol use: No    Drug use: No         Family History   I have reviewed this patient's family history and updated it with pertinent information if needed.  Family History   Problem Relation Age of Onset    Hypertension Mother     Dementia Mother     Prostate Cancer Paternal Grandfather     Cancer No family hx of     Diabetes No family hx of     Cerebrovascular Disease No family hx of     Thyroid Disease No family hx of     Glaucoma No family hx of     Macular Degeneration No family hx of          Allergies   Allergies   Allergen Reactions    Lisinopril Cough     Developed an ACE cough on the Lisinopril, pt denies this is a current allergy as of 6/19/2020.        Physical Exam   Vital Signs: Temp: 97.7  F (36.5  C) Temp src: Oral BP: 109/64 Pulse: 106   Resp: 18 SpO2: 95 %      Weight: 164 lbs 0 oz    General Appearance: Patient laying in the ER with his eyes closed.   Able to wake up and have some conversation regarding care plan, CODE STATUS.  Voice is weak and he is tearful  Respiratory: Clear to auscultation bilaterally without wheezes or rhonchi  Cardiovascular: Irregular rate with no gallop or rub normal S1-S2  GI: Positive bowel sounds soft no rebound guarding or tenderness  Skin: No overt edema      Medical Decision Making       75 MINUTES SPENT BY ME on the date of service doing chart review, history, exam, documentation & further activities per the note.    Discussion with the patient and family at the bedside.  Had a conference with the wife Anita and stepdaughter Delphine.   Discussion regarding goals of care.  Discussion regarding current presentation and multiorgan challenges discussed went through lab results imaging    Data     I have personally reviewed the  following data over the past 24 hrs:    9.1  \   8.5 (L)   / 154     133 (L) 98 61.0 (H) /  210 (H)   5.1 21 (L) 2.61 (H) \     ALT: 21 AST: 22 AP: 108 TBILI: 1.2   ALB: 4.0 TOT PROTEIN: 8.3 LIPASE: N/A     Trop: 103 (HH) BNP: 2,790 (H)     TSH: 10.47 (H) T4: 1.58 A1C: 6.4 (H)     Procal: 0.17 CRP: N/A Lactic Acid: 1.4         Imaging results reviewed over the past 24 hrs:   Recent Results (from the past 24 hours)   XR Chest 2 Views    Narrative    EXAM: XR CHEST 2 VIEWS  LOCATION: [facility]  DATE: 1/30/2025    INDICATION: Fever.  COMPARISON: None.      Impression    IMPRESSION:   Left lower lobe infiltrate. Question some left pleural fluid.

## 2025-01-30 NOTE — PHARMACY-ADMISSION MEDICATION HISTORY
Pharmacist Admission Medication History    Admission medication history is complete. The information provided in this note is only as accurate as the sources available at the time of the update.    Information Source(s): Family member and CareEverywhere/SureScripts via in-person    Changes made to PTA medication list:  Added: None  Deleted: Farxiga, Ferrous sulfate, Ondansetron  Changed: None    Allergies reviewed with patient and updates made in EHR: no    Medication History Completed By: Karrie Dodson RPH 1/30/2025 4:00 PM    PTA Med List   Medication Sig Last Dose/Taking    amLODIPine (NORVASC) 10 MG tablet TAKE 1 TABLET BY MOUTH DAILY 1/29/2025    DULoxetine (CYMBALTA) 20 MG capsule Take 1 capsule (20 mg) by mouth daily. 1/29/2025    folic acid (FOLVITE) 1 MG tablet Take 1 tablet (1 mg) by mouth daily 1/29/2025    glimepiride (AMARYL) 4 MG tablet Take 2 tablets (8 mg) by mouth every morning (before breakfast) 1/29/2025    levothyroxine (SYNTHROID/LEVOTHROID) 88 MCG tablet TAKE 1 TABLET BY MOUTH EVERY DAY GENERIC EQUIVALENT FOR SYNTHROID 1/29/2025    losartan (COZAAR) 100 MG tablet Take 1 tablet (100 mg) by mouth daily. 1/29/2025    metFORMIN (GLUCOPHAGE XR) 500 MG 24 hr tablet TAKE 2 TABLETS BY MOUTH DAILY WITH DINNER 1/29/2025    pregabalin (LYRICA) 75 MG capsule TAKE ONE CAPSULE BY MOUTH TWICE DAILY. GENERIC EQUIVALENT FOR LYRICA 1/29/2025    propranolol ER (INDERAL LA) 80 MG 24 hr capsule TAKE ONE CAPSULE BY MOUTH DAILY 1/29/2025    tamsulosin (FLOMAX) 0.4 MG capsule TAKE 2 CAPSULES BY MOUTH EVERY DAY 1/29/2025

## 2025-01-30 NOTE — ED NOTES
Bed: ED12  Expected date:   Expected time:   Means of arrival:   Comments:  [place] 738 89 m weakness cancer eta 15

## 2025-01-31 ENCOUNTER — APPOINTMENT (OUTPATIENT)
Dept: CT IMAGING | Facility: CLINIC | Age: 89
DRG: 064 | End: 2025-01-31
Attending: INTERNAL MEDICINE
Payer: COMMERCIAL

## 2025-01-31 ENCOUNTER — APPOINTMENT (OUTPATIENT)
Dept: CT IMAGING | Facility: CLINIC | Age: 89
DRG: 064 | End: 2025-01-31
Attending: STUDENT IN AN ORGANIZED HEALTH CARE EDUCATION/TRAINING PROGRAM
Payer: COMMERCIAL

## 2025-01-31 VITALS
DIASTOLIC BLOOD PRESSURE: 77 MMHG | WEIGHT: 164 LBS | BODY MASS INDEX: 24.86 KG/M2 | SYSTOLIC BLOOD PRESSURE: 100 MMHG | HEART RATE: 117 BPM | HEIGHT: 68 IN | OXYGEN SATURATION: 95 % | TEMPERATURE: 97.7 F | RESPIRATION RATE: 20 BRPM

## 2025-01-31 LAB
ABO + RH BLD: NORMAL
ALBUMIN SERPL BCG-MCNC: 3.1 G/DL (ref 3.5–5.2)
ALP SERPL-CCNC: 90 U/L (ref 40–150)
ALT SERPL W P-5'-P-CCNC: 18 U/L (ref 0–70)
ANION GAP SERPL CALCULATED.3IONS-SCNC: 14 MMOL/L (ref 7–15)
AST SERPL W P-5'-P-CCNC: 21 U/L (ref 0–45)
BILIRUB DIRECT SERPL-MCNC: <0.2 MG/DL (ref 0–0.3)
BILIRUB SERPL-MCNC: 0.5 MG/DL
BLD GP AB SCN SERPL QL: NEGATIVE
BLD PROD TYP BPU: NORMAL
BLOOD COMPONENT TYPE: NORMAL
BUN SERPL-MCNC: 53.6 MG/DL (ref 8–23)
CALCIUM SERPL-MCNC: 8.5 MG/DL (ref 8.8–10.4)
CHLORIDE SERPL-SCNC: 103 MMOL/L (ref 98–107)
CODING SYSTEM: NORMAL
CREAT SERPL-MCNC: 1.96 MG/DL (ref 0.67–1.17)
CROSSMATCH: NORMAL
EGFRCR SERPLBLD CKD-EPI 2021: 32 ML/MIN/1.73M2
ERYTHROCYTE [DISTWIDTH] IN BLOOD BY AUTOMATED COUNT: 18.7 % (ref 10–15)
GLUCOSE BLDC GLUCOMTR-MCNC: 185 MG/DL (ref 70–99)
GLUCOSE SERPL-MCNC: 196 MG/DL (ref 70–99)
HCO3 SERPL-SCNC: 18 MMOL/L (ref 22–29)
HCT VFR BLD AUTO: 23.4 % (ref 40–53)
HGB BLD-MCNC: 7.2 G/DL (ref 13.3–17.7)
ISSUE DATE AND TIME: NORMAL
MCH RBC QN AUTO: 23.2 PG (ref 26.5–33)
MCHC RBC AUTO-ENTMCNC: 30.8 G/DL (ref 31.5–36.5)
MCV RBC AUTO: 75 FL (ref 78–100)
PLATELET # BLD AUTO: 131 10E3/UL (ref 150–450)
POTASSIUM SERPL-SCNC: 4.6 MMOL/L (ref 3.4–5.3)
PROT SERPL-MCNC: 6.9 G/DL (ref 6.4–8.3)
RBC # BLD AUTO: 3.11 10E6/UL (ref 4.4–5.9)
SODIUM SERPL-SCNC: 135 MMOL/L (ref 135–145)
SPECIMEN EXP DATE BLD: NORMAL
UNIT ABO/RH: NORMAL
UNIT NUMBER: NORMAL
UNIT STATUS: NORMAL
UNIT TYPE ISBT: 9500
WBC # BLD AUTO: 7.6 10E3/UL (ref 4–11)

## 2025-01-31 PROCEDURE — 82248 BILIRUBIN DIRECT: CPT | Performed by: INTERNAL MEDICINE

## 2025-01-31 PROCEDURE — 99223 1ST HOSP IP/OBS HIGH 75: CPT | Performed by: INTERNAL MEDICINE

## 2025-01-31 PROCEDURE — 258N000003 HC RX IP 258 OP 636: Performed by: INTERNAL MEDICINE

## 2025-01-31 PROCEDURE — 250N000009 HC RX 250: Performed by: INTERNAL MEDICINE

## 2025-01-31 PROCEDURE — 70450 CT HEAD/BRAIN W/O DYE: CPT | Mod: 77

## 2025-01-31 PROCEDURE — 70450 CT HEAD/BRAIN W/O DYE: CPT

## 2025-01-31 PROCEDURE — 86901 BLOOD TYPING SEROLOGIC RH(D): CPT | Performed by: INTERNAL MEDICINE

## 2025-01-31 PROCEDURE — 36415 COLL VENOUS BLD VENIPUNCTURE: CPT | Performed by: INTERNAL MEDICINE

## 2025-01-31 PROCEDURE — 99222 1ST HOSP IP/OBS MODERATE 55: CPT | Performed by: PHYSICIAN ASSISTANT

## 2025-01-31 PROCEDURE — 99233 SBSQ HOSP IP/OBS HIGH 50: CPT | Performed by: INTERNAL MEDICINE

## 2025-01-31 PROCEDURE — 120N000001 HC R&B MED SURG/OB

## 2025-01-31 PROCEDURE — 99223 1ST HOSP IP/OBS HIGH 75: CPT | Performed by: NURSE PRACTITIONER

## 2025-01-31 PROCEDURE — 85014 HEMATOCRIT: CPT | Performed by: INTERNAL MEDICINE

## 2025-01-31 PROCEDURE — 82962 GLUCOSE BLOOD TEST: CPT

## 2025-01-31 PROCEDURE — 82565 ASSAY OF CREATININE: CPT | Performed by: INTERNAL MEDICINE

## 2025-01-31 PROCEDURE — 250N000013 HC RX MED GY IP 250 OP 250 PS 637: Performed by: INTERNAL MEDICINE

## 2025-01-31 PROCEDURE — 250N000011 HC RX IP 250 OP 636: Performed by: INTERNAL MEDICINE

## 2025-01-31 PROCEDURE — 86923 COMPATIBILITY TEST ELECTRIC: CPT | Performed by: INTERNAL MEDICINE

## 2025-01-31 PROCEDURE — P9016 RBC LEUKOCYTES REDUCED: HCPCS | Performed by: INTERNAL MEDICINE

## 2025-01-31 RX ORDER — METOPROLOL TARTRATE 25 MG/1
25 TABLET, FILM COATED ORAL 2 TIMES DAILY
Status: DISCONTINUED | OUTPATIENT
Start: 2025-02-01 | End: 2025-02-01

## 2025-01-31 RX ORDER — CARBOXYMETHYLCELLULOSE SODIUM 5 MG/ML
1-2 SOLUTION/ DROPS OPHTHALMIC
Status: DISCONTINUED | OUTPATIENT
Start: 2025-01-31 | End: 2025-02-07 | Stop reason: HOSPADM

## 2025-01-31 RX ORDER — DIGOXIN 0.25 MG/ML
250 INJECTION INTRAMUSCULAR; INTRAVENOUS ONCE
Status: COMPLETED | OUTPATIENT
Start: 2025-01-31 | End: 2025-01-31

## 2025-01-31 RX ORDER — BISACODYL 10 MG
10 SUPPOSITORY, RECTAL RECTAL
Status: DISCONTINUED | OUTPATIENT
Start: 2025-02-03 | End: 2025-02-07 | Stop reason: HOSPADM

## 2025-01-31 RX ORDER — METOPROLOL TARTRATE 25 MG/1
25 TABLET, FILM COATED ORAL 2 TIMES DAILY
Status: DISCONTINUED | OUTPATIENT
Start: 2025-01-31 | End: 2025-01-31

## 2025-01-31 RX ORDER — LORAZEPAM 1 MG/1
1 TABLET ORAL
Status: DISCONTINUED | OUTPATIENT
Start: 2025-01-31 | End: 2025-02-07 | Stop reason: HOSPADM

## 2025-01-31 RX ORDER — METOPROLOL TARTRATE 1 MG/ML
5 INJECTION, SOLUTION INTRAVENOUS EVERY 4 HOURS PRN
Status: DISCONTINUED | OUTPATIENT
Start: 2025-01-31 | End: 2025-02-07 | Stop reason: HOSPADM

## 2025-01-31 RX ORDER — HYDROMORPHONE HYDROCHLORIDE 1 MG/ML
1 SOLUTION ORAL
Status: DISCONTINUED | OUTPATIENT
Start: 2025-01-31 | End: 2025-02-02

## 2025-01-31 RX ORDER — HYDROMORPHONE HYDROCHLORIDE 2 MG/1
2 TABLET ORAL
Status: DISCONTINUED | OUTPATIENT
Start: 2025-01-31 | End: 2025-02-02

## 2025-01-31 RX ORDER — NALOXONE HYDROCHLORIDE 0.4 MG/ML
0.2 INJECTION, SOLUTION INTRAMUSCULAR; INTRAVENOUS; SUBCUTANEOUS
Status: DISCONTINUED | OUTPATIENT
Start: 2025-01-31 | End: 2025-02-07 | Stop reason: HOSPADM

## 2025-01-31 RX ORDER — MINERAL OIL/HYDROPHIL PETROLAT
OINTMENT (GRAM) TOPICAL
Status: DISCONTINUED | OUTPATIENT
Start: 2025-01-31 | End: 2025-02-07 | Stop reason: HOSPADM

## 2025-01-31 RX ORDER — HYDROMORPHONE HYDROCHLORIDE 1 MG/ML
2 SOLUTION ORAL
Status: DISCONTINUED | OUTPATIENT
Start: 2025-01-31 | End: 2025-02-02

## 2025-01-31 RX ORDER — SENNOSIDES 8.6 MG
1 TABLET ORAL 2 TIMES DAILY PRN
Status: DISCONTINUED | OUTPATIENT
Start: 2025-01-31 | End: 2025-02-07 | Stop reason: HOSPADM

## 2025-01-31 RX ORDER — GLIMEPIRIDE 1 MG/1
1 TABLET ORAL
Status: DISCONTINUED | OUTPATIENT
Start: 2025-01-31 | End: 2025-01-31

## 2025-01-31 RX ORDER — METOPROLOL TARTRATE 1 MG/ML
5 INJECTION, SOLUTION INTRAVENOUS ONCE
Status: DISCONTINUED | OUTPATIENT
Start: 2025-01-31 | End: 2025-01-31

## 2025-01-31 RX ORDER — HYDROMORPHONE HYDROCHLORIDE 1 MG/ML
0.5 INJECTION, SOLUTION INTRAMUSCULAR; INTRAVENOUS; SUBCUTANEOUS
Status: DISCONTINUED | OUTPATIENT
Start: 2025-01-31 | End: 2025-02-07 | Stop reason: HOSPADM

## 2025-01-31 RX ORDER — LORAZEPAM 2 MG/ML
1 INJECTION INTRAMUSCULAR
Status: DISCONTINUED | OUTPATIENT
Start: 2025-01-31 | End: 2025-02-07 | Stop reason: HOSPADM

## 2025-01-31 RX ORDER — OLANZAPINE 5 MG/1
5 TABLET, ORALLY DISINTEGRATING ORAL EVERY 6 HOURS PRN
Status: DISCONTINUED | OUTPATIENT
Start: 2025-01-31 | End: 2025-02-07 | Stop reason: HOSPADM

## 2025-01-31 RX ORDER — NALOXONE HYDROCHLORIDE 0.4 MG/ML
0.1 INJECTION, SOLUTION INTRAMUSCULAR; INTRAVENOUS; SUBCUTANEOUS
Status: DISCONTINUED | OUTPATIENT
Start: 2025-01-31 | End: 2025-02-07 | Stop reason: HOSPADM

## 2025-01-31 RX ORDER — SODIUM CHLORIDE 9 MG/ML
INJECTION, SOLUTION INTRAVENOUS CONTINUOUS
Status: DISCONTINUED | OUTPATIENT
Start: 2025-01-31 | End: 2025-01-31

## 2025-01-31 RX ORDER — HYDROMORPHONE HYDROCHLORIDE 1 MG/ML
0.3 INJECTION, SOLUTION INTRAMUSCULAR; INTRAVENOUS; SUBCUTANEOUS
Status: DISCONTINUED | OUTPATIENT
Start: 2025-01-31 | End: 2025-02-07 | Stop reason: HOSPADM

## 2025-01-31 RX ADMIN — LEVOTHYROXINE SODIUM 88 MCG: 88 TABLET ORAL at 08:54

## 2025-01-31 RX ADMIN — PREGABALIN 75 MG: 75 CAPSULE ORAL at 08:54

## 2025-01-31 RX ADMIN — SODIUM CHLORIDE 500 ML: 9 INJECTION, SOLUTION INTRAVENOUS at 00:59

## 2025-01-31 RX ADMIN — PROPRANOLOL HYDROCHLORIDE 80 MG: 80 CAPSULE, EXTENDED RELEASE ORAL at 10:25

## 2025-01-31 RX ADMIN — TAMSULOSIN HYDROCHLORIDE 0.8 MG: 0.4 CAPSULE ORAL at 08:54

## 2025-01-31 RX ADMIN — DIGOXIN 250 MCG: 0.25 INJECTION INTRAMUSCULAR; INTRAVENOUS at 08:53

## 2025-01-31 RX ADMIN — FOLIC ACID 1 MG: 1 TABLET ORAL at 08:54

## 2025-01-31 RX ADMIN — SODIUM CHLORIDE: 9 INJECTION, SOLUTION INTRAVENOUS at 01:03

## 2025-01-31 RX ADMIN — DILTIAZEM HYDROCHLORIDE 5 MG/HR: 5 INJECTION, SOLUTION INTRAVENOUS at 08:03

## 2025-01-31 RX ADMIN — ACETAMINOPHEN 650 MG: 325 TABLET, FILM COATED ORAL at 15:06

## 2025-01-31 RX ADMIN — DULOXETINE HYDROCHLORIDE 20 MG: 20 CAPSULE, DELAYED RELEASE ORAL at 08:54

## 2025-01-31 ASSESSMENT — ACTIVITIES OF DAILY LIVING (ADL)
ADLS_ACUITY_SCORE: 57
ADLS_ACUITY_SCORE: 41
ADLS_ACUITY_SCORE: 41
ADLS_ACUITY_SCORE: 57
ADLS_ACUITY_SCORE: 41
ADLS_ACUITY_SCORE: 43
ADLS_ACUITY_SCORE: 41
ADLS_ACUITY_SCORE: 41
ADLS_ACUITY_SCORE: 57
ADLS_ACUITY_SCORE: 41
ADLS_ACUITY_SCORE: 43
ADLS_ACUITY_SCORE: 43
ADLS_ACUITY_SCORE: 57
ADLS_ACUITY_SCORE: 41
ADLS_ACUITY_SCORE: 57
ADLS_ACUITY_SCORE: 41
ADLS_ACUITY_SCORE: 43
ADLS_ACUITY_SCORE: 41

## 2025-01-31 NOTE — PLAN OF CARE
Goal Outcome Evaluation:      Plan of Care Reviewed With: spouse          Outcome Evaluation: hospice, discharge planning

## 2025-01-31 NOTE — CONSULTS
Palliative Care Consultation Note  [facility]      Patient: Milo Lozano  Date of Admission:  1/30/2025    Requesting Clinician / Team: Hospital medicine  Reason for consult:   Goals of care     Recommendations & Counseling     GOALS OF CARE:   Comfort focused   Transition to comfort care in the emergency department while awaiting discharge back to his residence with hospice support.  Social work consulted, appreciate assistance.  Is now reflective of comfort care    ADVANCE CARE PLANNING:  Advance healthcare directive noted in chart however no scanned images on file.  Would defer to next of kin, [place], or decision making  There is no POLST form on file, defer to patient and/or next of kin for decisions   Code status: No CPR- Do NOT Intubate    DECISION MAKING:  Patient's decision making preferences: shared with support from loved ones  Patient has decision-making capacity today for complex decisions: Intact     MEDICAL MANAGEMENT:   Comfort Care   Below are common symptoms routinely seen in patients with comfort focused goals and at the end of life. This patient may not currently exhibit all of these symptoms; however, if these were to occur our recommendations are as follows:    #Atrial fibrillation with RVR (newly diagnosed) uncertain duration.  Now sinus rhythm  Continue PTA metoprolol    #Chronic myelomonocytic leukemia.   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 Continue menchaca catheter given comfort goals and hospice care.    #Pain  Continue prior to admission pregabalin (Lyrica)  1st choice: hydromorphone PO 1-2 mg q 2 hour as needed.   2nd choice: hydromorphone IV 0.3-0.5 mg q 15 minutes as needed     If unable to take PO and morphine, consider oxycodone SL 5-10 mg q 1 hour  as needed     #Air hunger/Dyspnea   1st choice: hydromorphone PO 1-2 mg q 1 hour as needed.   2nd choice: hydromorphone IV 0.3-0.5 mg q 15 minutes as needed     #Anxiety    1st choice: Lorazepam PO/SL 1 mg  q 3 hour as needed.   2nd choice: Lorazepam IV 1 mg q 3 hour as needed    #Secretion burden   Robinul 0.1 mg (PO/IV) q 4 hours as needed. If ineffective, increase up to 0.3 mg   Consider atropine if Robinul ineffective after dose increase      #Nausea   Zofran 4 mg q 6 hours as needed. Can increase to 8 mg   Zyprexa 5 mg q 6 hours as needed     #Agitation  Aggressive control of other symptoms first, then  1st choice: Zyprexa 5 mg ODT or IM   2nd choice: Increase dose of Zyprexa to 10 mg or consider switch to Haldol   3rd choice: Lorazepam as above     PSYCHOSOCIAL/SPIRITUAL SUPPORT:  Family   [place]lie community: [facility]    Palliative Care will continue to follow. Thank you for the consult and allowing us to aid in the care of Milo Lozano.    These recommendations have been discussed with bedside and primary team.    Jacobo Jessica NP  [facility], Palliative Care  Securely message with the [employer] Web Console (learn more here) or  Text page via [email or web address] Paging/[place]     Chart documentation was completed, in part, with [employer] voice-recognition software. Even though reviewed, some grammatical, spelling, and word errors may remain.    Assessment    Milo Lozano is a 89 year old male with a history of type 2 diabetes, CML, hypertension, CKD, BPH, hypothyroidism, DEMOND, BPV, presented to the emergency room on 1/30 with weakness and history of falls got admitted after he had difficulty in getting off the toilet.  CT head without contrast in the ED showed small right subdural hematoma at 7mm  .  No mass effect or midline shift or hydrocephalus         History of Present Illness   Met with Jack and his wife [place].   Introduced the role of palliative care as an  "interdisciplinary team that cares for patients with serious illness to help support symptom management, communication, coping for patients and their families as well as support with medical decision making.    Prognosis, Goals, & Planning:   Functional Status just prior to this current hospitalization:  Outpatient Palliative Performance Score (PPS) 60%  Significant disease.  Normal or reduced intake. Normal LOC or confusion. Reduced ambulation, some assist w/self-care, unable to work/do housework.      Prognosis, Goals, and/or Advance Care Planning:  We discussed general treatment options (full/restorative, selective/conservatives, and comfort only/hospice). We then discussed how these specifically apply to Alex.  Alex shared that that he is not interested in ongoing disease treatment.  He shared that he is \"ready to get it over with \".  Asked if there was anything specific in terms of how he feels or his overall situation that makes him feel this way.  He shared that it was nothing in particular other than being simply in the hospital and he is not interested in remaining here.  Education provided on transition to comfort-focused goals of care would be including discontinuation of IV fluids, cardiac monitoring, labs, tube feeding, TPN and other interventions that do not directly promote comfort.  Anticipatory guidance was given regarding feeding, hunger, fluids at end of life. We discussed utilization of medications to ease air hunger, agitation and restlessness at the time that ventilator is compassionately withdrawn.  Discussed that this process is very purposeful in terms of ensuring patient is as comfortable as possible and that family wishes are honored.  Education provided regarding hospice philosophy, prognostic,and eligibility criteria. Discussed what services are provided and those that are not,  Discussed common misconceptions. We explored the various disposition options where they can receive hospice " care (home, residential hospice homes, LTC with hospice) including subsequent financial and familial implications. Discussed typical anticipated timing of discharge.  He assents to transition to comfort care and then discharge back to his residence with hospice support.  His wife [place] assents to this as well.    Code Status was addressed today:   No already DNR/DNI which is clinically appropriate          Coping, Meaning, & Spirituality:   Mood, coping, and/or meaning in the context of serious illness were addressed today: Yes    Social:   Living situation:lives with family    Medications:  Reviewed this patient's medication profile and medications from this hospitalization.   [place] Board of Pharmacy Data Base Reviewed: Yes:   reviewed - controlled substances reflected in medication list.    ROS:  Comprehensive ROS is reviewed and is negative except as here & per HPI:     Physical Exam   Vital Signs with Ranges  Temp:  [97.7  F (36.5  C)-98.9  F (37.2  C)] 98.4  F (36.9  C)  Pulse:  [] 85  Resp:  [0-38] 9  BP: ()/(60-98) 154/77  SpO2:  [87 %-96 %] 94 %  Wt Readings from Last 10 Encounters:   01/30/25 74.4 kg (164 lb)   01/13/25 81.7 kg (180 lb 3.2 oz)   12/03/24 81.6 kg (180 lb)   11/29/24 85.3 kg (188 lb)   11/26/24 85.5 kg (188 lb 9.6 oz)   11/12/24 85.3 kg (188 lb)   10/22/24 82.1 kg (181 lb)   08/28/24 82.4 kg (181 lb 11.2 oz)   07/24/24 81.1 kg (178 lb 14.4 oz)   05/28/24 81.6 kg (180 lb)     164 lbs 0 oz    Physical Exam  Vitals and nursing note reviewed.   Constitutional:       General: He is sleeping. He is not in acute distress.  HENT:      Head: Normocephalic.      Mouth/Throat:      Mouth: Mucous membranes are moist.      Pharynx: Oropharynx is clear.   Eyes:      Extraocular Movements: Extraocular movements intact.      Conjunctiva/sclera: Conjunctivae normal.      Pupils: Pupils are equal, round, and reactive to light.   Cardiovascular:      Rate and Rhythm: Normal rate and regular  rhythm.      Pulses: Normal pulses.   Pulmonary:      Effort: Pulmonary effort is normal. No respiratory distress.      Breath sounds: No wheezing.   Abdominal:      General: Abdomen is flat. There is no distension.      Tenderness: There is no abdominal tenderness.   Musculoskeletal:         General: Normal range of motion.   Skin:     General: Skin is warm and dry.      Capillary Refill: Capillary refill takes less than 2 seconds.   Neurological:      General: No focal deficit present.      Mental Status: Mental status is at baseline. He is lethargic.   Psychiatric:         Mood and Affect: Affect is flat.         Thought Content: Thought content normal.           Data reviewed:  Results for orders placed or performed during the hospital encounter of 01/30/25 (from the past 24 hours)   EKG 12-lead, tracing only   Result Value Ref Range    Systolic Blood Pressure  mmHg    Diastolic Blood Pressure  mmHg    Ventricular Rate 102 BPM    Atrial Rate  BPM    MA Interval  ms    QRS Duration 90 ms     ms    QTc 419 ms    P Axis  degrees    R AXIS 58 degrees    T Axis 39 degrees    Interpretation ECG       Atrial fibrillation with rapid ventricular response  Low voltage QRS  Cannot rule out Anterior infarct , age undetermined  Abnormal ECG  No previous ECGs available  Confirmed by GENERATED REPORT, COMPUTER ([id number]),  Mellisa Rodriguez ([id number]) on 1/30/2025 2:53:17 PM     [place] Draw    Narrative    The following orders were created for panel order [place] Draw.  Procedure                               Abnormality         Status                     ---------                               -----------         ------                     Extra Blue Top Tube[[id number]]                              Final result               Extra Red Top Tube[[id number]]                               Final result               Extra Green Top (Lithium...[[id number]]                      Final result               Extra Purple Top  Tube[[id number]]                            Final result                 Please view results for these tests on the individual orders.   Extra Blue Top Tube   Result Value Ref Range    Hold Specimen JIC    Extra Red Top Tube   Result Value Ref Range    Hold Specimen JIC    Extra Green Top (Lithium Heparin) Tube   Result Value Ref Range    Hold Specimen JIC    Extra Purple Top Tube   Result Value Ref Range    Hold Specimen JIC    CBC with platelets differential    Narrative    The following orders were created for panel order CBC with platelets differential.  Procedure                               Abnormality         Status                     ---------                               -----------         ------                     CBC with platelets and d...[[id number]]  Abnormal            Final result                 Please view results for these tests on the individual orders.   Comprehensive metabolic panel   Result Value Ref Range    Sodium 133 (L) 135 - 145 mmol/L    Potassium 6.0 (H) 3.4 - 5.3 mmol/L    Carbon Dioxide (CO2) 22 22 - 29 mmol/L    Anion Gap 14 7 - 15 mmol/L    Urea Nitrogen 59.9 (H) 8.0 - 23.0 mg/dL    Creatinine 2.62 (H) 0.67 - 1.17 mg/dL    GFR Estimate 23 (L) >60 mL/min/1.73m2    Calcium 9.2 8.8 - 10.4 mg/dL    Chloride 97 (L) 98 - 107 mmol/L    Glucose 256 (H) 70 - 99 mg/dL    Alkaline Phosphatase 108 40 - 150 U/L    AST 22 0 - 45 U/L    ALT 21 0 - 70 U/L    Protein Total 8.3 6.4 - 8.3 g/dL    Albumin 4.0 3.5 - 5.2 g/dL    Bilirubin Total 1.2 <=1.2 mg/dL   Troponin T, High Sensitivity   Result Value Ref Range    Troponin T, High Sensitivity 111 (HH) <=22 ng/L   TSH with free T4 reflex   Result Value Ref Range    TSH 10.47 (H) 0.30 - 4.20 uIU/mL   Nt probnp inpatient (BNP)   Result Value Ref Range    N terminal Pro BNP Inpatient 2,790 (H) 0 - 1,800 pg/mL   CBC with platelets and differential   Result Value Ref Range    WBC Count 9.1 4.0 - 11.0 10e3/uL    RBC Count 3.60 (L) 4.40 - 5.90 10e6/uL     Hemoglobin 8.5 (L) 13.3 - 17.7 g/dL    Hematocrit 27.1 (L) 40.0 - 53.0 %    MCV 75 (L) 78 - 100 fL    MCH 23.6 (L) 26.5 - 33.0 pg    MCHC 31.4 (L) 31.5 - 36.5 g/dL    RDW 19.0 (H) 10.0 - 15.0 %    Platelet Count 154 150 - 450 10e3/uL    % Neutrophils 77 %    % Lymphocytes 8 %    % Monocytes 13 %    % Eosinophils 1 %    % Basophils 0 %    % Immature Granulocytes 1 %    NRBCs per 100 WBC 0 <1 /100    Absolute Neutrophils 7.0 1.6 - 8.3 10e3/uL    Absolute Lymphocytes 0.7 (L) 0.8 - 5.3 10e3/uL    Absolute Monocytes 1.2 0.0 - 1.3 10e3/uL    Absolute Eosinophils 0.1 0.0 - 0.7 10e3/uL    Absolute Basophils 0.0 0.0 - 0.2 10e3/uL    Absolute Immature Granulocytes 0.1 <=0.4 10e3/uL    Absolute NRBCs 0.0 10e3/uL   T4 free   Result Value Ref Range    Free T4 1.58 0.90 - 1.70 ng/dL   Procalcitonin   Result Value Ref Range    Procalcitonin 0.17 <0.50 ng/mL   Hemoglobin A1c   Result Value Ref Range    Estimated Average Glucose 137 (H) <117 mg/dL    Hemoglobin A1C 6.4 (H) <5.7 %   Blood Culture Peripheral Blood    Specimen: Peripheral Blood   Result Value Ref Range    Culture No growth after 12 hours    Blood Culture Peripheral Blood    Specimen: Peripheral Blood   Result Value Ref Range    Culture No growth after 12 hours    iStat Gases (lactate) venous, POCT   Result Value Ref Range    Lactic Acid POCT 1.4 <=2.0 mmol/L    Bicarbonate Venous POCT 21 21 - 28 mmol/L    O2 Sat, Venous POCT 32 (L) 70 - 75 %    pCO2 Venous POCT 35 (L) 40 - 50 mm Hg    pH Venous POCT 7.38 7.32 - 7.43    pO2 Venous POCT 20 (L) 25 - 47 mm Hg    Base Excess/Deficit (+/-) POCT -4.0 (L) -3.0 - 3.0 mmol/L   Influenza A/B, RSV and SARS-CoV2 PCR (COVID-19) Nasopharyngeal    Specimen: Nasopharyngeal; Swab   Result Value Ref Range    Influenza A PCR Negative Negative    Influenza B PCR Negative Negative    RSV PCR Negative Negative    SARS CoV2 PCR Negative Negative    Narrative    Testing was performed using the [employer] Xpress CoV2/Flu/RSV Assay on the [employer]  GeneXpert Instrument. This test should be ordered for the detection of SARS-CoV2, influenza, and RSV viruses in individuals with signs and symptoms of respiratory tract infection. This test is for in vitro diagnostic use under the [place] FDA for laboratories certified under CLIA to perform high or moderate complexity testing. This test has been [place] FDA cleared. A negative result does not rule out the presence of PCR inhibitors in the specimen or target RNA in concentration below the limit of detection for the assay. If only one viral target is positive but coinfection with multiple targets is suspected, the sample should be re-tested with another FDA cleared, approved, or authorized test, if coninfection would change clinical management. This test was validated by the [facility]. These laboratories are certified under the Clinical Laboratory Improvement Amendments of 1988 (CLIA-88) as qualified to perfom high complexity laboratory testing.   XR Chest 2 Views    Narrative    EXAM: XR CHEST 2 VIEWS  LOCATION: [facility]  DATE: 1/30/2025    INDICATION: Fever.  COMPARISON: None.      Impression    IMPRESSION:   Left lower lobe infiltrate. Question some left pleural fluid.   UA with Microscopic reflex to Culture    Specimen: Urine, Catheter   Result Value Ref Range    Color Urine Yellow Colorless, Straw, Light Yellow, Yellow    Appearance Urine Clear Clear    Glucose Urine Negative Negative mg/dL    Bilirubin Urine Negative Negative    Ketones Urine Negative Negative mg/dL    Specific Gravity Urine 1.021 1.003 - 1.035    Blood Urine Negative Negative    pH Urine 5.5 5.0 - 7.0    Protein Albumin Urine 70 (A) Negative mg/dL    Urobilinogen Urine Normal Normal, 2.0 mg/dL    Nitrite Urine Negative Negative    Leukocyte Esterase Urine Negative Negative    Mucus Urine Present (A) None Seen /LPF    RBC Urine 1 <=2 /HPF    WBC Urine 2 <=5 /HPF    Hyaline Casts Urine 1 <=2 /LPF     Narrative    Urine Culture not indicated   Troponin T, High Sensitivity   Result Value Ref Range    Troponin T, High Sensitivity 103 (HH) <=22 ng/L   Basic metabolic panel   Result Value Ref Range    Sodium 133 (L) 135 - 145 mmol/L    Potassium 6.0 (H) 3.4 - 5.3 mmol/L    Chloride 98 98 - 107 mmol/L    Carbon Dioxide (CO2) 21 (L) 22 - 29 mmol/L    Anion Gap 14 7 - 15 mmol/L    Urea Nitrogen 61.0 (H) 8.0 - 23.0 mg/dL    Creatinine 2.61 (H) 0.67 - 1.17 mg/dL    GFR Estimate 23 (L) >60 mL/min/1.73m2    Calcium 8.9 8.8 - 10.4 mg/dL    Glucose 238 (H) 70 - 99 mg/dL   Potassium   Result Value Ref Range    Potassium 5.1 3.4 - 5.3 mmol/L   Glucose by meter   Result Value Ref Range    GLUCOSE BY METER POCT 210 (H) 70 - 99 mg/dL   CT Head w/o Contrast    Narrative    EXAM: CT HEAD W/O CONTRAST  LOCATION: [facility]  DATE: 1/31/2025    INDICATION: falls  COMPARISON: 5/24/2019.  TECHNIQUE: Routine CT Head without IV contrast. Multiplanar reformats. Dose reduction techniques were used.    FINDINGS:  INTRACRANIAL CONTENTS: There is a mixed attenuated right lateral subdural hematoma with a width of approximately 7 mm. There is no other extra-axial fluid collection or intraparenchymal hemorrhage. There is no evidence of a midline shift shift. No CT   evidence of acute infarct. Mild presumed chronic small vessel ischemic changes. The ventricular system, basal cisterns and the cortical sulci are consistent with mild diffuse volume loss.     VISUALIZED ORBITS/SINUSES/MASTOIDS: No intraorbital abnormality. There are moderate air-fluid levels within the maxillary sinuses bilaterally and mild mucosal thickening of the ethmoid air cells. There is fluid filling the right mastoid air cells and the   right middle ear regions.     BONES/SOFT TISSUES: The calvarium is intact.      Impression    IMPRESSION:  1.  Mixed attenuated subdural hematoma right lateral 7 mm.  2.  Mild diffuse age related changes.    Comprehensive metabolic panel   Result Value Ref Range    Sodium 135 135 - 145 mmol/L    Potassium 4.6 3.4 - 5.3 mmol/L    Carbon Dioxide (CO2) 18 (L) 22 - 29 mmol/L    Anion Gap 14 7 - 15 mmol/L    Urea Nitrogen 53.6 (H) 8.0 - 23.0 mg/dL    Creatinine 1.96 (H) 0.67 - 1.17 mg/dL    GFR Estimate 32 (L) >60 mL/min/1.73m2    Calcium 8.5 (L) 8.8 - 10.4 mg/dL    Chloride 103 98 - 107 mmol/L    Glucose 196 (H) 70 - 99 mg/dL    Alkaline Phosphatase 90 40 - 150 U/L    AST 21 0 - 45 U/L    ALT 18 0 - 70 U/L    Protein Total 6.9 6.4 - 8.3 g/dL    Albumin 3.1 (L) 3.5 - 5.2 g/dL    Bilirubin Total 0.5 <=1.2 mg/dL   CBC with platelets   Result Value Ref Range    WBC Count 7.6 4.0 - 11.0 10e3/uL    RBC Count 3.11 (L) 4.40 - 5.90 10e6/uL    Hemoglobin 7.2 (L) 13.3 - 17.7 g/dL    Hematocrit 23.4 (L) 40.0 - 53.0 %    MCV 75 (L) 78 - 100 fL    MCH 23.2 (L) 26.5 - 33.0 pg    MCHC 30.8 (L) 31.5 - 36.5 g/dL    RDW 18.7 (H) 10.0 - 15.0 %    Platelet Count 131 (L) 150 - 450 10e3/uL   Bilirubin direct   Result Value Ref Range    Bilirubin Direct <0.20 0.00 - 0.30 mg/dL   ABO/Rh type and screen    Narrative    The following orders were created for panel order ABO/Rh type and screen.  Procedure                               Abnormality         Status                     ---------                               -----------         ------                     Adult Type and Screen[[id number]]                            Final result                 Please view results for these tests on the individual orders.   Adult Type and Screen   Result Value Ref Range    ABO/RH(D) O NEG     Antibody Screen Negative Negative    SPECIMEN EXPIRATION DATE [id number]    Glucose by meter   Result Value Ref Range    GLUCOSE BY METER POCT 185 (H) 70 - 99 mg/dL   CT Head w/o Contrast    Narrative    EXAM: CT HEAD W/O CONTRAST  LOCATION: [facility]  DATE: 1/31/2025    INDICATION: SDH interval assesment  COMPARISON: 01/31/2025 at 0025  hours.  TECHNIQUE: Routine CT Head without IV contrast. Multiplanar reformats. Dose reduction techniques were used.    FINDINGS:  INTRACRANIAL CONTENTS: Right inferior frontal extra-axial fluid collection measures 6 mm, similar to prior. No midline shift or mass effect.  No CT evidence of acute infarct. Moderate presumed chronic small vessel ischemic changes. Normal ventricles and   sulci.     VISUALIZED ORBITS/SINUSES/MASTOIDS: No intraorbital abnormality. Moderate mucosal thickening scattered about the paranasal sinuses. Complete/near complete opacification of the right and scattered opacification of the left mastoid air cells.    BONES/SOFT TISSUES: No acute abnormality.      Impression    IMPRESSION: Similar right subdural hematoma. No mass effect, midline shift or hydrocephalus.   Prepare red blood cells (unit)   Result Value Ref Range    Blood Component Type Red Blood Cells     Product Code [id number]     Unit Status Transfused     Unit Number [id number]     CROSSMATCH Compatible     CODING SYSTEM [id number]     ISSUE DATE AND TIME [id number]     UNIT ABO/RH O-     UNIT TYPE ISBT 9500        Medical Decision Making       84 MINUTES SPENT BY ME on the date of service doing chart review, history, exam, documentation & further activities per the note.

## 2025-01-31 NOTE — ED NOTES
[facility]  ED Nurse Handoff Report    ED Chief complaint: Generalized Weakness      ED Diagnosis:   Final diagnoses:   Atrial fibrillation with RVR (H)   Community acquired pneumonia of left lower lobe of lung   Hyperkalemia   Acute on chronic renal insufficiency   Urinary retention       Code Status: DNR / DNI    Allergies:   Allergies   Allergen Reactions    Lisinopril Cough     Developed an ACE cough on the Lisinopril, pt denies this is a current allergy as of 6/19/2020.       Patient Story: Milo Lozano is a 89 year old male with a history significant for type 2 diabetes, CML, hypertension, CKD and BPH who presents to the ED for generalized weakness. Pt reports increased weakness recently and inability to get off toilet today.Pt notes progressive weakness following biopsy for bone cancer earlier this month.     Of note pt recently underwent a series of chemo for his CML with last session 13 days ago.     Focused Assessment:    Constitutional: Wife at bedside  Respiratory: Regular rate and depth. No shortness of breath noted on exam   Cardiac: afib  Neurologic: A&Ox4       Treatments and/or interventions provided:   Labs Ordered and Resulted from Time of ED Arrival to Time of ED Departure   COMPREHENSIVE METABOLIC PANEL - Abnormal       Result Value    Sodium 133 (*)     Potassium 6.0 (*)     Carbon Dioxide (CO2) 22      Anion Gap 14      Urea Nitrogen 59.9 (*)     Creatinine 2.62 (*)     GFR Estimate 23 (*)     Calcium 9.2      Chloride 97 (*)     Glucose 256 (*)     Alkaline Phosphatase 108      AST 22      ALT 21      Protein Total 8.3      Albumin 4.0      Bilirubin Total 1.2     ROUTINE UA WITH MICROSCOPIC REFLEX TO CULTURE - Abnormal    Color Urine Yellow      Appearance Urine Clear      Glucose Urine Negative      Bilirubin Urine Negative      Ketones Urine Negative      Specific Gravity Urine 1.021      Blood Urine Negative      pH Urine 5.5      Protein Albumin Urine 70 (*)      Urobilinogen Urine Normal      Nitrite Urine Negative      Leukocyte Esterase Urine Negative      Mucus Urine Present (*)     RBC Urine 1      WBC Urine 2      Hyaline Casts Urine 1     TROPONIN T, HIGH SENSITIVITY - Abnormal    Troponin T, High Sensitivity 111 (*)    TSH WITH FREE T4 REFLEX - Abnormal    TSH 10.47 (*)    NT PROBNP INPATIENT - Abnormal    N terminal Pro BNP Inpatient 2,790 (*)    CBC WITH PLATELETS AND DIFFERENTIAL - Abnormal    WBC Count 9.1      RBC Count 3.60 (*)     Hemoglobin 8.5 (*)     Hematocrit 27.1 (*)     MCV 75 (*)     MCH 23.6 (*)     MCHC 31.4 (*)     RDW 19.0 (*)     Platelet Count 154      % Neutrophils 77      % Lymphocytes 8      % Monocytes 13      % Eosinophils 1      % Basophils 0      % Immature Granulocytes 1      NRBCs per 100 WBC 0      Absolute Neutrophils 7.0      Absolute Lymphocytes 0.7 (*)     Absolute Monocytes 1.2      Absolute Eosinophils 0.1      Absolute Basophils 0.0      Absolute Immature Granulocytes 0.1      Absolute NRBCs 0.0     ISTAT GASES LACTATE VENOUS POCT - Abnormal    Lactic Acid POCT 1.4      Bicarbonate Venous POCT 21      O2 Sat, Venous POCT 32 (*)     pCO2 Venous POCT 35 (*)     pH Venous POCT 7.38      pO2 Venous POCT 20 (*)     Base Excess/Deficit (+/-) POCT -4.0 (*)    TROPONIN T, HIGH SENSITIVITY - Abnormal    Troponin T, High Sensitivity 103 (*)    HEMOGLOBIN A1C - Abnormal    Estimated Average Glucose 137 (*)     Hemoglobin A1C 6.4 (*)    BASIC METABOLIC PANEL - Abnormal    Sodium 133 (*)     Potassium 6.0 (*)     Chloride 98      Carbon Dioxide (CO2) 21 (*)     Anion Gap 14      Urea Nitrogen 61.0 (*)     Creatinine 2.61 (*)     GFR Estimate 23 (*)     Calcium 8.9      Glucose 238 (*)    INFLUENZA A/B, RSV AND SARS-COV2 PCR - Normal    Influenza A PCR Negative      Influenza B PCR Negative      RSV PCR Negative      SARS CoV2 PCR Negative     T4 FREE - Normal    Free T4 1.58     PROCALCITONIN - Normal    Procalcitonin 0.17     GLUCOSE MONITOR  NURSING POCT   GLUCOSE MONITOR NURSING POCT   GLUCOSE MONITOR NURSING POCT   POTASSIUM   BLOOD CULTURE   BLOOD CULTURE     XR Chest 2 Views   Final Result   IMPRESSION:    Left lower lobe infiltrate. Question some left pleural fluid.        Medications   sodium chloride (PF) 0.9% PF flush 3 mL (has no administration in time range)   sodium chloride (PF) 0.9% PF flush 3 mL ( Intracatheter Canceled Entry 1/30/25 1545)   lidocaine 1 % 0.1-1 mL (has no administration in time range)   lidocaine (LMX4) cream (has no administration in time range)   sodium chloride (PF) 0.9% PF flush 3 mL (3 mLs Intracatheter $Given 1/30/25 1819)   sodium chloride (PF) 0.9% PF flush 3 mL (has no administration in time range)   senna-docusate (SENOKOT-S/PERICOLACE) 8.6-50 MG per tablet 1 tablet (has no administration in time range)     Or   senna-docusate (SENOKOT-S/PERICOLACE) 8.6-50 MG per tablet 2 tablet (has no administration in time range)   glucose gel 15-30 g (has no administration in time range)     Or   dextrose 50 % injection 25-50 mL (has no administration in time range)     Or   glucagon injection 1 mg (has no administration in time range)   acetaminophen (TYLENOL) tablet 650 mg (has no administration in time range)     Or   acetaminophen (TYLENOL) Suppository 650 mg (has no administration in time range)   ondansetron (ZOFRAN ODT) ODT tab 4 mg (has no administration in time range)     Or   ondansetron (ZOFRAN) injection 4 mg (has no administration in time range)   insulin aspart (NovoLOG) injection (RAPID ACTING) ( Subcutaneous Not Given 1/30/25 1800)   insulin aspart (NovoLOG) injection (RAPID ACTING) (has no administration in time range)   DULoxetine (CYMBALTA) DR capsule 20 mg (has no administration in time range)   folic acid (FOLVITE) tablet 1 mg (has no administration in time range)   levothyroxine (SYNTHROID/LEVOTHROID) tablet 88 mcg (has no administration in time range)   pregabalin (LYRICA) capsule 75 mg (75 mg Oral  $Given 1/30/25 2030)   propranolol ER (INDERAL LA) 24 hr capsule 80 mg (has no administration in time range)   tamsulosin (FLOMAX) capsule 0.8 mg (has no administration in time range)   azithromycin (ZITHROMAX) 500 mg vial to attach to  mL bag (has no administration in time range)   cefTRIAXone (ROCEPHIN) 2 g vial to attach to  ml bag for ADULTS or NS 50 ml bag for PEDS (has no administration in time range)   cefTRIAXone (ROCEPHIN) 2 g vial to attach to  ml bag for ADULTS or NS 50 ml bag for PEDS (0 g Intravenous Stopped 1/30/25 1930)   azithromycin (ZITHROMAX) 500 mg vial to attach to  mL bag (0 mg Intravenous Stopped 1/30/25 2005)   sodium chloride 0.9% BOLUS 500 mL (0 mLs Intravenous Stopped 1/30/25 2120)   furosemide (LASIX) injection 20 mg (20 mg Intravenous $Given 1/30/25 2006)   sodium chloride 0.9% BOLUS 500 mL (500 mLs Intravenous $New Bag 1/30/25 1854)       Patient's response to treatments and/or interventions: Tolerated well    To be done/followed up on inpatient unit:  Per Provider order    Does this patient have any cognitive concerns?:  N/A    Activity level - Baseline/Home:  Walker  Activity Level - Current:   Stand with Assist    Patient's Preferred language: English   Needed?: No    Isolation: None  Infection: Not Applicable  Patient tested for COVID 19 prior to admission: NO  Bariatric?: No    Vital Signs:   Vitals:    01/30/25 2010 01/30/25 2035 01/30/25 2051 01/30/25 2100   BP:   112/79 (!) 121/98   Pulse: 118 105 112 120   Resp: 16 20 20 24   Temp:       TempSrc:       SpO2:       Weight:       Height:           Cardiac Rhythm:Cardiac Rhythm: Atrial fibrillation         Family Comments: Wife at bedside, calm and cooperative, participates in cares.      For the majority of the shift this patient's behavior was Green.   Behavioral interventions performed were N/A.    ED NURSE PHONE NUMBER: [phone]

## 2025-01-31 NOTE — PROGRESS NOTES
Care Management Follow Up    Length of Stay (days): 1    Expected Discharge Date: 02/01/2025     Concerns to be Addressed: discharge planning     Patient plan of care discussed at interdisciplinary rounds: No    Anticipated Discharge Disposition: Hospice    Anticipated Discharge Services: None  Anticipated Discharge DME:      Patient/family educated on Medicare website which has current facility and service quality ratings:    Education Provided on the Discharge Plan: No  Patient/Family in Agreement with the Plan: yes    Referrals Placed by CM/SW: Hospice  Private pay costs discussed:   Not applicable  Discussed  Partnership in Safe Discharge Planning  document with patient/family: No     Handoff Completed: No, handoff not indicated or clinically appropriate    Additional Information:  Palliative NP, Jacobo, notified KRYSTIAN that pt will be going home with hospice when medically cleared. KRYSTIAN called pt's ILF, [facility] in [place], and they prefer [facility] but family can use any agency.     KRYSTIAN met with pt and his wife, [place], at bedside and introduced self and role. KRYSTIAN explained the role of a hospice agency and provided [place] a list of potential agencies. KRYSTIAN informed [place] [facility] does not provide 24/7 care. [place] said she feels confident in taking care of pt at home. [place] asked KRYSTIAN to call [facility] to see if they can provide extra services if needed while pt is on hospice.     [facility] [place] and [facility] for Cathryn, housing counselor, and requested a call back to discuss potential additional services.     Addendum 1430: Cathryn called KRYSTIAN back and said if pt is needing additional cares in the ILF, family would need to hire private pay services. KRYSTIAN informed [place] of this and gave her a list of private pay home care agencies. [place] informed KRYSTIAN that pt has long term care insurance.     Next Steps: Get hospice agency choice, safe discharge plan.     Gabbi Smith [facility]  [place]  Inpatient Care Management

## 2025-01-31 NOTE — SIGNIFICANT EVENT
Significant Event Note    Time of event: 1:38 AM January 31, 2025    Description of event:  neurosurgery    Plan:  Neursurgery consulted>CT repeat in AM, observe    Vincenzo Odom MD

## 2025-01-31 NOTE — PROGRESS NOTES
RECEIVING UNIT ED HANDOFF REVIEW    ED Nurse Handoff Report was reviewed by: Su Cooper RN on January 31, 2025 at 4:45 PM

## 2025-01-31 NOTE — PROGRESS NOTES
[facility]  Neurosurgery Daily Progress Note    Assessment  89-year-old male presented to the ER with generalized weakness.  Neurosurgery was consulted for acute on chronic right SDH.    Interval History   Patient reports mild headache.  Denies nausea, vomiting, dizziness, vision/speech changes, or new symptoms.  Neuro exam stable.    Repeat head CT completed:     EXAM: CT HEAD W/O CONTRAST  LOCATION: [facility]  DATE: 1/31/2025                                                           IMPRESSION: Similar right subdural hematoma. No mass effect, midline shift or hydrocephalus.    Plan   -No surgical intervention planned at this time   -Hold any blood thinners, aspirin, NSAIDS   -SBP less than 150  -Monitor neuro exam   -Follow up with NSGY clinic in 1 month with head CT prior to appt     Discussed with Dr. Jerrell Kay, CNP  [facility] Neurosurgery  Clinic phone number: [phone]  Securely message or page via Epic Secure Chat, [employer], or [employer]     Physical Exam   Temp: 98.4  F (36.9  C) Temp src: Oral BP: 123/65 Pulse: 74   Resp: 14 SpO2: 94 % O2 Device: None (Room air)    Vitals:    01/30/25 1430   Weight: 74.4 kg (164 lb)       Mental status:  Alert and oriented x 3, speech is fluent, following commands  Cranial nerves:  II-XII intact.   Motor:  Moves all extremities with appropriate strength.   Sensation:  Intact to light touch   Coordination:  Smooth finger to nose testing.   Negative pronator drift.

## 2025-01-31 NOTE — PROVIDER NOTIFICATION
MD Notification    Notified Person: MD    Notified Person Name: Dr. Kayla Villalba     Notification Date/Time: 5:37 PM 01/31/25     Notification Interaction: [employer] Messaging    Purpose of Notification: SBAR Situation: [address] L[address] Background: Gen weakness/pneumonia/Urinary retention Assessment: Pt just got up to unit. Is on Renal diet, can we switch it to Reg now that he is Comfort Cares?    Orders Received: Yes we can    Comments:

## 2025-01-31 NOTE — PROGRESS NOTES
[facility]    Medicine Progress Note - Hospitalist Service    Date of Admission:  1/30/2025    Assessment & Plan   Milo Lozano is a 89 year old male with a history of type 2 diabetes, CML, hypertension, CKD, BPH, hypothyroidism, DEMOND, BPV, presented to the emergency room on 1/30 with weakness and history of falls got admitted after he had difficulty in getting off the toilet.  CT head without contrast in the ED showed small right subdural hematoma at 7mm  .  No mass effect or midline shift or hydrocephalus     Palliative care consultation requested regarding goals of care discussion.  Goals of care discussion was done with patient and his wife by [facility] and palliative care provider Harshad Jessica.  Patient and family decided to transition to comfort cares only and plan on discharge with hospice.  Comfort care orders initiated on 1/31/2025  Social work consultation requested for hospice referral on discharge.  And are following      Small right subdural hematoma at 7 mm;  Repeat CT head showed stable subdural hematoma  Neurosurgery consulted and are following  PTA anticoagulation on hold  PT OT consultations will be requested     Left lower lobe pneumonia  Patient admitted with weakness and CXR findings of pneumonia.   Started on rocephin and zithromax.  SARS/COVID/RSV negative.   Lactate is normal 1.4  Blood cultures X 2 ordered.   Given his immunocompromise state will treat empirically with antibiotics  He describes a cough but has no fevers and his history is somewhat limited in reference to respiratory symptom  White count currently 9 and procalcitonin normal 0.17   Continue Rocephin and Zithromax.'  His weakness may be overall decline, CML, treatment but pneumonia not excluded and will treat      Chronic myelomonocytic leukemia.   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 Prince.   Bone marrow biopsy  12/2024   Recently started on a new treatment that he receives for 5 days Monday through Friday 1 week a month  His wife is uncertain if he can tolerate this or if he wants to continue   will have hematology/oncology see the patient for potential discussion regarding his prognosis with the CML in addition to anticoagulation of family chose to pursue this but it sounds unlikely  1 unit PRBCs transfusion ordered on 131  Patient's wife signed blood transfusion consent at bedside on 1/31/2025     Atrial fibrillation with RVR (newly diagnosed) uncertain duration.   Elevated troponin (potential supply/demand)   Newly diagnosed of uncertain duration   On propranolol PTA which could help with rate control and may have masked afib with rate control.   ECHO (no prior found)   Serial troponin first elevatyed 111 >> 103. (NSTEMI vs ischemia) Trend down with delta change  Family aware of troponin levels but given his overall decline are unlikely to pursue any other invasive workup  Holding anticoagulation with history of falls prior to admission and generalized weakness and anemia and subdural hematoma on admission  Patient was in A-fib RVR overnight heart rate in the 120s to 140s.  One-time dose of IV digoxin 250 mcg given.  Patient converted to normal sinus rhythm with heart rate currently in the 80s  Also started on diltiazem drip  Cardiology consultation requested.  Appreciate assistance     Hyperkalemia   Potassium at 6   No overt EKG changes.   Stop cozaar  Repeat after fluids.   Addendum after Barbosa catheter placement, fluid bolus and Lasix potassium came down to 5.1  Continue to trend: Would not resume Cozaar  Potassium improved to 4.6     DM:   Patient on amaryl 8 mg po am >> hold for now pending appetite  Glucophage XR 1000 mg po at dinner >> hold   Sliding scale coverage   Restart Amaryl low-dose at 1 mg p.o. daily     Mild hereditary spherocytosis  On daily folate 1 mg po daily   History of occasional spherocytes on  peripheral smear  Recent hematology notes in reference     Failure to thrive  Recent reported weight loss.   Recent balance issues reported  Noted last hematology visit with weight loss and change in eating habits.   In hematology clinic reports of balance issues with weight loss and anemia   1/30 his wife reports that the patient has had episodes of falling at home  Last was a few weeks ago and reportedly did not hurt himself or hit his head  Given balance problems and falls a CT of the head was ordered  PT/OT/case management  As noted will have a palliative care discussion about goals of care.  His wife is concerned about his progressive weakness and not hardly getting out of bed  Palliative care consulted for goals of care discussion with patient and family.  Appreciate assistance     CKD stage 3  Creatinine 2.61 which is slightly up on admission  Given fluids with 1 L given the poor p.o. intake   Also bladder scanned given history of urinary retention  Required menchaca and continue on fluids   Creatinine improved to 1.96 with IV fluids.  Continue IV fluids     Urinary retention  History of urinary retention.  Greater than 450 in the ER with Menchaca catheter inserted for chronic retention  Urology consultation requested on 1/31/2025     History of prostate cancer  Treated with XRT 2007     Pain   Lyrica 75 mg po BID      DEMOND:   On CPAP              Diet: Renal Diet (non-dialysis)    DVT Prophylaxis:   [place] Catheter: PRESENT, indication: ?  (Error. Value could not be saved.)  Lines: None     Cardiac Monitoring: ACTIVE order. Indication: Tachyarrhythmias, acute (48 hours)  Code Status: No CPR- Do NOT Intubate      Clinically Significant Risk Factors Present on Admission        # Hyperkalemia: Highest K = 6 mmol/L in last 2 days, will monitor as appropriate  # Hyponatremia: Lowest Na = 133 mmol/L in last 2 days, will monitor as appropriate  # Hypochloremia: Lowest Cl = 97 mmol/L in last 2 days, will monitor as  appropriate      # Hypoalbuminemia: Lowest albumin = 3.1 g/dL at 1/31/2025  6:19 AM, will monitor as appropriate     # Hypertension: Noted on problem list      # Anemia: based on hgb <11           # Financial/Environmental Concerns: none         Social Drivers of Health    Physical Activity: Inactive (10/21/2024)    Exercise Vital Sign     Days of Exercise per Week: 0 days     Minutes of Exercise per Session: 0 min   Social Connections: Unknown (10/21/2024)    Social Connection and Isolation Panel [NHANES]     Frequency of Social Gatherings with Friends and Family: Once a week          Disposition Plan     Medically Ready for Discharge: Anticipated in 2-4 Days             Kayla Villalba MD  Hospitalist Service  [facility]  Securely message with [employer] (more info)  Text page via [email or web address] Paging/Directory   ______________________________________________________________________    Interval History   Patient is resting comfortably in bed.  Denies any pain.  No chest pain or shortness of breath or palpitations currently.  Was in A-fib RVR overnight with heart rate in the 120s to 140s.  Started on diltiazem drip.  One-time dose of IV digoxin given patient converted to normal sinus rhythm hemoglobin down to 7.11 unit PRBCs ordered.  Wife at bedside and signed the blood transfusion consent.  No other acute issues  Physical Exam   Vital Signs: Temp: 98.9  F (37.2  C) Temp src: Oral BP: (!) 142/66 Pulse: 85   Resp: 16 SpO2: 92 % O2 Device: None (Room air)    Weight: 164 lbs 0 oz    General Appearance: Alert awake, resting comfortably in bed, not in acute distress  Respiratory: Clear to auscultation bilaterally, no crackles or wheezing  Cardiovascular: Normal rate rhythm regular, no murmurs  GI: Soft, nontender nondistended bowel sounds positive  Skin: Warm and dry no lower extremity edema  Psychiatric; calm and cooperative    Medical Decision Making       52 MINUTES SPENT BY ME on the date of service  doing chart review, history, exam, documentation & further activities per the note.      Data     I have personally reviewed the following data over the past 24 hrs:    7.6  \   7.2 (L)   / 131 (L)     135 103 53.6 (H) /  185 (H)   4.6 18 (L) 1.96 (H) \     ALT: 18 AST: 21 AP: 90 TBILI: 0.5   ALB: 3.1 (L) TOT PROTEIN: 6.9 LIPASE: N/A     Trop: 103 (HH) BNP: 2,790 (H)     TSH: 10.47 (H) T4: 1.58 A1C: 6.4 (H)     Procal: 0.17 CRP: N/A Lactic Acid: 1.4         Imaging results reviewed over the past 24 hrs:   Recent Results (from the past 24 hours)   XR Chest 2 Views    Narrative    EXAM: XR CHEST 2 VIEWS  LOCATION: [facility]  DATE: 1/30/2025    INDICATION: Fever.  COMPARISON: None.      Impression    IMPRESSION:   Left lower lobe infiltrate. Question some left pleural fluid.   CT Head w/o Contrast    Narrative    EXAM: CT HEAD W/O CONTRAST  LOCATION: [facility]  DATE: 1/31/2025    INDICATION: falls  COMPARISON: 5/24/2019.  TECHNIQUE: Routine CT Head without IV contrast. Multiplanar reformats. Dose reduction techniques were used.    FINDINGS:  INTRACRANIAL CONTENTS: There is a mixed attenuated right lateral subdural hematoma with a width of approximately 7 mm. There is no other extra-axial fluid collection or intraparenchymal hemorrhage. There is no evidence of a midline shift shift. No CT   evidence of acute infarct. Mild presumed chronic small vessel ischemic changes. The ventricular system, basal cisterns and the cortical sulci are consistent with mild diffuse volume loss.     VISUALIZED ORBITS/SINUSES/MASTOIDS: No intraorbital abnormality. There are moderate air-fluid levels within the maxillary sinuses bilaterally and mild mucosal thickening of the ethmoid air cells. There is fluid filling the right mastoid air cells and the   right middle ear regions.     BONES/SOFT TISSUES: The calvarium is intact.      Impression    IMPRESSION:  1.  Mixed attenuated subdural  hematoma right lateral 7 mm.  2.  Mild diffuse age related changes.   CT Head w/o Contrast    Narrative    EXAM: CT HEAD W/O CONTRAST  LOCATION: [facility]  DATE: 1/31/2025    INDICATION: SDH interval assesment  COMPARISON: 01/31/2025 at 0025 hours.  TECHNIQUE: Routine CT Head without IV contrast. Multiplanar reformats. Dose reduction techniques were used.    FINDINGS:  INTRACRANIAL CONTENTS: Right inferior frontal extra-axial fluid collection measures 6 mm, similar to prior. No midline shift or mass effect.  No CT evidence of acute infarct. Moderate presumed chronic small vessel ischemic changes. Normal ventricles and   sulci.     VISUALIZED ORBITS/SINUSES/MASTOIDS: No intraorbital abnormality. Moderate mucosal thickening scattered about the paranasal sinuses. Complete/near complete opacification of the right and scattered opacification of the left mastoid air cells.    BONES/SOFT TISSUES: No acute abnormality.      Impression    IMPRESSION: Similar right subdural hematoma. No mass effect, midline shift or hydrocephalus.

## 2025-01-31 NOTE — PROGRESS NOTES
Writer attempted to see patient. Patient was in with nurse and physician.     Enrique SANCHEZ  [facility]  Inpatient Care Coordination

## 2025-01-31 NOTE — H&P
[facility]    History and Physical - Hospitalist Service       Date of Admission:  1/30/2025    Assessment & Plan      Milo Lozano is a 89 year old male admitted on 1/30/2025. He ***    Pneumonia  CXR Left lower lobe   Patient admitted with weakness and CXR findings of pneumonia.   Started on rocephin and zithromax.  SARS/COVID/RSV negative.   Lactate is normal 1.4  Blood cultures X 2 ordered.       Chronic myelomonocytic leukemia.   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 Prince.   Bone marrow biopsy 12/2024   On     Atrial fibrillation with mild RVR  Elevated troponin (potential supply/demand)   Newly diagnosed of uncertain duration   Multiple mentions of progressive anemia.   On propranolol PTA  ECHO (no prior found)   Serial troponin first elevatyed 111  Currently holding on anticoagulation given the anemia, CML, and uncertain risk/benefit at this time  Will have hematology/oncology input tomorrow and cardiology.     Hyperkalemia   Potassium at 6   No overt EKG changes.   Stop cozaar  Repeat after fluids.     DM:   Patient on amaryl 8 mg po am >> hold for now pending appetite  Glucophage XR 1000 mg po at dinner >> hold with sepsis     Mild hereditary spherocytosis  On daily folate 1 mg po daily   History of occasional spherocytes on peripheral smea.     Recent reported weight loss.   Noted last hematology visit with weight loss and change in eating habits.     Recent balance issues reported  In hematology clinic reports of balance issues with weight loss and anemia     CKD stage 3    History of prostate cancer  Treated with XRT 2007    Pain   Lyrica 75 mg po BID     DEMOND:   On CPAP     GOALS of care  Patient's wife Anita in the ER and step daughter called Delphine. Started discussion of his multiple diagnosis on admit. Afib and HR elevated. Anticoagulation, renal concerns, potassium.           Diet:  regular   DVT Prophylaxis: {DVT PROPHYLAXIS:672287}  Chelsey  Catheter: PRESENT, indication:    Lines: None     Cardiac Monitoring: None  Code Status:  ***    Clinically Significant Risk Factors Present on Admission   { TIP  This section helps capture the illness of the patient on admission.     - Review diagnoses highlighted in blue; right click, edit & delete if not appropriate   - If blank, no additional diagnoses identified   :74723}     # Hyperkalemia: Highest K = 6 mmol/L in last 2 days, will monitor as appropriate  # Hyponatremia: Lowest Na = 133 mmol/L in last 2 days, will monitor as appropriate  # Hypochloremia: Lowest Cl = 97 mmol/L in last 2 days, will monitor as appropriate         # Acute Kidney Injury, unspecified: based on a >150% or 0.3 mg/dL increase in last creatinine compared to past 90 day average, will monitor renal function  # Hypertension: Noted on problem list      # Anemia: based on hgb <11      # DMII: A1C = N/A within past 6 months               Disposition Plan   {TIP  It is advised to update the Medical Readiness for Discharge [MRD] daily, until the patient is 'Ready Now.' Last Documentation-    . Use the SmartList below to update for today:258947}  Medically Ready for Discharge: {TIME; MEDICALLY READY FOR DISCHARGE:75642114}           ANKIT MELGAR MD  Hospitalist Service  [facility]  Securely message with [employer] (more info)  Text page via [employer] Paging/Directory     ______________________________________________________________________    Chief Complaint   ***    {History obtained from:5534249}    History of Present Illness   Milo Lozano is a 89 year old male who ***    Patient presented to the emergency room with complaints of generalized weakness.  Reportedly unable to get off the toilet so he called EMS who was able to assist him to get off the toilet.  He has a history of CML and has been receiving treatment with last session 13 days ago.  Reports lightheadedness that started yesterday and shortness of  breath with cough prior to the chemo.  Patient denies fevers nausea vomiting diarrhea abdominal pain.    He did receive a blood transfusion early December.    On presentation to the emergency room noted a blood pressure 109/64 and a pulse of 106 afebrile.  Patient was found to be in atrial fibrillation with mild tachycardia irregularly irregular rhythm.  White count 9.1 hemoglobin 8.5 platelets of 154.  Sodium 133 with potassium of 6 and a creatinine of 2.62  Troponin elevated at 111.  TSH 10.4  EKG with rapid ventricular response.  No acute ST segment changes.  Patient admitted with        Past Medical History    Past Medical History:   Diagnosis Date     Anemia due to chronic kidney disease 10/10/2023     BPH (benign prostatic hypertrophy)      BPH with obstruction/lower urinary tract symptoms 09/17/2020     BPV (benign positional vertigo), unspecified laterality 10/21/2021     CARDIOVASCULAR SCREENING; LDL GOAL LESS THAN 100 12/29/2011     CARDIOVASCULAR SCREENING; LDL GOAL LESS THAN 130 12/29/2011     Chronic diffuse otitis externa of right ear 10/22/2024     Class 1 obesity due to excess calories with body mass index (BMI) of 31.0 to 31.9 in adult 09/17/2020     Dizziness 10/10/2023     Essential tremor 10/21/2019     Falls frequently 10/22/2024     Hypertension goal BP (blood pressure) < 140/90 12/29/2011     Hypothyroidism due to acquired atrophy of thyroid 12/05/2016     Iron deficiency 10/10/2023     Low HDL (under 40) 11/29/2022     Normocytic anemia 08/15/2017     Poor balance 10/21/2021     Prostate cancer (H) 01/2007    Had Radiation     Thrombocytopenia 09/18/2019     Type 2 diabetes mellitus with hyperglycemia (H) 10/21/2010     Type 2 diabetes, HbA1c goal < 7% (H)        Past Surgical History   Past Surgical History:   Procedure Laterality Date     BONE MARROW BIOPSY, BONE SPECIMEN, NEEDLE/TROCAR Left 12/3/2024    Procedure: BIOPSY, BONE MARROW;  Surgeon: Viki Colon PA-C;  [place] [facility] [place]      CATARACT EXTRACTION Bilateral     2020     COLONOSCOPY       cystoscopy with uroLIft implant  08/2024       Prior to Admission Medications   Prior to Admission Medications   Prescriptions Last Dose Informant Patient Reported? Taking?   DULoxetine (CYMBALTA) 20 MG capsule 1/29/2025 Spouse/Significant Other No Yes   Sig: Take 1 capsule (20 mg) by mouth daily.   amLODIPine (NORVASC) 10 MG tablet 1/29/2025 Spouse/Significant Other No Yes   Sig: TAKE 1 TABLET BY MOUTH DAILY   folic acid (FOLVITE) 1 MG tablet 1/29/2025 Spouse/Significant Other No Yes   Sig: Take 1 tablet (1 mg) by mouth daily   glimepiride (AMARYL) 4 MG tablet 1/29/2025 Spouse/Significant Other No Yes   Sig: Take 2 tablets (8 mg) by mouth every morning (before breakfast)   levothyroxine (SYNTHROID/LEVOTHROID) 88 MCG tablet 1/29/2025 Spouse/Significant Other No Yes   Sig: TAKE 1 TABLET BY MOUTH EVERY DAY GENERIC EQUIVALENT FOR SYNTHROID   losartan (COZAAR) 100 MG tablet 1/29/2025 Spouse/Significant Other No Yes   Sig: Take 1 tablet (100 mg) by mouth daily.   metFORMIN (GLUCOPHAGE XR) 500 MG 24 hr tablet 1/29/2025 Spouse/Significant Other No Yes   Sig: TAKE 2 TABLETS BY MOUTH DAILY WITH DINNER   pregabalin (LYRICA) 75 MG capsule 1/29/2025 Spouse/Significant Other No Yes   Sig: TAKE ONE CAPSULE BY MOUTH TWICE DAILY. GENERIC EQUIVALENT FOR LYRICA   propranolol ER (INDERAL LA) 80 MG 24 hr capsule 1/29/2025 Spouse/Significant Other No Yes   Sig: TAKE ONE CAPSULE BY MOUTH DAILY   tamsulosin (FLOMAX) 0.4 MG capsule 1/29/2025 Spouse/Significant Other No Yes   Sig: TAKE 2 CAPSULES BY MOUTH EVERY DAY      Facility-Administered Medications: None      {Additional Note Sections (OPTIONAL)  :494859}     Physical Exam   Vital Signs: Temp: 97.7  F (36.5  C) Temp src: Oral BP: 109/64 Pulse: 106   Resp: 18 SpO2: 95 %      Weight: 164 lbs 0 oz    {Recommend personal SmartPhrase or Notewriter for exam (OPTIONAL)    :379347}    Medical Decision Making   { TIP   MDM  "Calculator    MDM grid (w/ times)    Coding Support Chat  Billing is now based on time OR medical decision making complexity. Medical decision making included in your A&P does NOT need to be re-documented here.    :64973}    {Time  :114203::\"*** MINUTES SPENT BY ME on the date of service doing chart review, history, exam, documentation & further activities per the note.\"}      Data   {INSERT LABS/IMAGING (OPTIONAL)   :333514}  "

## 2025-01-31 NOTE — CONSULTS
[facility]    Hematology / Oncology Consultation     Date of Admission:  1/30/2025  Date of Consult (When I saw the patient): 01/31/25    Assessment & Plan   Milo Lozano is a 89 year old male who was admitted on 1/30/2025 generalized weakness. I was asked to see the patient for CMML with pancytopenia.    Jack has been under care of Dr. Hernandez and now Dr. Prince.  He had progressive pancytopenia but was refusing bone marrow biopsy.  He eventually had bone marrow biopsy done on 12/3/2024 which has shown chronic myelomonocytic leukemia-CMML.  He has been started on azacitidine day 1 through 5 every 4 weeks starting 1/13/2025.  He has been declining overall for a long time but more so in the last month or so.  He has poor appetite, severe fatigue and somnolence.  Yesterday he felt so weak that he had difficulty getting up from the toilet seat and had to call EMS.  He was noted to be in atrial fibrillation with rapid ventricular rate which is new for him.  His chest x-ray has suggested pneumonia and he has been started on broad-spectrum antibiotics including ceftriaxone and azithromycin.    His wife reported problems with gait and multiple falls of late.  He was worked up with a CT scan of the head which has shown a subdural hematoma without any extra-axial fluid collection, intraparenchymal hemorrhage, midline shift or infarct.    His platelet counts have been adequate and above 100 K.  Still it is possible that subdural hematoma could be related to his CMML.  We would not recommend any heroic measures or aggressive surgical procedures for him.  We would recommend supportive cares including transfusions as needed.    We would strongly advise against anticoagulation for his atrial fibrillation.  With the new finding of subdural hematoma in the setting of his recurrent falls, anticoagulation for this significantly more risks than benefits.    I had a discussion with patient and his  wife. Patient was sedate and wife led conversation. They have decided to sign up for hospice and go for comfort cares. We are very supportive of this plan.     I have reviewed his case with Dr. René Prince -his primary hematologist who is in agreement with above plan.    Over 80 min spent on day of visit including review of tests, obtaining/reviewing separately obtained history/physical exam, counseling patient, ordering medications/tests/procedures, communicating with PCP/consultants, and documenting in electronic medical record.    Pan Bell  Hematologist and Medical Oncologist  [facility]       Pan Bell MD, MD    Code Status    No CPR- Do NOT Intubate    Reason for Consult   Reason for consult: I was asked by Dr. Vasquez to evaluate this patient for CMML.    Primary Care Physician   Aj Garces    Chief Complaint   Generalized weakness    History is obtained from the patient    History of Present Illness   Milo Lozano is a 89 year old male who presents with generalized weakness as above    Past Medical History   I have reviewed this patient's medical history and updated it with pertinent information if needed.   Past Medical History:   Diagnosis Date    Anemia due to chronic kidney disease 10/10/2023    BPH (benign prostatic hypertrophy)     BPH with obstruction/lower urinary tract symptoms 09/17/2020    BPV (benign positional vertigo), unspecified laterality 10/21/2021    CARDIOVASCULAR SCREENING; LDL GOAL LESS THAN 100 12/29/2011    CARDIOVASCULAR SCREENING; LDL GOAL LESS THAN 130 12/29/2011    Chronic diffuse otitis externa of right ear 10/22/2024    Class 1 obesity due to excess calories with body mass index (BMI) of 31.0 to 31.9 in adult 09/17/2020    Dizziness 10/10/2023    Essential tremor 10/21/2019    Falls frequently 10/22/2024    Hypertension goal BP (blood pressure) < 140/90 12/29/2011    Hypothyroidism due to acquired atrophy of thyroid 12/05/2016    Iron deficiency  10/10/2023    Low HDL (under 40) 11/29/2022    Normocytic anemia 08/15/2017    Poor balance 10/21/2021    Prostate cancer (H) 01/2007    Had Radiation    Thrombocytopenia 09/18/2019    Type 2 diabetes mellitus with hyperglycemia (H) 10/21/2010    Type 2 diabetes, HbA1c goal < 7% (H)        Past Surgical History   I have reviewed this patient's surgical history and updated it with pertinent information if needed.  Past Surgical History:   Procedure Laterality Date    BONE MARROW BIOPSY, BONE SPECIMEN, NEEDLE/TROCAR Left 12/3/2024    Procedure: BIOPSY, BONE MARROW;  Surgeon: Viki Colon PA-C;  Location: [facility] OR    CATARACT EXTRACTION Bilateral     2020    COLONOSCOPY      cystoscopy with uroLIft implant  08/2024       Prior to Admission Medications   Prior to Admission Medications   Prescriptions Last Dose Informant Patient Reported? Taking?   DULoxetine (CYMBALTA) 20 MG capsule 1/29/2025 Spouse/Significant Other No Yes   Sig: Take 1 capsule (20 mg) by mouth daily.   amLODIPine (NORVASC) 10 MG tablet 1/29/2025 Spouse/Significant Other No Yes   Sig: TAKE 1 TABLET BY MOUTH DAILY   folic acid (FOLVITE) 1 MG tablet 1/29/2025 Spouse/Significant Other No Yes   Sig: Take 1 tablet (1 mg) by mouth daily   glimepiride (AMARYL) 4 MG tablet 1/29/2025 Spouse/Significant Other No Yes   Sig: Take 2 tablets (8 mg) by mouth every morning (before breakfast)   levothyroxine (SYNTHROID/LEVOTHROID) 88 MCG tablet 1/29/2025 Spouse/Significant Other No Yes   Sig: TAKE 1 TABLET BY MOUTH EVERY DAY GENERIC EQUIVALENT FOR SYNTHROID   losartan (COZAAR) 100 MG tablet 1/29/2025 Spouse/Significant Other No Yes   Sig: Take 1 tablet (100 mg) by mouth daily.   metFORMIN (GLUCOPHAGE XR) 500 MG 24 hr tablet 1/29/2025 Spouse/Significant Other No Yes   Sig: TAKE 2 TABLETS BY MOUTH DAILY WITH DINNER   pregabalin (LYRICA) 75 MG capsule 1/29/2025 Spouse/Significant Other No Yes   Sig: TAKE ONE CAPSULE BY MOUTH TWICE DAILY. GENERIC EQUIVALENT FOR  LYRICA   propranolol ER (INDERAL LA) 80 MG 24 hr capsule 1/29/2025 Spouse/Significant Other No Yes   Sig: TAKE ONE CAPSULE BY MOUTH DAILY   tamsulosin (FLOMAX) 0.4 MG capsule 1/29/2025 Spouse/Significant Other No Yes   Sig: TAKE 2 CAPSULES BY MOUTH EVERY DAY      Facility-Administered Medications: None     Allergies   Allergies   Allergen Reactions    Lisinopril Cough     Developed an ACE cough on the Lisinopril, pt denies this is a current allergy as of 6/19/2020.       Social History   I have reviewed this patient's social history and updated it with pertinent information if needed. Milo Lozano  reports that he has never smoked. He has never been exposed to tobacco smoke. He has never used smokeless tobacco. He reports that he does not drink alcohol and does not use drugs.    Family History   I have reviewed this patient's family history and updated it with pertinent information if needed.   Family History   Problem Relation Age of Onset    Hypertension Mother     Dementia Mother     Prostate Cancer Paternal Grandfather     Cancer No family hx of     Diabetes No family hx of     Cerebrovascular Disease No family hx of     Thyroid Disease No family hx of     Glaucoma No family hx of     Macular Degeneration No family hx of        Review of Systems   The 10 point Review of Systems is negative other than noted in the HPI or here.     Physical Exam   Temp: 97.7  F (36.5  C) Temp src: Oral BP: 114/79 Pulse: (!) 142   Resp: 12 SpO2: 94 % O2 Device: None (Room air)    Vital Signs with Ranges  Temp:  [97.7  F (36.5  C)] 97.7  F (36.5  C)  Pulse:  [] 142  Resp:  [12-26] 12  BP: ()/(60-98) 114/79  SpO2:  [87 %-96 %] 94 %  164 lbs 0 oz         Data   Recent Labs   Lab Test 01/31/25  0619 01/30/25  1645 01/30/25  1444 01/13/25  1245 11/26/24  1437    133* 133* 137 133*   POTASSIUM 4.6 6.0* 6.0* 4.8 4.2   CHLORIDE 103 98 97* 101 100   CO2 18* 21* 22 24 20*   ANIONGAP 14 14 14 12 13   BUN 53.6* 61.0*  "59.9* 20.3 43.4*   CR 1.96* 2.61* 2.62* 1.45* 2.24*   * 238* 256* 212* 289*   DUSTY 8.5* 8.9 9.2 9.1 8.6*     Recent Labs   Lab Test 08/28/24  1453 08/01/24  1132 04/11/24  1333 01/10/24  1149 10/06/23  0807   PHOS 3.1 3.7 3.3 3.7 3.5     Recent Labs   Lab Test 01/31/25  0619 01/30/25  1444 01/28/25  1216 01/13/25  1245 01/06/25  0944 12/03/24  1055   WBC 7.6 9.1 13.8* 12.8* 13.9* 20.4*   HGB 7.2* 8.5* 8.4* 9.2* 8.1* 8.4*   * 154 111* 125* 148* 143*   MCV 75* 75* 77* 77* 79 80   NEUTROPHIL  --  77 80 57 75 66     Recent Labs   Lab Test 01/31/25  0619 01/30/25  1444 01/13/25  1245 11/02/21  0926 04/08/21  1055 09/29/20  1104   BILITOTAL 0.5 1.2 1.1 1.0 1.3 1.3   ALKPHOS 90 108 107 58 64 68   ALT 18 21 23 30 36 47   AST 21 22 22 17 19 29   ALBUMIN 3.1* 4.0 4.0 4.2 4.2 4.2   LDH  --   --   --  170 202 240*    < > = values in this interval not displayed.     TSH   Date Value Ref Range Status   01/30/2025 10.47 (H) 0.30 - 4.20 uIU/mL Final   10/22/2024 6.22 (H) 0.30 - 4.20 uIU/mL Final   04/11/2024 4.18 0.30 - 4.20 uIU/mL Final   11/25/2022 6.53 (H) 0.40 - 4.00 mU/L Final   05/09/2022 6.79 (H) 0.40 - 4.00 mU/L Final   10/21/2021 6.19 (H) 0.40 - 4.00 mU/L Final   09/29/2020 3.44 0.40 - 4.00 mU/L Final   05/24/2019 2.48 0.40 - 4.00 mU/L Final   09/26/2018 1.28 0.40 - 4.00 mU/L Final     No results for input(s): \"CEA\" in the last [id number] hours.  Results for orders placed or performed during the hospital encounter of 01/30/25   XR Chest 2 Views    Narrative    EXAM: XR CHEST 2 VIEWS  LOCATION: [facility]  DATE: 1/30/2025    INDICATION: Fever.  COMPARISON: None.      Impression    IMPRESSION:   Left lower lobe infiltrate. Question some left pleural fluid.   CT Head w/o Contrast    Narrative    EXAM: CT HEAD W/O CONTRAST  LOCATION: [facility]  DATE: 1/31/2025    INDICATION: falls  COMPARISON: 5/24/2019.  TECHNIQUE: Routine CT Head without IV contrast. Multiplanar " reformats. Dose reduction techniques were used.    FINDINGS:  INTRACRANIAL CONTENTS: There is a mixed attenuated right lateral subdural hematoma with a width of approximately 7 mm. There is no other extra-axial fluid collection or intraparenchymal hemorrhage. There is no evidence of a midline shift shift. No CT   evidence of acute infarct. Mild presumed chronic small vessel ischemic changes. The ventricular system, basal cisterns and the cortical sulci are consistent with mild diffuse volume loss.     VISUALIZED ORBITS/SINUSES/MASTOIDS: No intraorbital abnormality. There are moderate air-fluid levels within the maxillary sinuses bilaterally and mild mucosal thickening of the ethmoid air cells. There is fluid filling the right mastoid air cells and the   right middle ear regions.     BONES/SOFT TISSUES: The calvarium is intact.      Impression    IMPRESSION:  1.  Mixed attenuated subdural hematoma right lateral 7 mm.  2.  Mild diffuse age related changes.   CT Head w/o Contrast    Narrative    EXAM: CT HEAD W/O CONTRAST  LOCATION: [facility]  DATE: 1/31/2025    INDICATION: SDH interval assesment  COMPARISON: 01/31/2025 at 0025 hours.  TECHNIQUE: Routine CT Head without IV contrast. Multiplanar reformats. Dose reduction techniques were used.    FINDINGS:  INTRACRANIAL CONTENTS: Right inferior frontal extra-axial fluid collection measures 6 mm, similar to prior. No midline shift or mass effect.  No CT evidence of acute infarct. Moderate presumed chronic small vessel ischemic changes. Normal ventricles and   sulci.     VISUALIZED ORBITS/SINUSES/MASTOIDS: No intraorbital abnormality. Moderate mucosal thickening scattered about the paranasal sinuses. Complete/near complete opacification of the right and scattered opacification of the left mastoid air cells.    BONES/SOFT TISSUES: No acute abnormality.      Impression    IMPRESSION: Similar right subdural hematoma. No mass effect, midline shift or  hydrocephalus.               No

## 2025-01-31 NOTE — PROGRESS NOTES
Care Management Initial Consult    General Information  Assessment completed with: Spouse or significant other [place]  Type of CM/SW Visit: Initial Assessment    Primary Care Provider verified and updated as needed: Yes   Readmission within the last 30 days: no previous admission in last 30 days      Reason for Consult: discharge planning  Advance Care Planning: Advance Care Planning Reviewed: no concerns identified          Communication Assessment  Patient's communication style: spoken language (English or Bilingual)             Cognitive  Cognitive/Neuro/Behavioral: .WDL except (drowsy)                      Living Environment:   People in home: spouse  Jack and [place]  Current living Arrangements: independent living facility      Able to return to prior arrangements: other (see comments) (unknown)       Family/Social Support:  Care provided by: spouse/significant other  Provides care for: no one, unable/limited ability to care for self  Marital Status:   Support system: Wife  [place]       Description of Support System: Supportive, Involved    Support Assessment: Adequate family and caregiver support, Adequate social supports    Current Resources:   Patient receiving home care services: No        Community Resources: None  Equipment currently used at home: walker, rolling, shower chair  Supplies currently used at home: Incontinence Supplies    Employment/Financial:  Employment Status: retired        Financial Concerns: none   Referral to Financial Worker: No       Does the patient's insurance plan have a 3 day qualifying hospital stay waiver?  No    Lifestyle & Psychosocial Needs:  Social Drivers of Health     Food Insecurity: Low Risk  (10/21/2024)    Food Insecurity     Within the past 12 months, did you worry that your food would run out before you got money to buy more?: No     Within the past 12 months, did the food you bought just not last and you didn t have money to get more?: No   Depression:  Not at risk (10/22/2024)    PHQ-2     PHQ-2 Score: 0   Housing Stability: Low Risk  (10/21/2024)    Housing Stability     Do you have housing? : Yes     Are you worried about losing your housing?: No   Tobacco Use: Low Risk  (1/13/2025)    Patient History     Smoking Tobacco Use: Never     Smokeless Tobacco Use: Never     Passive Exposure: Never   Financial Resource Strain: Low Risk  (10/21/2024)    Financial Resource Strain     Within the past 12 months, have you or your family members you live with been unable to get utilities (heat, electricity) when it was really needed?: No   Alcohol Use: Not on file   Transportation Needs: Low Risk  (10/21/2024)    Transportation Needs     Within the past 12 months, has lack of transportation kept you from medical appointments, getting your medicines, non-medical meetings or appointments, work, or from getting things that you need?: No   Physical Activity: Inactive (10/21/2024)    Exercise Vital Sign     Days of Exercise per Week: 0 days     Minutes of Exercise per Session: 0 min   Interpersonal Safety: Low Risk  (12/3/2024)    Interpersonal Safety     Do you feel physically and emotionally safe where you currently live?: Yes     Within the past 12 months, have you been hit, slapped, kicked or otherwise physically hurt by someone?: No     Within the past 12 months, have you been humiliated or emotionally abused in other ways by your partner or ex-partner?: No   Stress: No Stress Concern Present (10/21/2024)    [employer] [place] [employer] - Occupational Stress Questionnaire     Feeling of Stress : Only a little   Social Connections: Unknown (10/21/2024)    Social Connection and Isolation Panel [NHANES]     Frequency of Communication with Friends and Family: Not on file     Frequency of Social Gatherings with Friends and Family: Once a week     Attends Spiritism Services: Not on file     Active Member of Clubs or Organizations: Not on file     Attends Club or  Organization Meetings: Not on file     Marital Status: Not on file   Health Literacy: Not on file       Functional Status:  Prior to admission patient needed assistance:   Dependent ADLs:: Ambulation-walker, Dressing, Grooming, Incontinence  Dependent IADLs:: Cleaning, Cooking, Laundry, Shopping, Meal Preparation, Medication Management, Money Management, Transportation, Incontinence  Assesssment of Functional Status: Not at  functional baseline    Mental Health Status:  Mental Health Status: No Current Concerns       Chemical Dependency Status:  Chemical Dependency Status: No Current Concerns             Values/Beliefs:  Spiritual, Cultural Beliefs, Zoroastrian Practices, Values that affect care: yes  Description of Beliefs that Will Affect Care: none    Cultural/Zoroastrian Practices Patient Routinely Participates In: prayer       Discussed  Partnership in Safe Discharge Planning  document with patient/family: No    Additional Information:     consulted for discharge planning. Writer met with patient and wife [place] at bedside. Writer introduced self and role. Patient is a 89 year old  male who admitted on 1/30/25 for generalized weakness.     [place] spoke for patient. Patient lives with wife [place] at [facility]. They receive no services. Patient owns a 4 wheeled walker and shower chair. Patient uses incontinent supplies. [place] assist patient with cooking, cleaning, some dressing, shopping, medication management and driving.     Patient's primary physician verified and pharmacy used is [facility] in [place]. [place] stated recently patient has become weaker and hospice was suggested by medical staff. Writer was told a palliative consult maybe next for patient tomorrow.     Next Steps: possibly hospice, discharge planning.     Enrique Aleman [facility]  Inpatient Care Coordination

## 2025-01-31 NOTE — CARE PLAN
Noted transition to comfort care.  Cardiology consult cancelled.   Please call with any additional questions.   AYDIN Logan

## 2025-01-31 NOTE — CONSULTS
Chart reviewed, received Provider Order --> calorie and weight loss reported     Noted patient has changed to comfort care status.  Will be available if more aggressive nutrition intervention desired.  Olimpia Fried RD, LD, [facility]   Clinical Dietitian - [facility]

## 2025-01-31 NOTE — CONSULTS
Neurosurgery Consult    Assessment  89 yo male presenting to ER with generalized weakness with head CT revealing acute on chronic right SDH.    Plan:  No operative Neurosurgical intervention indicated.  Repeat head CT this morning.  Every 2 hour neuro checks.  BP less than 150 systolic.    HPI  Per chart review:  Milo Lozano is a 89 year old male with a history of type 2 diabetes, CML, hypertension, CKD, BPH, hypothyroidism, DEMOND, BPV, presented to the emergency room on 1/30 with weakness after he had difficulty in getting off the toilet.     Patient presented to the emergency room with complaints of generalized weakness.  Reportedly unable to get off the toilet so he called EMS who was able to assist him to get off the toilet.  He has a history of CML and has been receiving treatment with last session 13 days ago.  Reports lightheadedness that started yesterday and shortness of breath with cough prior to the chemo.  Patient denies fevers nausea vomiting diarrhea abdominal pain.  His wife Antia assist with the history in the ER.  The patient has unfortunately been getting weaker over time.  She feels that he is not eating well.  He is having balancing issues.  Sleeps a lot.  Difficulty in getting up to the bathroom and having to wear depends.  Has just started chemotherapy that he receives 5 days a week 1 week of the month.  His wife is not certain that he could even continue as he has become weaker over time.   She feels that the patient has shared thoughts at times that he does not want to have ongoing treatments.  Insinuating he is may be ready to stop doing things but this has not been discussed in detail.      Medical history  NAVEED  BPH  Chronic otitis externa (R)  CKD  Diabetic polyneuropathy  Type 2 diabetes  Hypertension  Hypothyroidism  DEMOND  Anemia  Chronic myelomonocytic leukemia  Obesity  Iron deficiency  Thrombocytopenia  BPV    Exam    B/P: 114/79, T: 97.7, P: 131, R: 18 , sleeping upon entering  room, wakes up to voice, alert and oriented.  Moving all four extremities well.  Negative pronator drift.  GCS 15    Labs:  Platelet count 154    Imaging    Head CT:    There is a mixed attenuated right lateral subdural hematoma with a width of approximately 7 mm. There is no other extra-axial fluid collection or intraparenchymal hemorrhage. There is no evidence of a midline shift shift. No CT evidence of acute infarct. Mild presumed chronic small vessel ischemic changes. The ventricular system, basal cisterns and the cortical sulci are consistent with mild diffuse volume loss.      VISUALIZED ORBITS/SINUSES/MASTOIDS: No intraorbital abnormality. There are moderate air-fluid levels within the maxillary sinuses bilaterally and mild mucosal thickening of the ethmoid air cells. There is fluid filling the right mastoid air cells and the   right middle ear regions.      BONES/SOFT TISSUES: The calvarium is intact.                                                                      IMPRESSION:  1.  Mixed attenuated subdural hematoma right lateral 7 mm.  2.  Mild diffuse age related changes.    ARTEMIO Montgomery  [facility] Neurosurgery  [address] [address]    Tel [phone]  Pager [phone]

## 2025-01-31 NOTE — PROVIDER NOTIFICATION
Noted to have increase in HR.   Will give 500 NS bolus as also on drier side and got lasix with potassium level.   If HR remains elevated please contact night providers.   He is on inderal in am. BP somewhat soft but would need to assess BP After bolus to evaluate ? IV metoprolol vs other.   pierre

## 2025-01-31 NOTE — CONSULTS
See SW consult from 1/30/25    Following for discharge planning/ potential hospice care.    LAURA Watters  [facility]  Inpatient Care Management

## 2025-01-31 NOTE — ED NOTES
Barbosa Catheter inserted 16fr Coude. Pt tolerated well. Pt HR 120s-133s. [facility] notified.

## 2025-02-01 PROCEDURE — 120N000001 HC R&B MED SURG/OB

## 2025-02-01 PROCEDURE — 250N000013 HC RX MED GY IP 250 OP 250 PS 637: Performed by: INTERNAL MEDICINE

## 2025-02-01 PROCEDURE — 99232 SBSQ HOSP IP/OBS MODERATE 35: CPT | Performed by: INTERNAL MEDICINE

## 2025-02-01 RX ORDER — METOPROLOL TARTRATE 50 MG
50 TABLET ORAL 2 TIMES DAILY
Status: DISCONTINUED | OUTPATIENT
Start: 2025-02-01 | End: 2025-02-07 | Stop reason: HOSPADM

## 2025-02-01 RX ADMIN — ACETAMINOPHEN 650 MG: 325 TABLET, FILM COATED ORAL at 20:40

## 2025-02-01 RX ADMIN — METOPROLOL TARTRATE 25 MG: 25 TABLET, FILM COATED ORAL at 08:28

## 2025-02-01 RX ADMIN — ACETAMINOPHEN 650 MG: 325 TABLET, FILM COATED ORAL at 15:45

## 2025-02-01 RX ADMIN — TAMSULOSIN HYDROCHLORIDE 0.8 MG: 0.4 CAPSULE ORAL at 08:14

## 2025-02-01 RX ADMIN — ACETAMINOPHEN 650 MG: 325 TABLET, FILM COATED ORAL at 08:14

## 2025-02-01 RX ADMIN — METOPROLOL TARTRATE 50 MG: 50 TABLET, FILM COATED ORAL at 20:37

## 2025-02-01 RX ADMIN — DULOXETINE HYDROCHLORIDE 20 MG: 20 CAPSULE, DELAYED RELEASE ORAL at 08:15

## 2025-02-01 ASSESSMENT — ACTIVITIES OF DAILY LIVING (ADL)
ADLS_ACUITY_SCORE: 57
ADLS_ACUITY_SCORE: 63
ADLS_ACUITY_SCORE: 61
ADLS_ACUITY_SCORE: 61
ADLS_ACUITY_SCORE: 63
ADLS_ACUITY_SCORE: 63
ADLS_ACUITY_SCORE: 57
ADLS_ACUITY_SCORE: 61
ADLS_ACUITY_SCORE: 61
ADLS_ACUITY_SCORE: 63
ADLS_ACUITY_SCORE: 57
ADLS_ACUITY_SCORE: 63
ADLS_ACUITY_SCORE: 57
ADLS_ACUITY_SCORE: 57
ADLS_ACUITY_SCORE: 63
ADLS_ACUITY_SCORE: 63
ADLS_ACUITY_SCORE: 57
ADLS_ACUITY_SCORE: 57
ADLS_ACUITY_SCORE: 63

## 2025-02-01 NOTE — PLAN OF CARE
Orientation: A/Ox4-[place]  Aggression Stop Light: Green  Activity: Ax1 GB/W  Diet/BS Checks: Reg diet. Poor appetite.  Tele:  N/A  IV Access/Drains: 2PIV-SL  Pain Management: Denies pain or N/V  Abnormal VS/Results: VS deferred-comfort cares  Bowel/Bladder: Barbosa in place for end of life/retention. No BM while on 88.   Skin/Wounds: EVANS-family in room  Consults: KRYSTIAN  D/C Disposition: Home with home hospice.

## 2025-02-01 NOTE — PROGRESS NOTES
[facility]  Neurosurgery Daily Progress Note    Assessment and Plan   89-year-old male presented to the ER with generalized weakness.  Neurosurgery was consulted for acute on chronic right SDH. Repeat head CT stable.     Chart reviewed today.   Patient and family met with palliative care and oncology to discuss goals of care.   Patient and family decided to transition to comfort cares. Patient will discharge with hospice.  Follow up with NSGY clinic in 1 month with head CT prior to appt.   Appreciate assistance from specialties.     Ora Kay CNP  [facility] Neurosurgery  Clinic phone number: [phone]  Securely message or page via [employer] Secure Chat, [employer], or [employer]

## 2025-02-01 NOTE — PROGRESS NOTES
[facility]    Medicine Progress Note - Hospitalist Service    Date of Admission:  1/30/2025    Assessment & Plan   Milo Lozano is a 89 year old male with a history of type 2 diabetes, CML, hypertension, CKD, BPH, hypothyroidism, DEMOND, BPV, presented to the emergency room on 1/30 with weakness and history of falls got admitted after he had difficulty in getting off the toilet.  CT head without contrast in the ED showed small right subdural hematoma at 7mm  .  No mass effect or midline shift or hydrocephalus     Palliative care consultation requested regarding goals of care discussion.  Goals of care discussion was done with patient and his wife by [facility] and palliative care provider Harshad Jessica.  Patient and family decided to transition to comfort cares only and plan on discharge with hospice.  Comfort care orders initiated on 1/31/2025  Social work consultation requested for hospice referral on discharge.  And are following      Small right subdural hematoma at 7 mm;  Repeat CT head showed stable subdural hematoma  Neurosurgery consulted and are following  PTA anticoagulation on hold  Patient is comfort care only     Left lower lobe pneumonia  Patient admitted with weakness and CXR findings of pneumonia.   Started on rocephin and zithromax.  SARS/COVID/RSV negative.   Lactate is normal 1.4  Blood cultures X 2 ordered.   Given his immunocompromise state will treat empirically with antibiotics  He describes a cough but has no fevers and his history is somewhat limited in reference to respiratory symptom  White count currently 9 and procalcitonin normal 0.17   Continue Rocephin and Zithromax.'  His weakness may be overall decline, CML, treatment but pneumonia not excluded and will treat   Patient is comfort care only     Chronic myelomonocytic leukemia.   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 Prince.    Bone marrow biopsy 12/2024   Recently started on a new treatment that he receives for 5 days Monday through Friday 1 week a month  His wife is uncertain if he can tolerate this or if he wants to continue   will have hematology/oncology see the patient for potential discussion regarding his prognosis with the CML in addition to anticoagulation of family chose to pursue this but it sounds unlikely  1 unit PRBCs transfusion ordered on 131  Patient's wife signed blood transfusion consent at bedside on 1/31/2025     Atrial fibrillation with RVR (newly diagnosed) uncertain duration.   Elevated troponin (potential supply/demand)   Newly diagnosed of uncertain duration   On propranolol PTA which could help with rate control and may have masked afib with rate control.   ECHO (no prior found)   Serial troponin first elevatyed 111 >> 103. (NSTEMI vs ischemia) Trend down with delta change  Family aware of troponin levels but given his overall decline are unlikely to pursue any other invasive workup  Holding anticoagulation with history of falls prior to admission and generalized weakness and anemia and subdural hematoma on admission  Patient was in A-fib RVR overnight heart rate in the 120s to 140s.  One-time dose of IV digoxin 250 mcg given.  Patient converted to normal sinus rhythm with heart rate currently in the 80s  Also started on diltiazem drip  Cardiology consultation requested.  Appreciate assistance    Patient is transition to comfort cares only    Continue metoprolol 50 mg p.o. twice daily for better heart rate control     Hyperkalemia   Potassium at 6   No overt EKG changes.   Stop cozaar  Repeat after fluids.   Addendum after Barbosa catheter placement, fluid bolus and Lasix potassium came down to 5.1  Continue to trend: Would not resume Cozaar  Potassium improved to 4.6  Comfort care only     DM:   Patient on amaryl 8 mg po am >> hold for now pending appetite  Glucophage XR 1000 mg po at dinner >> hold   Sliding  scale coverage   Restart Amaryl low-dose at 1 mg p.o. daily  Comfort care only     Mild hereditary spherocytosis  On daily folate 1 mg po daily   History of occasional spherocytes on peripheral smear  Recent hematology notes in reference     Failure to thrive  Recent reported weight loss.   Recent balance issues reported  Noted last hematology visit with weight loss and change in eating habits.   In hematology clinic reports of balance issues with weight loss and anemia   1/30 his wife reports that the patient has had episodes of falling at home  Last was a few weeks ago and reportedly did not hurt himself or hit his head  Given balance problems and falls a CT of the head was ordered  PT/OT/case management  As noted will have a palliative care discussion about goals of care.  His wife is concerned about his progressive weakness and not hardly getting out of bed  Palliative care consulted for goals of care discussion with patient and family.  Appreciate assistance  Patient and family were met with oncology and palliative care.  Patient is transition to comfort care only     CKD stage 3  Creatinine 2.61 which is slightly up on admission  Given fluids with 1 L given the poor p.o. intake   Also bladder scanned given history of urinary retention  Required menchaca and continue on fluids   Patient is transition to comfort care only     Urinary retention  History of urinary retention.  Greater than 450 in the ER with Menchaca catheter inserted for chronic retention  Urology consultation requested on 1/31/2025  He Menchaca in due to urinary retention issues and end-of-life care     History of prostate cancer  Treated with XRT 2007     Pain   Lyrica 75 mg po BID      DEMOND:   On CPAP              Diet: Regular Diet Adult  Room Service    DVT Prophylaxis: Deferred as patient is comfort cares only  [place] Catheter: PRESENT, indication: ?  (Error. Value could not be saved.), Acute retention or obstruction, End of life  Lines: None      Cardiac Monitoring: None  Code Status: No CPR- Do NOT Intubate      Clinically Significant Risk Factors        # Hyperkalemia: Highest K = 6 mmol/L in last 2 days, will monitor as appropriate  # Hyponatremia: Lowest Na = 133 mmol/L in last 2 days, will monitor as appropriate       # Hypoalbuminemia: Lowest albumin = 3.1 g/dL at 1/31/2025  6:19 AM, will monitor as appropriate     # Hypertension: Noted on problem list                  # Financial/Environmental Concerns: none         Social Drivers of Health    Physical Activity: Inactive (10/21/2024)    Exercise Vital Sign     Days of Exercise per Week: 0 days     Minutes of Exercise per Session: 0 min   Social Connections: Unknown (10/21/2024)    Social Connection and Isolation Panel [NHANES]     Frequency of Social Gatherings with Friends and Family: Once a week          Disposition Plan         Medically Ready for Discharge: Anticipated in 2-4 Days    Discharged back to independent care facility with hospice    Social work consulted and are following         Kayla Villalba MD  Hospitalist Service  [facility]  Securely message with [employer] (more info)  Text page via [employer] Paging/Directory   ______________________________________________________________________    Interval History   Patient is resting comfortably in chair.  Denies any acute complaints.  Feels weak and tired.  No other acute issues    Physical Exam   Vital Signs: Temp: 98.1  F (36.7  C) Temp src: Oral BP: (!) 171/83 Pulse: 83     SpO2: 94 % O2 Device: None (Room air)    Weight: 164 lbs 0 oz    General Appearance: Alert awake, resting comfortably in chair, not in acute distress  Respiratory: Clear to auscultation bilaterally, no crackles or wheezing  Cardiovascular: Normal rate rhythm regular, no murmurs  GI: Soft, nontender nondistended bowel sounds positive  Skin: Warm and dry no lower extremity edema  Psychiatric; calm and cooperative    Medical Decision Making       45  MINUTES SPENT BY ME on the date of service doing chart review, history, exam, documentation & further activities per the note.      Data         Imaging results reviewed over the past 24 hrs:   No results found for this or any previous visit (from the past 24 hours).

## 2025-02-01 NOTE — PLAN OF CARE
Goal Outcome Evaluation:    Orientation: A&Ox4, drowsy  Aggression Stop Light: green  Activity: A1-2 GBW  Diet/BS Checks: regular diet  Tele: NA  IV Access/Drains: R PIV SL, L PIV SL  Pain Management: pt denies, PAINAD score = 0  Abnormal VS/Results: deferred d/t comfort care  Bowel/Bladder: incontinent bladder, Barbosa in place, no BM this shift, BSC in place  Skin/Wounds: scattered bruising and scabbing, blanchable redness to coccyx  Consults: Palliative, SWI  D/C Disposition: Discharge on hospice, waiting for safe discharge plan

## 2025-02-02 PROCEDURE — 250N000013 HC RX MED GY IP 250 OP 250 PS 637: Performed by: INTERNAL MEDICINE

## 2025-02-02 PROCEDURE — 99232 SBSQ HOSP IP/OBS MODERATE 35: CPT | Performed by: INTERNAL MEDICINE

## 2025-02-02 PROCEDURE — 120N000001 HC R&B MED SURG/OB

## 2025-02-02 RX ORDER — OXYCODONE HCL 20 MG/ML
5 CONCENTRATE, ORAL ORAL EVERY 6 HOURS PRN
Status: DISCONTINUED | OUTPATIENT
Start: 2025-02-02 | End: 2025-02-07 | Stop reason: HOSPADM

## 2025-02-02 RX ADMIN — METOPROLOL TARTRATE 50 MG: 50 TABLET, FILM COATED ORAL at 20:38

## 2025-02-02 RX ADMIN — TAMSULOSIN HYDROCHLORIDE 0.8 MG: 0.4 CAPSULE ORAL at 09:12

## 2025-02-02 RX ADMIN — OXYCODONE HYDROCHLORIDE 5 MG: 100 SOLUTION ORAL at 20:38

## 2025-02-02 RX ADMIN — DULOXETINE HYDROCHLORIDE 20 MG: 20 CAPSULE, DELAYED RELEASE ORAL at 09:12

## 2025-02-02 RX ADMIN — METOPROLOL TARTRATE 50 MG: 50 TABLET, FILM COATED ORAL at 09:12

## 2025-02-02 RX ADMIN — OXYCODONE HYDROCHLORIDE 5 MG: 100 SOLUTION ORAL at 09:13

## 2025-02-02 ASSESSMENT — ACTIVITIES OF DAILY LIVING (ADL)
ADLS_ACUITY_SCORE: 68
ADLS_ACUITY_SCORE: 68
ADLS_ACUITY_SCORE: 63
ADLS_ACUITY_SCORE: 68
ADLS_ACUITY_SCORE: 63
ADLS_ACUITY_SCORE: 63
ADLS_ACUITY_SCORE: 68
ADLS_ACUITY_SCORE: 63
ADLS_ACUITY_SCORE: 68
ADLS_ACUITY_SCORE: 63
ADLS_ACUITY_SCORE: 63
ADLS_ACUITY_SCORE: 68
ADLS_ACUITY_SCORE: 63
ADLS_ACUITY_SCORE: 68
ADLS_ACUITY_SCORE: 63
ADLS_ACUITY_SCORE: 68
ADLS_ACUITY_SCORE: 63

## 2025-02-02 NOTE — PLAN OF CARE
Orientation: A/Ox4-[place] and intermittent confusion.  Aggression Stop Light: Green  Activity: Ax1 GB/W  Diet/BS Checks: Reg diet. Poor appetite.  Tele:  N/A  IV Access/Drains: 2PIV-SL  Pain Management: Tylenol given x2 for headache-effective.  Abnormal VS/Results: VS deferred-Comfort Cares  Bowel/Bladder: Barbosa in place with GOP. Continent of Bowel-1x BM today. Is willing to use bedpan if feeling too weak.   Skin/Wounds: Scattered scabs and bruising, BLE trace edema, Blanchable redness on Coccyx.  Consults: KRYSTIAN  D/C Disposition: Home with home hospice.  Other Info: Wife and family at bedside. Pt moves independently in bed, but needs reminders and some help with weight shifting.

## 2025-02-02 NOTE — PLAN OF CARE
Goal Outcome Evaluation:    Orientation: A&Ox4, intermittent confusion, [place]  Aggression Stop Light: green  Activity: A1-2 GBW  Diet/BS Checks: regular diet  Tele: NA  IV Access/Drains: R PIV SL, L PIV SL  Pain Management: c/o headache and aching of R ear, PRN Tylenol x1   Abnormal VS/Results: deferred d/t comfort care  Bowel/Bladder: incontinent bladder, Barbosa in place, no BM this shift, bedpan available in room  Skin/Wounds: scattered bruising and scabbing, blanchable redness to coccyx  Consults: Palliative, SWI  D/C Disposition: Discharge on hospice, waiting for safe discharge plan

## 2025-02-02 NOTE — PROGRESS NOTES
Orientation: A/O x4,[place], intermittent confusion    Vitals/Tele: VSS on RA    IV Access/drains: R PIV SL, L PIV SL    Diet: Regular    Mobility: A x 1-2 GB/W    GI/: Continent of bowel, incontinent of bowel at times, menchaca in place. 1x BM this shift    Wound/Skin: Scabs and scattered bruising, blanchable redness to coccyx    Consults: Palliative/SW     Discharge Plan: Pending home w/ hospice     See Flow sheets for assessment

## 2025-02-02 NOTE — PROVIDER NOTIFICATION
MD Notification    Notified Person: MD    Notified Person Name: Gurmeet Villalba    Notification Date/Time: 3:35 PM 02/02/25     Notification Interaction: [employer] Messaging    Purpose of Notification: Pt normally sleeps with CPAP at home. Can you place order for one here?    Orders Received: ordered    Comments:

## 2025-02-02 NOTE — PROGRESS NOTES
[facility]    Medicine Progress Note - Hospitalist Service    Date of Admission:  1/30/2025    Assessment & Plan   Milo Lozano is a 89 year old male with a history of type 2 diabetes, CML, hypertension, CKD, BPH, hypothyroidism, DEMOND, BPV, presented to the emergency room on 1/30 with weakness and history of falls got admitted after he had difficulty in getting off the toilet.  CT head without contrast in the ED showed small right subdural hematoma at 7mm  .  No mass effect or midline shift or hydrocephalus     Palliative care consultation requested regarding goals of care discussion.  Goals of care discussion was done with patient and his wife by [facility] and palliative care provider Harshad Jessica.  Patient and family decided to transition to comfort cares only and plan on discharge with hospice.  Comfort care orders initiated on 1/31/2025  Social work consultation requested for hospice referral on discharge, And are following      Small right subdural hematoma at 7 mm;  Repeat CT head showed stable subdural hematoma  Neurosurgery consulted and are following  PTA anticoagulation on hold  Patient is comfort care only     Left lower lobe pneumonia  Patient admitted with weakness and CXR findings of pneumonia.   Started on rocephin and zithromax.  SARS/COVID/RSV negative.   Lactate is normal 1.4  Blood cultures X 2 ordered.   Given his immunocompromise state will treat empirically with antibiotics  He describes a cough but has no fevers and his history is somewhat limited in reference to respiratory symptom  White count currently 9 and procalcitonin normal 0.17   Continue Rocephin and Zithromax.'  His weakness may be overall decline, CML, treatment but pneumonia not excluded and will treat   Patient is comfort care only     Chronic myelomonocytic leukemia.   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 Prince.    Bone marrow biopsy 12/2024   Recently started on a new treatment that he receives for 5 days Monday through Friday 1 week a month  His wife is uncertain if he can tolerate this or if he wants to continue   will have hematology/oncology see the patient for potential discussion regarding his prognosis with the CML in addition to anticoagulation of family chose to pursue this but it sounds unlikely  1 unit PRBCs transfusion ordered on 131  Patient's wife signed blood transfusion consent at bedside on 1/31/2025     Atrial fibrillation with RVR (newly diagnosed) uncertain duration.   Elevated troponin (potential supply/demand)   Newly diagnosed of uncertain duration   On propranolol PTA which could help with rate control and may have masked afib with rate control.   ECHO (no prior found)   Serial troponin first elevatyed 111 >> 103. (NSTEMI vs ischemia) Trend down with delta change  Family aware of troponin levels but given his overall decline are unlikely to pursue any other invasive workup  Holding anticoagulation with history of falls prior to admission and generalized weakness and anemia and subdural hematoma on admission  Patient was in A-fib RVR overnight heart rate in the 120s to 140s.  One-time dose of IV digoxin 250 mcg given.  Patient converted to normal sinus rhythm with heart rate currently in the 80s  Also started on diltiazem drip  Cardiology consultation requested.  Appreciate assistance    Patient is transition to comfort cares only    Continue metoprolol 50 mg p.o. twice daily for better heart rate control     Hyperkalemia   Potassium at 6   No overt EKG changes.   Stop cozaar  Repeat after fluids.   Addendum after Barbosa catheter placement, fluid bolus and Lasix potassium came down to 5.1  Continue to trend: Would not resume Cozaar  Potassium improved to 4.6  Comfort care only     DM:   Patient on amaryl 8 mg po am >> hold for now pending appetite  Glucophage XR 1000 mg po at dinner >> hold   Sliding  scale coverage   Restart Amaryl low-dose at 1 mg p.o. daily  Comfort care only     Mild hereditary spherocytosis  On daily folate 1 mg po daily   History of occasional spherocytes on peripheral smear  Recent hematology notes in reference     Failure to thrive  Recent reported weight loss.   Recent balance issues reported  Noted last hematology visit with weight loss and change in eating habits.   In hematology clinic reports of balance issues with weight loss and anemia   1/30 his wife reports that the patient has had episodes of falling at home  Last was a few weeks ago and reportedly did not hurt himself or hit his head  Given balance problems and falls a CT of the head was ordered  PT/OT/case management  As noted will have a palliative care discussion about goals of care.  His wife is concerned about his progressive weakness and not hardly getting out of bed  Palliative care consulted for goals of care discussion with patient and family.  Appreciate assistance  Patient and family were met with oncology and palliative care.  Patient is transition to comfort care only     CKD stage 3  Creatinine 2.61 which is slightly up on admission  Given fluids with 1 L given the poor p.o. intake   Also bladder scanned given history of urinary retention  Required menchaca and continue on fluids   Patient is transition to comfort care only     Urinary retention  History of urinary retention.  Greater than 450 in the ER with Menchaca catheter inserted for chronic retention  Urology consultation requested on 1/31/2025  He Menchaca in due to urinary retention issues and end-of-life care     History of prostate cancer  Treated with XRT 2007     Pain   Lyrica 75 mg po BID      DEMOND:   On CPAP              Diet: Regular Diet Adult  Room Service    DVT Prophylaxis: Deferred as patient is comfort cares only  [place] Catheter: PRESENT, indication: ?  (Error. Value could not be saved.), End of life  Lines: None     Cardiac Monitoring: None  Code  Status: No CPR- Do NOT Intubate      Clinically Significant Risk Factors               # Hypoalbuminemia: Lowest albumin = 3.1 g/dL at 1/31/2025  6:19 AM, will monitor as appropriate     # Hypertension: Noted on problem list                  # Financial/Environmental Concerns: none         Social Drivers of Health    Physical Activity: Inactive (10/21/2024)    Exercise Vital Sign     Days of Exercise per Week: 0 days     Minutes of Exercise per Session: 0 min   Social Connections: Unknown (10/21/2024)    Social Connection and Isolation Panel [NHANES]     Frequency of Social Gatherings with Friends and Family: Once a week          Disposition Plan         Medically Ready for Discharge: Anticipated in 2-4 Days    Discharged back to independent care facility with hospice    Social work consulted and are following         Kayla Villalba MD  Hospitalist Service  [facility]  Securely message with [employer] (more info)  Text page via [employer] Paging/Directory   ______________________________________________________________________    Interval History   Patient is resting comfortably in bed .    No other acute issues    Physical Exam   Vital Signs: Temp: 97.9  F (36.6  C) Temp src: Oral BP: (!) 162/102 Pulse: (!) 140   Resp: 17 SpO2: 92 % O2 Device: None (Room air)    Weight: 164 lbs 0 oz    General Appearance: Alert awake, resting comfortably in chair, not in acute distress  Respiratory: Clear to auscultation bilaterally, no crackles or wheezing  Cardiovascular: Normal rate rhythm regular, no murmurs  GI: Soft, nontender nondistended bowel sounds positive  Skin: Warm and dry no lower extremity edema  Psychiatric; calm and cooperative    Medical Decision Making       45 MINUTES SPENT BY ME on the date of service doing chart review, history, exam, documentation & further activities per the note.      Data         Imaging results reviewed over the past 24 hrs:   No results found for this or any previous  visit (from the past 24 hours).

## 2025-02-03 PROCEDURE — 99232 SBSQ HOSP IP/OBS MODERATE 35: CPT | Performed by: INTERNAL MEDICINE

## 2025-02-03 PROCEDURE — 250N000013 HC RX MED GY IP 250 OP 250 PS 637: Performed by: INTERNAL MEDICINE

## 2025-02-03 PROCEDURE — 120N000001 HC R&B MED SURG/OB

## 2025-02-03 PROCEDURE — 99207 PR NO CHARGE TRIAGED PS: CPT | Performed by: NURSE PRACTITIONER

## 2025-02-03 RX ADMIN — METOPROLOL TARTRATE 50 MG: 50 TABLET, FILM COATED ORAL at 09:45

## 2025-02-03 RX ADMIN — METOPROLOL TARTRATE 50 MG: 50 TABLET, FILM COATED ORAL at 19:54

## 2025-02-03 RX ADMIN — OXYCODONE HYDROCHLORIDE 5 MG: 100 SOLUTION ORAL at 19:55

## 2025-02-03 RX ADMIN — TAMSULOSIN HYDROCHLORIDE 0.8 MG: 0.4 CAPSULE ORAL at 09:44

## 2025-02-03 RX ADMIN — DULOXETINE HYDROCHLORIDE 20 MG: 20 CAPSULE, DELAYED RELEASE ORAL at 09:44

## 2025-02-03 RX ADMIN — ACETAMINOPHEN 650 MG: 325 TABLET, FILM COATED ORAL at 19:54

## 2025-02-03 ASSESSMENT — ACTIVITIES OF DAILY LIVING (ADL)
ADLS_ACUITY_SCORE: 65
ADLS_ACUITY_SCORE: 66
ADLS_ACUITY_SCORE: 65
ADLS_ACUITY_SCORE: 65
ADLS_ACUITY_SCORE: 68
ADLS_ACUITY_SCORE: 69
ADLS_ACUITY_SCORE: 69
ADLS_ACUITY_SCORE: 66
ADLS_ACUITY_SCORE: 68
ADLS_ACUITY_SCORE: 65
ADLS_ACUITY_SCORE: 64
ADLS_ACUITY_SCORE: 64
ADLS_ACUITY_SCORE: 68
ADLS_ACUITY_SCORE: 64
ADLS_ACUITY_SCORE: 65
ADLS_ACUITY_SCORE: 68
ADLS_ACUITY_SCORE: 65

## 2025-02-03 NOTE — PLAN OF CARE
Goal Outcome Evaluation:  2/2/25  [id number]    Orientation: A/Ox4, int confusion,[place]  Aggression Stop Light: green  Activity: A1-2 GBW, pivots to BSC  Diet/BS Checks: reg  Tele:  none  IV Access/Drains: L/R PIVs SL  Pain Management: ear ache-Oxy give 1x  Abnormal VS/Results: deferred, on comfort cares  Bowel/Bladder: cont bowel, menchaca in place with adequate output  Skin/Wounds: scattered bruising  Consults: SW  D/C Disposition: pending home  with Hospice  Other Info: Full assessment deferred d/t comfort cares. Refused CPAP.

## 2025-02-03 NOTE — PROGRESS NOTES
[facility]    Medicine Progress Note - Hospitalist Service    Date of Admission:  1/30/2025    Assessment & Plan   Milo Lozano is a 89 year old male with a history of type 2 diabetes, CML, hypertension, CKD, BPH, hypothyroidism, DEMOND, BPV, presented to the emergency room on 1/30 with weakness and history of falls got admitted after he had difficulty in getting off the toilet.  CT head without contrast in the ED showed small right subdural hematoma at 7mm  .  No mass effect or midline shift or hydrocephalus     Palliative care consultation requested regarding goals of care discussion.  Goals of care discussion was done with patient and his wife by [facility] and palliative care provider Harshad Jessica.  Patient and family decided to transition to comfort cares only and plan on discharge with hospice.  Comfort care orders initiated on 1/31/2025  Social work consultation requested for hospice referral charge planning.  Called and discussed with wife [place] on 2/3/2025 that patient will not be able to come home he needs to discharge to a care facility with hospice as he is very weak and unable to stand up on his own.  This was also discussed with the patient and both patient and wife are agreeable now to go to care facility.   Chris was updated regarding this      Small right subdural hematoma at 7 mm;  Repeat CT head showed stable subdural hematoma  Neurosurgery consulted and are following  PTA anticoagulation on hold  Patient is comfort care only     Left lower lobe pneumonia  Patient admitted with weakness and CXR findings of pneumonia.   Started on rocephin and zithromax.  SARS/COVID/RSV negative.   Lactate is normal 1.4  Blood cultures X 2 ordered.   Given his immunocompromise state will treat empirically with antibiotics  He describes a cough but has no fevers and his history is somewhat limited in reference to respiratory symptom  White count currently 9 and  procalcitonin normal 0.17   Continue Rocephin and Zithromax.'  His weakness may be overall decline, CML, treatment but pneumonia not excluded and will treat   Patient is comfort care only     Chronic myelomonocytic leukemia.   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 Prince.   Bone marrow biopsy 12/2024   Recently started on a new treatment that he receives for 5 days Monday through Friday 1 week a month  His wife is uncertain if he can tolerate this or if he wants to continue   will have hematology/oncology see the patient for potential discussion regarding his prognosis with the CML in addition to anticoagulation of family chose to pursue this but it sounds unlikely  1 unit PRBCs transfusion ordered on 131  Patient's wife signed blood transfusion consent at bedside on 1/31/2025     Atrial fibrillation with RVR (newly diagnosed) uncertain duration.   Nonischemic myocardial injury due to pneumonia, CKD stage III, from the effect of azacitidine  Newly diagnosed of uncertain duration   On propranolol PTA which could help with rate control and may have masked afib with rate control.   ECHO (no prior found)   Serial troponin first elevatyed 111 >> 103. (NSTEMI vs ischemia) Trend down with delta change  Family aware of troponin levels but given his overall decline are unlikely to pursue any other invasive workup  Holding anticoagulation with history of falls prior to admission and generalized weakness and anemia and subdural hematoma on admission  Patient was in A-fib RVR overnight heart rate in the 120s to 140s.  One-time dose of IV digoxin 250 mcg given.  Patient converted to normal sinus rhythm with heart rate currently in the 80s  Also started on diltiazem drip  Cardiology consultation requested.  Appreciate assistance    Patient is transition to comfort cares only    Continue metoprolol 50 mg p.o. twice daily for better heart rate control  even on hospice     Hyperkalemia   Potassium at 6   No overt EKG changes.   Stop cozaar  Repeat after fluids.   Addendum after Menchaca catheter placement, fluid bolus and Lasix potassium came down to 5.1  Continue to trend: Would not resume Cozaar  Potassium improved to 4.6  Comfort care only     DM:   Patient on amaryl 8 mg po am >> hold for now pending appetite  Glucophage XR 1000 mg po at dinner >> hold   Sliding scale coverage   Restart Amaryl low-dose at 1 mg p.o. daily  Comfort care only     Mild hereditary spherocytosis  On daily folate 1 mg po daily   History of occasional spherocytes on peripheral smear  Recent hematology notes in reference     Failure to thrive  Recent reported weight loss.   Recent balance issues reported  Noted last hematology visit with weight loss and change in eating habits.   In hematology clinic reports of balance issues with weight loss and anemia   1/30 his wife reports that the patient has had episodes of falling at home  Last was a few weeks ago and reportedly did not hurt himself or hit his head  Given balance problems and falls a CT of the head was ordered  PT/OT/case management  As noted will have a palliative care discussion about goals of care.  His wife is concerned about his progressive weakness and not hardly getting out of bed  Palliative care consulted for goals of care discussion with patient and family.  Appreciate assistance  Patient and family were met with oncology and palliative care.  Patient is transition to comfort care only     CKD stage 3  Creatinine 2.61 which is slightly up on admission  Given fluids with 1 L given the poor p.o. intake   Also bladder scanned given history of urinary retention  Required menchaca and continue on fluids   Patient is transition to comfort care only     Urinary retention  History of urinary retention.  Greater than 450 in the ER with Men[place]aca catheter inserted for chronic retention  Urology consultation requested on 1/31/2025  He Menchaca  in due to urinary retention issues and end-of-life care     History of prostate cancer  Treated with XRT 2007     Pain   Lyrica 75 mg po BID      DEMOND:   On CPAP              Diet: Regular Diet Adult  Room Service    DVT Prophylaxis: Deferred as patient is comfort cares only  [place] Catheter: PRESENT, indication: ?  (Error. Value could not be saved.), Acute retention or obstruction, End of life  Lines: None     Cardiac Monitoring: None  Code Status: No CPR- Do NOT Intubate      Clinically Significant Risk Factors               # Hypoalbuminemia: Lowest albumin = 3.1 g/dL at 1/31/2025  6:19 AM, will monitor as appropriate     # Hypertension: Noted on problem list                  # Financial/Environmental Concerns: none         Social Drivers of Health    Physical Activity: Inactive (10/21/2024)    Exercise Vital Sign     Days of Exercise per Week: 0 days     Minutes of Exercise per Session: 0 min   Social Connections: Unknown (10/21/2024)    Social Connection and Isolation Panel [NHANES]     Frequency of Social Gatherings with Friends and Family: Once a week          Disposition Plan       Medically Ready for Discharge: Ready Now      Discharged back to long-term care facility with hospice    Social work consulted and are following     Elyssa Bartholomew was updated over the phone    Kayla Villalba MD  Hospitalist Service  [facility]  Securely message with [employer] (more info)  Text page via [facility] Paging/Directory   ______________________________________________________________________    Interval History   Patient is resting comfortably in bed .    No other acute issues.  Discussed with wife [place] over the phone and with the patient personally that that he will not be able to discharge to home independent care facility due to weakness.  He needs to discharge to a long-term care facility with hospice.  Patient and wife [place] are agreeable.  Social work updated.  No other acute issues    Physical  Exam   Vital Signs:     BP: (!) 145/66 Pulse: 82            Weight: 164 lbs 0 oz    General Appearance: Alert awake, resting comfortably in chair, not in acute distress  Respiratory: Clear to auscultation bilaterally, no crackles or wheezing  Cardiovascular: Normal rate rhythm regular, no murmurs  GI: Soft, nontender nondistended bowel sounds positive  Skin: Warm and dry no lower extremity edema  Psychiatric; calm and cooperative    Medical Decision Making       45 MINUTES SPENT BY ME on the date of service doing chart review, history, exam, documentation & further activities per the note.      Data         Imaging results reviewed over the past 24 hrs:   No results found for this or any previous visit (from the past 24 hours).

## 2025-02-03 NOTE — PLAN OF CARE
Orientation: A/Ox4-[place], flat   Aggression Stop Light: Green  Activity: Ax1 GB/W  Diet/BS Checks: Reg diet. Poor appetite.  Tele:  N/A  IV Access/Drains: 2PIV-SL  Pain Management: Oxy given x1 for inner ear pain-effective.  Abnormal VS/Results: VSS on RA.  Bowel/Bladder: Barbosa in place-GUOP. Continent of bowel. 1x BM this shift.  Skin/Wounds: Scattered bruises and scabs.   Consults: KRYSITAN  D/C Disposition: Home with hospice-pending choice of hospice agency.   Other Info: CPAP ordered for overnight wear. Pt shifts weight independently while awake. Wife, [place], would like to speak to SW regarding pt needs for home and concerns for getting up to bathroom.

## 2025-02-03 NOTE — PROGRESS NOTES
Care Management Follow Up    Length of Stay (days): 4    Expected Discharge Date: 02/04/2025     Concerns to be Addressed: discharge planning     Patient plan of care discussed at interdisciplinary rounds: Yes    Anticipated Discharge Disposition: Hospice              Anticipated Discharge Services: None  Anticipated Discharge DME:      Patient/family educated on Medicare website which has current facility and service quality ratings:    Education Provided on the Discharge Plan: No  Patient/Family in Agreement with the Plan: yes    Referrals Placed by CM/SW: Hospice  Private pay costs discussed: Not applicable    Discussed  Partnership in Safe Discharge Planning  document with patient/family: No     Handoff Completed: No, handoff not indicated or clinically appropriate    Additional Information:  SW met with patient and spouse and discussed hospice and informed they now feel they can't manage hospice at home and patient will need a facility. SW discussed options and private pay costs associated and informed they would like to look for LTC near home in [place]. Spouse aware of costs and indicated they have a LTC insurance benefit that they plan to work with to receive some coverage for. SW sent referrals via [facility].     Next Steps: Secure bed    LAURA Rubio    [facility]

## 2025-02-03 NOTE — CONSULTS
Care management consult completed 01/31. SW will continue to follow.      LAURA Rubio    [facility]

## 2025-02-04 LAB
BACTERIA BLD CULT: NO GROWTH
BACTERIA BLD CULT: NO GROWTH

## 2025-02-04 PROCEDURE — 250N000013 HC RX MED GY IP 250 OP 250 PS 637: Performed by: INTERNAL MEDICINE

## 2025-02-04 PROCEDURE — 99232 SBSQ HOSP IP/OBS MODERATE 35: CPT | Performed by: INTERNAL MEDICINE

## 2025-02-04 PROCEDURE — 120N000001 HC R&B MED SURG/OB

## 2025-02-04 RX ORDER — ACETAMINOPHEN 325 MG/1
650 TABLET ORAL EVERY 4 HOURS PRN
Qty: 30 TABLET | Refills: 0 | Status: SHIPPED | OUTPATIENT
Start: 2025-02-04

## 2025-02-04 RX ORDER — BISACODYL 10 MG
10 SUPPOSITORY, RECTAL RECTAL
Qty: 30 SUPPOSITORY | Refills: 0 | Status: SHIPPED | OUTPATIENT
Start: 2025-02-04

## 2025-02-04 RX ORDER — SENNOSIDES 8.6 MG
1 TABLET ORAL 2 TIMES DAILY PRN
Qty: 30 TABLET | Refills: 0 | Status: SHIPPED | OUTPATIENT
Start: 2025-02-04

## 2025-02-04 RX ORDER — METOPROLOL TARTRATE 50 MG
50 TABLET ORAL 2 TIMES DAILY
Qty: 60 TABLET | Refills: 0 | Status: SHIPPED | OUTPATIENT
Start: 2025-02-04

## 2025-02-04 RX ORDER — LORAZEPAM 1 MG/1
1 TABLET ORAL
Qty: 10 TABLET | Refills: 0 | Status: SHIPPED | OUTPATIENT
Start: 2025-02-04

## 2025-02-04 RX ORDER — OLANZAPINE 5 MG/1
5 TABLET, ORALLY DISINTEGRATING ORAL EVERY 6 HOURS PRN
Qty: 15 TABLET | Refills: 0 | Status: SHIPPED | OUTPATIENT
Start: 2025-02-04

## 2025-02-04 RX ORDER — OXYCODONE HCL 20 MG/ML
5 CONCENTRATE, ORAL ORAL EVERY 6 HOURS PRN
Qty: 5 ML | Refills: 0 | Status: SHIPPED | OUTPATIENT
Start: 2025-02-04 | End: 2025-02-04

## 2025-02-04 RX ORDER — MORPHINE SULFATE 100 MG/5ML
5 SOLUTION ORAL
Qty: 5 ML | Refills: 0 | Status: SHIPPED | OUTPATIENT
Start: 2025-02-04

## 2025-02-04 RX ADMIN — ACETAMINOPHEN 650 MG: 325 TABLET, FILM COATED ORAL at 19:34

## 2025-02-04 RX ADMIN — METOPROLOL TARTRATE 50 MG: 50 TABLET, FILM COATED ORAL at 19:35

## 2025-02-04 RX ADMIN — DULOXETINE HYDROCHLORIDE 20 MG: 20 CAPSULE, DELAYED RELEASE ORAL at 09:03

## 2025-02-04 RX ADMIN — TAMSULOSIN HYDROCHLORIDE 0.8 MG: 0.4 CAPSULE ORAL at 09:02

## 2025-02-04 RX ADMIN — METOPROLOL TARTRATE 50 MG: 50 TABLET, FILM COATED ORAL at 09:03

## 2025-02-04 ASSESSMENT — ACTIVITIES OF DAILY LIVING (ADL)
ADLS_ACUITY_SCORE: 69
ADLS_ACUITY_SCORE: 76
ADLS_ACUITY_SCORE: 69
ADLS_ACUITY_SCORE: 69
ADLS_ACUITY_SCORE: 76
ADLS_ACUITY_SCORE: 69
ADLS_ACUITY_SCORE: 76
ADLS_ACUITY_SCORE: 69
ADLS_ACUITY_SCORE: 76
ADLS_ACUITY_SCORE: 69
ADLS_ACUITY_SCORE: 76
ADLS_ACUITY_SCORE: 69
ADLS_ACUITY_SCORE: 76
ADLS_ACUITY_SCORE: 69
ADLS_ACUITY_SCORE: 76
ADLS_ACUITY_SCORE: 76
ADLS_ACUITY_SCORE: 69

## 2025-02-04 NOTE — PROGRESS NOTES
Care Management Follow Up    Length of Stay (days): 5    Expected Discharge Date: 02/05/2025     Concerns to be Addressed: discharge planning     Patient plan of care discussed at interdisciplinary rounds: Yes    Anticipated Discharge Disposition: Hospice              Anticipated Discharge Services: None  Anticipated Discharge DME:      Patient/family educated on Medicare website which has current facility and service quality ratings:    Education Provided on the Discharge Plan: No  Patient/Family in Agreement with the Plan: yes    Referrals Placed by CM/SW: Hospice  Private pay costs discussed: Not applicable    Discussed  Partnership in Safe Discharge Planning  document with patient/family: No     Handoff Completed: No, handoff not indicated or clinically appropriate    Additional Information:  SW sent additional LTC referrals via [facility]    Next Steps: Secure bed    LAURA Rubio    [facility]

## 2025-02-04 NOTE — PROGRESS NOTES
[facility]    Medicine Progress Note - Hospitalist Service    Date of Admission:  1/30/2025    Assessment & Plan   Milo Lozano is a 89 year old male with a history of type 2 diabetes, CML, hypertension, CKD, BPH, hypothyroidism, DEMOND, BPV, presented to the emergency room on 1/30 with weakness and history of falls got admitted after he had difficulty in getting off the toilet.  CT head without contrast in the ED showed small right subdural hematoma at 7mm  .  No mass effect or midline shift or hydrocephalus     Palliative care consultation requested regarding goals of care discussion.  Goals of care discussion was done with patient and his wife by [facility] and palliative care provider Adolfo Jessica.  Patient and family decided to transition to comfort cares only and plan on discharge with hospice.  Comfort care orders initiated on 1/31/2025  Social work consultation requested for hospice referral and discharge planning.  Called and discussed with wife [place] on 2/3/2025 that patient will not be able to come home he needs to discharge to a care facility with hospice as he is very weak and unable to stand up on his own.  This was also discussed with the patient and both patient and wife are agreeable now to go to care facility.   was updated, and are following.  Patient is medically ready for discharge.      Small right subdural hematoma at 7 mm;  Repeat CT head showed stable subdural hematoma  Neurosurgery consulted and are following  PTA anticoagulation on hold  Patient is comfort care only     Left lower lobe pneumonia  Patient admitted with weakness and CXR findings of pneumonia.   Started on rocephin and zithromax.  SARS/COVID/RSV negative.   Lactate is normal 1.4  Blood cultures X 2 ordered.   Given his immunocompromise state will treat empirically with antibiotics  He describes a cough but has no fevers and his history is somewhat limited in reference to  respiratory symptom  White count currently 9 and procalcitonin normal 0.17   Continue Rocephin and Zithromax.'  His weakness may be overall decline, CML, treatment but pneumonia not excluded and will treat   Patient is comfort care only     Chronic myelomonocytic leukemia.   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 Prince.   Bone marrow biopsy 12/2024   Recently started on a new treatment that he receives for 5 days Monday through Friday 1 week a month  His wife is uncertain if he can tolerate this or if he wants to continue   will have hematology/oncology see the patient for potential discussion regarding his prognosis with the CML in addition to anticoagulation of family chose to pursue this but it sounds unlikely  1 unit PRBCs transfusion ordered on 131  Patient's wife signed blood transfusion consent at bedside on 1/31/2025     Atrial fibrillation with RVR (newly diagnosed) uncertain duration.   Nonischemic myocardial injury due to pneumonia, CKD stage III, from the effect of azacitidine  Newly diagnosed of uncertain duration   On propranolol PTA which could help with rate control and may have masked afib with rate control.   ECHO (no prior found)   Serial troponin first elevatyed 111 >> 103. (NSTEMI vs ischemia) Trend down with delta change  Family aware of troponin levels but given his overall decline are unlikely to pursue any other invasive workup  Holding anticoagulation with history of falls prior to admission and generalized weakness and anemia and subdural hematoma on admission  Patient was in A-fib RVR overnight heart rate in the 120s to 140s.  One-time dose of IV digoxin 250 mcg given.  Patient converted to normal sinus rhythm with heart rate currently in the 80s  Also started on diltiazem drip  Cardiology consultation requested.  Appreciate assistance    Patient is transition to comfort cares only    Continue metoprolol 50 mg  p.o. twice daily for better heart rate control even on hospice     Hyperkalemia   Potassium at 6   No overt EKG changes.   Stop cozaar  Repeat after fluids.   Addendum after Menchaca catheter placement, fluid bolus and Lasix potassium came down to 5.1  Continue to trend: Would not resume Cozaar  Potassium improved to 4.6  Comfort care only     DM:   Patient on amaryl 8 mg po am >> hold for now pending appetite  Glucophage XR 1000 mg po at dinner >> hold   Sliding scale coverage   Restart Amaryl low-dose at 1 mg p.o. daily  Comfort care only     Mild hereditary spherocytosis  On daily folate 1 mg po daily   History of occasional spherocytes on peripheral smear  Recent hematology notes in reference     Failure to thrive  Recent reported weight loss.   Recent balance issues reported  Noted last hematology visit with weight loss and change in eating habits.   In hematology clinic reports of balance issues with weight loss and anemia   1/30 his wife reports that the patient has had episodes of falling at home  Last was a few weeks ago and reportedly did not hurt himself or hit his head  Given balance problems and falls a CT of the head was ordered  PT/OT/case management  As noted will have a palliative care discussion about goals of care.  His wife is concerned about his progressive weakness and not hardly getting out of bed  Palliative care consulted for goals of care discussion with patient and family.  Appreciate assistance  Patient and family were met with oncology and palliative care.  Patient is transition to comfort care only     CKD stage 3  Creatinine 2.61 which is slightly up on admission  Given fluids with 1 L given the poor p.o. intake   Also bladder scanned given history of urinary retention  Required menchaca and continue on fluids   Patient is transition to comfort care only     Urinary retention  History of urinary retention.  Greater than 450 in the ER with Menchaca catheter inserted for chronic  retention  Urology consultation requested on 1/31/2025  He Barbosa in due to urinary retention issues and end-of-life care     History of prostate cancer  Treated with XRT 2007     Pain   Lyrica 75 mg po BID      DEMOND:   On CPAP              Diet: Regular Diet Adult  Room Service    DVT Prophylaxis: Deferred as patient is comfort cares only  [place] Catheter: PRESENT, indication: ?  (Error. Value could not be saved.), End of life  Lines: None     Cardiac Monitoring: None  Code Status: No CPR- Do NOT Intubate      Clinically Significant Risk Factors               # Hypoalbuminemia: Lowest albumin = 3.1 g/dL at 1/31/2025  6:19 AM, will monitor as appropriate     # Hypertension: Noted on problem list                  # Financial/Environmental Concerns: none         Social Drivers of Health    Physical Activity: Inactive (10/21/2024)    Exercise Vital Sign     Days of Exercise per Week: 0 days     Minutes of Exercise per Session: 0 min   Social Connections: Unknown (10/21/2024)    Social Connection and Isolation Panel [NHANES]     Frequency of Social Gatherings with Friends and Family: Once a week          Disposition Plan       Medically Ready for Discharge: Ready Now      Discharge to long-term care facility with hospice    Social work consulted and are following     Wife [place] was updated at bedside on 2/4/25     Kayla Villalba MD  Hospitalist Service  [facility]  Securely message with [employer] (more info)  Text page via [employer] Paging/Directory   ______________________________________________________________________    Interval History   Patient is resting comfortably in bed .    No other acute issues.  Placement pending    Physical Exam   Vital Signs: Temp: 98.4  F (36.9  C) Temp src: Oral BP: (!) 186/103 Pulse: 85   Resp: 18 SpO2: 93 % O2 Device: None (Room air)    Weight: 164 lbs 0 oz    General Appearance: Alert awake, resting comfortably in chair, not in acute distress  Respiratory: Clear to  auscultation bilaterally, no crackles or wheezing  Cardiovascular: Normal rate rhythm regular, no murmurs  GI: Soft, nontender nondistended bowel sounds positive  Skin: Warm and dry no lower extremity edema  Psychiatric; calm and cooperative    Medical Decision Making       45 MINUTES SPENT BY ME on the date of service doing chart review, history, exam, documentation & further activities per the note.      Data         Imaging results reviewed over the past 24 hrs:   No results found for this or any previous visit (from the past 24 hours).

## 2025-02-04 NOTE — PLAN OF CARE
Goal Outcome Evaluation:  1900-0730  Orientation: A/O x4; forgetful; [place]  Aggression Stop Light: Green  Activity: A2 GBW  Diet/BS Checks: Reg  Tele: N/A  IV Access/Drains: L and R PIV SL  Pain Management: 4/10 head pain, given PRN tylenol and oxy   Abnormal VS/Results: Deferred d/t CC  Bowel/Bladder: menchaca in place; up to BSC for bowel   Skin/Wounds: intact   Consults: KRYSTIAN  D/C Disposition: pending LTC with hospice placement  Other Info:   -need cpap at night- assuming refusal was d/t late arrival of RT, was first called at 2000 when pt stated he was ready for it

## 2025-02-04 NOTE — PLAN OF CARE
02/-1930    Summary: presented to the emergency room on 1/30 with weakness and history of falls after he had difficulty in getting off the toilet.     Primary Diagnosis: Small right subdural hematoma at 7 mm, Left lower lobe pneumonia, Chronic myelomonocytic leukemia.     Orientation: A/Ox4, int confusion, [place], flat affect    Aggression Stop Light: green    Activity: A1-2 GBW, pivots to BSC    Diet/BS Checks: reg    Tele:  none    IV Access/Drains: L/R PIVs SL    Pain Management: Denies pain    Abnormal VS/Results: deferred due to comfort cares    Bowel/Bladder: cont bowel, menchaca in place with adequate output    Skin/Wounds: scattered bruising    Consults: KYRSTIAN    D/C Disposition: TBD, LTC w/ Hospice. Referrals sent by SW    Other Info:   Pt wants to use CPAP at night. Refused to get out of bed.

## 2025-02-04 NOTE — PLAN OF CARE
Goal Outcome Evaluation:       Comfort care. Denies pain. Alert but disoriented about situation. Slow to respond. Hard of hearing. Good appetite. Good urine output on menchaca. Placement pending.

## 2025-02-05 ENCOUNTER — TELEPHONE (OUTPATIENT)
Dept: INFUSION THERAPY | Facility: CLINIC | Age: 89
End: 2025-02-05
Payer: COMMERCIAL

## 2025-02-05 ENCOUNTER — PATIENT OUTREACH (OUTPATIENT)
Dept: ONCOLOGY | Facility: CLINIC | Age: 89
End: 2025-02-05
Payer: COMMERCIAL

## 2025-02-05 PROCEDURE — 250N000013 HC RX MED GY IP 250 OP 250 PS 637: Performed by: INTERNAL MEDICINE

## 2025-02-05 PROCEDURE — 250N000011 HC RX IP 250 OP 636: Performed by: INTERNAL MEDICINE

## 2025-02-05 PROCEDURE — 99232 SBSQ HOSP IP/OBS MODERATE 35: CPT | Performed by: INTERNAL MEDICINE

## 2025-02-05 PROCEDURE — 120N000001 HC R&B MED SURG/OB

## 2025-02-05 RX ORDER — CIPROFLOXACIN 2 MG/ML
400 INJECTION, SOLUTION INTRAVENOUS DAILY
Status: DISCONTINUED | OUTPATIENT
Start: 2025-02-05 | End: 2025-02-05

## 2025-02-05 RX ORDER — CIPROFLOXACIN 500 MG/1
500 TABLET, FILM COATED ORAL
Status: DISCONTINUED | OUTPATIENT
Start: 2025-02-06 | End: 2025-02-07 | Stop reason: HOSPADM

## 2025-02-05 RX ORDER — CIPROFLOXACIN 500 MG/1
500 TABLET, FILM COATED ORAL DAILY
Qty: 5 TABLET | Refills: 0 | Status: SHIPPED | OUTPATIENT
Start: 2025-02-05 | End: 2025-02-10

## 2025-02-05 RX ADMIN — METOPROLOL TARTRATE 50 MG: 50 TABLET, FILM COATED ORAL at 21:48

## 2025-02-05 RX ADMIN — TAMSULOSIN HYDROCHLORIDE 0.8 MG: 0.4 CAPSULE ORAL at 09:48

## 2025-02-05 RX ADMIN — CARBAMIDE PEROXIDE 6.5% 5 DROP: 6.5 LIQUID AURICULAR (OTIC) at 21:48

## 2025-02-05 RX ADMIN — CIPROFLOXACIN 400 MG: 400 INJECTION, SOLUTION INTRAVENOUS at 10:27

## 2025-02-05 RX ADMIN — ACETAMINOPHEN 650 MG: 325 TABLET, FILM COATED ORAL at 00:39

## 2025-02-05 RX ADMIN — DULOXETINE HYDROCHLORIDE 20 MG: 20 CAPSULE, DELAYED RELEASE ORAL at 09:48

## 2025-02-05 RX ADMIN — CARBAMIDE PEROXIDE 6.5% 5 DROP: 6.5 LIQUID AURICULAR (OTIC) at 11:28

## 2025-02-05 RX ADMIN — ACETAMINOPHEN 650 MG: 325 TABLET, FILM COATED ORAL at 09:58

## 2025-02-05 ASSESSMENT — ACTIVITIES OF DAILY LIVING (ADL)
ADLS_ACUITY_SCORE: 66
ADLS_ACUITY_SCORE: 75
ADLS_ACUITY_SCORE: 66
ADLS_ACUITY_SCORE: 75
ADLS_ACUITY_SCORE: 75
ADLS_ACUITY_SCORE: 66
ADLS_ACUITY_SCORE: 75
ADLS_ACUITY_SCORE: 66
ADLS_ACUITY_SCORE: 75
ADLS_ACUITY_SCORE: 66
ADLS_ACUITY_SCORE: 75
ADLS_ACUITY_SCORE: 66
ADLS_ACUITY_SCORE: 66
ADLS_ACUITY_SCORE: 75
ADLS_ACUITY_SCORE: 66
ADLS_ACUITY_SCORE: 66
ADLS_ACUITY_SCORE: 68
ADLS_ACUITY_SCORE: 66
ADLS_ACUITY_SCORE: 75
ADLS_ACUITY_SCORE: 68
ADLS_ACUITY_SCORE: 68
ADLS_ACUITY_SCORE: 66
ADLS_ACUITY_SCORE: 75

## 2025-02-05 NOTE — TELEPHONE ENCOUNTER
Patients spouse called and asked to cancel all of Alex's cancer center appts. She said he was no longer doing treatment. He is going into a nursing home.     Bharti Zavala

## 2025-02-05 NOTE — TELEPHONE ENCOUNTER
Anita called and asked to cancel all of Alex's appointments in the cancer center. Per wife, patient

## 2025-02-05 NOTE — PROGRESS NOTES
Call received from spouse, reports that patient is currently hospitalized and very weak.  He will be discharging to a care facility with hospice and will not be needing future care in clinic or infusions.     Thanked spouse for update and offered support/wish for comfort.  Provider updated.      Yasmine Carlos RN

## 2025-02-05 NOTE — PROGRESS NOTES
Care Management Follow Up    Length of Stay (days): 6    Expected Discharge Date: 02/06/2025     Concerns to be Addressed: discharge planning     Patient plan of care discussed at interdisciplinary rounds: Yes    Anticipated Discharge Disposition: Hospice              Anticipated Discharge Services: None  Anticipated Discharge DME:      Patient/family educated on Medicare website which has current facility and service quality ratings:    Education Provided on the Discharge Plan: No  Patient/Family in Agreement with the Plan: yes    Referrals Placed by CM/SW: Hospice  Private pay costs discussed: Not applicable    Discussed  Partnership in Safe Discharge Planning  document with patient/family: No     Handoff Completed: No, handoff not indicated or clinically appropriate    Additional Information:  SW informed [facility] accept patient either 02/06 or 02/07 pending hospice intake and would need a $5,000 deposit. Spouse is in agreement with this. SW discussed hospice agencies and spouse is in agreement with using facilities preferred agency ([facility]). SW sent a referral via [facility].    Addendum: KRYSTIAN informed [facility] can now not accept until Friday. KRYSTIAN updated [facility] on change in discharge date.    Addendum 1530: KRYSTIAN scheduled  stretcher transport for Friday 02/07 at [id number]. KRYSTIAN called Sarah with [facility] ([phone]). KRYSTIAN informed Silvia with [facility] of transport time.     KRYSTIAN called Spouse [place] and updated on discharge plans.     Next Steps: Finalize hospice discharge    LAURA Rubio    [facility]

## 2025-02-05 NOTE — PROGRESS NOTES
[facility]    Medicine Progress Note - Hospitalist Service    Date of Admission:  1/30/2025    Assessment & Plan   Milo Lozano is a 89 year old male with a history of type 2 diabetes, CML, hypertension, CKD, BPH, hypothyroidism, DEMOND, BPV, presented to the emergency room on 1/30 with weakness and history of falls got admitted after he had difficulty in getting off the toilet.  CT head without contrast in the ED showed small right subdural hematoma at 7mm  .  No mass effect or midline shift or hydrocephalus     Palliative care consultation requested regarding goals of care discussion.  Goals of care discussion was done with patient and his wife by [facility] and palliative care provider Adolfo Jessica.  Patient and family decided to transition to comfort cares only and plan on discharge with hospice.  Comfort care orders initiated on 1/31/2025  Social work consultation requested for hospice referral and discharge planning.  Called and discussed with wife [place] on 2/3/2025 that patient will not be able to come home he needs to discharge to a care facility with hospice as he is very weak and unable to stand up on his own.  This was also discussed with the patient and both patient and wife are agreeable now to go to care facility.   was updated, and are following.  Patient is medically ready for discharge.            Small right subdural hematoma at 7 mm;  Repeat CT head showed stable subdural hematoma  Neurosurgery consulted and are following  PTA anticoagulation on hold  Patient is comfort care only    Right ear pain with possible infection;  Right ear wax impaction;  As per the wife patient had a complicated right ear infection which was treated in the past  Started having right ear pain 2 days ago.  On ear exam there is wax impaction and prominent tympanic membrane  Started on Debrox eardrops  Patient was given IV ceftriaxone on admission  Started IV Cipro on 2/5/2025 to  help with the ear infection  Switch to oral Cipro 500 mg p.o. daily per renal dosing on 2/6/2025.  Will give him 7-day course of antibiotics     Left lower lobe pneumonia  Patient admitted with weakness and CXR findings of pneumonia.   Started on rocephin and zithromax.  SARS/COVID/RSV negative.   Lactate is normal 1.4  Blood cultures X 2 ordered.   Given his immunocompromise state will treat empirically with antibiotics  He describes a cough but has no fevers and his history is somewhat limited in reference to respiratory symptom  White count currently 9 and procalcitonin normal 0.17   Continue Rocephin and Zithromax.'  His weakness may be overall decline, CML, treatment but pneumonia not excluded and will treat   Patient is comfort care only     Chronic myelomonocytic leukemia.   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 Prince.   Bone marrow biopsy 12/2024   Recently started on a new treatment that he receives for 5 days Monday through Friday 1 week a month  His wife is uncertain if he can tolerate this or if he wants to continue   will have hematology/oncology see the patient for potential discussion regarding his prognosis with the CML in addition to anticoagulation of family chose to pursue this but it sounds unlikely  1 unit PRBCs transfusion ordered on 131  Patient's wife signed blood transfusion consent at bedside on 1/31/2025     Atrial fibrillation with RVR (newly diagnosed) uncertain duration.   Nonischemic myocardial injury due to pneumonia, CKD stage III, from the effect of azacitidine  Newly diagnosed of uncertain duration   On propranolol PTA which could help with rate control and may have masked afib with rate control.   ECHO (no prior found)   Serial troponin first elevatyed 111 >> 103. (NSTEMI vs ischemia) Trend down with delta change  Family aware of troponin levels but given his overall decline are unlikely to pursue any  other invasive workup  Holding anticoagulation with history of falls prior to admission and generalized weakness and anemia and subdural hematoma on admission  Patient was in A-fib RVR overnight heart rate in the 120s to 140s.  One-time dose of IV digoxin 250 mcg given.  Patient converted to normal sinus rhythm with heart rate currently in the 80s  Also started on diltiazem drip  Cardiology consultation requested.  Appreciate assistance    Patient is transition to comfort cares only    Continue metoprolol 50 mg p.o. twice daily for better heart rate control even on hospice     Hyperkalemia   Potassium at 6   No overt EKG changes.   Stop cozaar  Repeat after fluids.   Addendum after Barbosa catheter placement, fluid bolus and Lasix potassium came down to 5.1  Continue to trend: Would not resume Cozaar  Potassium improved to 4.6  Comfort care only     DM:   Patient on amaryl 8 mg po am >> hold for now pending appetite  Glucophage XR 1000 mg po at dinner >> hold   Sliding scale coverage   Restart Amaryl low-dose at 1 mg p.o. daily  Comfort care only     Mild hereditary spherocytosis  On daily folate 1 mg po daily   History of occasional spherocytes on peripheral smear  Recent hematology notes in reference     Failure to thrive  Recent reported weight loss.   Recent balance issues reported  Noted last hematology visit with weight loss and change in eating habits.   In hematology clinic reports of balance issues with weight loss and anemia   1/30 his wife reports that the patient has had episodes of falling at home  Last was a few weeks ago and reportedly did not hurt himself or hit his head  Given balance problems and falls a CT of the head was ordered  PT/OT/case management  As noted will have a palliative care discussion about goals of care.  His wife is concerned about his progressive weakness and not hardly getting out of bed  Palliative care consulted for goals of care discussion with patient and family.  Appreciate  assistance  Patient and family were met with oncology and palliative care.  Patient is transition to comfort care only     CKD stage 3  Creatinine 2.61 which is slightly up on admission  Given fluids with 1 L given the poor p.o. intake   Also bladder scanned given history of urinary retention  Required menchaca and continue on fluids   Patient is transition to comfort care only     Urinary retention  History of urinary retention.  Greater than 450 in the ER with Menchaca catheter inserted for chronic retention  Urology consultation requested on 1/31/2025  He Menchaca in due to urinary retention issues and end-of-life care     History of prostate cancer  Treated with XRT 2007     Pain   Lyrica 75 mg po BID      DEMOND:   On CPAP              Diet: Regular Diet Adult  Room Service    DVT Prophylaxis: Deferred as patient is comfort cares only  [place] Catheter: PRESENT, indication: ?  (Error. Value could not be saved.), Acute retention or obstruction  Lines: None     Cardiac Monitoring: None  Code Status: No CPR- Do NOT Intubate      Clinically Significant Risk Factors               # Hypoalbuminemia: Lowest albumin = 3.1 g/dL at 1/31/2025  6:19 AM, will monitor as appropriate     # Hypertension: Noted on problem list                  # Financial/Environmental Concerns: none         Social Drivers of Health    Physical Activity: Inactive (10/21/2024)    Exercise Vital Sign     Days of Exercise per Week: 0 days     Minutes of Exercise per Session: 0 min   Social Connections: Unknown (10/21/2024)    Social Connection and Isolation Panel [NHANES]     Frequency of Social Gatherings with Friends and Family: Once a week          Disposition Plan       Medically Ready for Discharge: Ready Now      Discharge to long-term care facility with hospice    Social work consulted and are following     Wife [place] was updated at bedside on 2/4/25     Kayla Villalba MD  Hospitalist Service  [facility]  Securely message with  [employer] (more info)  Text page via [facility] Paging/Directory   ______________________________________________________________________    Interval History   Patient is resting comfortably in bed .    No other acute issues.  Placement pending    Physical Exam   Vital Signs: Temp: 98.2  F (36.8  C) Temp src: Oral BP: 93/67 Pulse: 70   Resp: 18 SpO2: 95 % O2 Device: None (Room air)    Weight: 164 lbs 0 oz    General Appearance: Alert awake, resting comfortably in chair, not in acute distress  Respiratory: Clear to auscultation bilaterally, no crackles or wheezing  Cardiovascular: Normal rate rhythm regular, no murmurs  GI: Soft, nontender nondistended bowel sounds positive  Skin: Warm and dry no lower extremity edema  Psychiatric; calm and cooperative    Medical Decision Making       45 MINUTES SPENT BY ME on the date of service doing chart review, history, exam, documentation & further activities per the note.      Data         Imaging results reviewed over the past 24 hrs:   No results found for this or any previous visit (from the past 24 hours).

## 2025-02-05 NOTE — PLAN OF CARE
Goal Outcome Evaluation:  [id number]  Orientation: A/O x4; forgetful; [place]  Aggression Stop Light: Green  Activity: A2 GBW  Diet/BS Checks: Reg  Tele: N/A  IV Access/Drains: L and R PIV SL  Pain Management: 4/10 ear pain, given PRN tylenol  Abnormal VS/Results: Deferred d/t CC  Bowel/Bladder: menchaca in place; up to BSC for bowel   Skin/Wounds: intact   Consults: KRYSTIAN  D/C Disposition: pending LTC with hospice placement  Other Info:

## 2025-02-06 VITALS
HEART RATE: 76 BPM | BODY MASS INDEX: 24.86 KG/M2 | DIASTOLIC BLOOD PRESSURE: 88 MMHG | RESPIRATION RATE: 16 BRPM | OXYGEN SATURATION: 98 % | TEMPERATURE: 97.8 F | HEIGHT: 68 IN | WEIGHT: 164 LBS | SYSTOLIC BLOOD PRESSURE: 154 MMHG

## 2025-02-06 PROCEDURE — 120N000001 HC R&B MED SURG/OB

## 2025-02-06 PROCEDURE — 99232 SBSQ HOSP IP/OBS MODERATE 35: CPT | Performed by: INTERNAL MEDICINE

## 2025-02-06 PROCEDURE — 250N000013 HC RX MED GY IP 250 OP 250 PS 637: Performed by: INTERNAL MEDICINE

## 2025-02-06 RX ADMIN — TAMSULOSIN HYDROCHLORIDE 0.8 MG: 0.4 CAPSULE ORAL at 09:45

## 2025-02-06 RX ADMIN — METOPROLOL TARTRATE 50 MG: 50 TABLET, FILM COATED ORAL at 20:35

## 2025-02-06 RX ADMIN — METOPROLOL TARTRATE 50 MG: 50 TABLET, FILM COATED ORAL at 09:45

## 2025-02-06 RX ADMIN — CARBAMIDE PEROXIDE 6.5% 5 DROP: 6.5 LIQUID AURICULAR (OTIC) at 09:47

## 2025-02-06 RX ADMIN — CIPROFLOXACIN 500 MG: 500 TABLET ORAL at 09:45

## 2025-02-06 RX ADMIN — DULOXETINE HYDROCHLORIDE 20 MG: 20 CAPSULE, DELAYED RELEASE ORAL at 09:45

## 2025-02-06 ASSESSMENT — ACTIVITIES OF DAILY LIVING (ADL)
ADLS_ACUITY_SCORE: 66

## 2025-02-06 NOTE — PLAN OF CARE
Orientation: A&Ox3- disoriented to situation, forgetful, [place]  Aggression Stop Light: Green  Activity: A2 GB+W  Diet/BS Checks: Regular  Tele:  N/a  IV Access/Drains: PIV x2 SL  Pain Management: PRN tylenol for c/o headache & ear ache  Abnormal VS/Results: VS deferred   Bowel/Bladder: Continent of bowel - up to BSC, menchaca in place with adequate UOP.   Skin/Wounds: Intact  Consults: KRYSTIAN  D/C Disposition: Plan to discharge to [facility] 2/7 pending finalization of hospice   Other Info:   -Started Cipro for ear infection with ear drops 2x daily

## 2025-02-06 NOTE — PROGRESS NOTES
[facility]    Medicine Progress Note - Hospitalist Service    Date of Admission:  1/30/2025    Assessment & Plan   Milo Lozano is a 89 year old male with a history of type 2 diabetes, CML, hypertension, CKD, BPH, hypothyroidism, DEMOND, BPV, presented to the emergency room on 1/30 with weakness and history of falls got admitted after he had difficulty in getting off the toilet.  CT head without contrast in the ED showed small right subdural hematoma at 7mm  .  No mass effect or midline shift or hydrocephalus     Palliative care consultation requested regarding goals of care discussion.  Goals of care discussion was done with patient and his wife by [facility] and palliative care provider Adolfo Jessica.  Patient and family decided to transition to comfort cares only and plan on discharge with hospice.  Comfort care orders initiated on 1/31/2025  Social work consultation requested for hospice referral and discharge planning.  Called and discussed with wife [place] on 2/3/2025 that patient will not be able to come home he needs to discharge to a care facility with hospice as he is very weak and unable to stand up on his own.  This was also discussed with the patient and both patient and wife are agreeable now to go to care facility.   was updated, and are following.  Patient is medically ready for discharge.            Small right subdural hematoma at 7 mm;  Repeat CT head showed stable subdural hematoma  Neurosurgery consulted and are following  PTA anticoagulation on hold  Patient is comfort care only    Right ear pain with possible infection;  Right ear wax impaction;  As per the wife patient had a complicated right ear infection which was treated in the past  Started having right ear pain 2 days ago.  On ear exam there is wax impaction and prominent tympanic membrane  Started on Debrox eardrops  Patient was given IV ceftriaxone on admission  Started IV Cipro on 2/5/2025 to  help with the ear infection  Switch to oral Cipro 500 mg p.o. daily per renal dosing on 2/6/2025.  Will give him 7-day course of antibiotics  Ear pain much improved with above treatment  Left lower lobe pneumonia  Patient admitted with weakness and CXR findings of pneumonia.   Started on rocephin and zithromax.  SARS/COVID/RSV negative.   Lactate is normal 1.4  Blood cultures X 2 ordered.   Given his immunocompromise state will treat empirically with antibiotics  He describes a cough but has no fevers and his history is somewhat limited in reference to respiratory symptom  White count currently 9 and procalcitonin normal 0.17   Continue Rocephin and Zithromax.'  His weakness may be overall decline, CML, treatment but pneumonia not excluded and will treat   Patient is comfort care only     Chronic myelomonocytic leukemia.   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 Prince.   Bone marrow biopsy 12/2024   Recently started on a new treatment that he receives for 5 days Monday through Friday 1 week a month  His wife is uncertain if he can tolerate this or if he wants to continue   will have hematology/oncology see the patient for potential discussion regarding his prognosis with the CML in addition to anticoagulation of family chose to pursue this but it sounds unlikely  1 unit PRBCs transfusion ordered on 131  Patient's wife signed blood transfusion consent at bedside on 1/31/2025     Atrial fibrillation with RVR (newly diagnosed) uncertain duration.   Nonischemic myocardial injury due to pneumonia, CKD stage III, from the effect of azacitidine  Newly diagnosed of uncertain duration   On propranolol PTA which could help with rate control and may have masked afib with rate control.   ECHO (no prior found)   Serial troponin first elevatyed 111 >> 103. (NSTEMI vs ischemia) Trend down with delta change  Family aware of troponin levels but given his  overall decline are unlikely to pursue any other invasive workup  Holding anticoagulation with history of falls prior to admission and generalized weakness and anemia and subdural hematoma on admission  Patient was in A-fib RVR overnight heart rate in the 120s to 140s.  One-time dose of IV digoxin 250 mcg given.  Patient converted to normal sinus rhythm with heart rate currently in the 80s  Also started on diltiazem drip  Cardiology consultation requested.  Appreciate assistance    Patient is transition to comfort cares only    Continue metoprolol 50 mg p.o. twice daily for better heart rate control even on hospice     Hyperkalemia   Potassium at 6   No overt EKG changes.   Stop cozaar  Repeat after fluids.   Addendum after Barbosa catheter placement, fluid bolus and Lasix potassium came down to 5.1  Continue to trend: Would not resume Cozaar  Potassium improved to 4.6  Comfort care only     DM:   Patient on amaryl 8 mg po am >> hold for now pending appetite  Glucophage XR 1000 mg po at dinner >> hold   Sliding scale coverage   Restart Amaryl low-dose at 1 mg p.o. daily  Comfort care only     Mild hereditary spherocytosis  On daily folate 1 mg po daily   History of occasional spherocytes on peripheral smear  Recent hematology notes in reference     Failure to thrive  Recent reported weight loss.   Recent balance issues reported  Noted last hematology visit with weight loss and change in eating habits.   In hematology clinic reports of balance issues with weight loss and anemia   1/30 his wife reports that the patient has had episodes of falling at home  Last was a few weeks ago and reportedly did not hurt himself or hit his head  Given balance problems and falls a CT of the head was ordered  PT/OT/case management  As noted will have a palliative care discussion about goals of care.  His wife is concerned about his progressive weakness and not hardly getting out of bed  Palliative care consulted for goals of care  discussion with patient and family.  Appreciate assistance  Patient and family were met with oncology and palliative care.  Patient is transition to comfort care only     CKD stage 3  Creatinine 2.61 which is slightly up on admission  Given fluids with 1 L given the poor p.o. intake   Also bladder scanned given history of urinary retention  Required menchaca and continue on fluids   Patient is transition to comfort care only     Urinary retention  History of urinary retention.  Greater than 450 in the ER with Menchaca catheter inserted for chronic retention  Urology consultation requested on 1/31/2025  He Menchaca in due to urinary retention issues and end-of-life care     History of prostate cancer  Treated with XRT 2007     Pain   Lyrica 75 mg po BID      DEMOND:   On CPAP              Diet: Regular Diet Adult  Room Service  Diet    DVT Prophylaxis: Deferred as patient is comfort cares only  [place] Catheter: PRESENT, indication: ?  (Error. Value could not be saved.), Acute retention or obstruction  Lines: None     Cardiac Monitoring: None  Code Status: No CPR- Do NOT Intubate      Clinically Significant Risk Factors               # Hypoalbuminemia: Lowest albumin = 3.1 g/dL at 1/31/2025  6:19 AM, will monitor as appropriate     # Hypertension: Noted on problem list                  # Financial/Environmental Concerns: none         Social Drivers of Health    Physical Activity: Inactive (10/21/2024)    Exercise Vital Sign     Days of Exercise per Week: 0 days     Minutes of Exercise per Session: 0 min   Social Connections: Unknown (10/21/2024)    Social Connection and Isolation Panel [NHANES]     Frequency of Social Gatherings with Friends and Family: Once a week          Disposition Plan       Medically Ready for Discharge: Ready Now      Discharge to long-term care facility with hospice    Social work consulted and are following     Wife [place] was updated at bedside on 2/6/25     Kayla Villalba MD  Hospitalist Service  M  [facility]  Securely message with [employer] (more info)  Text page via [employer] Paging/Directory   ______________________________________________________________________    Interval History   Patient is resting comfortably in bed .    No other acute issues.  Placement pending    Physical Exam   Vital Signs:     BP: (!) 141/66 Pulse: 88   Resp: 18        Weight: 164 lbs 0 oz    General Appearance: Alert awake, resting comfortably in chair, not in acute distress  Respiratory: Clear to auscultation bilaterally, no crackles or wheezing  Cardiovascular: Normal rate rhythm regular, no murmurs  GI: Soft, nontender nondistended bowel sounds positive  Skin: Warm and dry no lower extremity edema  Psychiatric; calm and cooperative    Medical Decision Making       45 MINUTES SPENT BY ME on the date of service doing chart review, history, exam, documentation & further activities per the note.      Data         Imaging results reviewed over the past 24 hrs:   No results found for this or any previous visit (from the past 24 hours).

## 2025-02-06 NOTE — PROGRESS NOTES
Care Management Follow Up    Length of Stay (days): 7    Expected Discharge Date: 02/07/2025     Concerns to be Addressed: discharge planning     Patient plan of care discussed at interdisciplinary rounds: Yes    Anticipated Discharge Disposition: Hospice              Anticipated Discharge Services: None  Anticipated Discharge DME:      Patient/family educated on Medicare website which has current facility and service quality ratings:    Education Provided on the Discharge Plan: No  Patient/Family in Agreement with the Plan: yes    Referrals Placed by CM/SW: Hospice  Private pay costs discussed: Not applicable    Discussed  Partnership in Safe Discharge Planning  document with patient/family: No     Handoff Completed: No, handoff not indicated or clinically appropriate    Additional Information:    SW notified by patients hospice agency that they do not have a copy of patients health care directive SW noted that HCD should be on chart, but it does not load. SW emailed honoring amy to request copy of HCD to [facility] to [facility]. [employer] fax is [phone].    Next Steps: obtain and fax HCD    LEX Berumen

## 2025-02-06 NOTE — PLAN OF CARE
2/5/25 -- [id number]    Orientation: A&O x4; forgetful; [place]  Aggression Stop Light: Green  Activity: A2 GBW, up to BSC   Diet/BS Checks: Regular  Tele: n/a  IV Access/Drains: 2 PIV SL  Pain Management: denies   Abnormal VS/Results: Deferred Bowel/Bladder: Continent of bowel, 1 BM this shift. Barbosa in place  Skin/Wounds: WDL  Consults: SW  D/C Disposition: pending LTC with hospice placement

## 2025-02-06 NOTE — PLAN OF CARE
2/5/25 -- [id number]    Orientation: A&O x4; forgetful; [place]  Aggression Stop Light: Green  Activity: A2 GBW, up to BSC   Diet/BS Checks: Regular  Tele: n/a  IV Access/Drains: 2 PIV SL  Pain Management: denies   Abnormal VS/Results: Deferred Bowel/Bladder: Continent of bowel, 1 BM this shift. Barbosa in place  Skin/Wounds: WDL  Consults: SW  D/C Disposition: pending LTC with hospice placement

## 2025-02-07 VITALS
TEMPERATURE: 97.8 F | SYSTOLIC BLOOD PRESSURE: 155 MMHG | HEART RATE: 81 BPM | OXYGEN SATURATION: 98 % | RESPIRATION RATE: 16 BRPM | BODY MASS INDEX: 24.86 KG/M2 | HEIGHT: 68 IN | WEIGHT: 164 LBS | DIASTOLIC BLOOD PRESSURE: 91 MMHG

## 2025-02-07 PROCEDURE — 250N000013 HC RX MED GY IP 250 OP 250 PS 637: Performed by: INTERNAL MEDICINE

## 2025-02-07 PROCEDURE — 99239 HOSP IP/OBS DSCHRG MGMT >30: CPT | Performed by: INTERNAL MEDICINE

## 2025-02-07 RX ADMIN — CIPROFLOXACIN 500 MG: 500 TABLET ORAL at 08:07

## 2025-02-07 RX ADMIN — METOPROLOL TARTRATE 50 MG: 50 TABLET, FILM COATED ORAL at 08:05

## 2025-02-07 RX ADMIN — DULOXETINE HYDROCHLORIDE 20 MG: 20 CAPSULE, DELAYED RELEASE ORAL at 08:05

## 2025-02-07 RX ADMIN — TAMSULOSIN HYDROCHLORIDE 0.8 MG: 0.4 CAPSULE ORAL at 08:07

## 2025-02-07 ASSESSMENT — ACTIVITIES OF DAILY LIVING (ADL)
ADLS_ACUITY_SCORE: 66

## 2025-02-07 NOTE — PROGRESS NOTES
Care Management Discharge Note    Discharge Date: 02/07/2025       Discharge Disposition: Hospice    Discharge Services: None    Discharge DME:      Discharge Transportation: agency    Private pay costs discussed: private room/amenity fees and transportation costs    Does the patient's insurance plan have a 3 day qualifying hospital stay waiver?  No    PAS Confirmation Code:  [id number]  Patient/family educated on Medicare website which has current facility and service quality ratings:      Education Provided on the Discharge Plan: No  Persons Notified of Discharge Plans: Family   Patient/Family in Agreement with the Plan: yes    Handoff Referral Completed: No, handoff not indicated or clinically appropriate    Additional Information:  Patient discharging to [facility] for hospice services. Writer faxed orders to hospice and facility. Patient had [employer] transport ride arranged from [id number]. PAS was completed.     PAS-RR    D: Per DHS regulation, SW completed and submitted PAS-RR to [place] [employer] Direct Connect via the Senior LinkAge Line.  PAS-RR confirmation # is : [id number]    I: SW spoke with  and they are aware a PAS-RR has been submitted.  SW reviewed with  that they may be contacted for a follow up appointment within 10 days of hospital discharge if their SNF stay is < 30 days.  Contact information for Senior LinkAge Line was also provided.    A:  verbalized understanding.    P: Further questions may be directed to [facility] LinkAge Line at #[phone], option #4 for PAS-RR staff.     LAURA CUEVAS  [employer] [facility]  INPATIENT CARE COORDINATION       Detailed exam

## 2025-02-07 NOTE — PLAN OF CARE
Goal Outcome Evaluation:    Orientation: A&O x4; forgetful; [place]  Aggression Stop Light: Green  Activity: A2GBW, pivots to BSC   Diet/BS Checks: Regular  Tele: n/a  IV Access/Drains: L/R PIV SL  Pain Management: denies   Abnormal VS/Results: Deferred   Bowel/Bladder: Continent of bowel, 1 small BM this shift. Barbosa in place  Skin/Wounds: WDL  Consults: SW  D/C Disposition: 2/7: [facility] stretcher arranged for [id number]. Discharge medications ready.

## 2025-02-07 NOTE — DISCHARGE SUMMARY
[facility]  Hospitalist Discharge Summary      Date of Admission:  1/30/2025  Date of Discharge:  2/7/2025 10:45 AM  Discharging Provider: Kayla Villalba MD  Discharge Service: Hospitalist Service    Discharge Diagnoses     Milo Lozano is a 89 year old male with a history of type 2 diabetes, CML, hypertension, CKD, BPH, hypothyroidism, DEMOND, BPV, presented to the emergency room on 1/30 with weakness and history of falls got admitted after he had difficulty in getting off the toilet.  CT head without contrast in the ED showed small right subdural hematoma at 7mm  .  No mass effect or midline shift or hydrocephalus      Palliative care consultation requested regarding goals of care discussion.  Goals of care discussion was done with patient and his wife by [facility] and palliative care provider Adolfo Jessica.  Patient and family decided to transition to comfort cares only and plan on discharge with hospice.  Comfort care orders initiated on 1/31/2025  Social work consultation requested for hospice referral and discharge planning.  Called and discussed with wife [place] on 2/3/2025 that patient will not be able to come home he needs to discharge to a care facility with hospice as he is very weak and unable to stand up on his own.  This was also discussed with the patient and both patient and wife are agreeable now to go to care facility.   was updated, and are following.  Patient is medically ready for discharge.                 Small right subdural hematoma at 7 mm;  Repeat CT head showed stable subdural hematoma  Neurosurgery consulted and are following  PTA anticoagulation on hold  Patient is comfort care only     Right ear pain with possible infection;  Right ear wax impaction;  As per the wife patient had a complicated right ear infection which was treated in the past  Started having right ear pain 2 days ago.  On ear exam there is wax impaction and prominent tympanic  membrane  Started on Debrox eardrops  Patient was given IV ceftriaxone on admission  Started IV Cipro on 2/5/2025 to help with the ear infection  Switch to oral Cipro 500 mg p.o. daily per renal dosing on 2/6/2025.  Will give him 7-day course of antibiotics  Ear pain much improved with above treatment  Left lower lobe pneumonia  Patient admitted with weakness and CXR findings of pneumonia.   Started on rocephin and zithromax.  SARS/COVID/RSV negative.   Lactate is normal 1.4  Blood cultures X 2 ordered.   Given his immunocompromise state will treat empirically with antibiotics  He describes a cough but has no fevers and his history is somewhat limited in reference to respiratory symptom  White count currently 9 and procalcitonin normal 0.17   Continue Rocephin and Zithromax.'  His weakness may be overall decline, CML, treatment but pneumonia not excluded and will treat   Patient is comfort care only     Chronic myelomonocytic leukemia.   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 Prince.   Bone marrow biopsy 12/2024   Recently started on a new treatment that he receives for 5 days Monday through Friday 1 week a month  His wife is uncertain if he can tolerate this or if he wants to continue   will have hematology/oncology see the patient for potential discussion regarding his prognosis with the CML in addition to anticoagulation of family chose to pursue this but it sounds unlikely  1 unit PRBCs transfusion ordered on 131  Patient's wife signed blood transfusion consent at bedside on 1/31/2025     Atrial fibrillation with RVR (newly diagnosed) uncertain duration.   Nonischemic myocardial injury due to pneumonia, CKD stage III, from the effect of azacitidine  Newly diagnosed of uncertain duration   On propranolol PTA which could help with rate control and may have masked afib with rate control.   ECHO (no prior found)   Serial troponin first  elevatyed 111 >> 103. (NSTEMI vs ischemia) Trend down with delta change  Family aware of troponin levels but given his overall decline are unlikely to pursue any other invasive workup  Holding anticoagulation with history of falls prior to admission and generalized weakness and anemia and subdural hematoma on admission  Patient was in A-fib RVR overnight heart rate in the 120s to 140s.  One-time dose of IV digoxin 250 mcg given.  Patient converted to normal sinus rhythm with heart rate currently in the 80s  Also started on diltiazem drip  Cardiology consultation requested.  Appreciate assistance     Patient is transition to comfort cares only     Continue metoprolol 50 mg p.o. twice daily for better heart rate control even on hospice     Hyperkalemia   Potassium at 6   No overt EKG changes.   Stop cozaar  Repeat after fluids.   Addendum after Barbosa catheter placement, fluid bolus and Lasix potassium came down to 5.1  Continue to trend: Would not resume Cozaar  Potassium improved to 4.6  Comfort care only     DM:   Patient on amaryl 8 mg po am >> hold for now pending appetite  Glucophage XR 1000 mg po at dinner >> hold   Sliding scale coverage   Restart Amaryl low-dose at 1 mg p.o. daily  Comfort care only     Mild hereditary spherocytosis  On daily folate 1 mg po daily   History of occasional spherocytes on peripheral smear  Recent hematology notes in reference     Failure to thrive  Recent reported weight loss.   Recent balance issues reported  Noted last hematology visit with weight loss and change in eating habits.   In hematology clinic reports of balance issues with weight loss and anemia   1/30 his wife reports that the patient has had episodes of falling at home  Last was a few weeks ago and reportedly did not hurt himself or hit his head  Given balance problems and falls a CT of the head was ordered  PT/OT/case management  As noted will have a palliative care discussion about goals of care.  His wife is  concerned about his progressive weakness and not hardly getting out of bed  Palliative care consulted for goals of care discussion with patient and family.  Appreciate assistance  Patient and family were met with oncology and palliative care.  Patient is transition to comfort care only     CKD stage 3  Creatinine 2.61 which is slightly up on admission  Given fluids with 1 L given the poor p.o. intake   Also bladder scanned given history of urinary retention  Required menchaca and continue on fluids   Patient is transition to comfort care only     Urinary retention  History of urinary retention.  Greater than 450 in the ER with Menchaca catheter inserted for chronic retention  Urology consultation requested on 1/31/2025  He Menchaca in due to urinary retention issues and end-of-life care     History of prostate cancer  Treated with XRT 2007     Pain   Lyrica 75 mg po BID      DEMOND:   On CPAP               Diet: Regular Diet Adult  Room Service  Diet    DVT Prophylaxis: Deferred as patient is comfort cares only  Menchaca Catheter: PRESENT, indication: ?  (Error. Value could not be saved.), Acute retention or obstruction  Lines: None     Cardiac Monitoring: None  Code Status: No CPR- Do NOT Intubate          Clinically Significant Risk Factors               # Hypoalbuminemia: Lowest albumin = 3.1 g/dL at 1/31/2025  6:19 AM, will monitor as appropriate     # Hypertension: Noted on problem list                   # Financial/Environmental Concerns: none                Social Drivers of Health       Physical Activity: Inactive (10/21/2024)     Exercise Vital Sign      Days of Exercise per Week: 0 days      Minutes of Exercise per Session: 0 min   Social Connections: Unknown (10/21/2024)     Social Connection and Isolation Panel [NHANES]      Frequency of Social Gatherings with Friends and Family: Once a week               Disposition Plan        Medically Ready for Discharge: Ready Now        Discharge to long-term care facility with  hospice             Wife [place] was updated at bedside on 2/7/25      Kayla Villalba MD  Hospitalist Service  [facility]  Securely message with [employer] (more info)  Text page via [employer] Paging/Directory   Clinically Significant Risk Factors          Follow-ups Needed After Discharge   Follow-up Appointments       Follow Up and recommended labs and tests      F/u with Hospice team PRN                Unresulted Labs Ordered in the Past 30 Days of this Admission       No orders found from 12/31/2024 to 1/31/2025.            Discharge Disposition   Discharged to long-term care facility  Condition at discharge: Stable        Consultations This Hospital Stay   CARDIOLOGY IP CONSULT  HEMATOLOGY IP CONSULT  PHYSICAL THERAPY ADULT IP CONSULT  OCCUPATIONAL THERAPY ADULT IP CONSULT  CARE MANAGEMENT / SOCIAL WORK IP CONSULT  NUTRITION SERVICES ADULT IP CONSULT  PALLIATIVE CARE ADULT IP CONSULT  NEUROSURGERY IP CONSULT  CARDIOLOGY IP CONSULT  UROLOGY IP CONSULT  CARE MANAGEMENT / SOCIAL WORK IP CONSULT  CARE MANAGEMENT / SOCIAL WORK IP CONSULT    Code Status   No CPR- Do NOT Intubate    Time Spent on this Encounter   I, Kayla Villalba MD, personally saw the patient today and spent greater than 30 minutes discharging this patient.       Kayla Villalba MD  [place] [id number] [facility]  [address] [place] [address], [address]  [facility] [address]  Phone: [phone]  ______________________________________________________________________    Physical Exam   Vital Signs: Temp: 97.8  F (36.6  C) Temp src: Oral BP: (!) 155/91 Pulse: 81   Resp: 16 SpO2: 98 % O2 Device: None (Room air)    Weight: 164 lbs 0 oz  General Appearance:  Alert awake, resting comfortably in chair, not in acute distress  Respiratory: Clear to auscultation bilaterally, no crackles or wheezing  Cardiovascular: Normal rate rhythm regular, no murmurs  GI: Soft, nontender nondistended bowel sounds positive  Skin: Warm and dry no lower extremity  edema  Psychiatric; calm and cooperative          Primary Care Physician   Aj Garces    Discharge Orders      CT Head w/o Contrast     Hospice Referral      General info for SNF    Length of Stay Estimate: Long Term Care  Condition at Discharge: Declining  Level of care:board and care  Rehabilitation Potential: Poor  Admission H&P remains valid and up-to-date: Yes  Recent Chemotherapy: N/A  Use Nursing Home Standing Orders: no pt is comfort care only     Mantoux instructions    Give two-step Mantoux (PPD) Per Facility Policy Yes     Follow Up and recommended labs and tests    F/u with Hospice team PRN     Reason for your hospital stay    Pt with CMML with Pneuonia and weakness and AFIB RVR. Transitioned to comfort care only     Barbosa catheter    To straight gravity drainage. Change catheter every 2 weeks and PRN for leaking or decreased urine output with signs of bladder distention.     Activity - Up with nursing assistance     No CPR- Do NOT Intubate    Comfort care only     Fall precautions     Diet    Follow this diet upon discharge: Current Diet:Orders Placed This Encounter      Room Service      Regular Diet Adult       Significant Results and Procedures   Most Recent 3 CBC's:  Recent Labs   Lab Test 01/31/25  0619 01/30/25  1444 01/28/25  1216   WBC 7.6 9.1 13.8*   HGB 7.2* 8.5* 8.4*   MCV 75* 75* 77*   * 154 111*     Most Recent 3 BMP's:  Recent Labs   Lab Test 01/31/25  0755 01/31/25  0619 01/30/25  2151 01/30/25  2130 01/30/25  1645 01/30/25  1444   NA  --  135  --   --  133* 133*   POTASSIUM  --  4.6  --  5.1 6.0* 6.0*   CHLORIDE  --  103  --   --  98 97*   CO2  --  18*  --   --  21* 22   BUN  --  53.6*  --   --  61.0* 59.9*   CR  --  1.96*  --   --  2.61* 2.62*   ANIONGAP  --  14  --   --  14 14   DUSTY  --  8.5*  --   --  8.9 9.2   * 196* 210*  --  238* 256*     Most Recent 2 LFT's:  Recent Labs   Lab Test 01/31/25  0619 01/30/25  1444   AST 21 22   ALT 18 21   ALKPHOS 90 108   BILITOTAL  0.5 1.2     Most Recent 3 INR's:No lab results found.  Most Recent INR's and Anticoagulation Dosing History:  Anticoagulation Dose History           No data to display              Most Recent 3 Creatinines:  Recent Labs   Lab Test 01/31/25  0619 01/30/25  1645 01/30/25  1444   CR 1.96* 2.61* 2.62*     Most Recent 3 Hemoglobins:  Recent Labs   Lab Test 01/31/25  0619 01/30/25  1444 01/28/25  1216   HGB 7.2* 8.5* 8.4*     Most Recent 3 Troponin's:No lab results found.  Most Recent 3 BNP's:  Recent Labs   Lab Test 01/30/25  1444   NTBNPI 2,790*     Most Recent D-dimer:No lab results found.  Most Recent Cholesterol Panel:  Recent Labs   Lab Test 07/22/24  1310   CHOL 97   LDL 51   HDL 24*   TRIG 110     7-Day Micro Results       No results found for the last 168 hours.          Most Recent TSH and T4:  Recent Labs   Lab Test 01/30/25  1444   TSH 10.47*   T4 1.58     Most Recent Hemoglobin A1c:  Recent Labs   Lab Test 01/30/25  1444   A1C 6.4*     Most Recent 6 glucoses:  Recent Labs   Lab Test 01/31/25  0755 01/31/25  0619 01/30/25  2151 01/30/25  1645 01/30/25  1444 01/13/25  1245   * 196* 210* 238* 256* 212*     Most Recent Urinalysis:  Recent Labs   Lab Test 01/30/25  1641 06/11/24  1315 10/10/23  1506   COLOR Yellow   < > Yellow   APPEARANCE Clear   < > Clear   URINEGLC Negative   < > 500*   URINEBILI Negative   < > Negative   URINEKETONE Negative   < > Negative   SG 1.021   < > 1.010   UBLD Negative   < > Trace*   URINEPH 5.5   < > 5.5   PROTEIN 70*   < > 30*   UROBILINOGEN  --   --  0.2   NITRITE Negative   < > Negative   LEUKEST Negative   < > Negative   RBCU 1   < > 0-2   WBCU 2   < > 0-5    < > = values in this interval not displayed.     Most Recent ABG:No lab results found.  Most Recent ESR & CRP:  Recent Labs   Lab Test 06/11/24  1324   CRPI 9.67*     Most Recent Anemia Panel:  Recent Labs   Lab Test 01/31/25  0619 06/11/24  1324 05/28/24  1645 04/11/24  1333 08/23/22  1058 05/09/22  0930   WBC 7.6    < >  --  17.0*   < > 8.5   HGB 7.2*   < >  --  10.7*   < > 12.1*   HCT 23.4*   < >  --  34.5*   < > 36.0*   MCV 75*   < >  --  85   < > 93   *   < >  --  84*   < > 67*   IRON  --   --   --  27*   < > 84   IRONSAT  --   --   --  12*   < > 32   RETICABSCT  --   --   --   --   --  0.298*   RETP  --   --   --   --   --  7.7*   FEB  --   --   --  228*   < > 265   DEEPTHI  --   --   --  403   < > 373   B12  --   --  848  --   --  650   FOLIC  --   --   --   --   --  53.8    < > = values in this interval not displayed.     Most Recent CPK:No lab results found.,   Results for orders placed or performed during the hospital encounter of 01/30/25   XR Chest 2 Views    Narrative    EXAM: XR CHEST 2 VIEWS  LOCATION: [facility]  DATE: 1/30/2025    INDICATION: Fever.  COMPARISON: None.      Impression    IMPRESSION:   Left lower lobe infiltrate. Question some left pleural fluid.   CT Head w/o Contrast    Narrative    EXAM: CT HEAD W/O CONTRAST  LOCATION: [facility]  DATE: 1/31/2025    INDICATION: falls  COMPARISON: 5/24/2019.  TECHNIQUE: Routine CT Head without IV contrast. Multiplanar reformats. Dose reduction techniques were used.    FINDINGS:  INTRACRANIAL CONTENTS: There is a mixed attenuated right lateral subdural hematoma with a width of approximately 7 mm. There is no other extra-axial fluid collection or intraparenchymal hemorrhage. There is no evidence of a midline shift shift. No CT   evidence of acute infarct. Mild presumed chronic small vessel ischemic changes. The ventricular system, basal cisterns and the cortical sulci are consistent with mild diffuse volume loss.     VISUALIZED ORBITS/SINUSES/MASTOIDS: No intraorbital abnormality. There are moderate air-fluid levels within the maxillary sinuses bilaterally and mild mucosal thickening of the ethmoid air cells. There is fluid filling the right mastoid air cells and the   right middle ear regions.     BONES/SOFT  TISSUES: The calvarium is intact.      Impression    IMPRESSION:  1.  Mixed attenuated subdural hematoma right lateral 7 mm.  2.  Mild diffuse age related changes.   CT Head w/o Contrast    Narrative    EXAM: CT HEAD W/O CONTRAST  LOCATION: [facility]  DATE: 1/31/2025    INDICATION: SDH interval assesment  COMPARISON: 01/31/2025 at 0025 hours.  TECHNIQUE: Routine CT Head without IV contrast. Multiplanar reformats. Dose reduction techniques were used.    FINDINGS:  INTRACRANIAL CONTENTS: Right inferior frontal extra-axial fluid collection measures 6 mm, similar to prior. No midline shift or mass effect.  No CT evidence of acute infarct. Moderate presumed chronic small vessel ischemic changes. Normal ventricles and   sulci.     VISUALIZED ORBITS/SINUSES/MASTOIDS: No intraorbital abnormality. Moderate mucosal thickening scattered about the paranasal sinuses. Complete/near complete opacification of the right and scattered opacification of the left mastoid air cells.    BONES/SOFT TISSUES: No acute abnormality.      Impression    IMPRESSION: Similar right subdural hematoma. No mass effect, midline shift or hydrocephalus.       Discharge Medications   Discharge Medication List as of 2/7/2025  8:34 AM        START taking these medications    Details   acetaminophen (TYLENOL) 325 MG tablet Take 2 tablets (650 mg) by mouth every 4 hours as needed for mild pain or other (and adjunct with moderate or severe pain or per patient request)., Disp-30 tablet, R-0, E-Prescribe      bisacodyl (DULCOLAX) 10 MG suppository Place 1 suppository (10 mg) rectally once as needed for other (for no bowel movement for 72 hours)., Disp-30 suppository, R-0, E-Prescribe      ciprofloxacin (CIPRO) 500 MG tablet Take 1 tablet (500 mg) by mouth daily for 5 days., Disp-5 tablet, R-0, E-Prescribe      LORazepam (ATIVAN) 1 MG tablet Place 1 tablet (1 mg) under the tongue every 3 hours as needed for anxiety., Disp-10 tablet,  R-0, E-Prescribe      metoprolol tartrate (LOPRESSOR) 50 MG tablet Take 1 tablet (50 mg) by mouth 2 times daily., Disp-60 tablet, R-0, E-Prescribe      morphine sulfate, high concentrate, (ROXANOL-CONCENTRATED) 20 MG/ML concentrated solution Take 0.25 mLs (5 mg) by mouth every 2 hours as needed for shortness of breath or moderate pain., Disp-5 mL, R-0, E-Prescribe      OLANZapine zydis (ZYPREXA) 5 MG ODT Take 1 tablet (5 mg) by mouth every 6 hours as needed for other (delirium)., Disp-15 tablet, R-0, E-Prescribe      sennosides (SENOKOT) 8.6 MG tablet Take 1 tablet by mouth 2 times daily as needed for constipation., Disp-30 tablet, R-0, E-Prescribe           CONTINUE these medications which have NOT CHANGED    Details   DULoxetine (CYMBALTA) 20 MG capsule Take 1 capsule (20 mg) by mouth daily., Disp-90 capsule, R-0, E-Prescribe      pregabalin (LYRICA) 75 MG capsule TAKE ONE CAPSULE BY MOUTH TWICE DAILY. GENERIC EQUIVALENT FOR LYRICA, Disp-180 capsule, R-3, E-Prescribe      tamsulosin (FLOMAX) 0.4 MG capsule TAKE 2 CAPSULES BY MOUTH EVERY DAY, Disp-180 capsule, R-3, E-Prescribe           STOP taking these medications       amLODIPine (NORVASC) 10 MG tablet Comments:   Reason for Stopping:         folic acid (FOLVITE) 1 MG tablet Comments:   Reason for Stopping:         glimepiride (AMARYL) 4 MG tablet Comments:   Reason for Stopping:         levothyroxine (SYNTHROID/LEVOTHROID) 88 MCG tablet Comments:   Reason for Stopping:         losartan (COZAAR) 100 MG tablet Comments:   Reason for Stopping:         metFORMIN (GLUCOPHAGE XR) 500 MG 24 hr tablet Comments:   Reason for Stopping:         oxyCODONE (ROXICODONE INTENSOL) 20 mg/mL (HIGH CONC) solution Comments:   Reason for Stopping:         propranolol ER (INDERAL LA) 80 MG 24 hr capsule Comments:   Reason for Stopping:             Allergies   Allergies   Allergen Reactions    Lisinopril Cough     Developed an ACE cough on the Lisinopril, pt denies this is a current  allergy as of 6/19/2020.

## 2025-02-07 NOTE — PLAN OF CARE
2/6/25 -- [id number]    Orientation: A&O x4; forgetful; [place]  Aggression Stop Light: Green  Activity: A1-2 GBW, pivots to BSC   Diet/BS Checks: Regular  Tele: n/a  IV Access/Drains: 2 PIV SL  Pain Management: denies   Abnormal VS/Results: Deferred   Bowel/Bladder: Continent of bowel. Barbosa in place.  Skin/Wounds: Blanchable redness to coccyx  Consults: SW  D/C Disposition: Tomorrow (2/7) to [facility] on Hospice. [facility] stretcher ride set up for [id number].    Other Info:   - Discharge meds in drawer

## 2025-02-07 NOTE — PROGRESS NOTES
Discharge Note    Patient discharged to Nursing Home via EMS/BLS accompanied by significant other .  IV: Discontinued  Prescriptions filled and given to patient/family.   Belongings reviewed and sent with patient.   Home medications returned to patient: N/A  Equipment sent with: N/A  patient verbalizes understanding of discharge instructions. AVS given to patient.

## 2025-03-04 ENCOUNTER — TELEPHONE (OUTPATIENT)
Dept: NEUROSURGERY | Facility: CLINIC | Age: 89
End: 2025-03-04
Payer: COMMERCIAL

## 2025-03-04 NOTE — TELEPHONE ENCOUNTER
Patient had follow up for SDH but currently admitted in hospital so unable to come.     Called patient to discuss. LVM to call back.

## (undated) DEVICE — NDL BX BONE MARROW 11GA 4"

## (undated) DEVICE — TRAY BONE MARROW BIOPSY ASC 640 31-0097A

## (undated) RX ORDER — ACETAMINOPHEN 325 MG/1
TABLET ORAL
Status: DISPENSED
Start: 2024-12-03